# Patient Record
Sex: FEMALE
[De-identification: names, ages, dates, MRNs, and addresses within clinical notes are randomized per-mention and may not be internally consistent; named-entity substitution may affect disease eponyms.]

---

## 2019-05-17 ENCOUNTER — HOSPITAL ENCOUNTER (OUTPATIENT)
Dept: HOSPITAL 31 - C.MAMMO | Age: 59
End: 2019-05-17
Payer: COMMERCIAL

## 2019-05-17 DIAGNOSIS — Z12.31: Primary | ICD-10-CM

## 2024-06-02 ENCOUNTER — HOSPITAL ENCOUNTER (INPATIENT)
Facility: HOSPITAL | Age: 64
Discharge: NURSING, REHAB OR LONG-TERM CARE | End: 2024-06-02
Attending: STUDENT IN AN ORGANIZED HEALTH CARE EDUCATION/TRAINING PROGRAM | Admitting: EMERGENCY MEDICINE
Payer: MEDICARE

## 2024-06-02 ENCOUNTER — APPOINTMENT (OUTPATIENT)
Dept: CARDIOLOGY | Facility: HOSPITAL | Age: 64
End: 2024-06-02

## 2024-06-02 DIAGNOSIS — G62.9 NEUROPATHY: ICD-10-CM

## 2024-06-02 DIAGNOSIS — I10 HYPERTENSION, UNSPECIFIED TYPE: ICD-10-CM

## 2024-06-02 DIAGNOSIS — Z13.9 ENCOUNTER FOR MEDICAL SCREENING EXAMINATION: ICD-10-CM

## 2024-06-02 DIAGNOSIS — L85.3 DRY SKIN: ICD-10-CM

## 2024-06-02 DIAGNOSIS — R73.9 HYPERGLYCEMIA: ICD-10-CM

## 2024-06-02 DIAGNOSIS — K59.00 CONSTIPATION, UNSPECIFIED CONSTIPATION TYPE: ICD-10-CM

## 2024-06-02 DIAGNOSIS — N17.0 ACUTE RENAL FAILURE WITH ACUTE TUBULAR NECROSIS SUPERIMPOSED ON STAGE 3B CHRONIC KIDNEY DISEASE (MULTI): ICD-10-CM

## 2024-06-02 DIAGNOSIS — R06.00 DYSPNEA, UNSPECIFIED: ICD-10-CM

## 2024-06-02 DIAGNOSIS — R53.1 WEAKNESS: ICD-10-CM

## 2024-06-02 DIAGNOSIS — J96.02 ACUTE RESPIRATORY FAILURE WITH HYPOXIA AND HYPERCAPNIA (MULTI): ICD-10-CM

## 2024-06-02 DIAGNOSIS — H40.9 GLAUCOMA, UNSPECIFIED GLAUCOMA TYPE, UNSPECIFIED LATERALITY: ICD-10-CM

## 2024-06-02 DIAGNOSIS — Z78.9 UNABLE TO CARE FOR SELF: ICD-10-CM

## 2024-06-02 DIAGNOSIS — Z79.4 TYPE 2 DIABETES MELLITUS WITH HYPERGLYCEMIA, WITH LONG-TERM CURRENT USE OF INSULIN (MULTI): ICD-10-CM

## 2024-06-02 DIAGNOSIS — E11.65 TYPE 2 DIABETES MELLITUS WITH HYPERGLYCEMIA, WITH LONG-TERM CURRENT USE OF INSULIN (MULTI): ICD-10-CM

## 2024-06-02 DIAGNOSIS — N19 RENAL FAILURE, UNSPECIFIED CHRONICITY: ICD-10-CM

## 2024-06-02 DIAGNOSIS — N18.32 ACUTE RENAL FAILURE WITH ACUTE TUBULAR NECROSIS SUPERIMPOSED ON STAGE 3B CHRONIC KIDNEY DISEASE (MULTI): ICD-10-CM

## 2024-06-02 DIAGNOSIS — B37.9 CANDIDIASIS: ICD-10-CM

## 2024-06-02 DIAGNOSIS — K76.0 NAFL (NONALCOHOLIC FATTY LIVER): Primary | ICD-10-CM

## 2024-06-02 DIAGNOSIS — J96.01 ACUTE RESPIRATORY FAILURE WITH HYPOXIA AND HYPERCAPNIA (MULTI): ICD-10-CM

## 2024-06-02 DIAGNOSIS — I73.9 PERIPHERAL VASCULAR DISEASE (CMS-HCC): ICD-10-CM

## 2024-06-02 LAB
ALBUMIN SERPL-MCNC: 3.1 G/DL (ref 3.5–5)
ALP BLD-CCNC: 186 U/L (ref 35–125)
ALT SERPL-CCNC: 13 U/L (ref 5–40)
ANION GAP SERPL CALC-SCNC: 10 MMOL/L
AST SERPL-CCNC: 20 U/L (ref 5–40)
BASOPHILS # BLD AUTO: 0.03 X10*3/UL (ref 0–0.1)
BASOPHILS NFR BLD AUTO: 0.5 %
BILIRUB SERPL-MCNC: 0.4 MG/DL (ref 0.1–1.2)
BUN SERPL-MCNC: 46 MG/DL (ref 8–25)
CALCIUM SERPL-MCNC: 8.3 MG/DL (ref 8.5–10.4)
CHLORIDE SERPL-SCNC: 99 MMOL/L (ref 97–107)
CO2 SERPL-SCNC: 26 MMOL/L (ref 24–31)
CREAT SERPL-MCNC: 2.1 MG/DL (ref 0.4–1.6)
EGFRCR SERPLBLD CKD-EPI 2021: 26 ML/MIN/1.73M*2
EOSINOPHIL # BLD AUTO: 0.12 X10*3/UL (ref 0–0.7)
EOSINOPHIL NFR BLD AUTO: 1.8 %
ERYTHROCYTE [DISTWIDTH] IN BLOOD BY AUTOMATED COUNT: 20.9 % (ref 11.5–14.5)
GLUCOSE SERPL-MCNC: 453 MG/DL (ref 65–99)
HCT VFR BLD AUTO: 25.8 % (ref 36–46)
HGB BLD-MCNC: 7.8 G/DL (ref 12–16)
IMM GRANULOCYTES # BLD AUTO: 0.05 X10*3/UL (ref 0–0.7)
IMM GRANULOCYTES NFR BLD AUTO: 0.8 % (ref 0–0.9)
LYMPHOCYTES # BLD AUTO: 0.86 X10*3/UL (ref 1.2–4.8)
LYMPHOCYTES NFR BLD AUTO: 12.9 %
MCH RBC QN AUTO: 27.1 PG (ref 26–34)
MCHC RBC AUTO-ENTMCNC: 30.2 G/DL (ref 32–36)
MCV RBC AUTO: 90 FL (ref 80–100)
MONOCYTES # BLD AUTO: 0.65 X10*3/UL (ref 0.1–1)
MONOCYTES NFR BLD AUTO: 9.8 %
NEUTROPHILS # BLD AUTO: 4.94 X10*3/UL (ref 1.2–7.7)
NEUTROPHILS NFR BLD AUTO: 74.2 %
NRBC BLD-RTO: 0 /100 WBCS (ref 0–0)
PLATELET # BLD AUTO: 182 X10*3/UL (ref 150–450)
POLYCHROMASIA BLD QL SMEAR: NORMAL
POTASSIUM SERPL-SCNC: 4.8 MMOL/L (ref 3.4–5.1)
PROT SERPL-MCNC: 7 G/DL (ref 5.9–7.9)
RBC # BLD AUTO: 2.88 X10*6/UL (ref 4–5.2)
RBC MORPH BLD: NORMAL
SODIUM SERPL-SCNC: 135 MMOL/L (ref 133–145)
WBC # BLD AUTO: 6.7 X10*3/UL (ref 4.4–11.3)

## 2024-06-02 PROCEDURE — 83036 HEMOGLOBIN GLYCOSYLATED A1C: CPT | Performed by: EMERGENCY MEDICINE

## 2024-06-02 PROCEDURE — 99285 EMERGENCY DEPT VISIT HI MDM: CPT

## 2024-06-02 PROCEDURE — 93010 ELECTROCARDIOGRAM REPORT: CPT | Performed by: INTERNAL MEDICINE

## 2024-06-02 PROCEDURE — 80053 COMPREHEN METABOLIC PANEL: CPT | Performed by: STUDENT IN AN ORGANIZED HEALTH CARE EDUCATION/TRAINING PROGRAM

## 2024-06-02 PROCEDURE — 84443 ASSAY THYROID STIM HORMONE: CPT | Performed by: EMERGENCY MEDICINE

## 2024-06-02 PROCEDURE — 2500000002 HC RX 250 W HCPCS SELF ADMINISTERED DRUGS (ALT 637 FOR MEDICARE OP, ALT 636 FOR OP/ED): Performed by: STUDENT IN AN ORGANIZED HEALTH CARE EDUCATION/TRAINING PROGRAM

## 2024-06-02 PROCEDURE — 85025 COMPLETE CBC W/AUTO DIFF WBC: CPT | Performed by: STUDENT IN AN ORGANIZED HEALTH CARE EDUCATION/TRAINING PROGRAM

## 2024-06-02 PROCEDURE — 36415 COLL VENOUS BLD VENIPUNCTURE: CPT | Performed by: STUDENT IN AN ORGANIZED HEALTH CARE EDUCATION/TRAINING PROGRAM

## 2024-06-02 PROCEDURE — 86140 C-REACTIVE PROTEIN: CPT | Performed by: EMERGENCY MEDICINE

## 2024-06-02 PROCEDURE — 93005 ELECTROCARDIOGRAM TRACING: CPT

## 2024-06-02 RX ORDER — METOPROLOL SUCCINATE 50 MG/1
50 TABLET, EXTENDED RELEASE ORAL DAILY
Status: ON HOLD | COMMUNITY

## 2024-06-02 RX ORDER — LOSARTAN POTASSIUM 50 MG/1
50 TABLET ORAL DAILY
Status: ON HOLD | COMMUNITY

## 2024-06-02 RX ORDER — HYDROCORTISONE 25 MG/G
1 CREAM TOPICAL DAILY
Status: ON HOLD | COMMUNITY

## 2024-06-02 RX ORDER — ASCORBIC ACID 500 MG
500 TABLET ORAL DAILY
Status: ON HOLD | COMMUNITY

## 2024-06-02 RX ORDER — ROSUVASTATIN CALCIUM 5 MG/1
5 TABLET, COATED ORAL DAILY
Status: ON HOLD | COMMUNITY

## 2024-06-02 RX ORDER — INSULIN LISPRO 100 [IU]/ML
INJECTION, SOLUTION INTRAVENOUS; SUBCUTANEOUS
Status: ON HOLD | COMMUNITY

## 2024-06-02 RX ORDER — NITROFURANTOIN 25; 75 MG/1; MG/1
100 CAPSULE ORAL 2 TIMES DAILY
Status: ON HOLD | COMMUNITY
End: 2024-06-04

## 2024-06-02 RX ORDER — FERROUS SULFATE 325(65) MG
325 TABLET ORAL
Status: ON HOLD | COMMUNITY

## 2024-06-02 RX ORDER — INSULIN LISPRO 100 [IU]/ML
10 INJECTION, SOLUTION INTRAVENOUS; SUBCUTANEOUS ONCE
Status: COMPLETED | OUTPATIENT
Start: 2024-06-02 | End: 2024-06-02

## 2024-06-02 RX ORDER — AMLODIPINE BESYLATE 10 MG/1
10 TABLET ORAL DAILY
Status: ON HOLD | COMMUNITY

## 2024-06-02 RX ORDER — FUROSEMIDE 40 MG/1
40 TABLET ORAL DAILY
Status: ON HOLD | COMMUNITY

## 2024-06-02 RX ORDER — GABAPENTIN 300 MG/1
300 CAPSULE ORAL 3 TIMES DAILY
Status: ON HOLD | COMMUNITY

## 2024-06-02 RX ORDER — PANTOPRAZOLE SODIUM 40 MG/1
40 TABLET, DELAYED RELEASE ORAL
Status: ON HOLD | COMMUNITY

## 2024-06-02 RX ORDER — LEVOTHYROXINE SODIUM 75 UG/1
75 TABLET ORAL DAILY
Status: ON HOLD | COMMUNITY

## 2024-06-02 RX ORDER — INSULIN LISPRO 100 [IU]/ML
5 INJECTION, SOLUTION INTRAVENOUS; SUBCUTANEOUS
Status: ON HOLD | COMMUNITY

## 2024-06-02 RX ADMIN — INSULIN LISPRO 10 UNITS: 100 INJECTION, SOLUTION INTRAVENOUS; SUBCUTANEOUS at 23:56

## 2024-06-02 ASSESSMENT — PAIN - FUNCTIONAL ASSESSMENT: PAIN_FUNCTIONAL_ASSESSMENT: 0-10

## 2024-06-02 ASSESSMENT — PAIN SCALES - GENERAL: PAINLEVEL_OUTOF10: 0 - NO PAIN

## 2024-06-02 ASSESSMENT — COLUMBIA-SUICIDE SEVERITY RATING SCALE - C-SSRS
2. HAVE YOU ACTUALLY HAD ANY THOUGHTS OF KILLING YOURSELF?: NO
6. HAVE YOU EVER DONE ANYTHING, STARTED TO DO ANYTHING, OR PREPARED TO DO ANYTHING TO END YOUR LIFE?: NO
1. IN THE PAST MONTH, HAVE YOU WISHED YOU WERE DEAD OR WISHED YOU COULD GO TO SLEEP AND NOT WAKE UP?: NO

## 2024-06-03 ENCOUNTER — APPOINTMENT (OUTPATIENT)
Dept: CARDIOLOGY | Facility: HOSPITAL | Age: 64
End: 2024-06-03
Payer: MEDICARE

## 2024-06-03 ENCOUNTER — APPOINTMENT (OUTPATIENT)
Dept: RADIOLOGY | Facility: HOSPITAL | Age: 64
End: 2024-06-03
Payer: MEDICARE

## 2024-06-03 PROBLEM — I73.9 PERIPHERAL VASCULAR DISEASE (CMS-HCC): Status: ACTIVE | Noted: 2024-06-03

## 2024-06-03 PROBLEM — E11.9 TYPE 2 DIABETES MELLITUS (MULTI): Status: ACTIVE | Noted: 2024-06-03

## 2024-06-03 PROBLEM — Z78.9 UNABLE TO CARE FOR SELF: Status: ACTIVE | Noted: 2024-06-03

## 2024-06-03 PROBLEM — I10 HYPERTENSION: Status: ACTIVE | Noted: 2024-06-03

## 2024-06-03 PROBLEM — F69 BEHAVIOR PROBLEM, ADULT: Status: ACTIVE | Noted: 2024-06-03

## 2024-06-03 PROBLEM — L89.153 PRESSURE INJURY OF SACRAL REGION, STAGE 3 (MULTI): Status: ACTIVE | Noted: 2024-06-03

## 2024-06-03 PROBLEM — R53.1 WEAKNESS: Status: ACTIVE | Noted: 2024-06-03

## 2024-06-03 PROBLEM — N19 RENAL FAILURE: Status: ACTIVE | Noted: 2024-06-03

## 2024-06-03 PROBLEM — E66.01 MORBID OBESITY (MULTI): Status: ACTIVE | Noted: 2024-06-03

## 2024-06-03 LAB
ALBUMIN SERPL-MCNC: 3 G/DL (ref 3.5–5)
AMPHETAMINES UR QL SCN>1000 NG/ML: NEGATIVE
ANION GAP SERPL CALC-SCNC: 10 MMOL/L
APPARATUS: ABNORMAL
APPEARANCE UR: ABNORMAL
ARTERIAL PATENCY WRIST A: POSITIVE
BACTERIA #/AREA URNS AUTO: ABNORMAL /HPF
BARBITURATES UR QL SCN>300 NG/ML: NEGATIVE
BASE EXCESS BLDA CALC-SCNC: 0.8 MMOL/L (ref -2–3)
BENZODIAZ UR QL SCN>300 NG/ML: NEGATIVE
BILIRUB UR STRIP.AUTO-MCNC: NEGATIVE MG/DL
BODY TEMPERATURE: 37 DEGREES CELSIUS
BUN SERPL-MCNC: 42 MG/DL (ref 8–25)
BZE UR QL SCN>300 NG/ML: NEGATIVE
CALCIUM SERPL-MCNC: 8.1 MG/DL (ref 8.5–10.4)
CANNABINOIDS UR QL SCN>50 NG/ML: NEGATIVE
CHLORIDE SERPL-SCNC: 101 MMOL/L (ref 97–107)
CK SERPL-CCNC: 72 U/L (ref 24–195)
CO2 SERPL-SCNC: 26 MMOL/L (ref 24–31)
COLOR UR: YELLOW
CREAT SERPL-MCNC: 2 MG/DL (ref 0.4–1.6)
CRP SERPL-MCNC: 3.6 MG/DL (ref 0–2)
EGFRCR SERPLBLD CKD-EPI 2021: 27 ML/MIN/1.73M*2
EST. AVERAGE GLUCOSE BLD GHB EST-MCNC: 169 MG/DL
FENTANYL+NORFENTANYL UR QL SCN: NEGATIVE
FERRITIN SERPL-MCNC: 330 NG/ML (ref 13–150)
GLUCOSE BLD MANUAL STRIP-MCNC: 247 MG/DL (ref 74–99)
GLUCOSE BLD MANUAL STRIP-MCNC: 265 MG/DL (ref 74–99)
GLUCOSE BLD MANUAL STRIP-MCNC: 269 MG/DL (ref 74–99)
GLUCOSE BLD MANUAL STRIP-MCNC: 423 MG/DL (ref 74–99)
GLUCOSE BLD MANUAL STRIP-MCNC: 441 MG/DL (ref 74–99)
GLUCOSE SERPL-MCNC: 379 MG/DL (ref 65–99)
GLUCOSE UR STRIP.AUTO-MCNC: ABNORMAL MG/DL
HBA1C MFR BLD: 7.5 %
HCO3 BLDA-SCNC: 28.5 MMOL/L (ref 22–26)
INHALED O2 CONCENTRATION: 28 %
IRON SATN MFR SERPL: 21 % (ref 12–50)
IRON SERPL-MCNC: 40 UG/DL (ref 30–160)
KETONES UR STRIP.AUTO-MCNC: NEGATIVE MG/DL
LEUKOCYTE ESTERASE UR QL STRIP.AUTO: ABNORMAL
METHADONE UR QL SCN>300 NG/ML: NEGATIVE
NITRITE UR QL STRIP.AUTO: NEGATIVE
OPIATES UR QL SCN>300 NG/ML: NEGATIVE
OXYCODONE UR QL: NEGATIVE
OXYHGB MFR BLDA: ABNORMAL %
PCO2 BLDA: 58 MM HG (ref 38–42)
PCP UR QL SCN>25 NG/ML: NEGATIVE
PH BLDA: 7.3 PH (ref 7.38–7.42)
PH UR STRIP.AUTO: 8 [PH]
PHOSPHATE SERPL-MCNC: 4.3 MG/DL (ref 2.5–4.5)
PO2 BLDA: 103 MM HG (ref 85–95)
POTASSIUM SERPL-SCNC: 4.6 MMOL/L (ref 3.4–5.1)
PROT UR STRIP.AUTO-MCNC: ABNORMAL MG/DL
RBC # UR STRIP.AUTO: NEGATIVE /UL
RBC #/AREA URNS AUTO: ABNORMAL /HPF
SAO2 % BLDA: ABNORMAL %
SODIUM SERPL-SCNC: 137 MMOL/L (ref 133–145)
SP GR UR STRIP.AUTO: 1.02
SPECIMEN DRAWN FROM PATIENT: ABNORMAL
T3FREE SERPL-MCNC: 2.2 PG/ML (ref 2.3–4.2)
TIBC SERPL-MCNC: 193 UG/DL (ref 228–428)
TRI-PHOS CRY #/AREA UR COMP ASSIST: ABNORMAL /HPF
TSH SERPL DL<=0.05 MIU/L-ACNC: 7.13 MIU/L (ref 0.27–4.2)
UIBC SERPL-MCNC: 153 UG/DL (ref 110–370)
UROBILINOGEN UR STRIP.AUTO-MCNC: NORMAL MG/DL
WBC #/AREA URNS AUTO: >50 /HPF
WBC CLUMPS #/AREA URNS AUTO: ABNORMAL /HPF

## 2024-06-03 PROCEDURE — 2500000002 HC RX 250 W HCPCS SELF ADMINISTERED DRUGS (ALT 637 FOR MEDICARE OP, ALT 636 FOR OP/ED): Performed by: EMERGENCY MEDICINE

## 2024-06-03 PROCEDURE — 97535 SELF CARE MNGMENT TRAINING: CPT | Mod: GO

## 2024-06-03 PROCEDURE — 94760 N-INVAS EAR/PLS OXIMETRY 1: CPT

## 2024-06-03 PROCEDURE — 83540 ASSAY OF IRON: CPT | Performed by: INTERNAL MEDICINE

## 2024-06-03 PROCEDURE — 99222 1ST HOSP IP/OBS MODERATE 55: CPT

## 2024-06-03 PROCEDURE — 82805 BLOOD GASES W/O2 SATURATION: CPT | Performed by: EMERGENCY MEDICINE

## 2024-06-03 PROCEDURE — 36600 WITHDRAWAL OF ARTERIAL BLOOD: CPT

## 2024-06-03 PROCEDURE — 97530 THERAPEUTIC ACTIVITIES: CPT | Mod: GP

## 2024-06-03 PROCEDURE — 71046 X-RAY EXAM CHEST 2 VIEWS: CPT | Performed by: RADIOLOGY

## 2024-06-03 PROCEDURE — 82728 ASSAY OF FERRITIN: CPT | Performed by: INTERNAL MEDICINE

## 2024-06-03 PROCEDURE — 2500000002 HC RX 250 W HCPCS SELF ADMINISTERED DRUGS (ALT 637 FOR MEDICARE OP, ALT 636 FOR OP/ED): Performed by: NURSE PRACTITIONER

## 2024-06-03 PROCEDURE — 93005 ELECTROCARDIOGRAM TRACING: CPT

## 2024-06-03 PROCEDURE — 9420000001 HC RT PATIENT EDUCATION 5 MIN

## 2024-06-03 PROCEDURE — 1100000001 HC PRIVATE ROOM DAILY

## 2024-06-03 PROCEDURE — 84100 ASSAY OF PHOSPHORUS: CPT | Performed by: INTERNAL MEDICINE

## 2024-06-03 PROCEDURE — 82550 ASSAY OF CK (CPK): CPT | Performed by: INTERNAL MEDICINE

## 2024-06-03 PROCEDURE — 83550 IRON BINDING TEST: CPT | Performed by: INTERNAL MEDICINE

## 2024-06-03 PROCEDURE — 80307 DRUG TEST PRSMV CHEM ANLYZR: CPT | Performed by: EMERGENCY MEDICINE

## 2024-06-03 PROCEDURE — 2500000005 HC RX 250 GENERAL PHARMACY W/O HCPCS: Performed by: EMERGENCY MEDICINE

## 2024-06-03 PROCEDURE — 84481 FREE ASSAY (FT-3): CPT | Mod: TRILAB,WESLAB | Performed by: NURSE PRACTITIONER

## 2024-06-03 PROCEDURE — 36415 COLL VENOUS BLD VENIPUNCTURE: CPT | Performed by: INTERNAL MEDICINE

## 2024-06-03 PROCEDURE — 82947 ASSAY GLUCOSE BLOOD QUANT: CPT

## 2024-06-03 PROCEDURE — 97166 OT EVAL MOD COMPLEX 45 MIN: CPT | Mod: GO

## 2024-06-03 PROCEDURE — 97163 PT EVAL HIGH COMPLEX 45 MIN: CPT | Mod: GP

## 2024-06-03 PROCEDURE — 76770 US EXAM ABDO BACK WALL COMP: CPT

## 2024-06-03 PROCEDURE — 2500000001 HC RX 250 WO HCPCS SELF ADMINISTERED DRUGS (ALT 637 FOR MEDICARE OP): Performed by: EMERGENCY MEDICINE

## 2024-06-03 PROCEDURE — 2500000004 HC RX 250 GENERAL PHARMACY W/ HCPCS (ALT 636 FOR OP/ED): Performed by: INTERNAL MEDICINE

## 2024-06-03 PROCEDURE — 80069 RENAL FUNCTION PANEL: CPT | Performed by: INTERNAL MEDICINE

## 2024-06-03 PROCEDURE — 97530 THERAPEUTIC ACTIVITIES: CPT | Mod: GO

## 2024-06-03 PROCEDURE — 36415 COLL VENOUS BLD VENIPUNCTURE: CPT | Performed by: NURSE PRACTITIONER

## 2024-06-03 PROCEDURE — 71046 X-RAY EXAM CHEST 2 VIEWS: CPT

## 2024-06-03 PROCEDURE — 81001 URINALYSIS AUTO W/SCOPE: CPT | Performed by: EMERGENCY MEDICINE

## 2024-06-03 PROCEDURE — 2500000004 HC RX 250 GENERAL PHARMACY W/ HCPCS (ALT 636 FOR OP/ED): Performed by: EMERGENCY MEDICINE

## 2024-06-03 PROCEDURE — 94640 AIRWAY INHALATION TREATMENT: CPT

## 2024-06-03 PROCEDURE — 82810 BLOOD GASES O2 SAT ONLY: CPT | Performed by: EMERGENCY MEDICINE

## 2024-06-03 PROCEDURE — 76770 US EXAM ABDO BACK WALL COMP: CPT | Performed by: RADIOLOGY

## 2024-06-03 RX ORDER — ENOXAPARIN SODIUM 100 MG/ML
40 INJECTION SUBCUTANEOUS EVERY 12 HOURS SCHEDULED
Status: DISCONTINUED | OUTPATIENT
Start: 2024-06-03 | End: 2024-06-03

## 2024-06-03 RX ORDER — GABAPENTIN 300 MG/1
300 CAPSULE ORAL 3 TIMES DAILY
Status: DISCONTINUED | OUTPATIENT
Start: 2024-06-03 | End: 2024-06-06

## 2024-06-03 RX ORDER — IPRATROPIUM BROMIDE AND ALBUTEROL SULFATE 2.5; .5 MG/3ML; MG/3ML
3 SOLUTION RESPIRATORY (INHALATION)
Status: DISCONTINUED | OUTPATIENT
Start: 2024-06-03 | End: 2024-06-03

## 2024-06-03 RX ORDER — LEVOTHYROXINE SODIUM 75 UG/1
75 TABLET ORAL DAILY
Status: DISPENSED | OUTPATIENT
Start: 2024-06-03

## 2024-06-03 RX ORDER — INSULIN LISPRO 100 [IU]/ML
0-15 INJECTION, SOLUTION INTRAVENOUS; SUBCUTANEOUS 4 TIMES DAILY PRN
Status: DISCONTINUED | OUTPATIENT
Start: 2024-06-03 | End: 2024-06-03

## 2024-06-03 RX ORDER — ACETAMINOPHEN 325 MG/1
650 TABLET ORAL EVERY 4 HOURS PRN
Status: DISPENSED | OUTPATIENT
Start: 2024-06-03

## 2024-06-03 RX ORDER — INSULIN LISPRO 100 [IU]/ML
0-15 INJECTION, SOLUTION INTRAVENOUS; SUBCUTANEOUS 3 TIMES DAILY
Status: DISCONTINUED | OUTPATIENT
Start: 2024-06-03 | End: 2024-06-03

## 2024-06-03 RX ORDER — IPRATROPIUM BROMIDE AND ALBUTEROL SULFATE 2.5; .5 MG/3ML; MG/3ML
3 SOLUTION RESPIRATORY (INHALATION) EVERY 2 HOUR PRN
Status: DISPENSED | OUTPATIENT
Start: 2024-06-03

## 2024-06-03 RX ORDER — NYSTATIN 100000 [USP'U]/G
1 POWDER TOPICAL EVERY 8 HOURS
Status: DISPENSED | OUTPATIENT
Start: 2024-06-03

## 2024-06-03 RX ORDER — FERROUS SULFATE 325(65) MG
1 TABLET ORAL
Status: DISPENSED | OUTPATIENT
Start: 2024-06-03

## 2024-06-03 RX ORDER — TOLNAFTATE 1 G/100G
POWDER TOPICAL EVERY 8 HOURS
Status: DISCONTINUED | OUTPATIENT
Start: 2024-06-03 | End: 2024-06-03

## 2024-06-03 RX ORDER — AMLODIPINE BESYLATE 10 MG/1
10 TABLET ORAL DAILY
Status: DISCONTINUED | OUTPATIENT
Start: 2024-06-03 | End: 2024-06-06

## 2024-06-03 RX ORDER — MULTIVIT-MIN/IRON FUM/FOLIC AC 7.5 MG-4
1 TABLET ORAL DAILY
Status: DISPENSED | OUTPATIENT
Start: 2024-06-03

## 2024-06-03 RX ORDER — INSULIN LISPRO 100 [IU]/ML
0-15 INJECTION, SOLUTION INTRAVENOUS; SUBCUTANEOUS
Status: DISCONTINUED | OUTPATIENT
Start: 2024-06-04 | End: 2024-06-07

## 2024-06-03 RX ORDER — IPRATROPIUM BROMIDE AND ALBUTEROL SULFATE 2.5; .5 MG/3ML; MG/3ML
3 SOLUTION RESPIRATORY (INHALATION) EVERY 6 HOURS PRN
Status: DISCONTINUED | OUTPATIENT
Start: 2024-06-03 | End: 2024-06-03

## 2024-06-03 RX ORDER — INSULIN LISPRO 100 [IU]/ML
20 INJECTION, SOLUTION INTRAVENOUS; SUBCUTANEOUS ONCE
Status: COMPLETED | OUTPATIENT
Start: 2024-06-03 | End: 2024-06-03

## 2024-06-03 RX ORDER — ONDANSETRON HYDROCHLORIDE 2 MG/ML
4 INJECTION, SOLUTION INTRAVENOUS EVERY 8 HOURS PRN
Status: DISCONTINUED | OUTPATIENT
Start: 2024-06-03 | End: 2024-06-04

## 2024-06-03 RX ORDER — ACETAMINOPHEN 650 MG/1
650 SUPPOSITORY RECTAL EVERY 4 HOURS PRN
Status: DISCONTINUED | OUTPATIENT
Start: 2024-06-03 | End: 2024-06-04

## 2024-06-03 RX ORDER — ROSUVASTATIN CALCIUM 10 MG/1
5 TABLET, COATED ORAL DAILY
Status: DISPENSED | OUTPATIENT
Start: 2024-06-03

## 2024-06-03 RX ORDER — DEXTROSE 50 % IN WATER (D50W) INTRAVENOUS SYRINGE
12.5
Status: DISPENSED | OUTPATIENT
Start: 2024-06-03

## 2024-06-03 RX ORDER — PANTOPRAZOLE SODIUM 40 MG/1
40 TABLET, DELAYED RELEASE ORAL
Status: DISPENSED | OUTPATIENT
Start: 2024-06-03

## 2024-06-03 RX ORDER — ENOXAPARIN SODIUM 100 MG/ML
40 INJECTION SUBCUTANEOUS DAILY
Status: DISCONTINUED | OUTPATIENT
Start: 2024-06-04 | End: 2024-06-13

## 2024-06-03 RX ORDER — ONDANSETRON 4 MG/1
4 TABLET, ORALLY DISINTEGRATING ORAL EVERY 8 HOURS PRN
Status: ACTIVE | OUTPATIENT
Start: 2024-06-03

## 2024-06-03 RX ORDER — AMMONIUM LACTATE 12 G/100G
LOTION TOPICAL
Status: DISPENSED | OUTPATIENT
Start: 2024-06-03

## 2024-06-03 RX ORDER — METOPROLOL SUCCINATE 50 MG/1
50 TABLET, EXTENDED RELEASE ORAL DAILY
Status: DISCONTINUED | OUTPATIENT
Start: 2024-06-03 | End: 2024-06-13

## 2024-06-03 RX ORDER — LOSARTAN POTASSIUM 50 MG/1
50 TABLET ORAL DAILY
Status: DISCONTINUED | OUTPATIENT
Start: 2024-06-03 | End: 2024-06-13

## 2024-06-03 RX ORDER — INSULIN GLARGINE 100 [IU]/ML
10 INJECTION, SOLUTION SUBCUTANEOUS EVERY 24 HOURS
Status: DISCONTINUED | OUTPATIENT
Start: 2024-06-03 | End: 2024-06-05

## 2024-06-03 RX ORDER — KETOTIFEN FUMARATE 0.35 MG/ML
1 SOLUTION/ DROPS OPHTHALMIC 2 TIMES DAILY
Status: DISPENSED | OUTPATIENT
Start: 2024-06-03

## 2024-06-03 RX ORDER — ACETAMINOPHEN 160 MG/5ML
650 SOLUTION ORAL EVERY 4 HOURS PRN
Status: DISCONTINUED | OUTPATIENT
Start: 2024-06-03 | End: 2024-06-04

## 2024-06-03 RX ORDER — IPRATROPIUM BROMIDE AND ALBUTEROL SULFATE 2.5; .5 MG/3ML; MG/3ML
3 SOLUTION RESPIRATORY (INHALATION)
Status: DISPENSED | OUTPATIENT
Start: 2024-06-03

## 2024-06-03 RX ORDER — FUROSEMIDE 10 MG/ML
40 INJECTION INTRAMUSCULAR; INTRAVENOUS ONCE
Status: COMPLETED | OUTPATIENT
Start: 2024-06-03 | End: 2024-06-03

## 2024-06-03 RX ADMIN — METOPROLOL SUCCINATE 50 MG: 50 TABLET, EXTENDED RELEASE ORAL at 08:39

## 2024-06-03 RX ADMIN — Medication 2 L/MIN: at 15:00

## 2024-06-03 RX ADMIN — INSULIN LISPRO 9 UNITS: 100 INJECTION, SOLUTION INTRAVENOUS; SUBCUTANEOUS at 17:15

## 2024-06-03 RX ADMIN — IPRATROPIUM BROMIDE AND ALBUTEROL SULFATE 3 ML: .5; 3 SOLUTION RESPIRATORY (INHALATION) at 01:43

## 2024-06-03 RX ADMIN — NYSTATIN 1 APPLICATION: 100000 POWDER TOPICAL at 04:10

## 2024-06-03 RX ADMIN — PANTOPRAZOLE SODIUM 40 MG: 40 TABLET, DELAYED RELEASE ORAL at 06:00

## 2024-06-03 RX ADMIN — GABAPENTIN 300 MG: 300 CAPSULE ORAL at 08:39

## 2024-06-03 RX ADMIN — GABAPENTIN 300 MG: 300 CAPSULE ORAL at 21:51

## 2024-06-03 RX ADMIN — IPRATROPIUM BROMIDE AND ALBUTEROL SULFATE 3 ML: .5; 3 SOLUTION RESPIRATORY (INHALATION) at 12:49

## 2024-06-03 RX ADMIN — INSULIN LISPRO 20 UNITS: 100 INJECTION, SOLUTION INTRAVENOUS; SUBCUTANEOUS at 08:41

## 2024-06-03 RX ADMIN — ROSUVASTATIN CALCIUM 5 MG: 10 TABLET, COATED ORAL at 08:40

## 2024-06-03 RX ADMIN — NYSTATIN 1 APPLICATION: 100000 POWDER TOPICAL at 13:02

## 2024-06-03 RX ADMIN — AMLODIPINE BESYLATE 10 MG: 10 TABLET ORAL at 08:38

## 2024-06-03 RX ADMIN — KETOTIFEN FUMARATE 1 DROP: 0.25 SOLUTION/ DROPS OPHTHALMIC at 21:52

## 2024-06-03 RX ADMIN — IPRATROPIUM BROMIDE AND ALBUTEROL SULFATE 3 ML: .5; 3 SOLUTION RESPIRATORY (INHALATION) at 07:47

## 2024-06-03 RX ADMIN — Medication 3 L/MIN: at 08:00

## 2024-06-03 RX ADMIN — NYSTATIN 1 APPLICATION: 100000 POWDER TOPICAL at 22:01

## 2024-06-03 RX ADMIN — INSULIN HUMAN 8 UNITS: 100 INJECTION, SOLUTION PARENTERAL at 01:43

## 2024-06-03 RX ADMIN — KETOTIFEN FUMARATE 1 DROP: 0.25 SOLUTION/ DROPS OPHTHALMIC at 08:39

## 2024-06-03 RX ADMIN — INSULIN LISPRO 9 UNITS: 100 INJECTION, SOLUTION INTRAVENOUS; SUBCUTANEOUS at 12:55

## 2024-06-03 RX ADMIN — FUROSEMIDE 40 MG: 10 INJECTION, SOLUTION INTRAMUSCULAR; INTRAVENOUS at 13:02

## 2024-06-03 RX ADMIN — INSULIN GLARGINE 10 UNITS: 100 INJECTION, SOLUTION SUBCUTANEOUS at 17:01

## 2024-06-03 RX ADMIN — LEVOTHYROXINE SODIUM 75 MCG: 0.07 TABLET ORAL at 05:57

## 2024-06-03 RX ADMIN — FERROUS SULFATE TAB 325 MG (65 MG ELEMENTAL FE) 1 TABLET: 325 (65 FE) TAB at 08:39

## 2024-06-03 RX ADMIN — GABAPENTIN 300 MG: 300 CAPSULE ORAL at 16:34

## 2024-06-03 RX ADMIN — LOSARTAN POTASSIUM 50 MG: 50 TABLET, FILM COATED ORAL at 08:39

## 2024-06-03 RX ADMIN — ENOXAPARIN SODIUM 40 MG: 40 INJECTION SUBCUTANEOUS at 05:57

## 2024-06-03 RX ADMIN — Medication 1 TABLET: at 08:40

## 2024-06-03 RX ADMIN — IPRATROPIUM BROMIDE AND ALBUTEROL SULFATE 3 ML: .5; 3 SOLUTION RESPIRATORY (INHALATION) at 19:19

## 2024-06-03 SDOH — SOCIAL STABILITY: SOCIAL INSECURITY: WITHIN THE LAST YEAR, HAVE YOU BEEN HUMILIATED OR EMOTIONALLY ABUSED IN OTHER WAYS BY YOUR PARTNER OR EX-PARTNER?: NO

## 2024-06-03 SDOH — SOCIAL STABILITY: SOCIAL NETWORK: HOW OFTEN DO YOU ATTENT MEETINGS OF THE CLUB OR ORGANIZATION YOU BELONG TO?: PATIENT DECLINED

## 2024-06-03 SDOH — SOCIAL STABILITY: SOCIAL INSECURITY: ARE YOU OR HAVE YOU BEEN THREATENED OR ABUSED PHYSICALLY, EMOTIONALLY, OR SEXUALLY BY ANYONE?: NO

## 2024-06-03 SDOH — SOCIAL STABILITY: SOCIAL INSECURITY: HAVE YOU HAD ANY THOUGHTS OF HARMING ANYONE ELSE?: NO

## 2024-06-03 SDOH — SOCIAL STABILITY: SOCIAL INSECURITY: WERE YOU ABLE TO COMPLETE ALL THE BEHAVIORAL HEALTH SCREENINGS?: YES

## 2024-06-03 SDOH — ECONOMIC STABILITY: FOOD INSECURITY: WITHIN THE PAST 12 MONTHS, THE FOOD YOU BOUGHT JUST DIDN'T LAST AND YOU DIDN'T HAVE MONEY TO GET MORE.: NEVER TRUE

## 2024-06-03 SDOH — HEALTH STABILITY: MENTAL HEALTH
STRESS IS WHEN SOMEONE FEELS TENSE, NERVOUS, ANXIOUS, OR CAN'T SLEEP AT NIGHT BECAUSE THEIR MIND IS TROUBLED. HOW STRESSED ARE YOU?: ONLY A LITTLE

## 2024-06-03 SDOH — ECONOMIC STABILITY: FOOD INSECURITY: WITHIN THE PAST 12 MONTHS, YOU WORRIED THAT YOUR FOOD WOULD RUN OUT BEFORE YOU GOT MONEY TO BUY MORE.: NEVER TRUE

## 2024-06-03 SDOH — SOCIAL STABILITY: SOCIAL INSECURITY: WITHIN THE LAST YEAR, HAVE YOU BEEN AFRAID OF YOUR PARTNER OR EX-PARTNER?: NO

## 2024-06-03 SDOH — ECONOMIC STABILITY: HOUSING INSECURITY
IN THE LAST 12 MONTHS, WAS THERE A TIME WHEN YOU DID NOT HAVE A STEADY PLACE TO SLEEP OR SLEPT IN A SHELTER (INCLUDING NOW)?: NO

## 2024-06-03 SDOH — HEALTH STABILITY: MENTAL HEALTH
HOW OFTEN DO YOU NEED TO HAVE SOMEONE HELP YOU WHEN YOU READ INSTRUCTIONS, PAMPHLETS, OR OTHER WRITTEN MATERIAL FROM YOUR DOCTOR OR PHARMACY?: NEVER

## 2024-06-03 SDOH — HEALTH STABILITY: PHYSICAL HEALTH: ON AVERAGE, HOW MANY DAYS PER WEEK DO YOU ENGAGE IN MODERATE TO STRENUOUS EXERCISE (LIKE A BRISK WALK)?: 0 DAYS

## 2024-06-03 SDOH — SOCIAL STABILITY: SOCIAL INSECURITY
WITHIN THE LAST YEAR, HAVE YOU BEEN KICKED, HIT, SLAPPED, OR OTHERWISE PHYSICALLY HURT BY YOUR PARTNER OR EX-PARTNER?: NO

## 2024-06-03 SDOH — SOCIAL STABILITY: SOCIAL NETWORK: ARE YOU MARRIED, WIDOWED, DIVORCED, SEPARATED, NEVER MARRIED, OR LIVING WITH A PARTNER?: PATIENT DECLINED

## 2024-06-03 SDOH — SOCIAL STABILITY: SOCIAL INSECURITY: HAS ANYONE EVER THREATENED TO HURT YOUR FAMILY OR YOUR PETS?: NO

## 2024-06-03 SDOH — ECONOMIC STABILITY: INCOME INSECURITY: IN THE LAST 12 MONTHS, WAS THERE A TIME WHEN YOU WERE NOT ABLE TO PAY THE MORTGAGE OR RENT ON TIME?: NO

## 2024-06-03 SDOH — SOCIAL STABILITY: SOCIAL INSECURITY: ABUSE: ADULT

## 2024-06-03 SDOH — ECONOMIC STABILITY: TRANSPORTATION INSECURITY
IN THE PAST 12 MONTHS, HAS THE LACK OF TRANSPORTATION KEPT YOU FROM MEDICAL APPOINTMENTS OR FROM GETTING MEDICATIONS?: NO

## 2024-06-03 SDOH — SOCIAL STABILITY: SOCIAL INSECURITY: HAVE YOU HAD THOUGHTS OF HARMING ANYONE ELSE?: NO

## 2024-06-03 SDOH — SOCIAL STABILITY: SOCIAL INSECURITY
WITHIN THE LAST YEAR, HAVE TO BEEN RAPED OR FORCED TO HAVE ANY KIND OF SEXUAL ACTIVITY BY YOUR PARTNER OR EX-PARTNER?: NO

## 2024-06-03 SDOH — ECONOMIC STABILITY: HOUSING INSECURITY: IN THE LAST 12 MONTHS, HOW MANY PLACES HAVE YOU LIVED?: 1

## 2024-06-03 SDOH — SOCIAL STABILITY: SOCIAL INSECURITY: DO YOU FEEL ANYONE HAS EXPLOITED OR TAKEN ADVANTAGE OF YOU FINANCIALLY OR OF YOUR PERSONAL PROPERTY?: NO

## 2024-06-03 SDOH — ECONOMIC STABILITY: INCOME INSECURITY: HOW HARD IS IT FOR YOU TO PAY FOR THE VERY BASICS LIKE FOOD, HOUSING, MEDICAL CARE, AND HEATING?: NOT HARD AT ALL

## 2024-06-03 SDOH — HEALTH STABILITY: PHYSICAL HEALTH: ON AVERAGE, HOW MANY MINUTES DO YOU ENGAGE IN EXERCISE AT THIS LEVEL?: 0 MIN

## 2024-06-03 SDOH — HEALTH STABILITY: MENTAL HEALTH
HOW OFTEN DO YOU NEED TO HAVE SOMEONE HELP YOU WHEN YOU READ INSTRUCTIONS, PAMPHLETS, OR OTHER WRITTEN MATERIAL FROM YOUR DOCTOR OR PHARMACY?: RARELY

## 2024-06-03 SDOH — ECONOMIC STABILITY: INCOME INSECURITY: IN THE PAST 12 MONTHS, HAS THE ELECTRIC, GAS, OIL, OR WATER COMPANY THREATENED TO SHUT OFF SERVICE IN YOUR HOME?: NO

## 2024-06-03 SDOH — ECONOMIC STABILITY: FOOD INSECURITY: WITHIN THE PAST 12 MONTHS, THE FOOD YOU BOUGHT JUST DIDN'T LAST AND YOU DIDN'T HAVE MONEY TO GET MORE.: PATIENT DECLINED

## 2024-06-03 SDOH — SOCIAL STABILITY: SOCIAL NETWORK: HOW OFTEN DO YOU GET TOGETHER WITH FRIENDS OR RELATIVES?: PATIENT DECLINED

## 2024-06-03 SDOH — ECONOMIC STABILITY: TRANSPORTATION INSECURITY
IN THE PAST 12 MONTHS, HAS LACK OF TRANSPORTATION KEPT YOU FROM MEETINGS, WORK, OR FROM GETTING THINGS NEEDED FOR DAILY LIVING?: NO

## 2024-06-03 SDOH — SOCIAL STABILITY: SOCIAL NETWORK: IN A TYPICAL WEEK, HOW MANY TIMES DO YOU TALK ON THE PHONE WITH FAMILY, FRIENDS, OR NEIGHBORS?: TWICE A WEEK

## 2024-06-03 SDOH — SOCIAL STABILITY: SOCIAL INSECURITY: DOES ANYONE TRY TO KEEP YOU FROM HAVING/CONTACTING OTHER FRIENDS OR DOING THINGS OUTSIDE YOUR HOME?: NO

## 2024-06-03 SDOH — ECONOMIC STABILITY: INCOME INSECURITY
IN THE PAST 12 MONTHS, HAS THE ELECTRIC, GAS, OIL, OR WATER COMPANY THREATENED TO SHUT OFF SERVICE IN YOUR HOME?: PATIENT DECLINED

## 2024-06-03 SDOH — SOCIAL STABILITY: SOCIAL INSECURITY: DO YOU FEEL UNSAFE GOING BACK TO THE PLACE WHERE YOU ARE LIVING?: NO

## 2024-06-03 SDOH — SOCIAL STABILITY: SOCIAL INSECURITY: ARE THERE ANY APPARENT SIGNS OF INJURIES/BEHAVIORS THAT COULD BE RELATED TO ABUSE/NEGLECT?: NO

## 2024-06-03 SDOH — SOCIAL STABILITY: SOCIAL NETWORK
DO YOU BELONG TO ANY CLUBS OR ORGANIZATIONS SUCH AS CHURCH GROUPS UNIONS, FRATERNAL OR ATHLETIC GROUPS, OR SCHOOL GROUPS?: PATIENT DECLINED

## 2024-06-03 ASSESSMENT — ACTIVITIES OF DAILY LIVING (ADL)
JUDGMENT_ADEQUATE_SAFELY_COMPLETE_DAILY_ACTIVITIES: YES
BATHING_LEVEL_OF_ASSISTANCE: MAXIMUM ASSISTANCE
BATHING_ASSISTANCE: MODERATE
BATHING_WHERE_ASSESSED: OTHER (COMMENT)
PATIENT'S MEMORY ADEQUATE TO SAFELY COMPLETE DAILY ACTIVITIES?: YES
LACK_OF_TRANSPORTATION: PATIENT DECLINED
ADL_ASSISTANCE: INDEPENDENT
TOILETING: NEEDS ASSISTANCE
BATHING: NEEDS ASSISTANCE
ADEQUATE_TO_COMPLETE_ADL: YES
WALKS IN HOME: NEEDS ASSISTANCE
HEARING - LEFT EAR: FUNCTIONAL
LACK_OF_TRANSPORTATION: NO
HOME_MANAGEMENT_TIME_ENTRY: 15
DRESSING YOURSELF: NEEDS ASSISTANCE
HEARING - RIGHT EAR: FUNCTIONAL
FEEDING YOURSELF: NEEDS ASSISTANCE
GROOMING: NEEDS ASSISTANCE
ASSISTIVE_DEVICE: EYEGLASSES;WHEELCHAIR;WALKER

## 2024-06-03 ASSESSMENT — COGNITIVE AND FUNCTIONAL STATUS - GENERAL
DAILY ACTIVITIY SCORE: 13
DRESSING REGULAR LOWER BODY CLOTHING: TOTAL
HELP NEEDED FOR BATHING: A LOT
DAILY ACTIVITIY SCORE: 13
CLIMB 3 TO 5 STEPS WITH RAILING: TOTAL
STANDING UP FROM CHAIR USING ARMS: TOTAL
MOVING TO AND FROM BED TO CHAIR: TOTAL
TURNING FROM BACK TO SIDE WHILE IN FLAT BAD: TOTAL
MOVING TO AND FROM BED TO CHAIR: TOTAL
DRESSING REGULAR UPPER BODY CLOTHING: A LITTLE
DRESSING REGULAR UPPER BODY CLOTHING: A LITTLE
MOBILITY SCORE: 8
PERSONAL GROOMING: A LITTLE
EATING MEALS: A LITTLE
CLIMB 3 TO 5 STEPS WITH RAILING: TOTAL
EATING MEALS: A LITTLE
MOBILITY SCORE: 7
WALKING IN HOSPITAL ROOM: TOTAL
TURNING FROM BACK TO SIDE WHILE IN FLAT BAD: A LOT
PATIENT BASELINE BEDBOUND: UNABLE TO ASSESS AT THIS TIME
MOVING FROM LYING ON BACK TO SITTING ON SIDE OF FLAT BED WITH BEDRAILS: A LOT
DRESSING REGULAR LOWER BODY CLOTHING: TOTAL
TOILETING: A LOT
WALKING IN HOSPITAL ROOM: TOTAL
MOVING FROM LYING ON BACK TO SITTING ON SIDE OF FLAT BED WITH BEDRAILS: A LOT
HELP NEEDED FOR BATHING: TOTAL
TOILETING: TOTAL
PERSONAL GROOMING: A LITTLE
STANDING UP FROM CHAIR USING ARMS: TOTAL

## 2024-06-03 ASSESSMENT — ENCOUNTER SYMPTOMS
COLOR CHANGE: 0
CONFUSION: 0
DYSURIA: 0
POLYDIPSIA: 0
HALLUCINATIONS: 0
DIZZINESS: 0
SHORTNESS OF BREATH: 0
HEADACHES: 0
DIARRHEA: 1
MYALGIAS: 0
CHILLS: 0
SINUS PAIN: 0
POLYPHAGIA: 0
VOMITING: 0
HEMATURIA: 0
COUGH: 0
CHEST TIGHTNESS: 0
WEAKNESS: 1
ARTHRALGIAS: 0
NAUSEA: 0
FEVER: 0

## 2024-06-03 ASSESSMENT — PAIN - FUNCTIONAL ASSESSMENT: PAIN_FUNCTIONAL_ASSESSMENT: 0-10

## 2024-06-03 ASSESSMENT — LIFESTYLE VARIABLES
HOW OFTEN DO YOU HAVE 6 OR MORE DRINKS ON ONE OCCASION: NEVER
SKIP TO QUESTIONS 9-10: 1
SUBSTANCE_ABUSE_PAST_12_MONTHS: NO
HOW MANY STANDARD DRINKS CONTAINING ALCOHOL DO YOU HAVE ON A TYPICAL DAY: PATIENT DOES NOT DRINK
AUDIT-C TOTAL SCORE: 0
AUDIT-C TOTAL SCORE: 0
HOW OFTEN DO YOU HAVE A DRINK CONTAINING ALCOHOL: NEVER
PRESCIPTION_ABUSE_PAST_12_MONTHS: NO

## 2024-06-03 ASSESSMENT — PATIENT HEALTH QUESTIONNAIRE - PHQ9
1. LITTLE INTEREST OR PLEASURE IN DOING THINGS: SEVERAL DAYS
2. FEELING DOWN, DEPRESSED OR HOPELESS: SEVERAL DAYS
SUM OF ALL RESPONSES TO PHQ9 QUESTIONS 1 & 2: 2

## 2024-06-03 ASSESSMENT — PAIN SCALES - GENERAL
PAINLEVEL_OUTOF10: 0 - NO PAIN
PAINLEVEL_OUTOF10: 0 - NO PAIN

## 2024-06-03 NOTE — CONSULTS
Wound Care Consult     Visit Date: 6/3/2024      Patient Name: Brittanie Smart         MRN: [id number]           YOB: 1960    Consulted for wound care. A 63 y.o. female patient admitted for   Weakness [R53.1]  Hyperglycemia [R73.9]  Encounter for medical screening examination [Z13.9]  Unable to care for self [Z78.9]   Past Medical History:   Diagnosis Date    Anemia     Behavior problem, adult     Decubitus ulcer     Depression     Diabetes mellitus (Multi)     GERD (gastroesophageal reflux disease)     Hemorrhoids     Hyperlipidemia     Hypertension     Hypothyroid     Kidney disease     Morbid obesity (Multi)     Peripheral vascular disease (CMS-HCC)       History reviewed. No pertinent surgical history.   Scheduled medications  amLODIPine, 10 mg, oral, Daily  [START ON 6/4/2024] enoxaparin, 40 mg, subcutaneous, Daily  ferrous sulfate (325 mg ferrous sulfate), 1 tablet, oral, Daily with breakfast  gabapentin, 300 mg, oral, TID  ipratropium-albuteroL, 3 mL, nebulization, TID  ketotifen, 1 drop, Both Eyes, BID  levothyroxine, 75 mcg, oral, Daily  [Held by provider] losartan, 50 mg, oral, Daily  metoprolol succinate XL, 50 mg, oral, Daily  multivitamin with minerals, 1 tablet, oral, Daily  nystatin, 1 Application, Topical, q8h  oxygen, , inhalation, q8h  pantoprazole, 40 mg, oral, Daily before breakfast  rosuvastatin, 5 mg, oral, Daily      Continuous medications     PRN medications  PRN medications: acetaminophen **OR** acetaminophen **OR** acetaminophen, dextrose, glucagon, insulin lispro, ondansetron ODT **OR** ondansetron     No Known Allergies     No results found for the last 90 days.         Pertinent Labs:   Albumin   Date Value Ref Range Status   06/02/2024 3.1 (L) 3.5 - 5.0 g/dL Final       Wound Assessment:  Wound 06/03/24 Pressure Injury Sacrum (Active)   Wound Image   06/03/24 1000   Site Assessment White;Red 06/03/24 1000   Mery-Wound Assessment Blanchable erythema 06/03/24 1000   Pressure  Injury Stage 3 06/03/24 1000   Wound Length (cm) 1 cm 06/03/24 1000   Wound Width (cm) 0.5 cm 06/03/24 1000   Wound Surface Area (cm^2) 0.5 cm^2 06/03/24 1000   Wound Depth (cm) 0.3 cm 06/03/24 1000   Wound Volume (cm^3) 0.15 cm^3 06/03/24 1000   Margins Well-defined edges;Epibole (Rolled edges) 06/03/24 1000   Drainage Description Serosanguineous 06/03/24 1000   Drainage Amount Small 06/03/24 1000   Dressing Foam 06/03/24 0304   Dressing Changed New 06/03/24 0304   Dressing Status Clean;Dry 06/03/24 1000       Wound 06/03/24 Diabetic Ulcer Ankle Dorsal foot;Left (Active)   Site Assessment Alston 06/03/24 1000   Mery-Wound Assessment Blanchable erythema 06/03/24 1000   Wound Length (cm) 0.3 cm 06/03/24 1000   Wound Width (cm) 0.3 cm 06/03/24 1000   Wound Surface Area (cm^2) 0.09 cm^2 06/03/24 1000   Wound Depth (cm) 0.1 cm 06/03/24 1000   Wound Volume (cm^3) 0.009 cm^3 06/03/24 1000   Margins Well-defined edges 06/03/24 1000   Drainage Description None 06/03/24 1000   Dressing Foam 06/03/24 1000   Dressing Changed New 06/03/24 1000   Dressing Status Clean;Dry 06/03/24 0304       Wound 06/03/24 Traumatic Dorsal foot;Left (Active)   Wound Image   06/03/24 1000   Site Assessment Alston 06/03/24 1000   Mery-Wound Assessment Alston 06/03/24 1000   Wound Length (cm) 0.5 cm 06/03/24 1000   Wound Width (cm) 0.5 cm 06/03/24 1000   Wound Surface Area (cm^2) 0.25 cm^2 06/03/24 1000   Drainage Description None 06/03/24 1000   Dressing Changed Changed 06/03/24 1000       Wound 06/03/24 Other (comment) Knee Left;Anterior (Active)   Wound Image   06/03/24 1000   Site Assessment Brown;Eschar;Dry 06/03/24 1000   Drainage Amount None 06/03/24 0304   Dressing Open to air 06/03/24 1000       Wound 06/03/24 Diabetic Ulcer Ankle Right;Anterior;Lateral (Active)   Wound Image   06/03/24 1051   Site Assessment Brown;Eschar;Dry 06/03/24 1051   Wound Length (cm) 0.8 cm 06/03/24 1051   Wound Width (cm) 0.8 cm 06/03/24 1051   Wound Surface Area  (cm^2) 0.64 cm^2 06/03/24 1051   Wound Depth (cm) 0.3 cm 06/03/24 1051   Wound Volume (cm^3) 0.192 cm^3 06/03/24 1051   Dressing Carbon-impregnated;Gauze;Foam 06/03/24 1051   Dressing Changed New 06/03/24 1051   Dressing Status Removed 06/03/24 1051       Wound 06/03/24 Moisture Associated Skin Damage Breast Right;Lower (Active)   Wound Image   06/03/24 1054       Wound 06/03/24 Pressure Injury Heel Right (Active)   Site Assessment Red 06/03/24 1056   Pressure Injury Stage 1 06/03/24 1056   Wound Length (cm) 1.2 cm 06/03/24 1056   Wound Width (cm) 1 cm 06/03/24 1056   Wound Surface Area (cm^2) 1.2 cm^2 06/03/24 1056   State of Healing Closed wound edges 06/03/24 1056       Reason for Consult: Nursing consulted for wound evaluation and recommendations       Wound History: Patient was discharged from skilled nursing facility in [place], transported to [place] to live independently and ended up in the hospital. Bilateral ankle and sacrum wounds are present on admission.     Wound Team Assessment: Elliott is sitting in bedside chair, agreeable to exam, able to give history, no family present and nursing helped with 2 assist to stand using walker for sacral exam.     Upper sacrum has stage 3 pressure injury (staged by provider on admission), measures 1x0.5x0.3 cm, white/red base, linear nature to wound, no drainage noted on dressing and no odor noted. Patient unable to stand for long enough for treatment. Nursing has washed wound and applied medihoney and will recommend continuing this daily. Patient remained seated for rest of exam. Bilateral calves whashed with soap/water, rinsed, patted dry, dried with towel, loose skin was removed from right foot with drying.     Right lateral malleolus of ankle has diabetic wound, measure 0.8x0.8x0.2 cm, base is has darkened brown skin, medihoney in use by nursing, no drainage noted, no odor. Left lateral malleolus has diabetic wound measuring 0.3x0.3x0.1 cm with pink base, no  drainage and no odor. Bilateral ankles have 2x2 and medihoney placed and recommend daily dressing changes   Right heel has Stage 1 Pressure Injury that is present on admission, measures 1.2x1cm, red non blancheable area under brown skin that lifted and was removed during drying of foot. Left heel is not red and has dry flaking skin. Heel borders placed bilaterally that cover ankle 2x2 with medihoney.     Left Hallux and 2nd toe have superficial traumatic abrasions, patient states she was crawling and injured them on the carpet. Left toes dressed with medihoney and gauze covered by mepilex for protection. Patient is morbidly obese. Patient lifted breasts under right breast is yeast infection with linear (see photo) wound, recommend cleaning with soap/water, rinse, pat dry and apply nystatin as order for inner fold. Recommend using Interdry fabric for outer areas where skin is on skin under breasts and under pannus. Continue to follow provider orders with nystatin use.     Recommend daily dressing changes with medihoney as stated above.     Patient is on a [employer] bed frame with [employer] Mattress, due to patient obesity, internal bariatric bed ordered via maintenance, no internal bed available, requested order for rental Compella Bariatric bed, Lissette Camarena RN updated, continue pressure injury prevention interventions and nursing to continue to follow provider orders. Re consult wound RN PRN.    Soo NAVARRON RN [facility] OMS  6/3/2024  10:59 AM

## 2024-06-03 NOTE — PROGRESS NOTES
Occupational Therapy                 Therapy Communication Note    Patient Name: Brittanie Smart  MRN: [id number]  Today's Date: 6/3/2024     Discipline: Occupational Therapy    Missed Visit Reason: Missed Visit Reason: Other (Comment) (Attempted to see patient for OT, patient leaving for X-ray.)    Missed Time: Attempt    Comment:

## 2024-06-03 NOTE — PROGRESS NOTES
Occupational Therapy    Evaluation/Treatment    Patient Name: Brittanie Smart  MRN: [id number]  : 1960  Today's Date: 24  Time Calculation  Start Time: 48  Stop Time: 935  Time Calculation (min): 47 min       Assessment:  OT Assessment: 63 year old patient was in [place] in a skilled nursing and rehab facility. Pt insurance stopped covering the facility and patients family picked her up and brought her to [place] as patient unable to care for herself. Pt with BLE swelling and weakness , Chest XR 6/3  Bilateral pleural effusions on lateral projection and elevated right  hemidiaphragm.Patient is on 3 ltiers of O2 this date, is functioning below her baseline as was living independently 6 weeks ago . Patient limited with decreased strength, decreased balance, decreased endurance, decreased safety, is a 2 person assist for transfers this date, will benefit from continued OT intervention and moderate intensity rehab is recommended.  Prognosis: Good  Barriers to Discharge: Decreased caregiver support  Evaluation/Treatment Tolerance: Patient limited by fatigue  Medical Staff Made Aware: Yes  End of Session Communication: Bedside nurse  End of Session Patient Position: Up in chair, Alarm on  OT Assessment Results: Decreased ADL status, Decreased upper extremity strength, Decreased safe judgment during ADL, Decreased cognition, Decreased endurance, Decreased sensation, Decreased fine motor control, Decreased functional mobility, Decreased IADLs  Prognosis: Good  Barriers to Discharge: Decreased caregiver support  Evaluation/Treatment Tolerance: Patient limited by fatigue  Medical Staff Made Aware: Yes  Strengths: Premorbid level of function, Capable of completing ADLs semi/independent, Ability to acquire knowledge, Access to adaptive/assistive products, Attitude of self, Support of Caregivers, Other (Comment) (Patient reports living [facility] 6 weeks ago in [place].)  Barriers to Participation: Comorbidities, Coping  skills, Insurance, Other (Comment) (Patient just came in from [place] as her insurance ran out, currently unable to be independent as she needs 2 person assist for transfers, is morbidly obese, has had recent multiple falls.)  Plan:  Treatment Interventions: ADL retraining, Functional transfer training, UE strengthening/ROM, Endurance training, Cognitive reorientation, Patient/family training, Equipment evaluation/education, Fine motor coordination activities, Compensatory technique education  OT Frequency: 3 times per week  OT Discharge Recommendations: Moderate intensity level of continued care  Equipment Recommended upon Discharge: Wheeled walker  OT Recommended Transfer Status: Assist of 2  OT - OK to Discharge: Yes  Treatment Interventions: ADL retraining, Functional transfer training, UE strengthening/ROM, Endurance training, Cognitive reorientation, Patient/family training, Equipment evaluation/education, Fine motor coordination activities, Compensatory technique education    Subjective   Current Problem:  1. Unable to care for self        2. Encounter for medical screening examination        3. Hyperglycemia        4. Weakness          General:   OT Received On: 06/03/24  General  Reason for Referral: Decline in ADL function, unable to care for herself.  Referred By: Dr Turner  Past Medical History Relevant to Rehab: Behavior problem, adult      Decubitus ulcer     Depression     GERD (gastroesophageal reflux disease)     Hemorrhoids     Hyperlipidemia     Hypertension     Hypothyroid     Morbid obesity (Multi)     Peripheral vascular disease (CMS-HCC)  Missed Visit: Yes  Missed Visit Reason: Other (Comment) (Attempted to see patient for OT, patient leaving for X-ray.)  Family/Caregiver Present: No  Co-Treatment: PT  Co-Treatment Reason: Partial session with PT to optimize safe handling of the patient as patient is a 2 person assist for transfers.  Prior to Session Communication: Bedside nurse  Patient  Position Received: Bed, 3 rail up, Alarm on  Preferred Learning Style: verbal  General Comment: Cleared to see patient for OT, patient agreeable to therapy. Patient met in the room in the bed, on 2 liters of O2 this date.  Precautions:  Hearing/Visual Limitations: WfL hearing, glasses for reading.  Medical Precautions: Fall precautions, Oxygen therapy device and L/min (3 liters of O2)  Precautions Comment: Skin, Multiple Area BLE of Skin Breakdown.  Skin Protectors dressings both feet/heels.  Vital Signs:     Pain:  Pain Assessment  Pain Assessment: 0-10  Pain Score: 0 - No pain    Objective   Cognition:  Overall Cognitive Status: Within Functional Limits  Problem Solving: Exceptions to WFL  Safety/Judgement: Exceptions to WFL  Complex Functional Tasks: Moderate  Insight: Moderate  Impulsive: Mildly    Home Living:  Type of Home: Apartment  Lives With: Alone  Home Adaptive Equipment: Walker rolling or standard, Cane, Wheelchair-manual, Other (Comment) (Home O2)  Home Layout: One level  Bathroom Shower/Tub: Tub/shower unit  Bathroom Toilet: Standard  Bathroom Equipment: Grab bars in shower  Home Living Comments: Patient reports living in apartment in [place] 6 weeks ago, was at the SNF in [place] till this admission when family brought her to [place] as patient unable to take care of herself.  Prior Function:  Level of [place]: Independent with ADLs and functional transfers, Independent with homemaking with ambulation (6 weeks ago)  Receives Help From: Neighbor, Family  ADL Assistance: Independent  Homemaking Assistance: Independent  Ambulatory Assistance: Independent (cane/walker)  Vocational: Retired  Hand Dominance: Right  Prior Function Comments: Patient reports independent with ADL /transfers 6 weeks ago, minimal driving due to lack opf parking, used 2 liters of O2 continuous, grocery woyld be delivered, per patient she managed her own medication and finances. Per patient 3 falls prior to her  hospitalization.  IADL History:  Homemaking Responsibilities: Yes  Meal Prep Responsibility: Primary  Laundry Responsibility: Primary  Cleaning Responsibility: Primary  Bill Paying/Finance Responsibility: Primary  Shopping Responsibility: Primary  Current License: Yes  Mode of Transportation: Car, Family  ADL:  Eating Assistance: Independent  Eating Deficit: Setup  Grooming Assistance: Independent  Grooming Deficit: Setup  Bathing Assistance: Moderate  Bathing Deficit: Left lower leg including foot, Right lower leg including foot, Perineal area  UE Dressing Assistance: Minimal  UE Dressing Deficit:  (farshad/doff gown)  LE Dressing Assistance: Total  LE Dressing Deficit: Don/doff R sock, Don/doff L sock  Toileting Assistance with Device: Total  Toileting Deficit:  (after BM)  Functional Assistance: Other (Comment)  Functional Deficit:  (Assist x2 with walker)    Activity Tolerance:  Endurance: Decreased tolerance for upright activites  Activity Tolerance Comments: Patient on 2 liters of O2, reports decreased standing tolerance due to back pain.  Functional Standing Tolerance:     Bed Mobility/Transfers: Bed Mobility  Bed Mobility: Yes (Patient needed maximal assist for trunk, legs with the HOB raised and increased time to get to the EOB this date, no dizziness reported this date.)    Transfers  Transfer: Yes    Functional Mobility:  Functional Mobility  Functional Mobility Performed: Yes (Patient was able to walk with the walker with moderate assist x2 bed-chair and moderate assist x2 chair-BSC and back to the chair this date with moderate V/C this date.)  Sitting Balance:  Static Sitting Balance  Static Sitting-Level of Assistance: Close supervision  Static Sitting-Comment/Number of Minutes: 5-6min, G balance  Dynamic Sitting Balance  Dynamic Sitting-Comments: 6-7min, Close Supervision, F+/G  Standing Balance:  Static Standing Balance  Static Standing-Comment/Number of Minutes: Fair balance, 2-3min  Dynamic Standing  Balance  Dynamic Standing-Comments: Fair, 2-3min with the walker    Vision:Vision - Basic Assessment  Current Vision: Wears glasses only for reading  Sensation:  Sensation Comment: Patient reports tingling/numbness in Both hands/feet from neuropathy.  Strength:  Strength Comments: Overall 3+/5    Coordination:  Movements are Fluid and Coordinated: No  Upper Body Coordination: Patient limited with tremors in BUE/hands .   Hand Function:  Hand Function  Gross Grasp: Functional  Coordination: Impaired    Outcome Measures: [facility] Daily Activity  Putting on and taking off regular lower body clothing: Total  Bathing (including washing, rinsing, drying): A lot  Putting on and taking off regular upper body clothing: A little  Toileting, which includes using toilet, bedpan or urinal: Total  Taking care of personal grooming such as brushing teeth: A little  Eating Meals: A little  Daily Activity - Total Score: 13        Education Documentation  ADL Training, taught by Josie Brown, OT at 6/3/2024 10:13 AM.  Learner: Patient  Readiness: Acceptance  Method: Explanation  Response: Verbalizes Understanding, Demonstrated Understanding, Needs Reinforcement  Comment: Patient was instructed in safe functional transfers/mobility with the walker.    Education Comments  No comments found.        OP EDUCATION:       Goals:  Encounter Problems       Encounter Problems (Active)       OT Goals       Patient will be able to complete UB/LB dressing, bathing, grooming with modified independence using good safety and AE as needed. (Progressing)       Start:  06/03/24    Expected End:  06/17/24            Patient will be able to complete toileting tasks with Close Supervision using good safety and with G balance.  (Progressing)       Start:  06/03/24    Expected End:  06/17/24            Patient will be able to complete functional transfers/mobility with the walker with Close Supervision with G balance using good safety.  (Progressing)        Start:  06/03/24    Expected End:  06/17/24            Patient will be able to tolerate 7-8min of functional standing with G balance in prep for ADL/transfers. (Progressing)       Start:  06/03/24    Expected End:  06/17/24

## 2024-06-03 NOTE — ED NOTES
"Patient refuses to get chest xray at this time. She states \"Im trying to sleep and I will do it in the morning,\" Patient educated but chooses to refuse to have xray done.      Mami Moscoso RN  06/03/24 0147    "

## 2024-06-03 NOTE — CARE PLAN
The patient's goals for the shift include      The clinical goals for the shift include monitor for sob    Over the shift, the patient did not make progress toward the following goals. Barriers to progression include . Recommendations to address these barriers include   Problem: Respiratory  Goal: No signs of respiratory distress (eg. Use of accessory muscles. Peds grunting)  Outcome: Progressing  Goal: Wean oxygen to maintain O2 saturation per order/standard this shift  Outcome: Progressing   .

## 2024-06-03 NOTE — CARE PLAN
Problem: PT Problem  Goal: BED MOBILITY Patient will transfer supine to sit and sit to supine with minimal assist to facilitate mobility.   Outcome: Progressing  Goal: TRANSFER Patient will transfer sit to stand and stand to sit with  minimal 1-2 with RW  assist to facilitate mobility.   Outcome: Progressing  Goal: AMBULATION Patient will amb 25 feet with rolling walker device including two turns on even surface with minimal assist to facilitate safe mobility.   Outcome: Progressing  Goal: STRENGTH Patient will increase BLE strength to 4/5 to improve functional mobility.     Outcome: Progressing  Goal: ACTIVITY TOLERANCE Pt will tolerate standing activities including reaching for items, standing 3-4 minutes to improve overall activity tolerance.   Outcome: Progressing

## 2024-06-03 NOTE — PROGRESS NOTES
06/03/24 1212   Discharge Planning   Living Arrangements Alone   Support Systems Family members   Assistance Needed Patient was at a rehab, but insurance ended, and was discharged. Patient currently requires assistance for ADLs and is dependent for IADLs; however, prior to SNF admission was living independently in her own apartment.   Type of Residence Private residence   Number of Stairs to Enter Residence 0   Number of Stairs Within Residence 0   Do you have animals or pets at home? No   Who is requesting discharge planning? Patient   Home or Post Acute Services Post acute facilities (Rehab/SNF/etc)   Type of Post Acute Facility Services Skilled nursing   Patient expects to be discharged to: SNF   Does the patient need discharge transport arranged? Yes   RoundTrip coordination needed? Yes   Has discharge transport been arranged? No   Financial Resource Strain   How hard is it for you to pay for the very basics like food, housing, medical care, and heating? Not hard   Housing Stability   In the last 12 months, was there a time when you were not able to pay the mortgage or rent on time? N   In the last 12 months, how many places have you lived? 1   In the last 12 months, was there a time when you did not have a steady place to sleep or slept in a shelter (including now)? N   Transportation Needs   In the past 12 months, has lack of transportation kept you from medical appointments or from getting medications? no   In the past 12 months, has lack of transportation kept you from meetings, work, or from getting things needed for daily living? No   Patient Choice   Provider Choice list and CMS website (https://medicare.gov/care-compare#search) for post-acute Quality and Resource Measure Data were provided and reviewed with: Patient   Patient / Family choosing to utilize agency / facility established prior to hospitalization No     Patient was living in an apartment in [place], [place] for the past 34 years. She reports her  sister has been encouraging her to move to [place] for sometime. Patient agreed following SNF stay and sister made arrangements for an apartment in [place] for which patient has paid rent and security deposit. Patient unable to care for herself following SNF stay and agreeable to return to SNF in the [place] area; list provided and referrals made to SNFs in immediate proximity. Patient states she has a 4:00 PM phone meeting regarding her insurance; reports she had insurance, but does not currently; however, system states she is active with Medicare. Will have facilities check into the same.     2:39 PM [facility], [facility], [facility], [facility], and [facility] declined the patient due to insurance.     [facility], [facility], [facility] and [facility] are reviewing.     [facility] and [facility] are clinically able to accept pending insurance determinations. Patient has a meeting at 4:00 PM to obtain Medicare for [place] and to advise.     4:05 PM [facility] is willing to accept the patient and confirmed insurance of [employer]. They are out of network, but her policy does have [place] benefits. Advised their liaison they could come discuss the same with the patient. Patient is scheduled for a 4:00 PM meeting this date to change insurance, but insurance changes are not typically effective until the earliest of the 1st day of the following month. Will follow up with patient for additional information.
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
## 2024-06-03 NOTE — CARE PLAN
Problem: Psychosocial Needs  Goal: Collaborate with me, my family, and caregiver to identify my specific goals  Recent Flowsheet Documentation  Taken 6/3/2024 0302 by Kya Chinchilla RN  Cultural Requests During Hospitalization: none  Spiritual Requests During Hospitalization: none     Problem: Skin  Goal: Prevent/manage excess moisture  Flowsheets (Taken 6/3/2024 0638)  Prevent/manage excess moisture:   Cleanse incontinence/protect with barrier cream   Follow provider orders for dressing changes   Monitor for/manage infection if present   Moisturize dry skin   The patient's goals for the shift include      The clinical goals for the shift include monitor for sob

## 2024-06-03 NOTE — PROGRESS NOTES
Physical Therapy    Physical Therapy Evaluation    Patient Name: Brittanie Smart  MRN: [id number]  Today's Date: 6/3/2024   Time Calculation  Start Time: 0847  Stop Time: 0915  Time Calculation (min): 28 min    Assessment/Plan   PT Assessment  PT Assessment Results: Decreased strength, Decreased range of motion, Decreased endurance, Impaired balance, Decreased mobility, Decreased coordination, Decreased safety awareness, Impaired sensation, Obesity, Decreased skin integrity, Pain  IP OR SWING BED PT PLAN  Inpatient or Swing Bed: Inpatient  PT Plan  Treatment/Interventions: Bed mobility, Transfer training, Gait training, Range of motion, Strengthening, Endurance training, Therapeutic exercise  PT Plan: Skilled PT  PT Frequency: 4 times per week  PT Discharge Recommendations: Moderate intensity level of continued care  PT Recommended Transfer Status: Assist x2  PT - OK to Discharge: Yes      Subjective   General Visit Information:  General  Reason for Referral: Decline of all Mobility Skills  Referred By: Dr Turner  Past Medical History Relevant to Rehab: Behavior problem, adult      Decubitus ulcer     Depression     GERD (gastroesophageal reflux disease)     Hemorrhoids     Hyperlipidemia     Hypertension     Hypothyroid     Morbid obesity (Multi)     Peripheral vascular disease (CMS-HCC)  General Comment: Pt in bed AxOx3 Cooperative, Agreeable  Home Living:  Home Living  Type of Home: Apartment (per pt as of 1 month ago living alone)  Lives With: Alone  Home Adaptive Equipment: Walker rolling or standard, Cane, Wheelchair-manual  Home Layout: One level  Home Access: Level entry  Home Living Comments: Pt was most recently at SNF for Rehab.  Has not been living Alone for last month.  Sister lives in [facility] and brought pt here from [place].  Plan is to live alone in [facility].  Prior Level of Function:  Prior Function Per Pt/Caregiver Report  Level of [place]: Independent with ADLs and functional transfers (Was struggling but  completed light meal prep, had groceries delivered.  Wasc Driving)  Precautions:  Precautions  Precautions Comment: Skin, Multiple Area BLE of Skin Breakdown.  Skin Protectors dressings both feet/heels.     Cognition:  Cognition  Overall Cognitive Status: Within Functional Limits  Attention: Within Functional Limits  Processing Speed: Within funtional limits    General Assessments:  General Observation  General Observation: Pt up in bedside chair, all needs in reach   Postural Control  Postural Control: Impaired  Head Control: Forward Head posture  Trunk Control: Flexed at hips  Posture Comment: Pt reports difficulty standing upright for soemtime    Static Standing Balance  Static Standing-Balance Support: Bilateral upper extremity supported (with RW min assist x2)  Static Standing-Level of Assistance: Minimum assistance, Moderate assistance (Min/Mod assist x2)  Static Standing-Comment/Number of Minutes: Min/mod assisonce standing  Dynamic Standing Balance  Dynamic Standing-Balance Support: Bilateral upper extremity supported  Dynamic Standing-Balance: Turning  Dynamic Standing-Comments: Min/mod assist x2 with RW very effortful  Functional Assessments:  Bed Mobility 1  Bed Mobility 1: Supine to sitting  Level of Assistance 1: Moderate verbal cues, Moderate assistance, +2  Bed Mobility Comments 1: effortful trunk assist up    Transfer 1  Transfer From 1: Bed to  Transfer to 1: Stand  Technique 1: Sit to stand, Stand to sit  Transfer Device 1: Walker  Transfer Level of Assistance 1: Moderate assistance, +2  Trials/Comments 1: Transfers with hands on RW , bed height elevated.  Once standing slow to fully stand upright.  Transfers 2  Transfer From 2: Chair with arms to  Transfer to 2: Stand  Technique 2: Sit to stand, Stand to sit  Transfer Level of Assistance 2: Moderate assistance, +2  Trials/Comments 2: STS vmod assist x2 (chair pad placed with 3rd to position in chair while brieflt standing)    Ambulation/Gait  Training  Ambulation/Gait Training Performed: Yes  Ambulation/Gait Training 1  Surface 1: Level tile  Device 1: Rolling walker  Assistance 1: Moderate tactile cues, Moderate assistance (mod assist x2)  Quality of Gait 1: Forward flexed posture, Shuffling gait (Slow effortful)  Comments/Distance (ft) 1: Ambulating 6-7 steps RW min/mod assist x2  Extremity/Trunk Assessments:  Strength RLE  R Hip Flexion: 2+/5  R Hip Extension: 2+/5  R Hip ABduction: 2+/5  R Hip ADduction: 2+/5  R Knee Flexion: 3-/5  R Knee Extension: 2+/5  R Ankle Dorsiflexion: 2+/5  R Ankle Plantar Flexion: 2+/5  Strength LLE  L Hip Flexion: 2+/5  L Hip Extension: 2+/5  L Hip ABduction: 2+/5  L Hip ADduction: 2+/5  L Knee Flexion: 2+/5  L Knee Extension: 2+/5  L Ankle Dorsiflexion: 2+/5  L Ankle Plantar Flexion: 2+/5  Outcome Measures:  [facility] Basic Mobility  Turning from your back to your side while in a flat bed without using bedrails: A lot  Moving from lying on your back to sitting on the side of a flat bed without using bedrails: Total  Moving to and from bed to chair (including a wheelchair): Total  Standing up from a chair using your arms (e.g. wheelchair or bedside chair): Total  To walk in hospital room: Total  Climbing 3-5 steps with railing: Total  Basic Mobility - Total Score: 7    Problem: PT Problem  Goal: BED MOBILITY Patient will transfer supine to sit and sit to supine with minimal assist to facilitate mobility.   Outcome: Progressing  Goal: TRANSFER Patient will transfer sit to stand and stand to sit with  minimal 1-2 with RW  assist to facilitate mobility.   Outcome: Progressing  Goal: AMBULATION Patient will amb 25 feet with rolling walker device including two turns on even surface with minimal assist to facilitate safe mobility.   Outcome: Progressing  Goal: STRENGTH Patient will increase BLE strength to 4/5 to improve functional mobility.     Outcome: Progressing  Goal: ACTIVITY TOLERANCE Pt will tolerate standing activities  including reaching for items, standing 3-4 minutes to improve overall activity tolerance.   Outcome: Progressing

## 2024-06-03 NOTE — ED NOTES
Release of medical information form filled out and signed by patient. Form faxed to facility in [place].      Soo Martinez RN  06/03/24 015

## 2024-06-03 NOTE — ED NOTES
Respiratory therapy called at this time to obtain blood gas.      Soo Martinez RN  06/03/24 0157

## 2024-06-03 NOTE — NURSING NOTE
Pt lying in bed eyes closed nodded yes when asked if nshe would do her CXR today oxygen on per order .

## 2024-06-03 NOTE — CONSULTS
"Nutrition Assessement Note Pt recently moved to [place], states she has had many different kinds of supplements in ECF and acute care, feels some have caused severe diarrhea. Discussed Davi, agreeable to trying in applesauce    Nutrition Assessment    Reason for Assessment: Admission nursing screening    Reason for Hospital Admission:  Brittanie Smart is a 63 y.o. female who is admitted for unable to care for self    Past Medical History:   Diagnosis Date    Anemia     Behavior problem, adult     Decubitus ulcer     Depression     Diabetes mellitus (Multi)     GERD (gastroesophageal reflux disease)     Hemorrhoids     Hyperlipidemia     Hypertension     Hypothyroid     Kidney disease     Morbid obesity (Multi)     Peripheral vascular disease (CMS-HCC)       History reviewed. No pertinent surgical history.    Nutrition History:     Energy Intake: Good > 75 %  Food Allergies/Intolerances:  None  GI Symptoms: None  Oral Problems: None  Anthropometrics:  Ht: 172.7 cm (5' 7.99\"), Wt: 126 kg (277 lb 3.2 oz), BMI: 42.16  IBW/kg (Dietitian Calculated): 64 kg  Percent of IBW: 197 %  Adjusted Body Weight (kg): 80 kg    Weight Change:  Daily Weight  06/03/24 : 126 kg (277 lb 3.2 oz)              Nutrition Focused Physical Exam Findings:   Subcutaneous Fat Loss  Orbital Fat Pads: Well nourished (slightly bulging fat pads)  Buccal Fat Pads: Well nourished (full, rounded cheeks)  Triceps: Well nourished (ample fat tissue)    Muscle Wasting  Temporalis: Well nourished (well-defined muscle)  Pectoralis (Clavicular Region): Well nourished (clavicle not visible)  Deltoid/Trapezius: Well nourished (rounded appearance at arm, shoulder, neck)    Edema  Edema: none    Physical Findings (Nutrition Deficiency/Toxicity)  Skin: Positive    Nutrition Significant Labs:  Lab Results   Component Value Date    WBC 6.7 06/02/2024    HGB 7.8 (L) 06/02/2024    HCT 25.8 (L) 06/02/2024     06/02/2024    ALT 13 06/02/2024    AST 20 06/02/2024     " 06/03/2024    K 4.6 06/03/2024     06/03/2024    CREATININE 2.00 (H) 06/03/2024    BUN 42 (H) 06/03/2024    CO2 26 06/03/2024    TSH 7.13 (H) 06/02/2024    HGBA1C 7.5 (H) 06/02/2024     Nutrition Specific Medications:  amLODIPine, 10 mg, oral, Daily  [START ON 6/4/2024] enoxaparin, 40 mg, subcutaneous, Daily  ferrous sulfate (325 mg ferrous sulfate), 1 tablet, oral, Daily with breakfast  gabapentin, 300 mg, oral, TID  ipratropium-albuteroL, 3 mL, nebulization, TID  ketotifen, 1 drop, Both Eyes, BID  levothyroxine, 75 mcg, oral, Daily  [Held by provider] losartan, 50 mg, oral, Daily  metoprolol succinate XL, 50 mg, oral, Daily  multivitamin with minerals, 1 tablet, oral, Daily  nystatin, 1 Application, Topical, q8h  oxygen, , inhalation, q8h  pantoprazole, 40 mg, oral, Daily before breakfast  rosuvastatin, 5 mg, oral, Daily         Dietary Orders (From admission, onward)       Start     Ordered    06/03/24 0250  Adult diet Carb Controlled, Cardiac; 90 gram carb/meal, 60 gram Carb evening snack; 70 gm fat; 2 - 3 grams Sodium  Diet effective now        Question Answer Comment   Diet type Carb Controlled    Diet type Cardiac    Carb diet selection: 90 gram carb/meal, 60 gram Carb evening snack    Fat restriction: 70 gm fat    Sodium restriction: 2 - 3 grams Sodium        06/03/24 0249                     Estimated Needs:   Estimated Energy Needs  Total Energy Estimated Needs (kCal):  (7612-0849)  Total Estimated Energy Need per Day (kCal/kg):  (25-30)  Method for Estimating Needs: adj wt    Estimated Protein Needs  Total Protein Estimated Needs (g):  ()  Total Protein Estimated Needs (g/kg):  (1.25-1.5)  Method for Estimating Needs: adj wt    Estimated Fluid Needs  Total Fluid Estimated Needs (mL):  (2337-5565)  Total Fluid Estimated Needs (mL/kg): 1 mL/kg  Method for Estimating Needs: adj wt      Nutrition Diagnosis   Nutrition Diagnosis:  Malnutrition Diagnosis  Patient has Malnutrition Diagnosis:  No    Nutrition Diagnosis  Patient has Nutrition Diagnosis: Yes  Diagnosis Status (1): New  Nutrition Diagnosis 1: Increased nutrient needs  Related to (1): increased demand for nutrients  As Evidenced by (1): pressure injury     Nutrition Interventions/Recommendations   Nutrition Interventions and Recommendations:    Nutrition Prescription:  Individualized Nutrition Prescription Provided for : 0714-4155 calories,  gm protein,    Nutrition Interventions:   Food and/or Nutrient Delivery Interventions  Interventions: Medical food supplement  Medical Food Supplement: Commercial beverage  Goal: unflavored Davi in applesauce    Education Documentation  No documentation found.       Nutrition Monitoring and Evaluation   Monitoring/Evaluation:   Food/Nutrient Related History Monitoring  Monitoring and Evaluation Plan: Energy intake  Energy Intake: Estimated energy intake  Criteria: Pt will consume >/75% estimated energy needs                 Time Spent/Follow-up:   Follow Up  Time Spent (min): 35 minutes  Last Date of Nutrition Visit: 06/03/24  Nutrition Follow-Up Needed?: 3-5 days  Follow up Comment: 6/7/24

## 2024-06-03 NOTE — PROGRESS NOTES
Brittanie Smart is a 63 y.o. female on day 0 of admission presenting with Unable to care for self.      Subjective   Patient resting in bed comfortably. She is tired and expressed frustration about her insurance. I told her that the right people will look into the insurance and we will come up with a plan for post discharge..        Objective     Last Recorded Vitals  /87 (BP Location: Left arm, Patient Position: Lying)   Pulse 77   Temp 36.7 °C (98.1 °F) (Oral)   Resp 20   Wt 126 kg (277 lb 3.2 oz)   SpO2 97%   Intake/Output last 3 Shifts:    Intake/Output Summary (Last 24 hours) at 6/3/2024 1337  Last data filed at 6/3/2024 0900  Gross per 24 hour   Intake 120 ml   Output --   Net 120 ml       Admission Weight  Weight: 119 kg (263 lb 4.8 oz) (06/02/24 2217)    Daily Weight  06/03/24 : 126 kg (277 lb 3.2 oz)    Image Results      Physical Exam  Constitutional:       Appearance: Normal appearance.   Cardiovascular:      Rate and Rhythm: Normal rate and regular rhythm.      Pulses: Normal pulses.      Heart sounds: Normal heart sounds.   Pulmonary:      Effort: Pulmonary effort is normal.      Breath sounds: Normal breath sounds.   Abdominal:      General: Bowel sounds are normal.      Palpations: Abdomen is soft.   Musculoskeletal:         General: Normal range of motion.   Skin:     General: Skin is warm and dry.   Neurological:      Mental Status: She is alert and oriented to person, place, and time.         Relevant Results             Results for orders placed or performed during the hospital encounter of 06/02/24 (from the past 24 hour(s))   CBC and Auto Differential   Result Value Ref Range    WBC 6.7 4.4 - 11.3 x10*3/uL    nRBC 0.0 0.0 - 0.0 /100 WBCs    RBC 2.88 (L) 4.00 - 5.20 x10*6/uL    Hemoglobin 7.8 (L) 12.0 - 16.0 g/dL    Hematocrit 25.8 (L) 36.0 - 46.0 %    MCV 90 80 - 100 fL    MCH 27.1 26.0 - 34.0 pg    MCHC 30.2 (L) 32.0 - 36.0 g/dL    RDW 20.9 (H) 11.5 - 14.5 %    Platelets 182 150 - 450  x10*3/uL    Neutrophils % 74.2 40.0 - 80.0 %    Immature Granulocytes %, Automated 0.8 0.0 - 0.9 %    Lymphocytes % 12.9 13.0 - 44.0 %    Monocytes % 9.8 2.0 - 10.0 %    Eosinophils % 1.8 0.0 - 6.0 %    Basophils % 0.5 0.0 - 2.0 %    Neutrophils Absolute 4.94 1.20 - 7.70 x10*3/uL    Immature Granulocytes Absolute, Automated 0.05 0.00 - 0.70 x10*3/uL    Lymphocytes Absolute 0.86 (L) 1.20 - 4.80 x10*3/uL    Monocytes Absolute 0.65 0.10 - 1.00 x10*3/uL    Eosinophils Absolute 0.12 0.00 - 0.70 x10*3/uL    Basophils Absolute 0.03 0.00 - 0.10 x10*3/uL   Comprehensive Metabolic Panel   Result Value Ref Range    Glucose 453 (H) 65 - 99 mg/dL    Sodium 135 133 - 145 mmol/L    Potassium 4.8 3.4 - 5.1 mmol/L    Chloride 99 97 - 107 mmol/L    Bicarbonate 26 24 - 31 mmol/L    Urea Nitrogen 46 (H) 8 - 25 mg/dL    Creatinine 2.10 (H) 0.40 - 1.60 mg/dL    eGFR 26 (L) >60 mL/min/1.73m*2    Calcium 8.3 (L) 8.5 - 10.4 mg/dL    Albumin 3.1 (L) 3.5 - 5.0 g/dL    Alkaline Phosphatase 186 (H) 35 - 125 U/L    Total Protein 7.0 5.9 - 7.9 g/dL    AST 20 5 - 40 U/L    Bilirubin, Total 0.4 0.1 - 1.2 mg/dL    ALT 13 5 - 40 U/L    Anion Gap 10 <=19 mmol/L   Morphology   Result Value Ref Range    RBC Morphology See Below     Polychromasia Mild    Hemoglobin A1C   Result Value Ref Range    Hemoglobin A1C 7.5 (H) See below %    Estimated Average Glucose 169 Not Established mg/dL   TSH   Result Value Ref Range    Thyroid Stimulating Hormone 7.13 (H) 0.27 - 4.20 mIU/L   C-reactive protein   Result Value Ref Range    C-Reactive Protein 3.60 (H) 0.00 - 2.00 mg/dL   ECG 12 lead   Result Value Ref Range    Ventricular Rate 74 BPM    Atrial Rate 74 BPM    CO Interval 156 ms    QRS Duration 96 ms    QT Interval 430 ms    QTC Calculation(Bazett) 477 ms    P Axis 42 degrees    R Axis -22 degrees    T Axis 9 degrees    QRS Count 12 beats    Q Onset 213 ms    P Onset 135 ms    P Offset 182 ms    T Offset 428 ms    QTC Fredericia 461 ms   POCT GLUCOSE   Result  Value Ref Range    POCT Glucose 441 (H) 74 - 99 mg/dL   Urinalysis with Reflex Microscopic   Result Value Ref Range    Color, Urine Yellow Light-Yellow, Yellow, Dark-Yellow    Appearance, Urine Turbid (N) Clear    Specific Gravity, Urine 1.016 1.005 - 1.035    pH, Urine 8.0 5.0, 5.5, 6.0, 6.5, 7.0, 7.5, 8.0    Protein, Urine 200 (2+) (A) NEGATIVE, 10 (TRACE), 20 (TRACE) mg/dL    Glucose, Urine 300 (3+) (A) Normal mg/dL    Blood, Urine NEGATIVE NEGATIVE    Ketones, Urine NEGATIVE NEGATIVE mg/dL    Bilirubin, Urine NEGATIVE NEGATIVE    Urobilinogen, Urine Normal Normal mg/dL    Nitrite, Urine NEGATIVE NEGATIVE    Leukocyte Esterase, Urine 500 [place]/µL (A) NEGATIVE   Drug Screen, Urine   Result Value Ref Range    Amphetamine Screen, Urine Negative      Barbiturate Screen, Urine Negative      Benzodiazepines Screen, Urine Negative      Cannabinoid Screen, Urine Negative      Cocaine Metabolite Screen, Urine Negative      Fentanyl Screen, Urine Negative       Methadone Screen, Urine Negative      Opiate Screen, Urine Negative      Oxycodone Screen, Urine Negative      PCP Screen, Urine Negative     Microscopic Only, Urine   Result Value Ref Range    WBC, Urine >50 (A) 1-5, NONE /HPF    WBC Clumps, Urine OCCASIONAL Reference range not established. /HPF    RBC, Urine 11-20 (A) NONE, 1-2, 3-5 /HPF    Bacteria, Urine 3+ (A) NONE SEEN /HPF    Triple Phosphate Crystals, Urine 1+ NONE, 1+ /HPF   Blood Gas Arterial   Result Value Ref Range    POCT pH, Arterial 7.30 (L) 7.38 - 7.42 pH    POCT pCO2, Arterial 58 (H) 38 - 42 mm Hg    POCT pO2, Arterial 103 (H) 85 - 95 mm Hg    POCT SO2, Arterial      POCT Oxy Hemoglobin, Arterial      POCT Base Excess, Arterial 0.8 -2.0 - 3.0 mmol/L    POCT HCO3 Calculated, Arterial 28.5 (H) 22.0 - 26.0 mmol/L    Patient Temperature 37.0 degrees Celsius    FiO2 28 %    Apparatus CANNULA     Site of Arterial Puncture Radial Right     Tyrone's Test Positive    Creatine Kinase   Result Value Ref Range     Creatine Kinase 72 24 - 195 U/L   POCT GLUCOSE   Result Value Ref Range    POCT Glucose 423 (H) 74 - 99 mg/dL   Renal Function Panel   Result Value Ref Range    Glucose 379 (H) 65 - 99 mg/dL    Sodium 137 133 - 145 mmol/L    Potassium 4.6 3.4 - 5.1 mmol/L    Chloride 101 97 - 107 mmol/L    Bicarbonate 26 24 - 31 mmol/L    Urea Nitrogen 42 (H) 8 - 25 mg/dL    Creatinine 2.00 (H) 0.40 - 1.60 mg/dL    eGFR 27 (L) >60 mL/min/1.73m*2    Calcium 8.1 (L) 8.5 - 10.4 mg/dL    Phosphorus 4.3 2.5 - 4.5 mg/dL    Albumin 3.0 (L) 3.5 - 5.0 g/dL    Anion Gap 10 <=19 mmol/L   POCT GLUCOSE   Result Value Ref Range    POCT Glucose 269 (H) 74 - 99 mg/dL       Assessment/Plan                  Principal Problem:    Unable to care for self  Active Problems:    Type 2 diabetes mellitus (Multi)    Morbid obesity (Multi)    Peripheral vascular disease (CMS-[facility])    Hypertension    Pressure injury of sacral region, stage 3 (Multi)    Behavior problem, adult    Renal failure      Unable to care for self   Consult  for assistance.   No indication for APS at this time; defer to . Patient is angry at her sister and brother in law because she was brought to the hospital and not her new apartment.     Type 2 diabetes mellitus  markedly uncontrolled blood sugar on arrival, Hbg A1c 7.5  Will add Lantus at 10 units  Hypoglycemic protocol  AC/HS blood sugars    Morbid obesity   limited ability to care for herself  Safety precautions   PT/OT to evaluate and treat    Peripheral vascular disease   unsure what previous diagnostics have been done[place]    Hypertension   continue medications    Chronic Kidney Disease   - unsure chronicity  Patient has been on hemodialysis temporarily.    Pressure injury of sacral region, stage 3 and both ankles   consult wound care.  Continue MediHoney    Behavior problem, adult   Unsure of previous diagnosis  Consult Behavior Health    Hypothyroid   check TSH  and continue her medications    Anemia   on iron supplementation    Plan of Care  Patient will likely need SNF at discharge. Social Service will need to verify insurance benefits.   Plan of care discussed with patient and collaborating physician, Dr. Tobin.               ACE [facility]-CNP

## 2024-06-03 NOTE — ED PROVIDER NOTES
HPI   Chief Complaint   Patient presents with    Leg Swelling    Weakness, Gen     Pt was in [place] in a skilled nursing and rehab facility. Pt insurance stopped covering the facility and pt family picked her up and brought her to [place]. Pt has leg swelling and weakness in her legs.        Patient was in [place] in a skilled nursing/rehab facility.  She had recently been diagnosed with a kidney infection and her legs have been weak.  She states that she also had a recent hip injection.  She states that she ran out of time at her rehab facility and so was discharged.  She had plan to go to an apartment in [place].  Her family came and got her from [place] but on arrival to [place], rather than taking her to her apartment, family found that patient was unable to walk on her own and patient agrees that she has been unable to get around on her own without significant help at her facility.  She states that she has not been able to get to the bathroom on her own without help.  Family initially tried to make patient get out of the car in the emergency department but she did not want to.  Patient ultimately agreed to get out of the car and is now in the emergency department.                          No data recorded                   Patient History   History reviewed. No pertinent past medical history.  History reviewed. No pertinent surgical history.  No family history on file.  Social History     Tobacco Use    Smoking status: Unknown    Smokeless tobacco: Not on file   Substance Use Topics    Alcohol use: Not on file    Drug use: Not on file       Physical Exam   ED Triage Vitals [06/02/24 2130]   Temperature Heart Rate Respirations BP   36.8 °C (98.2 °F) 78 18 130/72      Pulse Ox Temp Source Heart Rate Source Patient Position   96 % Temporal Monitor Sitting      BP Location FiO2 (%)     Right arm --       Physical Exam  HENT:      Head: Normocephalic.   Eyes:      General: No scleral  icterus.  Cardiovascular:      Rate and Rhythm: Normal rate.   Pulmonary:      Effort: Pulmonary effort is normal.   Musculoskeletal:      Comments: Bilateral pretibial edema of lower extremities.   Neurological:      Mental Status: She is alert.      Comments: Poor strength globally, patient barely able to transfer from wheelchair to bed.  Patient needs help to lift legs into bed.  Patient is awake and alert, answers questions appropriately.         ED Course & MDM   ED Course as of 06/03/24 0157   Sun Jun 02, 2024   7993 EKG interpretation: Normal sinus rhythm, rate 74.  Normal axis.  Borderline LVH with possible repolarization abnormality. [ML]      ED Course User Index  [ML] Demetrio Peguero MD         Diagnoses as of 06/03/24 0157   Encounter for medical screening examination   Hyperglycemia   Weakness   Unable to care for self       Medical Decision Making  Patient presents with ongoing inability to complete her activities of daily living or walk without assistance.  I was able to witness her transfer from wheelchair to bed and she was barely able to accomplish this.  Her legs had to be physically lifted into bed as she was unable to do it herself.  Patient agrees that she is not able to walk to the bathroom on her own and has not been able to do so since she has been in her rehab facility in [place].  Laboratory studies noted to have hyperglycemia and patient's home insulin was ordered.  Patient accepted to hospitalist service for care coordinator intervention and further disposition.  Parts of this chart were completed with dictation software, please excuse any errors in transcription.        Procedure  Procedures     Demetrio Peguero MD  06/03/24 0015       Demetrio Peguero MD  06/03/24 0157

## 2024-06-03 NOTE — CONSULTS
Consults    Reason For Consult  Elevated creatinine    History Of Present Illness  Brittanie Smart is a 63 y.o. female with a past medical history of diabetes mellitus who presented to the hospital from [place] with inability to care for herself, generalized weakness, nonambulatory.  Patient reports she has been having diarrhea otherwise denies any other symptoms.  She was found to have a creatinine of 2.1.  It is unclear at this time what her baseline creatinine is.  She does report that 2 years ago she was told she had kidney failure and she was temporarily on dialysis that she had dialysis 2 times and no longer on dialysis.  She reports that 7 weeks ago she got sick and had weakness, poor p.o. intake, falls, told she had a kidney infection, was in the hospital for 3.5 weeks and then was discharged to rehab.  She denies NSAID use.  We are consulted for management of elevated creatinine.     Past Medical History  She has a past medical history of Anemia, Behavior problem, adult, Decubitus ulcer, Depression, Diabetes mellitus (Multi), GERD (gastroesophageal reflux disease), Hemorrhoids, Hyperlipidemia, Hypertension, Hypothyroid, Kidney disease, Morbid obesity (Multi), and Peripheral vascular disease (CMS-HCC).    Surgical History  She has no past surgical history on file.     Social History  She reports that she has never smoked. She has never used smokeless tobacco. She reports that she does not currently use alcohol. She reports that she does not use drugs.    Family History  Family History   Problem Relation Name Age of Onset    COPD Mother      Heart disease Father          Allergies  Patient has no known allergies.    Review of Systems   Constitutional:  Negative for chills and fever.   HENT:  Negative for congestion and sinus pain.    Respiratory:  Negative for cough, chest tightness and shortness of breath.    Cardiovascular:  Positive for leg swelling. Negative for chest pain.   Gastrointestinal:  Positive  "for diarrhea. Negative for nausea and vomiting.   Endocrine: Negative for polydipsia, polyphagia and polyuria.   Genitourinary:  Negative for dysuria and hematuria.   Musculoskeletal:  Negative for arthralgias and myalgias.   Skin:  Negative for color change and rash.   Neurological:  Positive for weakness. Negative for dizziness, syncope and headaches.   Psychiatric/Behavioral:  Negative for confusion and hallucinations.           Physical Exam  Constitutional:       General: She is awake. She is not in acute distress.     Appearance: She is not toxic-appearing.   HENT:      Head: Normocephalic and atraumatic.      Mouth/Throat:      Mouth: Mucous membranes are moist.   Eyes:      General: No scleral icterus.     Comments: Conjunctiva clear   Neck:      Vascular: No JVD.   Cardiovascular:      Rate and Rhythm: Regular rhythm.      Heart sounds:      No friction rub.   Pulmonary:      Effort: Pulmonary effort is normal.      Comments: Some diminished breath sounds in the lung bases bilaterally  Abdominal:      General: Bowel sounds are normal.      Palpations: Abdomen is soft.      Tenderness: There is no guarding or rebound.   Musculoskeletal:      Cervical back: Neck supple.      Comments: 1-2+ edema   Skin:     General: Skin is warm.      Coloration: Skin is not jaundiced.   Neurological:      Mental Status: She is alert.      Comments: Cooperative with exam   Psychiatric:         Attention and Perception: Attention normal.      Comments: Slightly depressed mood            Last Recorded Vitals  Blood pressure 140/87, pulse 77, temperature 36.7 °C (98.1 °F), temperature source Oral, resp. rate 20, height 1.727 m (5' 7.99\"), weight 126 kg (277 lb 3.2 oz), SpO2 98%.    Relevant Results  Results for orders placed or performed during the hospital encounter of 06/02/24 (from the past 24 hour(s))   CBC and Auto Differential   Result Value Ref Range    WBC 6.7 4.4 - 11.3 x10*3/uL    nRBC 0.0 0.0 - 0.0 /100 WBCs    RBC " 2.88 (L) 4.00 - 5.20 x10*6/uL    Hemoglobin 7.8 (L) 12.0 - 16.0 g/dL    Hematocrit 25.8 (L) 36.0 - 46.0 %    MCV 90 80 - 100 fL    MCH 27.1 26.0 - 34.0 pg    MCHC 30.2 (L) 32.0 - 36.0 g/dL    RDW 20.9 (H) 11.5 - 14.5 %    Platelets 182 150 - 450 x10*3/uL    Neutrophils % 74.2 40.0 - 80.0 %    Immature Granulocytes %, Automated 0.8 0.0 - 0.9 %    Lymphocytes % 12.9 13.0 - 44.0 %    Monocytes % 9.8 2.0 - 10.0 %    Eosinophils % 1.8 0.0 - 6.0 %    Basophils % 0.5 0.0 - 2.0 %    Neutrophils Absolute 4.94 1.20 - 7.70 x10*3/uL    Immature Granulocytes Absolute, Automated 0.05 0.00 - 0.70 x10*3/uL    Lymphocytes Absolute 0.86 (L) 1.20 - 4.80 x10*3/uL    Monocytes Absolute 0.65 0.10 - 1.00 x10*3/uL    Eosinophils Absolute 0.12 0.00 - 0.70 x10*3/uL    Basophils Absolute 0.03 0.00 - 0.10 x10*3/uL   Comprehensive Metabolic Panel   Result Value Ref Range    Glucose 453 (H) 65 - 99 mg/dL    Sodium 135 133 - 145 mmol/L    Potassium 4.8 3.4 - 5.1 mmol/L    Chloride 99 97 - 107 mmol/L    Bicarbonate 26 24 - 31 mmol/L    Urea Nitrogen 46 (H) 8 - 25 mg/dL    Creatinine 2.10 (H) 0.40 - 1.60 mg/dL    eGFR 26 (L) >60 mL/min/1.73m*2    Calcium 8.3 (L) 8.5 - 10.4 mg/dL    Albumin 3.1 (L) 3.5 - 5.0 g/dL    Alkaline Phosphatase 186 (H) 35 - 125 U/L    Total Protein 7.0 5.9 - 7.9 g/dL    AST 20 5 - 40 U/L    Bilirubin, Total 0.4 0.1 - 1.2 mg/dL    ALT 13 5 - 40 U/L    Anion Gap 10 <=19 mmol/L   Morphology   Result Value Ref Range    RBC Morphology See Below     Polychromasia Mild    Hemoglobin A1C   Result Value Ref Range    Hemoglobin A1C 7.5 (H) See below %    Estimated Average Glucose 169 Not Established mg/dL   TSH   Result Value Ref Range    Thyroid Stimulating Hormone 7.13 (H) 0.27 - 4.20 mIU/L   C-reactive protein   Result Value Ref Range    C-Reactive Protein 3.60 (H) 0.00 - 2.00 mg/dL   ECG 12 lead   Result Value Ref Range    Ventricular Rate 74 BPM    Atrial Rate 74 BPM    NV Interval 156 ms    QRS Duration 96 ms    QT Interval 430  ms    QTC Calculation(Bazett) 477 ms    P Axis 42 degrees    R Axis -22 degrees    T Axis 9 degrees    QRS Count 12 beats    Q Onset 213 ms    P Onset 135 ms    P Offset 182 ms    T Offset 428 ms    QTC Fredericia 461 ms   POCT GLUCOSE   Result Value Ref Range    POCT Glucose 441 (H) 74 - 99 mg/dL   Urinalysis with Reflex Microscopic   Result Value Ref Range    Color, Urine Yellow Light-Yellow, Yellow, Dark-Yellow    Appearance, Urine Turbid (N) Clear    Specific Gravity, Urine 1.016 1.005 - 1.035    pH, Urine 8.0 5.0, 5.5, 6.0, 6.5, 7.0, 7.5, 8.0    Protein, Urine 200 (2+) (A) NEGATIVE, 10 (TRACE), 20 (TRACE) mg/dL    Glucose, Urine 300 (3+) (A) Normal mg/dL    Blood, Urine NEGATIVE NEGATIVE    Ketones, Urine NEGATIVE NEGATIVE mg/dL    Bilirubin, Urine NEGATIVE NEGATIVE    Urobilinogen, Urine Normal Normal mg/dL    Nitrite, Urine NEGATIVE NEGATIVE    Leukocyte Esterase, Urine 500 [place]/µL (A) NEGATIVE   Drug Screen, Urine   Result Value Ref Range    Amphetamine Screen, Urine Negative      Barbiturate Screen, Urine Negative      Benzodiazepines Screen, Urine Negative      Cannabinoid Screen, Urine Negative      Cocaine Metabolite Screen, Urine Negative      Fentanyl Screen, Urine Negative       Methadone Screen, Urine Negative      Opiate Screen, Urine Negative      Oxycodone Screen, Urine Negative      PCP Screen, Urine Negative     Microscopic Only, Urine   Result Value Ref Range    WBC, Urine >50 (A) 1-5, NONE /HPF    WBC Clumps, Urine OCCASIONAL Reference range not established. /HPF    RBC, Urine 11-20 (A) NONE, 1-2, 3-5 /HPF    Bacteria, Urine 3+ (A) NONE SEEN /HPF    Triple Phosphate Crystals, Urine 1+ NONE, 1+ /HPF   Blood Gas Arterial   Result Value Ref Range    POCT pH, Arterial 7.30 (L) 7.38 - 7.42 pH    POCT pCO2, Arterial 58 (H) 38 - 42 mm Hg    POCT pO2, Arterial 103 (H) 85 - 95 mm Hg    POCT SO2, Arterial      POCT Oxy Hemoglobin, Arterial      POCT Base Excess, Arterial 0.8 -2.0 - 3.0 mmol/L    POCT  HCO3 Calculated, Arterial 28.5 (H) 22.0 - 26.0 mmol/L    Patient Temperature 37.0 degrees Celsius    FiO2 28 %    Apparatus CANNULA     Site of Arterial Puncture Radial Right     Tyrone's Test Positive    POCT GLUCOSE   Result Value Ref Range    POCT Glucose 423 (H) 74 - 99 mg/dL   Renal Function Panel   Result Value Ref Range    Glucose 379 (H) 65 - 99 mg/dL    Sodium 137 133 - 145 mmol/L    Potassium 4.6 3.4 - 5.1 mmol/L    Chloride 101 97 - 107 mmol/L    Bicarbonate 26 24 - 31 mmol/L    Urea Nitrogen 42 (H) 8 - 25 mg/dL    Creatinine 2.00 (H) 0.40 - 1.60 mg/dL    eGFR 27 (L) >60 mL/min/1.73m*2    Calcium 8.1 (L) 8.5 - 10.4 mg/dL    Phosphorus 4.3 2.5 - 4.5 mg/dL    Albumin 3.0 (L) 3.5 - 5.0 g/dL    Anion Gap 10 <=19 mmol/L   POCT GLUCOSE   Result Value Ref Range    POCT Glucose 269 (H) 74 - 99 mg/dL          Assessment/Plan   Elevated creatinine I believe she likely has chronic kidney disease secondary to diabetic nephropathy and that she is close to her baseline however I have no records at this time to confirm this  Diabetes mellitus type 2  Hypertension  Anemia  Possible UTI  Generalized weakness    Plan: Discussed case with the primary team who will request records from [place].  Will check a kidney ultrasound and postvoid bladder scan.  Monitor culture results.  Hold losartan for now.  Check a CK level.  Give 40 mg of IV Lasix x 1 for volume overload.  Recommend Pulmonology consultation and will likely need an echocardiogram.  Dose medications for GFR.  Check iron studies.  Thank you for your consultation.    Siddhartha Tovar MD

## 2024-06-04 ENCOUNTER — APPOINTMENT (OUTPATIENT)
Dept: RADIOLOGY | Facility: HOSPITAL | Age: 64
End: 2024-06-04
Payer: MEDICARE

## 2024-06-04 ENCOUNTER — APPOINTMENT (OUTPATIENT)
Dept: CARDIOLOGY | Facility: HOSPITAL | Age: 64
End: 2024-06-04

## 2024-06-04 LAB
ALBUMIN SERPL-MCNC: 2.8 G/DL (ref 3.5–5)
ALP BLD-CCNC: 161 U/L (ref 35–125)
ALT SERPL-CCNC: 10 U/L (ref 5–40)
ANION GAP SERPL CALC-SCNC: 9 MMOL/L
AORTIC VALVE PEAK VELOCITY: 1.37 M/S
AST SERPL-CCNC: 16 U/L (ref 5–40)
ATRIAL RATE: 74 BPM
AV PEAK GRADIENT: 7.5 MMHG
AVA (PEAK VEL): 1.19 CM2
BILIRUB SERPL-MCNC: 0.4 MG/DL (ref 0.1–1.2)
BUN SERPL-MCNC: 52 MG/DL (ref 8–25)
CALCIUM SERPL-MCNC: 8.2 MG/DL (ref 8.5–10.4)
CHLORIDE SERPL-SCNC: 101 MMOL/L (ref 97–107)
CO2 SERPL-SCNC: 27 MMOL/L (ref 24–31)
CREAT SERPL-MCNC: 2.3 MG/DL (ref 0.4–1.6)
EGFRCR SERPLBLD CKD-EPI 2021: 23 ML/MIN/1.73M*2
EJECTION FRACTION APICAL 4 CHAMBER: 64.1
ERYTHROCYTE [DISTWIDTH] IN BLOOD BY AUTOMATED COUNT: 20.8 % (ref 11.5–14.5)
GLUCOSE BLD MANUAL STRIP-MCNC: 258 MG/DL (ref 74–99)
GLUCOSE BLD MANUAL STRIP-MCNC: 272 MG/DL (ref 74–99)
GLUCOSE BLD MANUAL STRIP-MCNC: 272 MG/DL (ref 74–99)
GLUCOSE BLD MANUAL STRIP-MCNC: 279 MG/DL (ref 74–99)
GLUCOSE SERPL-MCNC: 265 MG/DL (ref 65–99)
HCT VFR BLD AUTO: 24.7 % (ref 36–46)
HGB BLD-MCNC: 7.4 G/DL (ref 12–16)
LEFT VENTRICLE INTERNAL DIMENSION DIASTOLE: 4.76 CM (ref 3.5–6)
LEFT VENTRICULAR OUTFLOW TRACT DIAMETER: 1.7 CM
LV EJECTION FRACTION BIPLANE: 60 %
MCH RBC QN AUTO: 26.4 PG (ref 26–34)
MCHC RBC AUTO-ENTMCNC: 30 G/DL (ref 32–36)
MCV RBC AUTO: 88 FL (ref 80–100)
MITRAL VALVE E/A RATIO: 1.11
MITRAL VALVE E/E' RATIO: 16.29
NRBC BLD-RTO: 0 /100 WBCS (ref 0–0)
P AXIS: 42 DEGREES
P OFFSET: 182 MS
P ONSET: 135 MS
PLATELET # BLD AUTO: 169 X10*3/UL (ref 150–450)
POTASSIUM SERPL-SCNC: 4.9 MMOL/L (ref 3.4–5.1)
PR INTERVAL: 156 MS
PROT SERPL-MCNC: 6.5 G/DL (ref 5.9–7.9)
Q ONSET: 213 MS
QRS COUNT: 12 BEATS
QRS DURATION: 96 MS
QT INTERVAL: 430 MS
QTC CALCULATION(BAZETT): 477 MS
QTC FREDERICIA: 461 MS
R AXIS: -22 DEGREES
RBC # BLD AUTO: 2.8 X10*6/UL (ref 4–5.2)
RIGHT VENTRICLE FREE WALL PEAK S': 7.94 CM/S
RIGHT VENTRICLE PEAK SYSTOLIC PRESSURE: 79.6 MMHG
SODIUM SERPL-SCNC: 137 MMOL/L (ref 133–145)
T AXIS: 9 DEGREES
T OFFSET: 428 MS
TRICUSPID ANNULAR PLANE SYSTOLIC EXCURSION: 1.1 CM
VENTRICULAR RATE: 74 BPM
WBC # BLD AUTO: 7.4 X10*3/UL (ref 4.4–11.3)

## 2024-06-04 PROCEDURE — 36415 COLL VENOUS BLD VENIPUNCTURE: CPT | Performed by: EMERGENCY MEDICINE

## 2024-06-04 PROCEDURE — 2500000002 HC RX 250 W HCPCS SELF ADMINISTERED DRUGS (ALT 637 FOR MEDICARE OP, ALT 636 FOR OP/ED): Performed by: NURSE PRACTITIONER

## 2024-06-04 PROCEDURE — 2500000004 HC RX 250 GENERAL PHARMACY W/ HCPCS (ALT 636 FOR OP/ED): Performed by: INTERNAL MEDICINE

## 2024-06-04 PROCEDURE — 2500000004 HC RX 250 GENERAL PHARMACY W/ HCPCS (ALT 636 FOR OP/ED): Performed by: NURSE PRACTITIONER

## 2024-06-04 PROCEDURE — 74150 CT ABDOMEN W/O CONTRAST: CPT

## 2024-06-04 PROCEDURE — 2500000001 HC RX 250 WO HCPCS SELF ADMINISTERED DRUGS (ALT 637 FOR MEDICARE OP): Performed by: INTERNAL MEDICINE

## 2024-06-04 PROCEDURE — 2500000005 HC RX 250 GENERAL PHARMACY W/O HCPCS: Performed by: EMERGENCY MEDICINE

## 2024-06-04 PROCEDURE — 94640 AIRWAY INHALATION TREATMENT: CPT

## 2024-06-04 PROCEDURE — 9420000001 HC RT PATIENT EDUCATION 5 MIN

## 2024-06-04 PROCEDURE — 93306 TTE W/DOPPLER COMPLETE: CPT | Performed by: INTERNAL MEDICINE

## 2024-06-04 PROCEDURE — 93306 TTE W/DOPPLER COMPLETE: CPT

## 2024-06-04 PROCEDURE — 74150 CT ABDOMEN W/O CONTRAST: CPT | Performed by: RADIOLOGY

## 2024-06-04 PROCEDURE — 2500000001 HC RX 250 WO HCPCS SELF ADMINISTERED DRUGS (ALT 637 FOR MEDICARE OP): Performed by: EMERGENCY MEDICINE

## 2024-06-04 PROCEDURE — 82947 ASSAY GLUCOSE BLOOD QUANT: CPT

## 2024-06-04 PROCEDURE — 94760 N-INVAS EAR/PLS OXIMETRY 1: CPT

## 2024-06-04 PROCEDURE — 2500000002 HC RX 250 W HCPCS SELF ADMINISTERED DRUGS (ALT 637 FOR MEDICARE OP, ALT 636 FOR OP/ED): Performed by: EMERGENCY MEDICINE

## 2024-06-04 PROCEDURE — 80053 COMPREHEN METABOLIC PANEL: CPT | Performed by: EMERGENCY MEDICINE

## 2024-06-04 PROCEDURE — 85027 COMPLETE CBC AUTOMATED: CPT | Performed by: EMERGENCY MEDICINE

## 2024-06-04 PROCEDURE — 1100000001 HC PRIVATE ROOM DAILY

## 2024-06-04 RX ADMIN — ACETAMINOPHEN 650 MG: 325 TABLET ORAL at 16:07

## 2024-06-04 RX ADMIN — GABAPENTIN 300 MG: 300 CAPSULE ORAL at 14:16

## 2024-06-04 RX ADMIN — INSULIN LISPRO 9 UNITS: 100 INJECTION, SOLUTION INTRAVENOUS; SUBCUTANEOUS at 18:27

## 2024-06-04 RX ADMIN — Medication 1 TABLET: at 09:34

## 2024-06-04 RX ADMIN — INSULIN LISPRO 9 UNITS: 100 INJECTION, SOLUTION INTRAVENOUS; SUBCUTANEOUS at 09:32

## 2024-06-04 RX ADMIN — IRON SUCROSE 200 MG: 20 INJECTION, SOLUTION INTRAVENOUS at 12:20

## 2024-06-04 RX ADMIN — NYSTATIN 1 APPLICATION: 100000 POWDER TOPICAL at 14:16

## 2024-06-04 RX ADMIN — PERFLUTREN 2 ML OF DILUTION: 6.52 INJECTION, SUSPENSION INTRAVENOUS at 09:23

## 2024-06-04 RX ADMIN — GABAPENTIN 300 MG: 300 CAPSULE ORAL at 20:31

## 2024-06-04 RX ADMIN — KETOTIFEN FUMARATE 1 DROP: 0.25 SOLUTION/ DROPS OPHTHALMIC at 21:00

## 2024-06-04 RX ADMIN — FERROUS SULFATE TAB 325 MG (65 MG ELEMENTAL FE) 1 TABLET: 325 (65 FE) TAB at 09:31

## 2024-06-04 RX ADMIN — Medication 2 L/MIN: at 15:00

## 2024-06-04 RX ADMIN — ENOXAPARIN SODIUM 40 MG: 40 INJECTION SUBCUTANEOUS at 09:36

## 2024-06-04 RX ADMIN — INSULIN LISPRO 9 UNITS: 100 INJECTION, SOLUTION INTRAVENOUS; SUBCUTANEOUS at 12:16

## 2024-06-04 RX ADMIN — Medication 2 L/MIN: at 07:00

## 2024-06-04 RX ADMIN — NYSTATIN 1 APPLICATION: 100000 POWDER TOPICAL at 06:30

## 2024-06-04 RX ADMIN — METOPROLOL SUCCINATE 50 MG: 50 TABLET, EXTENDED RELEASE ORAL at 09:34

## 2024-06-04 RX ADMIN — ROSUVASTATIN CALCIUM 5 MG: 10 TABLET, COATED ORAL at 09:34

## 2024-06-04 RX ADMIN — KETOTIFEN FUMARATE 1 DROP: 0.25 SOLUTION/ DROPS OPHTHALMIC at 09:51

## 2024-06-04 RX ADMIN — LEVOTHYROXINE SODIUM 75 MCG: 0.07 TABLET ORAL at 06:30

## 2024-06-04 RX ADMIN — IPRATROPIUM BROMIDE AND ALBUTEROL SULFATE 3 ML: .5; 3 SOLUTION RESPIRATORY (INHALATION) at 19:25

## 2024-06-04 RX ADMIN — PANTOPRAZOLE SODIUM 40 MG: 40 TABLET, DELAYED RELEASE ORAL at 06:30

## 2024-06-04 RX ADMIN — INSULIN GLARGINE 10 UNITS: 100 INJECTION, SOLUTION SUBCUTANEOUS at 21:00

## 2024-06-04 RX ADMIN — AMLODIPINE BESYLATE 10 MG: 10 TABLET ORAL at 09:31

## 2024-06-04 RX ADMIN — FUROSEMIDE 60 MG: 40 TABLET ORAL at 18:32

## 2024-06-04 RX ADMIN — GABAPENTIN 300 MG: 300 CAPSULE ORAL at 09:37

## 2024-06-04 RX ADMIN — IPRATROPIUM BROMIDE AND ALBUTEROL SULFATE 3 ML: .5; 3 SOLUTION RESPIRATORY (INHALATION) at 12:49

## 2024-06-04 ASSESSMENT — COGNITIVE AND FUNCTIONAL STATUS - GENERAL
PERSONAL GROOMING: A LITTLE
EATING MEALS: A LITTLE
WALKING IN HOSPITAL ROOM: TOTAL
CLIMB 3 TO 5 STEPS WITH RAILING: TOTAL
DAILY ACTIVITIY SCORE: 13
MOVING TO AND FROM BED TO CHAIR: TOTAL
TURNING FROM BACK TO SIDE WHILE IN FLAT BAD: TOTAL
TOILETING: TOTAL
STANDING UP FROM CHAIR USING ARMS: TOTAL
HELP NEEDED FOR BATHING: A LOT
PERSONAL GROOMING: A LITTLE
DRESSING REGULAR UPPER BODY CLOTHING: A LITTLE
DRESSING REGULAR UPPER BODY CLOTHING: A LOT
STANDING UP FROM CHAIR USING ARMS: TOTAL
CLIMB 3 TO 5 STEPS WITH RAILING: TOTAL
MOBILITY SCORE: 7
MOVING FROM LYING ON BACK TO SITTING ON SIDE OF FLAT BED WITH BEDRAILS: A LOT
HELP NEEDED FOR BATHING: A LOT
DAILY ACTIVITIY SCORE: 13
DAILY ACTIVITIY SCORE: 12
CLIMB 3 TO 5 STEPS WITH RAILING: TOTAL
EATING MEALS: A LITTLE
STANDING UP FROM CHAIR USING ARMS: TOTAL
MOVING FROM LYING ON BACK TO SITTING ON SIDE OF FLAT BED WITH BEDRAILS: A LOT
WALKING IN HOSPITAL ROOM: TOTAL
MOVING TO AND FROM BED TO CHAIR: TOTAL
MOBILITY SCORE: 7
EATING MEALS: A LITTLE
DRESSING REGULAR LOWER BODY CLOTHING: TOTAL
HELP NEEDED FOR BATHING: A LOT
MOBILITY SCORE: 7
DRESSING REGULAR LOWER BODY CLOTHING: TOTAL
DRESSING REGULAR LOWER BODY CLOTHING: TOTAL
MOVING TO AND FROM BED TO CHAIR: TOTAL
DRESSING REGULAR UPPER BODY CLOTHING: A LITTLE
TURNING FROM BACK TO SIDE WHILE IN FLAT BAD: TOTAL
WALKING IN HOSPITAL ROOM: TOTAL
PERSONAL GROOMING: A LITTLE
TOILETING: TOTAL
TURNING FROM BACK TO SIDE WHILE IN FLAT BAD: TOTAL
MOVING FROM LYING ON BACK TO SITTING ON SIDE OF FLAT BED WITH BEDRAILS: A LOT
TOILETING: TOTAL

## 2024-06-04 ASSESSMENT — PAIN SCALES - GENERAL
PAINLEVEL_OUTOF10: 0 - NO PAIN
PAINLEVEL_OUTOF10: 3

## 2024-06-04 ASSESSMENT — PAIN DESCRIPTION - LOCATION: LOCATION: BACK

## 2024-06-04 NOTE — PROGRESS NOTES
Brittanie Smart is a 63 y.o. female on day 1 of admission presenting with Unable to care for self.      Subjective   Patient resting in bed, endorses weakness and fatigue. She denies any pain or sob.        Objective     Last Recorded Vitals  /53 (BP Location: Right arm, Patient Position: Lying)   Pulse 77   Temp 36.9 °C (98.4 °F) (Oral)   Resp 16   Wt 126 kg (277 lb 3.2 oz)   SpO2 90%   Intake/Output last 3 Shifts:    Intake/Output Summary (Last 24 hours) at 6/4/2024 0948  Last data filed at 6/4/2024 0300  Gross per 24 hour   Intake 360 ml   Output 1000 ml   Net -640 ml       Admission Weight  Weight: 119 kg (263 lb 4.8 oz) (06/02/24 2217)    Daily Weight  06/03/24 : 126 kg (277 lb 3.2 oz)    Image Results  US renal complete  Narrative: Interpreted By:  Otilio Hernandez,   STUDY:  US RENAL COMPLETE; 6/3/2024 7:24 pm      INDICATION:  Signs/Symptoms:DELISA.      COMPARISON:  None      ACCESSION NUMBER(S):  [id number]      ORDERING CLINICIAN:  DEBORAH KIM      TECHNIQUE:  Grayscale and color Doppler imaging of the kidneys      FINDINGS:  COMMENTS: Technologist note indicates that the study was quite  limited due to the patient's body habitus. Exam was performed  portably as well.      The right kidney measures 10.7 cm x 3.9 cm x 4.7 cm .  The left kidney measures 11.8 cm x 5.4 cm x 5.4 cm. THERE IS A  HETEROGENEOUS COMPLEX MASS LATERALLY MID TO LOWER POLE LEFT KIDNEY  MEASURING 4.3 X 3.9 X 4.6 CM.          No renal stones are identified.  The renal cortical thickness and  echogenicity is normal.  No hydronephrosis is identified.      Urinary bladder is nonspecific in appearance with no stones or masses  identified.      Impression: COMPLEX APPEARING MASS LEFT KIDNEY MEASURING UP TO 4.6 CM IN  DIAMETER, WITH A DIFFERENTIAL TO INCLUDE MALIGNANCY. CT UROGRAM WITH  AND WITHOUT CONTRAST IS SUGGESTED FOR MORE DEFINITIVE ASSESSMENT.      MACRO:  Otilio Hernandez discussed the significance and urgency of this  critical  finding EPIC secure chat with  [facility] medicine team on 6/4/2024 at  8:22 am.  (**-RCF-**) Findings:  See findings.      Signed by: Otilio Mary 6/4/2024 8:23 AM  Dictation workstation:   [id number]      Physical Exam  Constitutional:       Appearance: She is obese. She is ill-appearing.   Cardiovascular:      Rate and Rhythm: Normal rate and regular rhythm.      Pulses: Normal pulses.      Heart sounds: Normal heart sounds.   Pulmonary:      Effort: Pulmonary effort is normal.      Breath sounds: Normal breath sounds.   Abdominal:      General: Bowel sounds are normal.      Palpations: Abdomen is soft.   Musculoskeletal:         General: Normal range of motion.   Skin:     General: Skin is warm and dry.      Comments: Ulcer to medial left ankle and great left toe  Stage 2 pressure ulcer to coccyx, borders are healthy and pink, no drainage.    Neurological:      Mental Status: She is alert and oriented to person, place, and time.         Relevant Results             Results for orders placed or performed during the hospital encounter of 06/02/24 (from the past 24 hour(s))   Renal Function Panel   Result Value Ref Range    Glucose 379 (H) 65 - 99 mg/dL    Sodium 137 133 - 145 mmol/L    Potassium 4.6 3.4 - 5.1 mmol/L    Chloride 101 97 - 107 mmol/L    Bicarbonate 26 24 - 31 mmol/L    Urea Nitrogen 42 (H) 8 - 25 mg/dL    Creatinine 2.00 (H) 0.40 - 1.60 mg/dL    eGFR 27 (L) >60 mL/min/1.73m*2    Calcium 8.1 (L) 8.5 - 10.4 mg/dL    Phosphorus 4.3 2.5 - 4.5 mg/dL    Albumin 3.0 (L) 3.5 - 5.0 g/dL    Anion Gap 10 <=19 mmol/L   Ferritin   Result Value Ref Range    Ferritin 330 (H) 13 - 150 ng/mL   Iron and TIBC   Result Value Ref Range    Iron 40 30 - 160 ug/dL    UIBC 153 110 - 370 ug/dL    TIBC 193 (L) 228 - 428 ug/dL    % Saturation 21 12 - 50 %   POCT GLUCOSE   Result Value Ref Range    POCT Glucose 269 (H) 74 - 99 mg/dL   POCT GLUCOSE   Result Value Ref Range    POCT Glucose 265 (H) 74 - 99 mg/dL   POCT  GLUCOSE   Result Value Ref Range    POCT Glucose 247 (H) 74 - 99 mg/dL   CBC   Result Value Ref Range    WBC 7.4 4.4 - 11.3 x10*3/uL    nRBC 0.0 0.0 - 0.0 /100 WBCs    RBC 2.80 (L) 4.00 - 5.20 x10*6/uL    Hemoglobin 7.4 (L) 12.0 - 16.0 g/dL    Hematocrit 24.7 (L) 36.0 - 46.0 %    MCV 88 80 - 100 fL    MCH 26.4 26.0 - 34.0 pg    MCHC 30.0 (L) 32.0 - 36.0 g/dL    RDW 20.8 (H) 11.5 - 14.5 %    Platelets 169 150 - 450 x10*3/uL   Comprehensive metabolic panel   Result Value Ref Range    Glucose 265 (H) 65 - 99 mg/dL    Sodium 137 133 - 145 mmol/L    Potassium 4.9 3.4 - 5.1 mmol/L    Chloride 101 97 - 107 mmol/L    Bicarbonate 27 24 - 31 mmol/L    Urea Nitrogen 52 (H) 8 - 25 mg/dL    Creatinine 2.30 (H) 0.40 - 1.60 mg/dL    eGFR 23 (L) >60 mL/min/1.73m*2    Calcium 8.2 (L) 8.5 - 10.4 mg/dL    Albumin 2.8 (L) 3.5 - 5.0 g/dL    Alkaline Phosphatase 161 (H) 35 - 125 U/L    Total Protein 6.5 5.9 - 7.9 g/dL    AST 16 5 - 40 U/L    Bilirubin, Total 0.4 0.1 - 1.2 mg/dL    ALT 10 5 - 40 U/L    Anion Gap 9 <=19 mmol/L   POCT GLUCOSE   Result Value Ref Range    POCT Glucose 258 (H) 74 - 99 mg/dL   Transthoracic Echo (TTE) Complete   Result Value Ref Range    BSA 2.46 m2       Assessment/Plan                  Principal Problem:    Unable to care for self  Active Problems:    Type 2 diabetes mellitus (Multi)    Morbid obesity (Multi)    Peripheral vascular disease (CMS-HCC)    Hypertension    Pressure injury of sacral region, stage 3 (Multi)    Behavior problem, adult    Renal failure        Unable to care for self   Consult  for assistance.   No indication for APS at this time; defer to . Patient is angry at her sister and brother in law because she was brought to the hospital and not her new apartment.   Insurance will likely cover discharge to SNF, arrangements are being made       Type 2 diabetes mellitus  markedly uncontrolled blood sugar on arrival, Hbg A1c 7.5  Will add Lantus at 10  units  Hypoglycemic protocol  AC/HS blood sugars     Morbid obesity   limited ability to care for herself  Safety precautions   PT/OT to evaluate and treat     Peripheral vascular disease   unsure what previous diagnostics have been done[place]     Hypertension   continue medications     Chronic Kidney Disease   - unsure chronicity  Patient has been on hemodialysis temporarily.  US shows a possible mass/malignancy on left kidney, MRI ordered.  -patient states she did have a biopsy in [place] and was found to have an infection in her kidney and had 6 weeks of antibiotic, MRI will still be done and record retrieval is in process       Pressure injury of sacral region, stage 3 and both ankles   wound care gave recommendations  Continue [facility]     Behavior problem, adult   Unsure of previous diagnosis patient denies any  Consult to Behavior Health was done and found no needs at present     Hypothyroid   check TSH and continue her medications, TSH elevated and free t 4 only slightly decreased will continue will same dose and recommend recheck in 6 weeks.     Anemia   on iron supplementation     Plan of Care  Patient will likely need SNF at discharge. Social Service will need to verify insurance benefits.   Plan of care discussed with patient and collaborating physician, Dr. Tobin.               Kaylan Bird, APRN-CNP

## 2024-06-04 NOTE — CONSULTS
Inpatient consult to Urology  Consult performed by: Mirna Baron MD  Consult ordered by: Siddhartha Tovar MD  Reason for consult: renal lashay  Assessment/Recommendations: Pt refusing MRI at this facility, explained the concerns re the mass, but is adamant, will have her pursue as outpatient, but concerned re her compliance      Renal mass    Pt ruibn traveled from rehab in [place], brought to ER by family due to ambulation issues, She is upset being here, but ultimately many   medical issues being pursued, one of which is cki, had renal us which revealed a moderate sized renal mass.  She did not recall having the ultrasound done and denies any discomfort    Active Ambulatory Problems     Diagnosis Date Noted    No Active Ambulatory Problems     Resolved Ambulatory Problems     Diagnosis Date Noted    No Resolved Ambulatory Problems     Past Medical History:   Diagnosis Date    Anemia     Behavior problem, adult     Decubitus ulcer     Depression     Diabetes mellitus (Multi)     GERD (gastroesophageal reflux disease)     Hemorrhoids     Hyperlipidemia     Hypertension     Hypothyroid     Kidney disease     Morbid obesity (Multi)     Peripheral vascular disease (CMS-HCC)         History reviewed. No pertinent surgical history.     No current facility-administered medications on file prior to encounter.     Current Outpatient Medications on File Prior to Encounter   Medication Sig Dispense Refill    amLODIPine (Norvasc) 10 mg tablet Take 1 tablet (10 mg) by mouth once daily.      ascorbic acid (Vitamin C) 500 mg tablet Take 1 tablet (500 mg) by mouth once daily.      B complex-vitamin C-folic acid (Nephro-Chavez Rx) 1- mg-mg-mcg tablet Take 1 tablet by mouth once daily with breakfast.      ferrous sulfate, 325 mg ferrous sulfate, tablet Take 1 tablet by mouth once daily with breakfast.      furosemide (Lasix) 40 mg tablet Take 1 tablet (40 mg) by mouth once daily.      gabapentin (Neurontin) 300 mg capsule Take 1  capsule (300 mg) by mouth 3 times a day.      hydrocortisone (Anusol-HC) 2.5 % rectal cream Insert 1 Application into the rectum once daily.      insulin lispro (HumaLOG) 100 unit/mL injection Inject under the skin 3 times daily (morning, midday, late afternoon). Take as directed per insulin instructions.      insulin lispro (HumaLOG) 100 unit/mL injection Inject 0.05 mL (5 Units) under the skin 3 times daily (morning, midday, late afternoon). Take as directed per insulin instructions.      levothyroxine (Synthroid, Levoxyl) 75 mcg tablet Take 1 tablet (75 mcg) by mouth early in the morning.. Take on an empty stomach at the same time each day, either 30 to 60 minutes prior to breakfast      losartan (Cozaar) 50 mg tablet Take 1 tablet (50 mg) by mouth once daily.      metoprolol succinate XL (Toprol-XL) 50 mg 24 hr tablet Take 1 tablet (50 mg) by mouth once daily. Do not crush or chew.      multivitamin with minerals iron-free (Centrum Silver) Take 1 tablet by mouth once daily.      nitrofurantoin, macrocrystal-monohydrate, (Macrobid) 100 mg capsule Take 1 capsule (100 mg) by mouth 2 times a day.      pantoprazole (ProtoNix) 40 mg EC tablet Take 1 tablet (40 mg) by mouth once daily in the morning. Take before meals. Do not crush, chew, or split.      rosuvastatin (Crestor) 5 mg tablet Take 1 tablet (5 mg) by mouth once daily.          No Known Allergies     Results for orders placed or performed during the hospital encounter of 06/02/24 (from the past 96 hour(s))   CBC and Auto Differential   Result Value Ref Range    WBC 6.7 4.4 - 11.3 x10*3/uL    nRBC 0.0 0.0 - 0.0 /100 WBCs    RBC 2.88 (L) 4.00 - 5.20 x10*6/uL    Hemoglobin 7.8 (L) 12.0 - 16.0 g/dL    Hematocrit 25.8 (L) 36.0 - 46.0 %    MCV 90 80 - 100 fL    MCH 27.1 26.0 - 34.0 pg    MCHC 30.2 (L) 32.0 - 36.0 g/dL    RDW 20.9 (H) 11.5 - 14.5 %    Platelets 182 150 - 450 x10*3/uL    Neutrophils % 74.2 40.0 - 80.0 %    Immature Granulocytes %, Automated 0.8 0.0 -  0.9 %    Lymphocytes % 12.9 13.0 - 44.0 %    Monocytes % 9.8 2.0 - 10.0 %    Eosinophils % 1.8 0.0 - 6.0 %    Basophils % 0.5 0.0 - 2.0 %    Neutrophils Absolute 4.94 1.20 - 7.70 x10*3/uL    Immature Granulocytes Absolute, Automated 0.05 0.00 - 0.70 x10*3/uL    Lymphocytes Absolute 0.86 (L) 1.20 - 4.80 x10*3/uL    Monocytes Absolute 0.65 0.10 - 1.00 x10*3/uL    Eosinophils Absolute 0.12 0.00 - 0.70 x10*3/uL    Basophils Absolute 0.03 0.00 - 0.10 x10*3/uL   Comprehensive Metabolic Panel   Result Value Ref Range    Glucose 453 (H) 65 - 99 mg/dL    Sodium 135 133 - 145 mmol/L    Potassium 4.8 3.4 - 5.1 mmol/L    Chloride 99 97 - 107 mmol/L    Bicarbonate 26 24 - 31 mmol/L    Urea Nitrogen 46 (H) 8 - 25 mg/dL    Creatinine 2.10 (H) 0.40 - 1.60 mg/dL    eGFR 26 (L) >60 mL/min/1.73m*2    Calcium 8.3 (L) 8.5 - 10.4 mg/dL    Albumin 3.1 (L) 3.5 - 5.0 g/dL    Alkaline Phosphatase 186 (H) 35 - 125 U/L    Total Protein 7.0 5.9 - 7.9 g/dL    AST 20 5 - 40 U/L    Bilirubin, Total 0.4 0.1 - 1.2 mg/dL    ALT 13 5 - 40 U/L    Anion Gap 10 <=19 mmol/L   Morphology   Result Value Ref Range    RBC Morphology See Below     Polychromasia Mild    Hemoglobin A1C   Result Value Ref Range    Hemoglobin A1C 7.5 (H) See below %    Estimated Average Glucose 169 Not Established mg/dL   TSH   Result Value Ref Range    Thyroid Stimulating Hormone 7.13 (H) 0.27 - 4.20 mIU/L   C-reactive protein   Result Value Ref Range    C-Reactive Protein 3.60 (H) 0.00 - 2.00 mg/dL   ECG 12 lead   Result Value Ref Range    Ventricular Rate 74 BPM    Atrial Rate 74 BPM    MS Interval 156 ms    QRS Duration 96 ms    QT Interval 430 ms    QTC Calculation(Bazett) 477 ms    P Axis 42 degrees    R Axis -22 degrees    T Axis 9 degrees    QRS Count 12 beats    Q Onset 213 ms    P Onset 135 ms    P Offset 182 ms    T Offset 428 ms    QTC Fredericia 461 ms   POCT GLUCOSE   Result Value Ref Range    POCT Glucose 441 (H) 74 - 99 mg/dL   Urinalysis with Reflex Microscopic    Result Value Ref Range    Color, Urine Yellow Light-Yellow, Yellow, Dark-Yellow    Appearance, Urine Turbid (N) Clear    Specific Gravity, Urine 1.016 1.005 - 1.035    pH, Urine 8.0 5.0, 5.5, 6.0, 6.5, 7.0, 7.5, 8.0    Protein, Urine 200 (2+) (A) NEGATIVE, 10 (TRACE), 20 (TRACE) mg/dL    Glucose, Urine 300 (3+) (A) Normal mg/dL    Blood, Urine NEGATIVE NEGATIVE    Ketones, Urine NEGATIVE NEGATIVE mg/dL    Bilirubin, Urine NEGATIVE NEGATIVE    Urobilinogen, Urine Normal Normal mg/dL    Nitrite, Urine NEGATIVE NEGATIVE    Leukocyte Esterase, Urine 500 Clemente/µL (A) NEGATIVE   Drug Screen, Urine   Result Value Ref Range    Amphetamine Screen, Urine Negative      Barbiturate Screen, Urine Negative      Benzodiazepines Screen, Urine Negative      Cannabinoid Screen, Urine Negative      Cocaine Metabolite Screen, Urine Negative      Fentanyl Screen, Urine Negative       Methadone Screen, Urine Negative      Opiate Screen, Urine Negative      Oxycodone Screen, Urine Negative      PCP Screen, Urine Negative     Microscopic Only, Urine   Result Value Ref Range    WBC, Urine >50 (A) 1-5, NONE /HPF    WBC Clumps, Urine OCCASIONAL Reference range not established. /HPF    RBC, Urine 11-20 (A) NONE, 1-2, 3-5 /HPF    Bacteria, Urine 3+ (A) NONE SEEN /HPF    Triple Phosphate Crystals, Urine 1+ NONE, 1+ /HPF   Blood Gas Arterial   Result Value Ref Range    POCT pH, Arterial 7.30 (L) 7.38 - 7.42 pH    POCT pCO2, Arterial 58 (H) 38 - 42 mm Hg    POCT pO2, Arterial 103 (H) 85 - 95 mm Hg    POCT SO2, Arterial      POCT Oxy Hemoglobin, Arterial      POCT Base Excess, Arterial 0.8 -2.0 - 3.0 mmol/L    POCT HCO3 Calculated, Arterial 28.5 (H) 22.0 - 26.0 mmol/L    Patient Temperature 37.0 degrees Celsius    FiO2 28 %    Apparatus CANNULA     Site of Arterial Puncture Radial Right     Tyrone's Test Positive    T3, free   Result Value Ref Range    Triiodothyronine, Free 2.2 (L) 2.3 - 4.2 pg/mL   Creatine Kinase   Result Value Ref Range     Creatine Kinase 72 24 - 195 U/L   POCT GLUCOSE   Result Value Ref Range    POCT Glucose 423 (H) 74 - 99 mg/dL   Renal Function Panel   Result Value Ref Range    Glucose 379 (H) 65 - 99 mg/dL    Sodium 137 133 - 145 mmol/L    Potassium 4.6 3.4 - 5.1 mmol/L    Chloride 101 97 - 107 mmol/L    Bicarbonate 26 24 - 31 mmol/L    Urea Nitrogen 42 (H) 8 - 25 mg/dL    Creatinine 2.00 (H) 0.40 - 1.60 mg/dL    eGFR 27 (L) >60 mL/min/1.73m*2    Calcium 8.1 (L) 8.5 - 10.4 mg/dL    Phosphorus 4.3 2.5 - 4.5 mg/dL    Albumin 3.0 (L) 3.5 - 5.0 g/dL    Anion Gap 10 <=19 mmol/L   Ferritin   Result Value Ref Range    Ferritin 330 (H) 13 - 150 ng/mL   Iron and TIBC   Result Value Ref Range    Iron 40 30 - 160 ug/dL    UIBC 153 110 - 370 ug/dL    TIBC 193 (L) 228 - 428 ug/dL    % Saturation 21 12 - 50 %   POCT GLUCOSE   Result Value Ref Range    POCT Glucose 269 (H) 74 - 99 mg/dL   POCT GLUCOSE   Result Value Ref Range    POCT Glucose 265 (H) 74 - 99 mg/dL   POCT GLUCOSE   Result Value Ref Range    POCT Glucose 247 (H) 74 - 99 mg/dL   CBC   Result Value Ref Range    WBC 7.4 4.4 - 11.3 x10*3/uL    nRBC 0.0 0.0 - 0.0 /100 WBCs    RBC 2.80 (L) 4.00 - 5.20 x10*6/uL    Hemoglobin 7.4 (L) 12.0 - 16.0 g/dL    Hematocrit 24.7 (L) 36.0 - 46.0 %    MCV 88 80 - 100 fL    MCH 26.4 26.0 - 34.0 pg    MCHC 30.0 (L) 32.0 - 36.0 g/dL    RDW 20.8 (H) 11.5 - 14.5 %    Platelets 169 150 - 450 x10*3/uL   Comprehensive metabolic panel   Result Value Ref Range    Glucose 265 (H) 65 - 99 mg/dL    Sodium 137 133 - 145 mmol/L    Potassium 4.9 3.4 - 5.1 mmol/L    Chloride 101 97 - 107 mmol/L    Bicarbonate 27 24 - 31 mmol/L    Urea Nitrogen 52 (H) 8 - 25 mg/dL    Creatinine 2.30 (H) 0.40 - 1.60 mg/dL    eGFR 23 (L) >60 mL/min/1.73m*2    Calcium 8.2 (L) 8.5 - 10.4 mg/dL    Albumin 2.8 (L) 3.5 - 5.0 g/dL    Alkaline Phosphatase 161 (H) 35 - 125 U/L    Total Protein 6.5 5.9 - 7.9 g/dL    AST 16 5 - 40 U/L    Bilirubin, Total 0.4 0.1 - 1.2 mg/dL    ALT 10 5 - 40 U/L     Anion Gap 9 <=19 mmol/L   POCT GLUCOSE   Result Value Ref Range    POCT Glucose 258 (H) 74 - 99 mg/dL   Transthoracic Echo (TTE) Complete   Result Value Ref Range    AV pk candace 1.37 m/s    LVOT diam 1.70 cm    MV E/A ratio 1.11     Tricuspid annular plane systolic excursion 1.1 cm    LV Biplane EF 60 %    MV avg E/e' ratio 16.29     RV free wall pk S' 7.94 cm/s    RVSP 79.6 mmHg    LVIDd 4.76 cm    AV pk grad 7.5 mmHg    Aortic Valve Area by Continuity of Peak Velocity 1.19 cm2    LV A4C EF 64.1    POCT GLUCOSE   Result Value Ref Range    POCT Glucose 279 (H) 74 - 99 mg/dL       Transthoracic Echo (TTE) Complete    Result Date: 6/4/2024            [place] [id number]             Phone [phone] TRANSTHORACIC ECHOCARDIOGRAM REPORT  Patient Name:      DESI Arias Physician:    [id number] Duong Worthington DO Study Date:        6/4/2024              Ordering Provider:    [id number] GARCIA MRN/PID:           [id number]              Fellow: Accession#:        [id number]          Nurse: Date of Birth/Age: 1960 / 63 years  Sonographer:          Chantelle Carrasquillo                                                                ACS, LUCÍA PATEL Gender:            F                     Additional Staff: Height:            170.18 cm             Admit Date: Weight:            125.65 kg             Admission Status:     Inpatient -                                                                Routine BSA / BMI:         2.32 m2 / 43.38 kg/m2 Department Location:  [facility] Blood Pressure: 124 /53 mmHg Study Type:    TRANSTHORACIC ECHO (TTE) COMPLETE Diagnosis/ICD: Dyspnea, unspecified-R06.00 Indication:    dyspnea, Htn, Renal failure CPT Codes:     Echo Complete w Full  [id number] Patient History: Pertinent History: Dyspnea, DM2, Renal failure, Htn, PVD. Study Detail: The following Echo studies were performed: 2D, M-Mode, Doppler and               color flow. Technically challenging study due to poor acoustic               windows and body habitus. Definity used as a contrast agent for               endocardial border definition. Total contrast used for this               procedure was 2 mL via IV push.  PHYSICIAN INTERPRETATION: Left Ventricle: Left ventricular systolic function is normal, with an estimated ejection fraction of 60-65%. There are no regional wall motion abnormalities. The left ventricular cavity size is normal. There is mild concentric left ventricular hypertrophy. Spectral Doppler shows an impaired relaxation pattern of left ventricular diastolic filling. Left Atrium: The left atrium is normal in size. Right Ventricle: The right ventricle was not well visualized. Unable to determine right ventricular systolic function. RV not well visualized but suggests RV enlargemnt and RV hypokinesis in some views, with severe pulmonary hypertension. Right Atrium: The right atrium was not well visualized. Aortic Valve: The aortic valve appears abnormal. There is mild to moderate aortic valve cusp calcification. There is evidence of mildly elevated transaortic gradients consistent with sclerosis of the aortic valve. There is no evidence of aortic valve regurgitation. The peak instantaneous gradient of the aortic valve is 7.5 mmHg. Mitral Valve: The mitral valve is abnormal. There is mild mitral valve regurgitation. Tricuspid Valve: The tricuspid valve is structurally normal. There is severe tricuspid regurgitation. The Doppler estimated RVSP is severely elevated at 79.6 mmHg. Pulmonic Valve: The pulmonic valve is not well visualized. The pulmonic valve regurgitation was not well visualized. Pericardium: There is no pericardial effusion noted. Aorta: The aortic root is normal.   CONCLUSIONS:  1. Left ventricular systolic function is normal with a 60-65% estimated ejection fraction.  2. Poorly visualized anatomical structures due to suboptimal image quality.  3. Spectral Doppler shows an impaired relaxation pattern of left ventricular diastolic filling.  4. RV not well visualized but suggests RV enlargemnt and RV hypokinesis in some views, with severe pulmonary hypertension.  5. Severe tricuspid regurgitation.  6. Severely elevated right ventricular systolic pressure.  7. Aortic valve appears abnormal.  8. Aortic valve sclerosis. QUANTITATIVE DATA SUMMARY: 2D MEASUREMENTS:                           Normal Ranges: IVSd:          1.40 cm    (0.6-1.1cm) LVPWd:         1.23 cm    (0.6-1.1cm) LVIDd:         4.76 cm    (3.9-5.9cm) LVIDs:         3.21 cm LV Mass Index: 106.2 g/m2 LV % FS        32.6 % RA VOLUME BY A/L METHOD:                       Normal Ranges: RA Area A4C: 12.7 cm2 AORTA MEASUREMENTS:                    Normal Ranges: Asc Ao, d: 3.10 cm (2.1-3.4cm) LV SYSTOLIC FUNCTION BY 2D PLANIMETRY (MOD):                     Normal Ranges: EF-A4C View: 64.1 % (>=55%) EF-A2C View: 56.5 % EF-Biplane:  60.2 % LV DIASTOLIC FUNCTION:                        Normal Ranges: MV Peak E:    1.14 m/s (0.7-1.2 m/s) MV Peak A:    1.03 m/s (0.42-0.7 m/s) E/A Ratio:    1.11     (1.0-2.2) MV e'         0.07 m/s (>8.0) MV lateral e' 0.09 m/s MV medial e'  0.05 m/s E/e' Ratio:   16.29    (<8.0) MITRAL VALVE:                 Normal Ranges: MV DT: 189 msec (150-240msec) AORTIC VALVE:                         Normal Ranges: AoV Vmax:      1.37 m/s (<=1.7m/s) AoV Peak P.5 mmHg (<20mmHg) LVOT Max Joshua:  0.72 m/s (<=1.1m/s) LVOT VTI:      15.80 cm LVOT Diameter: 1.70 cm  (1.8-2.4cm) AoV Area,Vmax: 1.19 cm2 (2.5-4.5cm2)  RIGHT VENTRICLE: RV Basal 3.89 cm TAPSE:   10.7 mm RV s'    0.08 m/s TRICUSPID VALVE/RVSP:                             Normal Ranges: Peak TR Velocity: 3.86 m/s RV Syst Pressure: 79.6 mmHg (<  30mmHg) IVC Diam:         2.45 cm  [id number] Duong Worthington DO Electronically signed on 6/4/2024 at 11:34:03 AM  ** Final **     US renal complete    Result Date: 6/4/2024  Interpreted By:  Otilio Hernandez, STUDY: US RENAL COMPLETE; 6/3/2024 7:24 pm   INDICATION: Signs/Symptoms:DELISA.   COMPARISON: None   ACCESSION NUMBER(S): [id number]   ORDERING CLINICIAN: DEBORAH KIM   TECHNIQUE: Grayscale and color Doppler imaging of the kidneys   FINDINGS: COMMENTS: Technologist note indicates that the study was quite limited due to the patient's body habitus. Exam was performed portably as well.   The right kidney measures 10.7 cm x 3.9 cm x 4.7 cm . The left kidney measures 11.8 cm x 5.4 cm x 5.4 cm. THERE IS A HETEROGENEOUS COMPLEX MASS LATERALLY MID TO LOWER POLE LEFT KIDNEY MEASURING 4.3 X 3.9 X 4.6 CM.     No renal stones are identified.  The renal cortical thickness and echogenicity is normal.  No hydronephrosis is identified.   Urinary bladder is nonspecific in appearance with no stones or masses identified.       COMPLEX APPEARING MASS LEFT KIDNEY MEASURING UP TO 4.6 CM IN DIAMETER, WITH A DIFFERENTIAL TO INCLUDE MALIGNANCY. CT UROGRAM WITH AND WITHOUT CONTRAST IS SUGGESTED FOR MORE DEFINITIVE ASSESSMENT.   MACRO: Otilio Hernandez discussed the significance and urgency of this critical finding EPIC secure chat with  [facility] medicine team on 6/4/2024 at 8:22 am.  (**-RCF-**) Findings:  See findings.   Signed by: Otilio Hernandez 6/4/2024 8:23 AM Dictation workstation:   [id number]    XR chest 2 views    Result Date: 6/3/2024  Interpreted By:  Gerri Hsu, STUDY: XR CHEST 2 VIEWS 6/3/2024 8:15 am   INDICATION: Signs/Symptoms:COPD with wheezing   COMPARISON: None available.   ACCESSION NUMBER(S): [id number]   ORDERING CLINICIAN: ZAHRA BATISTA   TECHNIQUE: AP and lateral views of cervical spine are performed.   FINDINGS: The cardiac size is indeterminate due to the AP projection.   Right hemidiaphragm is elevated. On lateral  projection, bilateral pleural effusions are observed. No obvious focal infiltrate or airspace consolidation is seen.       Bilateral pleural effusions on lateral projection and elevated right hemidiaphragm.   Signed by: Gerri Hsu 6/3/2024 8:24 AM Dictation workstation:   [id number]    ECG 12 lead    Result Date: 6/3/2024    Poor data quality, interpretation may be adversely affected Normal sinus rhythm Minimal voltage criteria for LVH, may be normal variant Nonspecific T wave abnormality Abnormal ECG No previous ECGs available      PE:  avoidance and eye aversion and overt ignoring the examiner  No sob nor wheezes  Abd benign  No rash nor jaundice    A/P:  long discussion w pt describing concern for renal malignancy, she is refusing MRI here and states will only do in open, she states she would never be at a facility that has only closed mri and not willing to listen to explanations or re her condition.she/her/hers may pursue this as outpatient, as not the reason for her admit, but I doubt her future compliance, will try again tomorrow to talk w pt

## 2024-06-04 NOTE — PROGRESS NOTES
Brittanie Smart is a 63 y.o. female on day 1 of admission presenting with Unable to care for self.      Subjective   Still feeling weak otherwise no complaints. Cr fairly unchanged.        Objective          Vitals 24HR  Heart Rate:  [70-78]   Temp:  [36.6 °C (97.9 °F)-36.9 °C (98.4 °F)]   Resp:  [15-17]   BP: (109-137)/(47-80)   SpO2:  [90 %-97 %]       Intake/Output last 3 Shifts:    Intake/Output Summary (Last 24 hours) at 6/4/2024 1646  Last data filed at 6/4/2024 1028  Gross per 24 hour   Intake 120 ml   Output 601 ml   Net -481 ml       Physical Exam  Constitutional:       General: She is awake. She is not in acute distress.  Cardiovascular:      Rate and Rhythm: Regular rhythm.      Heart sounds:      No friction rub.   Pulmonary:      Effort: Pulmonary effort is normal.      Breath sounds: Normal breath sounds.   Abdominal:      General: Bowel sounds are normal.      Palpations: Abdomen is soft.      Tenderness: There is no guarding or rebound.   Musculoskeletal:      Comments: 1-2+ edema   Neurological:      Mental Status: She is alert.         Relevant Results  Results for orders placed or performed during the hospital encounter of 06/02/24 (from the past 24 hour(s))   POCT GLUCOSE   Result Value Ref Range    POCT Glucose 247 (H) 74 - 99 mg/dL   CBC   Result Value Ref Range    WBC 7.4 4.4 - 11.3 x10*3/uL    nRBC 0.0 0.0 - 0.0 /100 WBCs    RBC 2.80 (L) 4.00 - 5.20 x10*6/uL    Hemoglobin 7.4 (L) 12.0 - 16.0 g/dL    Hematocrit 24.7 (L) 36.0 - 46.0 %    MCV 88 80 - 100 fL    MCH 26.4 26.0 - 34.0 pg    MCHC 30.0 (L) 32.0 - 36.0 g/dL    RDW 20.8 (H) 11.5 - 14.5 %    Platelets 169 150 - 450 x10*3/uL   Comprehensive metabolic panel   Result Value Ref Range    Glucose 265 (H) 65 - 99 mg/dL    Sodium 137 133 - 145 mmol/L    Potassium 4.9 3.4 - 5.1 mmol/L    Chloride 101 97 - 107 mmol/L    Bicarbonate 27 24 - 31 mmol/L    Urea Nitrogen 52 (H) 8 - 25 mg/dL    Creatinine 2.30 (H) 0.40 - 1.60 mg/dL    eGFR 23 (L) >60  mL/min/1.73m*2    Calcium 8.2 (L) 8.5 - 10.4 mg/dL    Albumin 2.8 (L) 3.5 - 5.0 g/dL    Alkaline Phosphatase 161 (H) 35 - 125 U/L    Total Protein 6.5 5.9 - 7.9 g/dL    AST 16 5 - 40 U/L    Bilirubin, Total 0.4 0.1 - 1.2 mg/dL    ALT 10 5 - 40 U/L    Anion Gap 9 <=19 mmol/L   POCT GLUCOSE   Result Value Ref Range    POCT Glucose 258 (H) 74 - 99 mg/dL   Transthoracic Echo (TTE) Complete   Result Value Ref Range    AV pk candace 1.37 m/s    LVOT diam 1.70 cm    MV E/A ratio 1.11     Tricuspid annular plane systolic excursion 1.1 cm    LV Biplane EF 60 %    MV avg E/e' ratio 16.29     RV free wall pk S' 7.94 cm/s    RVSP 79.6 mmHg    LVIDd 4.76 cm    AV pk grad 7.5 mmHg    Aortic Valve Area by Continuity of Peak Velocity 1.19 cm2    LV A4C EF 64.1    POCT GLUCOSE   Result Value Ref Range    POCT Glucose 279 (H) 74 - 99 mg/dL            Assessment/Plan   Elevated creatinine I believe she likely has chronic kidney disease secondary to diabetic nephropathy and that she is close to her baseline however I have no records at this time to confirm this  Diabetes mellitus type 2  Hypertension  Anemia   Possible UTI  Generalized weakness  Renal Mass  Diastolic CHF     Plan: Urology consulted. Monitor culture results. Pending post void bladder scan. Hold losartan for now.  Schedule lasix 60 mg PO daily.  Dose medications for GFR.  Start IV venofer. No indications for dialysis. Still awaiting records from [place].     Siddhartha Tovar MD

## 2024-06-04 NOTE — PROGRESS NOTES
"Physical Therapy                 Therapy Communication Note    Patient Name: Brittanie Smart  MRN: [id number]  Today's Date: 6/4/2024     Discipline: Physical Therapy    Missed Visit Reason:      Missed Time: Attempt    Comment: Pt currently off the floor for CT scan.  Addendum 1557: pt just back to the floor, declined any therapy, \"not today.\"  "

## 2024-06-04 NOTE — CONSULTS
"Inpatient consult to Psychiatry  Consult performed by: RHONDA Marcelo  Consult ordered by: RHONDA Kaiser  Reason for consult: \"anxiety depression, behavior problems, possible competency\"      PSYCHIATRY CONSULT NOTE    Visit type: Face to face evaluation     HISTORY OF PRESENT ILLNESS:     Brittanie Smart is a 63 y.o. female with no formal past psychiatric history (per patient) and past medical history of diabetes, hyperlipidemia, hypertension, morbid obesity who presented to the ED after family brought her in for significant weeakness, inability to carry out ADLs.  Patient was initially non cooperative, irritable that her family brought her to the hospital and left her here.      On chart review pt has recent illness, falls at home and kidney infection.  After her hospitalization she went to SNF for approximately 2.5 weeks until her insurance was denies for further therapy.  Her sister and family assisted patient in getting an apartment in [place], however once they picked her up and brought her back to [place] they realized that she would not be able to care for herself in her apartment.  They brought her to the [facility] ED, where initially the patient refused to get out of the car.  Once in the ED the family left and patient was admitted for further work up and coordination of care.     On interview this afternoon, patient is asleep, wakes with loud verbal stimulation.  She is agreeable to talk with this provider after introductions.  She is originally mildly disoriented, however after a few minutes she is able to fully and appropriately answer questions.  She explains that her family brought her to [place] to live since she needed more support than was available in [place].  Per patient, \"I got here and I didn't even get to see my apartment, they just dumped me in the hospital.\"  She then explains that she's waiting for an important call from her insurance as she is worried about coverage " "for this hospitalization.  Patient is alert and oriented self, place, month, state, year, president.  She is attentive, able to recite the months of the year in reverse with occasional pause.  When asked about psychiatric symptoms she denies stating she has no history of depression or anxiety, is not treated for either and has never been to a psychiatrist.  She denies history of SI, SA, SIB.  She then asks \"why would you even ask me these questions.\"  Explained that we want to ensure we cover all the bases and ensure safety.  Patient verbalizes understanding and continues with interview.  She admits to being irritable with her family for not even having a conversation with her regarding coming to the ED.  She feels that it was very sneaky and done behind her back.  She denies current symptoms of depression, reports feeling mildly anxious due to being in the hospital again and due to her worries regarding insurance coverage.  She does not seem to have significant insight into her weakness and inability to care for herself in her current state.    Discussed with patient's nurse who denies any behavior issues today, reports patient has been calm and cooperative.     Past Psychiatric History  Current/Previous Diagnoses: Denies  Current Psychiatrist/Provider: Denies  Current Therapist: Denies  Other Providers / Agencies: Denies  Outpatient Treatment History: Denies  Past Medication Trials: Denies  Inpatient Hospitalizations: Denies  Suicide Attempts: Denies  Homicide attempts/Violence: Denies  Self Harm/Self Injurious: Denies    Substance Abuse History  Tobacco use history: Denies  Alcohol use history: Denies  Cannabis use history: Denies  Illicit Drug Use History: Denies    Social History  Household: prior to recent illness was living in an apartment in [place]  Legal hx: Denies  History of trauma/abuse: Denies  Weapons at home and access to lethal means: Denies    [place]AR[place] REVIEW  [place] checked: we do not query [place] [place]    Past Medical History  She has a past medical history of Anemia, Behavior problem, adult, Decubitus ulcer, Depression, Diabetes mellitus (Multi), GERD (gastroesophageal reflux disease), Hemorrhoids, Hyperlipidemia, Hypertension, Hypothyroid, Kidney disease, Morbid obesity (Multi), and Peripheral vascular disease (CMS-[facility]).    ALLERGIES  Patient has no known allergies.    Surgical History  She has no past surgical history on file.    FAMILY HISTORY  Family History   Problem Relation Name Age of Onset    COPD Mother      Heart disease Father        Psychiatric History: denies      PSYCHIATRIC REVIEW OF SYSTEMS  Depression: negative  Anxiety: excessive worry that is difficult to control and irritability  Meg: negative  Psychosis: negative  Delirium: negative   Trauma: negative    OBJECTIVE:     VITALS      6/3/2024     7:30 AM 6/3/2024    11:12 AM 6/3/2024     4:14 PM 6/3/2024     7:32 PM 6/3/2024    10:46 PM 6/4/2024     3:59 AM 6/4/2024     7:25 AM   Vitals   Systolic 138 140 132 134 115 122 124   Diastolic 77 87 60 54 47 54 53   Heart Rate 80 77 67 72 74 76 77   Temp 37 °C (98.6 °F) 36.7 °C (98.1 °F) 36.8 °C (98.2 °F) 36.7 °C (98.1 °F) 36.8 °C (98.2 °F) 36.6 °C (97.9 °F) 36.9 °C (98.4 °F)   Resp 18 20 18 17 16 15 16      Body mass index is 42.16 kg/m².  Facility age limit for growth %teressa is 20 years.  Wt Readings from Last 4 Encounters:   06/03/24 126 kg (277 lb 3.2 oz)       Mental Status Exam  General: 64 y/o obese female laying in her assigned hospital bed during interview.  Appearance: Appeared as age stated; appropriately dressed/groomed in hospital attire.  Attitude: Pleasant and cooperative; guarded but warm.  Behavior: Fair EC; overall responding appropriately  Motor Activity: No notable malcolm PMAR  Speech: Clear, with fair phonation, and no lisp nor dysarthria.   Mood: \"worried\"  Affect:  mildly constricted, congruent with mood and topic of conversation  Thought Process: Linear and logical; not " perseverating   Thought Content: Denied SI/HI. Not voicing/endorsing delusions.  Thought Perception: Did not appear to be responding to internal stimuli. Not endorsing AVH  Cognition: Grossly intact; A&O x4/4 to self, place, date, and context.  Insight: Fair to limited, she does not have good insight into her current state of disability  Judgement: Fair    HOME MEDICATIONS  Medication Documentation Review Audit       Reviewed by Kya Chinchilla RN (Registered Nurse) on 06/03/24 at 0254      Medication Order Taking? Sig Documenting Provider Last Dose Status   amLODIPine (Norvasc) 10 mg tablet [id number]  Take 1 tablet (10 mg) by mouth once daily. Historical MD Ab  Active   ascorbic acid (Vitamin C) 500 mg tablet [id number]  Take 1 tablet (500 mg) by mouth once daily. Marlon Berger MD  Active   B complex-vitamin C-folic acid (Nephro-Chavez Rx) 1- mg-mg-mcg tablet [id number]  Take 1 tablet by mouth once daily with breakfast. Marlon Berger MD  Active   ferrous sulfate, 325 mg ferrous sulfate, tablet [id number]  Take 1 tablet by mouth once daily with breakfast. Marlon Provider, MD  Active   furosemide (Lasix) 40 mg tablet [id number]  Take 1 tablet (40 mg) by mouth once daily. Marlon Provider, MD  Active   gabapentin (Neurontin) 300 mg capsule [id number]  Take 1 capsule (300 mg) by mouth 3 times a day. Historical Provider, MD  Active   hydrocortisone (Anusol-HC) 2.5 % rectal cream [id number]  Insert 1 Application into the rectum once daily. Marlon Berger MD  Active   insulin lispro (HumaLOG) 100 unit/mL injection [id number]  Inject under the skin 3 times daily (morning, midday, late afternoon). Take as directed per insulin instructions. Marlon Berger MD  Active   insulin lispro (HumaLOG) 100 unit/mL injection [id number]  Inject 0.05 mL (5 Units) under the skin 3 times daily (morning, midday, late afternoon). Take as directed per insulin instructions. Marlon Berger MD  Active    levothyroxine (Synthroid, Levoxyl) 75 mcg tablet [id number]  Take 1 tablet (75 mcg) by mouth early in the morning.. Take on an empty stomach at the same time each day, either 30 to 60 minutes prior to breakfast Historical Provider, MD  Active   losartan (Cozaar) 50 mg tablet [id number]  Take 1 tablet (50 mg) by mouth once daily. Historical Provider, MD  Active   metoprolol succinate XL (Toprol-XL) 50 mg 24 hr tablet [id number]  Take 1 tablet (50 mg) by mouth once daily. Do not crush or chew. Historical Provider, MD  Active   multivitamin with minerals iron-free (Centrum Silver) [id number]  Take 1 tablet by mouth once daily. Historical Provider, MD  Active   nitrofurantoin, macrocrystal-monohydrate, (Macrobid) 100 mg capsule [id number]  Take 1 capsule (100 mg) by mouth 2 times a day. Historical Provider, MD  Active   pantoprazole (ProtoNix) 40 mg EC tablet [id number]  Take 1 tablet (40 mg) by mouth once daily in the morning. Take before meals. Do not crush, chew, or split. Historical Provider, MD  Active   rosuvastatin (Crestor) 5 mg tablet [id number]  Take 1 tablet (5 mg) by mouth once daily. Historical Provider, MD  Active                     CURRENT MEDICATIONS  Scheduled medications  amLODIPine, 10 mg, oral, Daily  enoxaparin, 40 mg, subcutaneous, Daily  ferrous sulfate (325 mg ferrous sulfate), 1 tablet, oral, Daily with breakfast  gabapentin, 300 mg, oral, TID  insulin glargine, 10 Units, subcutaneous, q24h  insulin lispro, 0-15 Units, subcutaneous, TID after meals  ipratropium-albuteroL, 3 mL, nebulization, TID  ketotifen, 1 drop, Both Eyes, BID  levothyroxine, 75 mcg, oral, Daily  [Held by provider] losartan, 50 mg, oral, Daily  metoprolol succinate XL, 50 mg, oral, Daily  multivitamin with minerals, 1 tablet, oral, Daily  nystatin, 1 Application, Topical, q8h  oxygen, , inhalation, q8h  pantoprazole, 40 mg, oral, Daily before breakfast  rosuvastatin, 5 mg, oral, Daily        Continuous medications       PRN  medications  PRN medications: acetaminophen **OR** acetaminophen **OR** acetaminophen, ammonium lactate, dextrose, glucagon, ipratropium-albuteroL, ondansetron ODT **OR** ondansetron     LABS  Admission on 06/02/2024   Component Date Value Ref Range Status    WBC 06/02/2024 6.7  4.4 - 11.3 x10*3/uL Final    nRBC 06/02/2024 0.0  0.0 - 0.0 /100 WBCs Final    RBC 06/02/2024 2.88 (L)  4.00 - 5.20 x10*6/uL Final    Hemoglobin 06/02/2024 7.8 (L)  12.0 - 16.0 g/dL Final    Hematocrit 06/02/2024 25.8 (L)  36.0 - 46.0 % Final    MCV 06/02/2024 90  80 - 100 fL Final    MCH 06/02/2024 27.1  26.0 - 34.0 pg Final    MCHC 06/02/2024 30.2 (L)  32.0 - 36.0 g/dL Final    RDW 06/02/2024 20.9 (H)  11.5 - 14.5 % Final    Platelets 06/02/2024 182  150 - 450 x10*3/uL Final    Neutrophils % 06/02/2024 74.2  40.0 - 80.0 % Final    Immature Granulocytes %, Automated 06/02/2024 0.8  0.0 - 0.9 % Final    Immature Granulocyte Count (IG) includes promyelocytes, myelocytes and metamyelocytes but does not include bands. Percent differential counts (%) should be interpreted in the context of the absolute cell counts (cells/UL).    Lymphocytes % 06/02/2024 12.9  13.0 - 44.0 % Final    Monocytes % 06/02/2024 9.8  2.0 - 10.0 % Final    Eosinophils % 06/02/2024 1.8  0.0 - 6.0 % Final    Basophils % 06/02/2024 0.5  0.0 - 2.0 % Final    Neutrophils Absolute 06/02/2024 4.94  1.20 - 7.70 x10*3/uL Final    Percent differential counts (%) should be interpreted in the context of the absolute cell counts (cells/uL).    Immature Granulocytes Absolute, Au* 06/02/2024 0.05  0.00 - 0.70 x10*3/uL Final    Lymphocytes Absolute 06/02/2024 0.86 (L)  1.20 - 4.80 x10*3/uL Final    Monocytes Absolute 06/02/2024 0.65  0.10 - 1.00 x10*3/uL Final    Eosinophils Absolute 06/02/2024 0.12  0.00 - 0.70 x10*3/uL Final    Basophils Absolute 06/02/2024 0.03  0.00 - 0.10 x10*3/uL Final    Glucose 06/02/2024 453 (H)  65 - 99 mg/dL Final    Sodium 06/02/2024 135  133 - 145 mmol/L  Final    Potassium 06/02/2024 4.8  3.4 - 5.1 mmol/L Final    Chloride 06/02/2024 99  97 - 107 mmol/L Final    Bicarbonate 06/02/2024 26  24 - 31 mmol/L Final    Urea Nitrogen 06/02/2024 46 (H)  8 - 25 mg/dL Final    Creatinine 06/02/2024 2.10 (H)  0.40 - 1.60 mg/dL Final    eGFR 06/02/2024 26 (L)  >60 mL/min/1.73m*2 Final    Calculations of estimated GFR are performed using the 2021 CKD-EPI Study Refit equation without the race variable for the IDMS-Traceable creatinine methods.  https://jasn.asnjournals.org/content/early/2021/09/22/ASN.1489702796    Calcium 06/02/2024 8.3 (L)  8.5 - 10.4 mg/dL Final    Albumin 06/02/2024 3.1 (L)  3.5 - 5.0 g/dL Final    Alkaline Phosphatase 06/02/2024 186 (H)  35 - 125 U/L Final    Total Protein 06/02/2024 7.0  5.9 - 7.9 g/dL Final    AST 06/02/2024 20  5 - 40 U/L Final    Bilirubin, Total 06/02/2024 0.4  0.1 - 1.2 mg/dL Final    ALT 06/02/2024 13  5 - 40 U/L Final    Anion Gap 06/02/2024 10  <=19 mmol/L Final    Ventricular Rate 06/02/2024 74  BPM Preliminary    Atrial Rate 06/02/2024 74  BPM Preliminary    RI Interval 06/02/2024 156  ms Preliminary    QRS Duration 06/02/2024 96  ms Preliminary    QT Interval 06/02/2024 430  ms Preliminary    QTC Calculation(Bazett) 06/02/2024 477  ms Preliminary    P Axis 06/02/2024 42  degrees Preliminary    R Axis 06/02/2024 -22  degrees Preliminary    T Axis 06/02/2024 9  degrees Preliminary    QRS Count 06/02/2024 12  beats Preliminary    Q Onset 06/02/2024 213  ms Preliminary    P Onset 06/02/2024 135  ms Preliminary    P Offset 06/02/2024 182  ms Preliminary    T Offset 06/02/2024 428  ms Preliminary    QTC Fredericia 06/02/2024 461  ms Preliminary    RBC Morphology 06/02/2024 See Below   Final    Polychromasia 06/02/2024 Mild   Final    POCT pH, Arterial 06/03/2024 7.30 (L)  7.38 - 7.42 pH Final    POCT pCO2, Arterial 06/03/2024 58 (H)  38 - 42 mm Hg Final    POCT pO2, Arterial 06/03/2024 103 (H)  85 - 95 mm Hg Final    POCT SO2, Arterial  06/03/2024    Final    NO RESULT    POCT Oxy Hemoglobin, Arterial 06/03/2024    Final    NO RESULT    POCT Base Excess, Arterial 06/03/2024 0.8  -2.0 - 3.0 mmol/L Final    POCT HCO3 Calculated, Arterial 06/03/2024 28.5 (H)  22.0 - 26.0 mmol/L Final    Patient Temperature 06/03/2024 37.0  degrees Celsius Final    FiO2 06/03/2024 28  % Final    Apparatus 06/03/2024 CANNULA   Final    Site of Arterial Puncture 06/03/2024 Radial Right   Final    Tyrone's Test 06/03/2024 Positive   Final    Color, Urine 06/03/2024 Yellow  Light-Yellow, Yellow, Dark-Yellow Final    Appearance, Urine 06/03/2024 Turbid (N)  Clear Final    Specific Gravity, Urine 06/03/2024 1.016  1.005 - 1.035 Final    pH, Urine 06/03/2024 8.0  5.0, 5.5, 6.0, 6.5, 7.0, 7.5, 8.0 Final    Protein, Urine 06/03/2024 200 (2+) (A)  NEGATIVE, 10 (TRACE), 20 (TRACE) mg/dL Final    Glucose, Urine 06/03/2024 300 (3+) (A)  Normal mg/dL Final    Blood, Urine 06/03/2024 NEGATIVE  NEGATIVE Final    Ketones, Urine 06/03/2024 NEGATIVE  NEGATIVE mg/dL Final    Bilirubin, Urine 06/03/2024 NEGATIVE  NEGATIVE Final    Urobilinogen, Urine 06/03/2024 Normal  Normal mg/dL Final    Nitrite, Urine 06/03/2024 NEGATIVE  NEGATIVE Final    Leukocyte Esterase, Urine 06/03/2024 500 [place]/µL (A)  NEGATIVE Final    Amphetamine Screen, Urine 06/03/2024 Negative    Final    Barbiturate Screen, Urine 06/03/2024 Negative    Final    Benzodiazepines Screen, Urine 06/03/2024 Negative    Final    Cannabinoid Screen, Urine 06/03/2024 Negative    Final    Cocaine Metabolite Screen, Urine 06/03/2024 Negative    Final    Fentanyl Screen, Urine 06/03/2024 Negative     Final    Methadone Screen, Urine 06/03/2024 Negative    Final    Opiate Screen, Urine 06/03/2024 Negative    Final    Oxycodone Screen, Urine 06/03/2024 Negative    Final    PCP Screen, Urine 06/03/2024 Negative    Final    Hemoglobin A1C 06/02/2024 7.5 (H)  See below % Final    Estimated Average Glucose 06/02/2024 169  Not Established  mg/dL Final    Thyroid Stimulating Hormone 06/02/2024 7.13 (H)  0.27 - 4.20 mIU/L Final    C-Reactive Protein 06/02/2024 3.60 (H)  0.00 - 2.00 mg/dL Final    POCT Glucose 06/03/2024 441 (H)  74 - 99 mg/dL Final    WBC, Urine 06/03/2024 >50 (A)  1-5, NONE /HPF Final    WBC Clumps, Urine 06/03/2024 OCCASIONAL  Reference range not established. /HPF Final    RBC, Urine 06/03/2024 11-20 (A)  NONE, 1-2, 3-5 /HPF Final    Bacteria, Urine 06/03/2024 3+ (A)  NONE SEEN /HPF Final    Triple Phosphate Crystals, Urine 06/03/2024 1+  NONE, 1+ /HPF Final    Triiodothyronine, Free 06/03/2024 2.2 (L)  2.3 - 4.2 pg/mL Final    POCT Glucose 06/03/2024 423 (H)  74 - 99 mg/dL Final    Glucose 06/03/2024 379 (H)  65 - 99 mg/dL Final    Sodium 06/03/2024 137  133 - 145 mmol/L Final    Potassium 06/03/2024 4.6  3.4 - 5.1 mmol/L Final    Chloride 06/03/2024 101  97 - 107 mmol/L Final    Bicarbonate 06/03/2024 26  24 - 31 mmol/L Final    Urea Nitrogen 06/03/2024 42 (H)  8 - 25 mg/dL Final    Creatinine 06/03/2024 2.00 (H)  0.40 - 1.60 mg/dL Final    eGFR 06/03/2024 27 (L)  >60 mL/min/1.73m*2 Final    Calculations of estimated GFR are performed using the 2021 CKD-EPI Study Refit equation without the race variable for the IDMS-Traceable creatinine methods.  https://jasn.asnjournals.org/content/early/2021/09/22/ASN.9932572855    Calcium 06/03/2024 8.1 (L)  8.5 - 10.4 mg/dL Final    Phosphorus 06/03/2024 4.3  2.5 - 4.5 mg/dL Final    Albumin 06/03/2024 3.0 (L)  3.5 - 5.0 g/dL Final    Anion Gap 06/03/2024 10  <=19 mmol/L Final    Creatine Kinase 06/03/2024 72  24 - 195 U/L Final    POCT Glucose 06/03/2024 269 (H)  74 - 99 mg/dL Final    Ferritin 06/03/2024 330 (H)  13 - 150 ng/mL Final    Iron 06/03/2024 40  30 - 160 ug/dL Final    UIBC 06/03/2024 153  110 - 370 ug/dL Final    TIBC 06/03/2024 193 (L)  228 - 428 ug/dL Final    % Saturation 06/03/2024 21  12 - 50 % Final    POCT Glucose 06/03/2024 265 (H)  74 - 99 mg/dL Final    WBC 06/04/2024 7.4   4.4 - 11.3 x10*3/uL Final    nRBC 06/04/2024 0.0  0.0 - 0.0 /100 WBCs Final    RBC 06/04/2024 2.80 (L)  4.00 - 5.20 x10*6/uL Final    Hemoglobin 06/04/2024 7.4 (L)  12.0 - 16.0 g/dL Final    Hematocrit 06/04/2024 24.7 (L)  36.0 - 46.0 % Final    MCV 06/04/2024 88  80 - 100 fL Final    MCH 06/04/2024 26.4  26.0 - 34.0 pg Final    MCHC 06/04/2024 30.0 (L)  32.0 - 36.0 g/dL Final    RDW 06/04/2024 20.8 (H)  11.5 - 14.5 % Final    Platelets 06/04/2024 169  150 - 450 x10*3/uL Final    Glucose 06/04/2024 265 (H)  65 - 99 mg/dL Final    Sodium 06/04/2024 137  133 - 145 mmol/L Final    Potassium 06/04/2024 4.9  3.4 - 5.1 mmol/L Final    Chloride 06/04/2024 101  97 - 107 mmol/L Final    Bicarbonate 06/04/2024 27  24 - 31 mmol/L Final    Urea Nitrogen 06/04/2024 52 (H)  8 - 25 mg/dL Final    Creatinine 06/04/2024 2.30 (H)  0.40 - 1.60 mg/dL Final    eGFR 06/04/2024 23 (L)  >60 mL/min/1.73m*2 Final    Calculations of estimated GFR are performed using the 2021 CKD-EPI Study Refit equation without the race variable for the IDMS-Traceable creatinine methods.  https://jasn.asnjournals.org/content/early/2021/09/22/ASN.8174525850    Calcium 06/04/2024 8.2 (L)  8.5 - 10.4 mg/dL Final    Albumin 06/04/2024 2.8 (L)  3.5 - 5.0 g/dL Final    Alkaline Phosphatase 06/04/2024 161 (H)  35 - 125 U/L Final    Total Protein 06/04/2024 6.5  5.9 - 7.9 g/dL Final    AST 06/04/2024 16  5 - 40 U/L Final    Bilirubin, Total 06/04/2024 0.4  0.1 - 1.2 mg/dL Final    ALT 06/04/2024 10  5 - 40 U/L Final    Anion Gap 06/04/2024 9  <=19 mmol/L Final    POCT Glucose 06/03/2024 247 (H)  74 - 99 mg/dL Final    POCT Glucose 06/04/2024 258 (H)  74 - 99 mg/dL Final     IMAGING  US renal complete    Result Date: 6/4/2024  Interpreted By:  Otilio Hernandez, STUDY: US RENAL COMPLETE; 6/3/2024 7:24 pm   INDICATION: Signs/Symptoms:DELISA.   COMPARISON: None   ACCESSION NUMBER(S): [id number]   ORDERING CLINICIAN: DEBORAH KIM   TECHNIQUE: Grayscale and color Doppler  imaging of the kidneys   FINDINGS: COMMENTS: Technologist note indicates that the study was quite limited due to the patient's body habitus. Exam was performed portably as well.   The right kidney measures 10.7 cm x 3.9 cm x 4.7 cm . The left kidney measures 11.8 cm x 5.4 cm x 5.4 cm. THERE IS A HETEROGENEOUS COMPLEX MASS LATERALLY MID TO LOWER POLE LEFT KIDNEY MEASURING 4.3 X 3.9 X 4.6 CM.     No renal stones are identified.  The renal cortical thickness and echogenicity is normal.  No hydronephrosis is identified.   Urinary bladder is nonspecific in appearance with no stones or masses identified.       COMPLEX APPEARING MASS LEFT KIDNEY MEASURING UP TO 4.6 CM IN DIAMETER, WITH A DIFFERENTIAL TO INCLUDE MALIGNANCY. CT UROGRAM WITH AND WITHOUT CONTRAST IS SUGGESTED FOR MORE DEFINITIVE ASSESSMENT.   MACRO: Otilio Hernandez discussed the significance and urgency of this critical finding EPIC secure chat with  [facility] team on 6/4/2024 at 8:22 am.  (**-RCF-**) Findings:  See findings.   Signed by: Otilio Hernandez 6/4/2024 8:23 AM Dictation workstation:   [id number]    XR chest 2 views    Result Date: 6/3/2024  Interpreted By:  Gerri Hsu, STUDY: XR CHEST 2 VIEWS 6/3/2024 8:15 am   INDICATION: Signs/Symptoms:COPD with wheezing   COMPARISON: None available.   ACCESSION NUMBER(S): [id number]   ORDERING CLINICIAN: ZAHRA BATISTA   TECHNIQUE: AP and lateral views of cervical spine are performed.   FINDINGS: The cardiac size is indeterminate due to the AP projection.   Right hemidiaphragm is elevated. On lateral projection, bilateral pleural effusions are observed. No obvious focal infiltrate or airspace consolidation is seen.       Bilateral pleural effusions on lateral projection and elevated right hemidiaphragm.   Signed by: Gerri Hsu 6/3/2024 8:24 AM Dictation workstation:   [id number]    ECG 12 lead    Result Date: 6/3/2024   Poor data quality, interpretation may be adversely affected Normal sinus rhythm  "Minimal voltage criteria for LVH, may be normal variant Nonspecific T wave abnormality Abnormal ECG No previous ECGs available       ASSESSMENT:     PSYCHIATRIC RISK ASSESSMENT  Violence Risk Factors:  stress/destabilizers  Acute Risk of Harm to Others is Considered: Low  Suicide Risk Factors: ; /[place] native and age > 65 years old   Protective Factors: positive family relationships and hopefulness/future-orientation  Acute Risk of Harm to Self is Considered: Low    DIAGNOSIS  Principal Problem:    Unable to care for self  Active Problems:    Type 2 diabetes mellitus (Multi)    Morbid obesity (Multi)    Peripheral vascular disease (CMS-HCC)    Hypertension    Pressure injury of sacral region, stage 3 (Multi)    Behavior problem, adult    Renal failure    IMPRESSION  Brittanie is a 63 year old female who presented to the ED from [facility].  She was initially uncooperative, irritable due to the situation, but has since seemed to be calm and appropriate.      PLAN/ RECOMMENDATIONS:   SAFETY  - Patient does not currently meet criteria for inpatient psychiatric admission.     - To evaluate decision-making capacity, recommend use of the capacity Evaluation Tool. Search “ IP Capacity Evaluation under SmartText\" unless the patient has a legal guardian, in which case all decisions per the legal guardian.    - As with all hospitalized patients, would recommend delirium precautions, as below.  - Would recommend the use of delirium precautions :  -- Minimize use of benzos, opiates, anticholinergics, as these may worsen mental status  -- Would use caution with narcotic pain medications  -- Would still adequately controlling pain, as uncontrolled pain is also a risk factor for delirium  -- Reinforce sleep hygiene; encourage patient to stay awake during the day  -- Keep curtains/blinds open during the day and closed at night.  -- Would recommend reorienting/redirecting patient as much as " possible,   -- Aim for consistent staffing, familiar objects, avoiding bright lights and loud noises, etc.    WORKUP  Ensure euthyroid (level 7.13 6/3/24)  Recommend obtaining Vit D and Vit B12 levels and supplement as appropriate.    MEDICATIONS  Patient denies feeling depressed or significantly anxious outside of worrying about her insurance information and the fact that she is in a new state.        -Thank you for allowing us to participate in the care of this patient.  Psychiatry will sign off.  Please re-consult if psychiatric needs arise.      I personally spent 56 minutes today providing care for this patient, including preparation, face to face time, documentation and other services such as review of medical records, diagnostic result, patient education, counseling, coordination of care as specified in the encounter.

## 2024-06-04 NOTE — PROGRESS NOTES
"Occupational Therapy                 Therapy Communication Note    Patient Name: Brittanie Smart  MRN: [id number]  Today's Date: 6/4/2024     Discipline: Occupational Therapy    Missed Visit Reason: Missed Visit Reason: Patient refused (Pt appears disinterested, unable to encourage pt to participate, \" maybe tomorrow\")    Missed Time: Cancel    Comment:  "

## 2024-06-04 NOTE — PROGRESS NOTES
Music Therapy Note    Brittanie Smart     Therapy Session  Referral Type: New referral this admission  Visit Type: New visit  Session Start Time: 1404  Intervention Delivery: In-person  Conflict of Service: Asleep               Treatment/Interventions            Narrative  Follow-up: MT will follow-up if Pt remains admitted.    Education Documentation  No documentation found.

## 2024-06-04 NOTE — PROGRESS NOTES
Physical Therapy                 Therapy Communication Note    Patient Name: Brittanie Smart  MRN: [id number]  Today's Date: 6/4/2024     Discipline: Physical Therapy    Missed Visit Reason: Missed Visit Reason: Other (Comment) (Pt is very somnolent at this time. Able to open eyes and respond with effort but unable to maintain. Will hold PT at this time. Nurse notified.)    Missed Time: Attempt    Comment:

## 2024-06-04 NOTE — PROGRESS NOTES
06/04/24 0751   Discharge Planning   Patient expects to be discharged to: SNF   Does the patient need discharge transport arranged? Yes   RoundTrip coordination needed? Yes   Has discharge transport been arranged? No     [facility] will follow up with the patient regarding OON benefit/coverage and advise.     Patient's insurance information sent to [place], [facility], and [facility]. [facility] declined due to being out of network with the patient's insurance.     11:14 AM The only facility willing to accept patient and work with her insurance is [facility]. [facility] liaison, will come meet with the patient and answer any questions. [facility] will have pre-cert submitted and will advise.

## 2024-06-04 NOTE — NURSING NOTE
Informed Dr. Briggs; unable to do MRI, cannot tolerate closed MRI, and pt. states she will not fit in machine.

## 2024-06-05 LAB
ALBUMIN SERPL-MCNC: 2.9 G/DL (ref 3.5–5)
ALP BLD-CCNC: 170 U/L (ref 35–125)
ALT SERPL-CCNC: 8 U/L (ref 5–40)
ANION GAP SERPL CALC-SCNC: 9 MMOL/L
AST SERPL-CCNC: 13 U/L (ref 5–40)
BILIRUB SERPL-MCNC: 0.3 MG/DL (ref 0.1–1.2)
BUN SERPL-MCNC: 59 MG/DL (ref 8–25)
CALCIUM SERPL-MCNC: 8.1 MG/DL (ref 8.5–10.4)
CHLORIDE SERPL-SCNC: 99 MMOL/L (ref 97–107)
CO2 SERPL-SCNC: 26 MMOL/L (ref 24–31)
CREAT SERPL-MCNC: 2.4 MG/DL (ref 0.4–1.6)
EGFRCR SERPLBLD CKD-EPI 2021: 22 ML/MIN/1.73M*2
ERYTHROCYTE [DISTWIDTH] IN BLOOD BY AUTOMATED COUNT: 21.3 % (ref 11.5–14.5)
GLUCOSE BLD MANUAL STRIP-MCNC: 318 MG/DL (ref 74–99)
GLUCOSE BLD MANUAL STRIP-MCNC: 333 MG/DL (ref 74–99)
GLUCOSE BLD MANUAL STRIP-MCNC: 344 MG/DL (ref 74–99)
GLUCOSE BLD MANUAL STRIP-MCNC: 355 MG/DL (ref 74–99)
GLUCOSE SERPL-MCNC: 380 MG/DL (ref 65–99)
HCT VFR BLD AUTO: 26.4 % (ref 36–46)
HGB BLD-MCNC: 7.9 G/DL (ref 12–16)
MCH RBC QN AUTO: 27 PG (ref 26–34)
MCHC RBC AUTO-ENTMCNC: 29.9 G/DL (ref 32–36)
MCV RBC AUTO: 90 FL (ref 80–100)
NRBC BLD-RTO: 0.3 /100 WBCS (ref 0–0)
PLATELET # BLD AUTO: 158 X10*3/UL (ref 150–450)
POTASSIUM SERPL-SCNC: 4.8 MMOL/L (ref 3.4–5.1)
PROT SERPL-MCNC: 6.3 G/DL (ref 5.9–7.9)
RBC # BLD AUTO: 2.93 X10*6/UL (ref 4–5.2)
SODIUM SERPL-SCNC: 134 MMOL/L (ref 133–145)
WBC # BLD AUTO: 7.3 X10*3/UL (ref 4.4–11.3)

## 2024-06-05 PROCEDURE — 2500000001 HC RX 250 WO HCPCS SELF ADMINISTERED DRUGS (ALT 637 FOR MEDICARE OP): Performed by: EMERGENCY MEDICINE

## 2024-06-05 PROCEDURE — 82947 ASSAY GLUCOSE BLOOD QUANT: CPT

## 2024-06-05 PROCEDURE — 9420000001 HC RT PATIENT EDUCATION 5 MIN

## 2024-06-05 PROCEDURE — 85027 COMPLETE CBC AUTOMATED: CPT | Performed by: EMERGENCY MEDICINE

## 2024-06-05 PROCEDURE — 97535 SELF CARE MNGMENT TRAINING: CPT | Mod: GO,CO

## 2024-06-05 PROCEDURE — 2500000002 HC RX 250 W HCPCS SELF ADMINISTERED DRUGS (ALT 637 FOR MEDICARE OP, ALT 636 FOR OP/ED): Performed by: EMERGENCY MEDICINE

## 2024-06-05 PROCEDURE — 80053 COMPREHEN METABOLIC PANEL: CPT | Performed by: EMERGENCY MEDICINE

## 2024-06-05 PROCEDURE — 87086 URINE CULTURE/COLONY COUNT: CPT | Mod: TRILAB,WESLAB | Performed by: INTERNAL MEDICINE

## 2024-06-05 PROCEDURE — 94640 AIRWAY INHALATION TREATMENT: CPT

## 2024-06-05 PROCEDURE — 2500000004 HC RX 250 GENERAL PHARMACY W/ HCPCS (ALT 636 FOR OP/ED): Performed by: INTERNAL MEDICINE

## 2024-06-05 PROCEDURE — 2500000005 HC RX 250 GENERAL PHARMACY W/O HCPCS: Performed by: EMERGENCY MEDICINE

## 2024-06-05 PROCEDURE — 2500000001 HC RX 250 WO HCPCS SELF ADMINISTERED DRUGS (ALT 637 FOR MEDICARE OP): Performed by: INTERNAL MEDICINE

## 2024-06-05 PROCEDURE — 1100000001 HC PRIVATE ROOM DAILY

## 2024-06-05 PROCEDURE — 36415 COLL VENOUS BLD VENIPUNCTURE: CPT | Performed by: EMERGENCY MEDICINE

## 2024-06-05 PROCEDURE — 2500000002 HC RX 250 W HCPCS SELF ADMINISTERED DRUGS (ALT 637 FOR MEDICARE OP, ALT 636 FOR OP/ED): Performed by: NURSE PRACTITIONER

## 2024-06-05 PROCEDURE — 97530 THERAPEUTIC ACTIVITIES: CPT | Mod: GP

## 2024-06-05 RX ORDER — INSULIN GLARGINE 100 [IU]/ML
15 INJECTION, SOLUTION SUBCUTANEOUS NIGHTLY
Status: DISCONTINUED | OUTPATIENT
Start: 2024-06-05 | End: 2024-06-06

## 2024-06-05 RX ADMIN — INSULIN GLARGINE 15 UNITS: 100 INJECTION, SOLUTION SUBCUTANEOUS at 21:32

## 2024-06-05 RX ADMIN — KETOTIFEN FUMARATE 1 DROP: 0.25 SOLUTION/ DROPS OPHTHALMIC at 12:03

## 2024-06-05 RX ADMIN — FUROSEMIDE 60 MG: 40 TABLET ORAL at 09:00

## 2024-06-05 RX ADMIN — PANTOPRAZOLE SODIUM 40 MG: 40 TABLET, DELAYED RELEASE ORAL at 05:42

## 2024-06-05 RX ADMIN — GABAPENTIN 300 MG: 300 CAPSULE ORAL at 16:46

## 2024-06-05 RX ADMIN — LEVOTHYROXINE SODIUM 75 MCG: 0.07 TABLET ORAL at 05:42

## 2024-06-05 RX ADMIN — IPRATROPIUM BROMIDE AND ALBUTEROL SULFATE 3 ML: .5; 3 SOLUTION RESPIRATORY (INHALATION) at 12:46

## 2024-06-05 RX ADMIN — GABAPENTIN 300 MG: 300 CAPSULE ORAL at 09:00

## 2024-06-05 RX ADMIN — METOPROLOL SUCCINATE 50 MG: 50 TABLET, EXTENDED RELEASE ORAL at 09:00

## 2024-06-05 RX ADMIN — IRON SUCROSE 200 MG: 20 INJECTION, SOLUTION INTRAVENOUS at 05:42

## 2024-06-05 RX ADMIN — INSULIN LISPRO 12 UNITS: 100 INJECTION, SOLUTION INTRAVENOUS; SUBCUTANEOUS at 13:15

## 2024-06-05 RX ADMIN — NYSTATIN 1 APPLICATION: 100000 POWDER TOPICAL at 13:20

## 2024-06-05 RX ADMIN — IPRATROPIUM BROMIDE AND ALBUTEROL SULFATE 3 ML: .5; 3 SOLUTION RESPIRATORY (INHALATION) at 19:14

## 2024-06-05 RX ADMIN — ROSUVASTATIN CALCIUM 5 MG: 10 TABLET, COATED ORAL at 09:00

## 2024-06-05 RX ADMIN — KETOTIFEN FUMARATE 1 DROP: 0.25 SOLUTION/ DROPS OPHTHALMIC at 21:34

## 2024-06-05 RX ADMIN — FERROUS SULFATE TAB 325 MG (65 MG ELEMENTAL FE) 1 TABLET: 325 (65 FE) TAB at 08:57

## 2024-06-05 RX ADMIN — Medication 2 L/MIN: at 07:00

## 2024-06-05 RX ADMIN — ENOXAPARIN SODIUM 40 MG: 40 INJECTION SUBCUTANEOUS at 08:59

## 2024-06-05 RX ADMIN — NYSTATIN 1 APPLICATION: 100000 POWDER TOPICAL at 21:33

## 2024-06-05 RX ADMIN — Medication 1 TABLET: at 09:00

## 2024-06-05 RX ADMIN — INSULIN LISPRO 12 UNITS: 100 INJECTION, SOLUTION INTRAVENOUS; SUBCUTANEOUS at 17:56

## 2024-06-05 RX ADMIN — AMLODIPINE BESYLATE 10 MG: 10 TABLET ORAL at 08:59

## 2024-06-05 RX ADMIN — GABAPENTIN 300 MG: 300 CAPSULE ORAL at 21:31

## 2024-06-05 RX ADMIN — IPRATROPIUM BROMIDE AND ALBUTEROL SULFATE 3 ML: .5; 3 SOLUTION RESPIRATORY (INHALATION) at 07:02

## 2024-06-05 RX ADMIN — INSULIN LISPRO 15 UNITS: 100 INJECTION, SOLUTION INTRAVENOUS; SUBCUTANEOUS at 08:53

## 2024-06-05 RX ADMIN — Medication 2 L/MIN: at 15:00

## 2024-06-05 ASSESSMENT — COGNITIVE AND FUNCTIONAL STATUS - GENERAL
EATING MEALS: A LITTLE
HELP NEEDED FOR BATHING: A LOT
MOVING TO AND FROM BED TO CHAIR: TOTAL
CLIMB 3 TO 5 STEPS WITH RAILING: TOTAL
TOILETING: TOTAL
DAILY ACTIVITIY SCORE: 12
STANDING UP FROM CHAIR USING ARMS: A LOT
WALKING IN HOSPITAL ROOM: TOTAL
WALKING IN HOSPITAL ROOM: TOTAL
MOBILITY SCORE: 10
CLIMB 3 TO 5 STEPS WITH RAILING: TOTAL
DRESSING REGULAR LOWER BODY CLOTHING: TOTAL
STANDING UP FROM CHAIR USING ARMS: TOTAL
TOILETING: TOTAL
HELP NEEDED FOR BATHING: A LOT
TOILETING: TOTAL
PERSONAL GROOMING: A LITTLE
DAILY ACTIVITIY SCORE: 12
MOVING FROM LYING ON BACK TO SITTING ON SIDE OF FLAT BED WITH BEDRAILS: A LOT
MOVING TO AND FROM BED TO CHAIR: A LOT
DAILY ACTIVITIY SCORE: 12
CLIMB 3 TO 5 STEPS WITH RAILING: TOTAL
EATING MEALS: A LITTLE
DRESSING REGULAR UPPER BODY CLOTHING: A LOT
WALKING IN HOSPITAL ROOM: TOTAL
DRESSING REGULAR UPPER BODY CLOTHING: A LOT
MOBILITY SCORE: 10
HELP NEEDED FOR BATHING: A LOT
TURNING FROM BACK TO SIDE WHILE IN FLAT BAD: A LOT
DRESSING REGULAR LOWER BODY CLOTHING: TOTAL
MOBILITY SCORE: 7
PERSONAL GROOMING: A LITTLE
EATING MEALS: A LITTLE
STANDING UP FROM CHAIR USING ARMS: A LOT
TURNING FROM BACK TO SIDE WHILE IN FLAT BAD: TOTAL
MOVING FROM LYING ON BACK TO SITTING ON SIDE OF FLAT BED WITH BEDRAILS: A LOT
MOVING TO AND FROM BED TO CHAIR: A LOT
DRESSING REGULAR LOWER BODY CLOTHING: TOTAL
MOVING FROM LYING ON BACK TO SITTING ON SIDE OF FLAT BED WITH BEDRAILS: A LOT
TURNING FROM BACK TO SIDE WHILE IN FLAT BAD: A LOT
DRESSING REGULAR UPPER BODY CLOTHING: A LOT
PERSONAL GROOMING: A LITTLE

## 2024-06-05 ASSESSMENT — PAIN SCALES - GENERAL
PAINLEVEL_OUTOF10: 0 - NO PAIN

## 2024-06-05 ASSESSMENT — ACTIVITIES OF DAILY LIVING (ADL): BATHING_LEVEL_OF_ASSISTANCE: MAXIMUM ASSISTANCE

## 2024-06-05 ASSESSMENT — PAIN - FUNCTIONAL ASSESSMENT
PAIN_FUNCTIONAL_ASSESSMENT: 0-10
PAIN_FUNCTIONAL_ASSESSMENT: 0-10

## 2024-06-05 NOTE — PROGRESS NOTES
Physical Therapy    Physical Therapy Treatment    Patient Name: Brittanie Smart  MRN: [id number]  Today's Date: 6/5/2024  Time Calculation  Start Time: 1337  Stop Time: 1348  Time Calculation (min): 11 min    Assessment/Plan   PT Assessment  PT Assessment Results: Decreased strength, Decreased range of motion, Decreased endurance, Impaired balance, Decreased mobility, Decreased coordination, Decreased safety awareness, Impaired sensation, Obesity, Decreased skin integrity, Pain  Rehab Prognosis: Fair  Barriers to Discharge: assist x 2 for mobility. self-limiting at times.  Evaluation/Treatment Tolerance: Patient limited by fatigue  Medical Staff Made Aware: Yes  End of Session Communication: Bedside nurse  End of Session Patient Position: Up in chair, Alarm on    Assessment Comment: Tolerated transfer to chair this date; required encouragement to participate in task. Requires assist x 2 for safe OOB mobility.       PT Plan  Treatment/Interventions: Transfer training, Gait training, Balance training, Strengthening, Endurance training, Therapeutic activity  PT Plan: Skilled PT  PT Frequency: 4 times per week  PT Discharge Recommendations: Moderate intensity level of continued care  PT Recommended Transfer Status: Assist x2, Assistive device  PT - OK to Discharge: Yes      General Visit Information:   PT  Visit  PT Received On: 06/05/24  General  Reason for Referral: impaired mobility  Missed Visit: No  Family/Caregiver Present: No  Co-Treatment: OT  Co-Treatment Reason: safety with OOB mobility  Prior to Session Communication: Bedside nurse  Patient Position Received: Bed, 2 rail up, Alarm off, caregiver present  Preferred Learning Style: verbal, visual  General Comment: Pt sitting on EOB with OT at bedside, upon PT arrival. Reluctantly agreeable to get OOB to chair.    Subjective   Precautions:  Precautions  Medical Precautions: Fall precautions  Precautions Comment: skin breakdown  Vital Signs:       Objective    Pain:  Pain Assessment  Pain Assessment: 0-10  Pain Score: 0 - No pain  Cognition:  Cognition  Overall Cognitive Status: Within Functional Limits    Activity Tolerance:  Activity Tolerance  Endurance: Decreased tolerance for upright activites  Activity Tolerance Comments: fatigues easily  Treatments:  Therapeutic Exercise  Therapeutic Exercise Performed: Yes  Therapeutic Exercise Activity 1: B LAQs sitting on EOB    Therapeutic Activity  Therapeutic Activity Performed: Yes  Therapeutic Activity 1: Pt sat on EOB about 5-7 minutes with supervision. Cueing to promote upright posture.        Transfer 1  Technique 1: Sit to stand  Transfer Device 1: Walker  Transfer Level of Assistance 1: Moderate assistance, +2  Trials/Comments 1: from elevated EOB  Transfers 2  Technique 2: Stand to sit  Transfer Device 2: Walker  Transfer Level of Assistance 2: Moderate assistance, +2  Trials/Comments 2: onto chair with arms  Transfers 3  Transfer From 3: Bed to  Transfer to 3: Chair with arms  Technique 3: Sit to stand, Stand to sit  Transfer Device 3: Walker  Transfer Level of Assistance 3: Moderate assistance, +2  Trials/Comments 3: shuffle-like gait, flexed posture. walker manipulation provided by therapist. about 1 ft to bedside chair. Repeated cueing to promote uprgiht posture.        Outcome Measures:  [facility] Basic Mobility  Turning from your back to your side while in a flat bed without using bedrails: A lot  Moving from lying on your back to sitting on the side of a flat bed without using bedrails: A lot  Moving to and from bed to chair (including a wheelchair): A lot  Standing up from a chair using your arms (e.g. wheelchair or bedside chair): A lot  To walk in hospital room: Total  Climbing 3-5 steps with railing: Total  Basic Mobility - Total Score: 10    Education Documentation  Precautions, taught by Kacie Corona PT at 6/5/2024  2:33 PM.  Learner: Patient  Readiness: Acceptance  Method: Explanation  Response: Needs  Reinforcement    Body Mechanics, taught by Kacie Corona PT at 6/5/2024  2:33 PM.  Learner: Patient  Readiness: Acceptance  Method: Explanation  Response: Needs Reinforcement    Mobility Training, taught by Kacie Corona PT at 6/5/2024  2:33 PM.  Learner: Patient  Readiness: Acceptance  Method: Explanation  Response: Needs Reinforcement    Education Comments  No comments found.        OP EDUCATION:       Encounter Problems       Encounter Problems (Active)       PT Problem       BED MOBILITY Patient will transfer supine to sit and sit to supine with minimal assist to facilitate mobility.  (Progressing)       Start:  06/03/24    Expected End:  07/01/24            TRANSFER Patient will transfer sit to stand and stand to sit with  minimal 1-2 with RW  assist to facilitate mobility.  (Progressing)       Start:  06/03/24    Expected End:  07/01/24            AMBULATION Patient will amb 25 feet with rolling walker device including two turns on even surface with minimal assist to facilitate safe mobility.  (Progressing)       Start:  06/03/24    Expected End:  07/01/24            STRENGTH Patient will increase BLE strength to 4/5 to improve functional mobility.    (Progressing)       Start:  06/03/24    Expected End:  07/01/24            ACTIVITY TOLERANCE Pt will tolerate standing activities including reaching for items, standing 3-4 minutes to improve overall activity tolerance.  (Progressing)       Start:  06/03/24    Expected End:  07/01/24               Pain - Adult          Safety       LTG - Patient will adhere to hip precautions during ADL's and transfers       Start:  06/03/24            LTG - Patient will demonstrate safety requirements appropriate to situation/environment       Start:  06/03/24            LTG - Patient will utilize safety techniques       Start:  06/03/24            STG - Patient locks brakes on wheelchair       Start:  06/03/24            STG - Patient uses call light consistently to request  assistance with transfers       Start:  06/03/24            STG - Patient uses gait belt during all transfers       Start:  06/03/24            Goal 1       Start:  06/03/24            Goal 2       Start:  06/03/24            Goal 3       Start:  06/03/24

## 2024-06-05 NOTE — NURSING NOTE
"Called [facility] at [phone] and spoke with the Medical Records Department to obtain their Fax number.   Faxed Medical Release form To [facility] Medical Records at Fax #[phone] requesting STAT medical records.  Confirmation sheet obtained confirming fax was sent successfully.  Waiting on requested records to be received.      Received Fax Transmission back from [facility] Medical Records  Ph #[phone] informing us that they have \" No such patient in their system\".  Notified Kacie Bird CNP of these findings.    6/6/24  Called [facility] Medical Records and spoke with Aaron asking for them to recheck their records to see if Brittanie Smart was  a patient back in April, 2024.  Aaron was able to find this patient in their system.  Aaron faxed the medical records requested.  Kacie Bird CNP and Dr. Siddhartha Tovar made aware of records being received.  284 pages of medical records received.  "

## 2024-06-05 NOTE — PROGRESS NOTES
In room to discuss again the renal mass, pt at first implies she does not know anything about it, reminded her of our long discussion yesterday, then she does relate details of this, she now states will do mri if can go in backwards, unsure if they are able to do this here, if not will do as outpatient in [facility].  Following w primary service

## 2024-06-05 NOTE — PROGRESS NOTES
06/05/24 0959   Discharge Planning   Patient expects to be discharged to: [facility] pending pre-cert. Reached out to [facility] to check on status.   Does the patient need discharge transport arranged? Yes   RoundTrip coordination needed? Yes   Has discharge transport been arranged? No

## 2024-06-05 NOTE — PROGRESS NOTES
Brittanie Smart is a 63 y.o. female on day 2 of admission presenting with Unable to care for self.      Subjective   Patient reports fatigue otherwise no complaints.  Her creatinine is slowly trending up.  I/Os not recorded yesterday.  Postvoid bladder scan negative.       Objective          Vitals 24HR  Heart Rate:  [66-78]   Temp:  [36 °C (96.8 °F)-36.8 °C (98.2 °F)]   Resp:  [16-17]   BP: (109-134)/(50-80)   SpO2:  [92 %-99 %]       Intake/Output last 3 Shifts:  No intake or output data in the 24 hours ending 06/05/24 1211    Physical Exam  Constitutional:       General: She is awake. She is not in acute distress.  Cardiovascular:      Rate and Rhythm: Regular rhythm.      Heart sounds:      No friction rub.   Pulmonary:      Effort: Pulmonary effort is normal.      Breath sounds: Normal breath sounds.   Abdominal:      General: Bowel sounds are normal.      Palpations: Abdomen is soft.      Tenderness: There is no guarding or rebound.   Musculoskeletal:      Comments: 2+ edema     Relevant Results  Results for orders placed or performed during the hospital encounter of 06/02/24 (from the past 24 hour(s))   POCT GLUCOSE   Result Value Ref Range    POCT Glucose 272 (H) 74 - 99 mg/dL   POCT GLUCOSE   Result Value Ref Range    POCT Glucose 272 (H) 74 - 99 mg/dL   CBC   Result Value Ref Range    WBC 7.3 4.4 - 11.3 x10*3/uL    nRBC 0.3 (H) 0.0 - 0.0 /100 WBCs    RBC 2.93 (L) 4.00 - 5.20 x10*6/uL    Hemoglobin 7.9 (L) 12.0 - 16.0 g/dL    Hematocrit 26.4 (L) 36.0 - 46.0 %    MCV 90 80 - 100 fL    MCH 27.0 26.0 - 34.0 pg    MCHC 29.9 (L) 32.0 - 36.0 g/dL    RDW 21.3 (H) 11.5 - 14.5 %    Platelets 158 150 - 450 x10*3/uL   Comprehensive metabolic panel   Result Value Ref Range    Glucose 380 (H) 65 - 99 mg/dL    Sodium 134 133 - 145 mmol/L    Potassium 4.8 3.4 - 5.1 mmol/L    Chloride 99 97 - 107 mmol/L    Bicarbonate 26 24 - 31 mmol/L    Urea Nitrogen 59 (H) 8 - 25 mg/dL    Creatinine 2.40 (H) 0.40 - 1.60 mg/dL    eGFR 22 (L)  >60 mL/min/1.73m*2    Calcium 8.1 (L) 8.5 - 10.4 mg/dL    Albumin 2.9 (L) 3.5 - 5.0 g/dL    Alkaline Phosphatase 170 (H) 35 - 125 U/L    Total Protein 6.3 5.9 - 7.9 g/dL    AST 13 5 - 40 U/L    Bilirubin, Total 0.3 0.1 - 1.2 mg/dL    ALT 8 5 - 40 U/L    Anion Gap 9 <=19 mmol/L   POCT GLUCOSE   Result Value Ref Range    POCT Glucose 355 (H) 74 - 99 mg/dL   POCT GLUCOSE   Result Value Ref Range    POCT Glucose 344 (H) 74 - 99 mg/dL            Assessment/Plan   Elevated creatinine I believe she likely has chronic kidney disease secondary to diabetic nephropathy and that she is close to her baseline however I have no records at this time to confirm this, still awaiting records  Diabetes mellitus type 2  Hypertension  Anemia   Possible UTI  Generalized weakness  Renal Mass  Diastolic CHF     Plan: Urology consulted. Monitor culture results. Hold losartan.  Continue lasix 60 mg PO daily.  Dose medications for GFR.  Continue IV venofer. No indications for dialysis.  Strict I/O's.  Still awaiting records from [place], the nurse to check with the  once she is back to see if we received any records.     Siddhartha Tovar MD

## 2024-06-05 NOTE — PROGRESS NOTES
Occupational Therapy    OT Treatment    Patient Name: Brittanie Smart  MRN: [id number]  Today's Date: 6/5/2024  Time Calculation  Start Time: 1320  Stop Time: 1406  Time Calculation (min): 46 min         Assessment:  OT Assessment: Pt limited with standing tolerance < 1 minute, continues to need assist for bed mobility and tranfers, encouragement to participate.  Barriers to Discharge: Decreased caregiver support (assist of 2)  Evaluation/Treatment Tolerance: Patient limited by fatigue  End of Session Communication: Bedside nurse  End of Session Patient Position: Up in chair, Alarm on (all needs in reach)  OT Assessment Results: Decreased ADL status, Decreased upper extremity strength, Decreased safe judgment during ADL, Decreased cognition, Decreased endurance, Decreased sensation, Decreased fine motor control, Decreased functional mobility, Decreased IADLs  Barriers to Discharge: Decreased caregiver support (assist of 2)  Evaluation/Treatment Tolerance: Patient limited by fatigue  Plan:  Treatment Interventions: ADL retraining, Functional transfer training  OT Frequency: 3 times per week  OT Discharge Recommendations: Moderate intensity level of continued care  Equipment Recommended upon Discharge: Wheeled walker  OT Recommended Transfer Status: Assist of 2  OT - OK to Discharge: Yes  Treatment Interventions: ADL retraining, Functional transfer training    Subjective   Previous Visit Info:  OT Last Visit  OT Received On: 06/05/24  General:  General  Reason for Referral: Decline in ADL function, unable to care for herself.  Referred By: Dr Turner  Past Medical History Relevant to Rehab: Behavior problem, adult      Decubitus ulcer     Depression     GERD (gastroesophageal reflux disease)     Hemorrhoids     Hyperlipidemia     Hypertension     Hypothyroid     Morbid obesity (Multi)     Peripheral vascular disease (CMS-HCC)  Missed Visit: Yes  Missed Visit Reason: Patient refused (therapist unable to encourage pt to get  out of bed despite multiple attempts, pt did agree to get up after lunch if she can get some sleep.)  Prior to Session Communication: Bedside nurse  Patient Position Received: Bed, 3 rail up, Alarm off, not on at start of session  General Comment: Pt agreeable to treatment with Max encouragement and additional time to agree.  Precautions:  Hearing/Visual Limitations: WfL hearing, glasses for reading.  Medical Precautions: Fall precautions, Oxygen therapy device and L/min  Precautions Comment: Skin, Multiple Area BLE of Skin Breakdown.  Skin Protectors dressings both feet/heels.  Pain:  Pain Assessment  Pain Assessment: 0-10  Pain Score: 0 - No pain    Objective    Cognition:  Cognition  Insight: Moderate  Impulsive: Mildly  Processing Speed: Within funtional limits     Activities of Daily Living: Grooming  Grooming Level of Assistance: Setup  Grooming Where Assessed: Chair  Grooming Comments: pt washed face with washrag    UE Bathing  UE Bathing Level of Assistance: Minimum assistance  UE Bathing Where Assessed:  (chair)  UE Bathing Comments: Pt sponge bathed UB with set up, however, pt or therapist unable to wash under abdominal folds while in chair. nurse aid will complete once back in bed.    LE Bathing  LE Bathing Level of Assistance: Maximum assistance  LE Bathing Where Assessed:  (chair)  LE Bathing Comments: vargas area in stance, BLE to ankles, seated     Bed Mobility/Transfers: Bed Mobility 1  Bed Mobility 1: Supine to sitting  Level of Assistance 1: Moderate assistance  Bed Mobility Comments 1: HOB elevated ~ 60 degrees, cues for bedrail use    Transfer 1  Transfer From 1: Bed to  Transfer to 1: Chair with arms  Transfer Device 1: Walker  Transfer Level of Assistance 1: Moderate assistance, +2  Trials/Comments 1: to take ~ 6 steps from bed>chair    Sitting Balance:  Static Sitting Balance  Static Sitting-Balance Support: Feet supported  Static Sitting-Level of Assistance: Close supervision  Static  Sitting-Comment/Number of Minutes: ~ 10 minutes unsupported sitting, fair balance    Outcome Measures:[facility] Daily Activity  Putting on and taking off regular lower body clothing: Total  Bathing (including washing, rinsing, drying): A lot  Putting on and taking off regular upper body clothing: A lot  Toileting, which includes using toilet, bedpan or urinal: Total  Taking care of personal grooming such as brushing teeth: A little  Eating Meals: A little  Daily Activity - Total Score: 12          Goals:  Encounter Problems       Encounter Problems (Active)       OT Goals       Patient will be able to complete UB/LB dressing, bathing, grooming with modified independence using good safety and AE as needed. (Progressing)       Start:  06/03/24    Expected End:  06/17/24            Patient will be able to complete toileting tasks with Close Supervision using good safety and with G balance.  (Progressing)       Start:  06/03/24    Expected End:  06/17/24            Patient will be able to complete functional transfers/mobility with the walker with Close Supervision with G balance using good safety.  (Progressing)       Start:  06/03/24    Expected End:  06/17/24            Patient will be able to tolerate 7-8min of functional standing with G balance in prep for ADL/transfers. (Progressing)       Start:  06/03/24    Expected End:  06/17/24

## 2024-06-05 NOTE — PROGRESS NOTES
Occupational Therapy                 Therapy Communication Note    Patient Name: Brittanie Smart  MRN: [id number]  Today's Date: 6/5/2024     Discipline: Occupational Therapy    Missed Visit Reason: Missed Visit Reason: Patient refused (therapist unable to encourage pt to get out of bed despite multiple attempts, pt did agree to get up after lunch if she can get some sleep.)    Missed Time: Attempt    Comment:

## 2024-06-05 NOTE — PROGRESS NOTES
Brittanie Smart is a 63 y.o. female on day 2 of admission presenting with Unable to care for self.      Subjective   Patient resting in bed, denies pain or sob.        Objective     Last Recorded Vitals  /67 (BP Location: Left arm, Patient Position: Lying)   Pulse 78   Temp 36.8 °C (98.2 °F) (Oral)   Resp 16   Wt 126 kg (277 lb 3.2 oz)   SpO2 97%   Intake/Output last 3 Shifts:  No intake or output data in the 24 hours ending 06/05/24 1457    Admission Weight  Weight: 119 kg (263 lb 4.8 oz) (06/02/24 2217)    Daily Weight  06/03/24 : 126 kg (277 lb 3.2 oz)    Image Results  CT kidney wo IV contrast  Narrative: Interpreted By:  Otilio Hernandez,   STUDY:  CT KIDNEY WO CONTRAST; 6/4/2024 3:50 pm      INDICATION:  Signs/Symptoms:mass on left kidney.      COMPARISON:  None.      ACCESSION NUMBER(S):  [id number]      ORDERING CLINICIAN:  KYLIE GARCIA      TECHNIQUE:  Without INTRAVENOUS  CONTRAST  WithoutORAL CONTRAST  (  Intravenous contrast was not able to utilized due to renal  insufficiency. One or more of the following dose reduction techniques  were used: Automated exposure control Adjustment of the mA and/or kV  according to patient size, and/or use of iterative reconstruction  technique.      FINDINGS:  ABDOMEN CT:  SOLID ORGAN ASSESSMENT:  Moderate bilateral pleural effusions are present. Bibasilar  atelectasis is present. Infiltrate may be obscured by the atelectatic  changes.      Within limits of this unenhanced study no focal masses are identified  within the liver, spleen, pancreas, right kidney or adrenal glands.  Spleen is enlarged with a craniocaudal extensive 16.4 cm.      CT examination does confirm a rim calcified mass within the mid to  lower pole left kidney measuring on the order of 3.1 cm in diameter.  Delineation and characterization of the mass is somewhat limited due  to the absence of contrast. There is suspicion of mildly enlarged  retroperitoneal lymph nodes on the left at the level  of the left  kidney, again not well characterized due to the patient's body  habitus and absence of contrast.      Multiple stones are identified along the dependent portion the  gallbladder. Gallbladder wall is not well defined raising possibility  of gallbladder wall thickening and/or pericholecystic fluid. Please  correlate with patient's clinical assessment to rule out acute  cholecystitis. Right upper quadrant ultrasound could be obtained to  further assess this finding.      RETROPERITONEUM:  AORTA:  There is no evidence for abdominal aortic aneurysm.      BOWEL ASSESSMENT:  No intraperitoneal free air is identified. There is a nonspecific  appearance of the stomach. Visualized portions of the large and small  bowel are unremarkable.      ABDOMINAL WILKINS: There is no evidence for a hernia. Anasarca is  present.      OSSEOUS STRUCTURES: No lytic or blastic lesions are identified.  Spurring is noted throughout the spine.      Impression: 1. Indeterminate rim calcified mass left kidney as was suspected on  the ultrasound study. Both benign and malignant etiologies are  considered in the differential this time with characterization of the  mass limited due to the technical issues on this study and the  ultrasound study. There is suspicion of left-sided periaortic  lymphadenopathy, again not well characterized due to the absence of  intravenous contrast.  2. Cholelithiasis with poorly defined gallbladder wall margins.  Please correlate with clinical history to rule out the possibility of  acute cholecystitis. Right upper quadrant ultrasound may be attempted  for further characterization the gallbladder wall to rule out  gallbladder wall thickening and pericholecystic fluid.  3. Anasarca.  4. Moderate bilateral pleural effusions with bibasilar atelectasis  most marked involving the lower lobes.  5. Splenomegaly. Spleen measures 16.4 cm in craniocaudal extent.          MACRO:  none      Signed by: Otilio Hernandez  6/5/2024 12:36 PM  Dictation workstation:   [id number]      Physical Exam  Constitutional:       Appearance: She is obese.   Cardiovascular:      Rate and Rhythm: Normal rate and regular rhythm.      Pulses: Normal pulses.      Heart sounds: Normal heart sounds.   Pulmonary:      Effort: Pulmonary effort is normal.      Breath sounds: Normal breath sounds.   Abdominal:      General: Bowel sounds are normal.      Palpations: Abdomen is soft.   Musculoskeletal:         General: Normal range of motion.   Skin:     General: Skin is warm and dry.   Neurological:      Mental Status: She is alert and oriented to person, place, and time.         Relevant Results             Results for orders placed or performed during the hospital encounter of 06/02/24 (from the past 24 hour(s))   POCT GLUCOSE   Result Value Ref Range    POCT Glucose 272 (H) 74 - 99 mg/dL   POCT GLUCOSE   Result Value Ref Range    POCT Glucose 272 (H) 74 - 99 mg/dL   CBC   Result Value Ref Range    WBC 7.3 4.4 - 11.3 x10*3/uL    nRBC 0.3 (H) 0.0 - 0.0 /100 WBCs    RBC 2.93 (L) 4.00 - 5.20 x10*6/uL    Hemoglobin 7.9 (L) 12.0 - 16.0 g/dL    Hematocrit 26.4 (L) 36.0 - 46.0 %    MCV 90 80 - 100 fL    MCH 27.0 26.0 - 34.0 pg    MCHC 29.9 (L) 32.0 - 36.0 g/dL    RDW 21.3 (H) 11.5 - 14.5 %    Platelets 158 150 - 450 x10*3/uL   Comprehensive metabolic panel   Result Value Ref Range    Glucose 380 (H) 65 - 99 mg/dL    Sodium 134 133 - 145 mmol/L    Potassium 4.8 3.4 - 5.1 mmol/L    Chloride 99 97 - 107 mmol/L    Bicarbonate 26 24 - 31 mmol/L    Urea Nitrogen 59 (H) 8 - 25 mg/dL    Creatinine 2.40 (H) 0.40 - 1.60 mg/dL    eGFR 22 (L) >60 mL/min/1.73m*2    Calcium 8.1 (L) 8.5 - 10.4 mg/dL    Albumin 2.9 (L) 3.5 - 5.0 g/dL    Alkaline Phosphatase 170 (H) 35 - 125 U/L    Total Protein 6.3 5.9 - 7.9 g/dL    AST 13 5 - 40 U/L    Bilirubin, Total 0.3 0.1 - 1.2 mg/dL    ALT 8 5 - 40 U/L    Anion Gap 9 <=19 mmol/L   POCT GLUCOSE   Result Value Ref Range    POCT Glucose  355 (H) 74 - 99 mg/dL   POCT GLUCOSE   Result Value Ref Range    POCT Glucose 344 (H) 74 - 99 mg/dL       Assessment/Plan                  Principal Problem:    Unable to care for self  Active Problems:    Type 2 diabetes mellitus (Multi)    Morbid obesity (Multi)    Peripheral vascular disease (CMS-[facility])    Hypertension    Pressure injury of sacral region, stage 3 (Multi)    Behavior problem, adult    Renal failure      Unable to care for self   No indication for APS at this time; defer to . Patient is angry at her sister and brother in law because she was brought to the hospital and not her new apartment.   Insurance will likely cover discharge to SNF, arrangements are being made        Type 2 diabetes mellitus  markedly uncontrolled blood sugar on arrival, Hbg A1c 7.5  Increase Lantus to 15 units  Hypoglycemic protocol  AC/HS blood sugars     Morbid obesity   limited ability to care for herself  Safety precautions   PT/OT to evaluate and treat     Peripheral vascular disease   unsure what previous diagnostics have been done[place]     Hypertension   continue medications     Chronic Kidney Disease   - unsure chronicity  Patient has been on hemodialysis temporarily.  US shows a possible mass/malignancy on left kidney, CT done, urology following  -patient states she did have a biopsy in [place] and was found to have an infection in her kidney and had 6 weeks of antibiotic, record retrieval is in process         Pressure injury of sacral region, stage 3 and both ankles   wound care gave recommendations  Continue MediHoney     Behavior problem, adult   Unsure of previous diagnosis patient denies any  Consult to Behavior Health was done and found no needs at present     Hypothyroid   Continue e her home medications, TSH elevated and free t 4 only slightly decreased will continue will same dose and recommend recheck in 6 weeks.     Anemia   on iron supplementation      Plan of Care  Patient will likely need SNF at discharge. Social Service will need to verify insurance benefits.   Plan of care discussed with patient and collaborating physician, Dr. Tobin.                  ACE Kaiser-CNP

## 2024-06-05 NOTE — PROGRESS NOTES
"Physical Therapy                 Therapy Communication Note    Patient Name: Brittanie Smart  MRN: [id number]  Today's Date: 6/5/2024     Discipline: Physical Therapy    Missed Visit Reason: Missed Visit Reason: Patient refused    Missed Time: Attempt    Comment: Therapist spent about 5-7 minutes at patient's bedside attempting to encourage pt to get OOB. Education provided on importance of mobility and risk of immobility. \"Can't you see I'm trying to sleep,\" pt stated. Pt refusing therapy and OOB mobility at this time.  "

## 2024-06-06 ENCOUNTER — APPOINTMENT (OUTPATIENT)
Dept: RADIOLOGY | Facility: HOSPITAL | Age: 64
End: 2024-06-06

## 2024-06-06 LAB
ALBUMIN SERPL-MCNC: 2.9 G/DL (ref 3.5–5)
ALP BLD-CCNC: 186 U/L (ref 35–125)
ALT SERPL-CCNC: 8 U/L (ref 5–40)
ANION GAP SERPL CALC-SCNC: 7 MMOL/L
AST SERPL-CCNC: 12 U/L (ref 5–40)
BILIRUB SERPL-MCNC: 0.3 MG/DL (ref 0.1–1.2)
BUN SERPL-MCNC: 60 MG/DL (ref 8–25)
CALCIUM SERPL-MCNC: 8.6 MG/DL (ref 8.5–10.4)
CHLORIDE SERPL-SCNC: 98 MMOL/L (ref 97–107)
CO2 SERPL-SCNC: 28 MMOL/L (ref 24–31)
CREAT SERPL-MCNC: 2.7 MG/DL (ref 0.4–1.6)
EGFRCR SERPLBLD CKD-EPI 2021: 19 ML/MIN/1.73M*2
ERYTHROCYTE [DISTWIDTH] IN BLOOD BY AUTOMATED COUNT: 21.2 % (ref 11.5–14.5)
GLUCOSE BLD MANUAL STRIP-MCNC: 276 MG/DL (ref 74–99)
GLUCOSE BLD MANUAL STRIP-MCNC: 300 MG/DL (ref 74–99)
GLUCOSE BLD MANUAL STRIP-MCNC: 301 MG/DL (ref 74–99)
GLUCOSE BLD MANUAL STRIP-MCNC: 307 MG/DL (ref 74–99)
GLUCOSE SERPL-MCNC: 324 MG/DL (ref 65–99)
HCT VFR BLD AUTO: 24.9 % (ref 36–46)
HGB BLD-MCNC: 7.3 G/DL (ref 12–16)
MCH RBC QN AUTO: 26.4 PG (ref 26–34)
MCHC RBC AUTO-ENTMCNC: 29.3 G/DL (ref 32–36)
MCV RBC AUTO: 90 FL (ref 80–100)
NRBC BLD-RTO: 0.2 /100 WBCS (ref 0–0)
PLATELET # BLD AUTO: 167 X10*3/UL (ref 150–450)
POTASSIUM SERPL-SCNC: 4.9 MMOL/L (ref 3.4–5.1)
PROT SERPL-MCNC: 6.7 G/DL (ref 5.9–7.9)
RBC # BLD AUTO: 2.76 X10*6/UL (ref 4–5.2)
SODIUM SERPL-SCNC: 133 MMOL/L (ref 133–145)
WBC # BLD AUTO: 8 X10*3/UL (ref 4.4–11.3)

## 2024-06-06 PROCEDURE — 2500000002 HC RX 250 W HCPCS SELF ADMINISTERED DRUGS (ALT 637 FOR MEDICARE OP, ALT 636 FOR OP/ED): Performed by: EMERGENCY MEDICINE

## 2024-06-06 PROCEDURE — 84075 ASSAY ALKALINE PHOSPHATASE: CPT | Performed by: EMERGENCY MEDICINE

## 2024-06-06 PROCEDURE — 9420000001 HC RT PATIENT EDUCATION 5 MIN

## 2024-06-06 PROCEDURE — 94760 N-INVAS EAR/PLS OXIMETRY 1: CPT

## 2024-06-06 PROCEDURE — 36415 COLL VENOUS BLD VENIPUNCTURE: CPT | Performed by: EMERGENCY MEDICINE

## 2024-06-06 PROCEDURE — 2500000004 HC RX 250 GENERAL PHARMACY W/ HCPCS (ALT 636 FOR OP/ED): Performed by: INTERNAL MEDICINE

## 2024-06-06 PROCEDURE — 2500000005 HC RX 250 GENERAL PHARMACY W/O HCPCS: Performed by: EMERGENCY MEDICINE

## 2024-06-06 PROCEDURE — 76705 ECHO EXAM OF ABDOMEN: CPT

## 2024-06-06 PROCEDURE — 2500000001 HC RX 250 WO HCPCS SELF ADMINISTERED DRUGS (ALT 637 FOR MEDICARE OP): Performed by: INTERNAL MEDICINE

## 2024-06-06 PROCEDURE — 2500000004 HC RX 250 GENERAL PHARMACY W/ HCPCS (ALT 636 FOR OP/ED): Performed by: NURSE PRACTITIONER

## 2024-06-06 PROCEDURE — 94640 AIRWAY INHALATION TREATMENT: CPT

## 2024-06-06 PROCEDURE — 80053 COMPREHEN METABOLIC PANEL: CPT | Performed by: EMERGENCY MEDICINE

## 2024-06-06 PROCEDURE — 85027 COMPLETE CBC AUTOMATED: CPT | Performed by: EMERGENCY MEDICINE

## 2024-06-06 PROCEDURE — 1100000001 HC PRIVATE ROOM DAILY

## 2024-06-06 PROCEDURE — 76705 ECHO EXAM OF ABDOMEN: CPT | Performed by: RADIOLOGY

## 2024-06-06 PROCEDURE — 2500000002 HC RX 250 W HCPCS SELF ADMINISTERED DRUGS (ALT 637 FOR MEDICARE OP, ALT 636 FOR OP/ED): Performed by: NURSE PRACTITIONER

## 2024-06-06 PROCEDURE — 82947 ASSAY GLUCOSE BLOOD QUANT: CPT

## 2024-06-06 PROCEDURE — 2500000001 HC RX 250 WO HCPCS SELF ADMINISTERED DRUGS (ALT 637 FOR MEDICARE OP): Performed by: EMERGENCY MEDICINE

## 2024-06-06 RX ORDER — INSULIN GLARGINE 100 [IU]/ML
20 INJECTION, SOLUTION SUBCUTANEOUS NIGHTLY
Status: DISCONTINUED | OUTPATIENT
Start: 2024-06-06 | End: 2024-06-07

## 2024-06-06 RX ORDER — DOXAZOSIN 1 MG/1
1 TABLET ORAL DAILY
Status: DISPENSED | OUTPATIENT
Start: 2024-06-06

## 2024-06-06 RX ORDER — GABAPENTIN 300 MG/1
300 CAPSULE ORAL 2 TIMES DAILY
Status: DISPENSED | OUTPATIENT
Start: 2024-06-06

## 2024-06-06 RX ORDER — CEFTRIAXONE 1 G/50ML
1 INJECTION, SOLUTION INTRAVENOUS EVERY 24 HOURS
Status: DISCONTINUED | OUTPATIENT
Start: 2024-06-06 | End: 2024-06-16

## 2024-06-06 RX ADMIN — NYSTATIN 1 APPLICATION: 100000 POWDER TOPICAL at 06:41

## 2024-06-06 RX ADMIN — INSULIN LISPRO 9 UNITS: 100 INJECTION, SOLUTION INTRAVENOUS; SUBCUTANEOUS at 08:54

## 2024-06-06 RX ADMIN — IPRATROPIUM BROMIDE AND ALBUTEROL SULFATE 3 ML: .5; 3 SOLUTION RESPIRATORY (INHALATION) at 19:49

## 2024-06-06 RX ADMIN — INSULIN GLARGINE 20 UNITS: 100 INJECTION, SOLUTION SUBCUTANEOUS at 21:11

## 2024-06-06 RX ADMIN — IPRATROPIUM BROMIDE AND ALBUTEROL SULFATE 3 ML: .5; 3 SOLUTION RESPIRATORY (INHALATION) at 07:09

## 2024-06-06 RX ADMIN — PANTOPRAZOLE SODIUM 40 MG: 40 TABLET, DELAYED RELEASE ORAL at 06:41

## 2024-06-06 RX ADMIN — FERROUS SULFATE TAB 325 MG (65 MG ELEMENTAL FE) 1 TABLET: 325 (65 FE) TAB at 08:51

## 2024-06-06 RX ADMIN — Medication 1 TABLET: at 08:53

## 2024-06-06 RX ADMIN — ROSUVASTATIN CALCIUM 5 MG: 10 TABLET, COATED ORAL at 08:53

## 2024-06-06 RX ADMIN — Medication 2 L/MIN: at 07:00

## 2024-06-06 RX ADMIN — ENOXAPARIN SODIUM 40 MG: 40 INJECTION SUBCUTANEOUS at 08:50

## 2024-06-06 RX ADMIN — KETOTIFEN FUMARATE 1 DROP: 0.25 SOLUTION/ DROPS OPHTHALMIC at 21:14

## 2024-06-06 RX ADMIN — IPRATROPIUM BROMIDE AND ALBUTEROL SULFATE 3 ML: .5; 3 SOLUTION RESPIRATORY (INHALATION) at 13:19

## 2024-06-06 RX ADMIN — INSULIN LISPRO 12 UNITS: 100 INJECTION, SOLUTION INTRAVENOUS; SUBCUTANEOUS at 12:52

## 2024-06-06 RX ADMIN — GABAPENTIN 300 MG: 300 CAPSULE ORAL at 08:52

## 2024-06-06 RX ADMIN — IRON SUCROSE 200 MG: 20 INJECTION, SOLUTION INTRAVENOUS at 06:40

## 2024-06-06 RX ADMIN — Medication 2 L/MIN: at 23:15

## 2024-06-06 RX ADMIN — NYSTATIN 1 APPLICATION: 100000 POWDER TOPICAL at 21:15

## 2024-06-06 RX ADMIN — NYSTATIN 1 APPLICATION: 100000 POWDER TOPICAL at 14:41

## 2024-06-06 RX ADMIN — KETOTIFEN FUMARATE 1 DROP: 0.25 SOLUTION/ DROPS OPHTHALMIC at 08:52

## 2024-06-06 RX ADMIN — GABAPENTIN 300 MG: 300 CAPSULE ORAL at 21:11

## 2024-06-06 RX ADMIN — CEFTRIAXONE SODIUM 1 G: 1 INJECTION, SOLUTION INTRAVENOUS at 17:09

## 2024-06-06 RX ADMIN — LEVOTHYROXINE SODIUM 75 MCG: 0.07 TABLET ORAL at 06:40

## 2024-06-06 RX ADMIN — INSULIN LISPRO 9 UNITS: 100 INJECTION, SOLUTION INTRAVENOUS; SUBCUTANEOUS at 17:19

## 2024-06-06 RX ADMIN — METOPROLOL SUCCINATE 50 MG: 50 TABLET, EXTENDED RELEASE ORAL at 08:53

## 2024-06-06 RX ADMIN — FUROSEMIDE 60 MG: 40 TABLET ORAL at 08:51

## 2024-06-06 ASSESSMENT — PAIN SCALES - GENERAL
PAINLEVEL_OUTOF10: 0 - NO PAIN

## 2024-06-06 ASSESSMENT — COGNITIVE AND FUNCTIONAL STATUS - GENERAL
STANDING UP FROM CHAIR USING ARMS: A LOT
TOILETING: TOTAL
DAILY ACTIVITIY SCORE: 12
MOVING TO AND FROM BED TO CHAIR: A LOT
MOBILITY SCORE: 10
MOVING FROM LYING ON BACK TO SITTING ON SIDE OF FLAT BED WITH BEDRAILS: A LOT
WALKING IN HOSPITAL ROOM: TOTAL
EATING MEALS: A LITTLE
DRESSING REGULAR UPPER BODY CLOTHING: A LOT
DRESSING REGULAR LOWER BODY CLOTHING: TOTAL
TURNING FROM BACK TO SIDE WHILE IN FLAT BAD: A LOT
CLIMB 3 TO 5 STEPS WITH RAILING: TOTAL
HELP NEEDED FOR BATHING: A LOT
PERSONAL GROOMING: A LITTLE

## 2024-06-06 NOTE — NURSING NOTE
Encouraged pt to work with therapy states she will do it later in the day ,no c/o pain or discomfort at this time.

## 2024-06-06 NOTE — CARE PLAN
The patient's goals for the shift include      The clinical goals for the shift include No SOB    Over the shift, the patient did not make progress toward the following goals. Barriers to progression include . Recommendations to address these barriers include   Problem: Respiratory  Goal: Wean oxygen to maintain O2 saturation per order/standard this shift  Outcome: Progressing   .

## 2024-06-06 NOTE — PROGRESS NOTES
Brittanie Smart is a 63 y.o. female on day 3 of admission presenting with No Principal Problem: There is no principal problem currently on the Problem List. Please update the Problem List and refresh..      Subjective   Patient resting in bed, denies pain or sob.        Objective     Last Recorded Vitals  /68 (BP Location: Left arm, Patient Position: Lying)   Pulse 81   Temp 36.3 °C (97.3 °F) (Oral)   Resp 18   Wt 126 kg (277 lb 3.2 oz)   SpO2 94%   Intake/Output last 3 Shifts:  No intake or output data in the 24 hours ending 06/06/24 1024    Admission Weight  Weight: 119 kg (263 lb 4.8 oz) (06/02/24 2217)    Daily Weight  06/03/24 : 126 kg (277 lb 3.2 oz)    Image Results  US gallbladder  Narrative: Interpreted By:  Otilio Hernandez,   STUDY:  US GALLBLADDER; 9:13 am      INDICATION:  Signs/Symptoms:abnormal CT. Abnormal appearance of gallbladder on CT  from 06/04/2024.      COMPARISON:  CT 06/04/2024      ACCESSION NUMBER(S):  [id number]      ORDERING CLINICIAN:  KYLIE GARCIA      TECHNIQUE:  Limited abdominal ultrasound of the right upper quadrant was  performed utilizing gray scale imaging.      FINDINGS:  COMMENTS: Technologist note states that the exam was limited due to  patient being uncooperative.      Liver: There is a very coarse echotexture of the hepatic parenchyma  with a lobulated hepatic contour consistent with underlying  cirrhosis. Gallbladder: Gallbladder is filled with stones. There is  diffuse gallbladder wall thickening measuring 5 mm with no  pericholecystic fluid identified. Sonographic Latham's sign: Negative  Pancreas: Pancreas is predominantly obscured by bowel gas.  CBD: 0.36 cm  Right Kidney:  Minimal amount of fluid is identified adjacent to the  kidney. The kidney itself is unremarkable.      Impression: 1. Abnormal appearance of the liver in a pattern consistent with  cirrhosis. The splenomegaly noted on CT may be related to portal  venous hypertension considering this  appearance.  2. Cholelithiasis with gallbladder wall thickening but negative  sonographic Latham's sign and no pericholecystic fluid. Findings may  be indicative of chronic cholecystitis. Please correlate clinically.  3. Nonspecific perinephric fluid.  4. Please note exam is technically limited due to suboptimal  cooperation from the patient according to technologist note.      MACRO:  None.      Signed by: Otilio Hernandez 6/6/2024 9:17 AM  Dictation workstation:   [id number]      Physical Exam  Constitutional:       Appearance: She is obese. She is ill-appearing.   Cardiovascular:      Rate and Rhythm: Normal rate and regular rhythm.      Pulses: Normal pulses.      Heart sounds: Normal heart sounds.   Pulmonary:      Effort: Pulmonary effort is normal.      Breath sounds: Normal breath sounds.   Abdominal:      General: Bowel sounds are normal.      Palpations: Abdomen is soft.   Musculoskeletal:         General: Normal range of motion.      Right lower leg: Edema present.      Left lower leg: Edema present.   Skin:     General: Skin is warm and dry.   Neurological:      Mental Status: She is alert and oriented to person, place, and time.         Relevant Results             Results for orders placed or performed during the hospital encounter of 06/02/24 (from the past 24 hour(s))   POCT GLUCOSE   Result Value Ref Range    POCT Glucose 344 (H) 74 - 99 mg/dL   POCT GLUCOSE   Result Value Ref Range    POCT Glucose 333 (H) 74 - 99 mg/dL   POCT GLUCOSE   Result Value Ref Range    POCT Glucose 318 (H) 74 - 99 mg/dL   CBC   Result Value Ref Range    WBC 8.0 4.4 - 11.3 x10*3/uL    nRBC 0.2 (H) 0.0 - 0.0 /100 WBCs    RBC 2.76 (L) 4.00 - 5.20 x10*6/uL    Hemoglobin 7.3 (L) 12.0 - 16.0 g/dL    Hematocrit 24.9 (L) 36.0 - 46.0 %    MCV 90 80 - 100 fL    MCH 26.4 26.0 - 34.0 pg    MCHC 29.3 (L) 32.0 - 36.0 g/dL    RDW 21.2 (H) 11.5 - 14.5 %    Platelets 167 150 - 450 x10*3/uL   Comprehensive metabolic panel   Result Value Ref  Range    Glucose 324 (H) 65 - 99 mg/dL    Sodium 133 133 - 145 mmol/L    Potassium 4.9 3.4 - 5.1 mmol/L    Chloride 98 97 - 107 mmol/L    Bicarbonate 28 24 - 31 mmol/L    Urea Nitrogen 60 (H) 8 - 25 mg/dL    Creatinine 2.70 (H) 0.40 - 1.60 mg/dL    eGFR 19 (L) >60 mL/min/1.73m*2    Calcium 8.6 8.5 - 10.4 mg/dL    Albumin 2.9 (L) 3.5 - 5.0 g/dL    Alkaline Phosphatase 186 (H) 35 - 125 U/L    Total Protein 6.7 5.9 - 7.9 g/dL    AST 12 5 - 40 U/L    Bilirubin, Total 0.3 0.1 - 1.2 mg/dL    ALT 8 5 - 40 U/L    Anion Gap 7 <=19 mmol/L   POCT GLUCOSE   Result Value Ref Range    POCT Glucose 276 (H) 74 - 99 mg/dL       Assessment/Plan                  Active Problems:    Type 2 diabetes mellitus (Multi)    Morbid obesity (Multi)    Peripheral vascular disease (CMS-[facility])    Hypertension    Pressure injury of sacral region, stage 3 (Multi)    Behavior problem, adult    Renal failure    Unable to care for self   No indication for APS at this time; defer to . Patient is angry at her sister and brother in law because she was brought to the hospital and not her new apartment.   Insurance will likely cover discharge to SNF, arrangements are being made.     Type 2 diabetes mellitus  markedly uncontrolled blood sugar on arrival, Hbg A1c 7.5  Increase Lantus to 20 units  Hypoglycemic protocol  AC/HS blood sugars     Morbid obesity   limited ability to care for herself  Safety precautions   PT/OT to evaluate and treat     Peripheral vascular disease   unsure what previous diagnostics have been done, records have been requested from [place]     Hypertension   continue medications     Chronic Kidney Disease   - unsure chronicity  BUN and creatinine are increasing, monitoring  Patient has been on hemodialysis temporarily.  US shows a possible mass/malignancy on left kidney, CT done, urology following  -patient states she did have a biopsy in [place] and was found to have an infection in her kidney and had 6 weeks of  antibiotic, record retrieval is in process         Pressure injury of sacral region, stage 3 and both ankles   wound care gave recommendations  Continue MediHoney     Behavior problem, adult   Unsure of previous diagnosis patient denies any  Consult to Behavior Health was done and found no needs at present     Hypothyroid   Continue her home medications, TSH elevated and free T 4 only slightly decreased will continue will same dose and recommend recheck in 6 weeks.     Anemia   on iron supplementation     Plan of Care  Patient will likely need SNF at discharge. Social Service will need to verify insurance benefits.   Plan of care discussed with patient and collaborating physician, Dr. Tobin.               Kaylan Bird, APRN-CNP

## 2024-06-06 NOTE — PROGRESS NOTES
Brittanie Smart is a 63 y.o. female on day 3 of admission presenting with No Principal Problem: There is no principal problem currently on the Problem List. Please update the Problem List and refresh..      Subjective   Patient still very fatigued otherwise no complaints.  Apparently there was some mixup with getting records from [place] and we are still awaiting the records which should be coming in shortly.  The patient's creatinine has been slowly trending up to 2.7.  Her hemoglobin is trending down to 7.3.       Objective          Vitals 24HR  Heart Rate:  [66-81]   Temp:  [36.3 °C (97.3 °F)-36.9 °C (98.4 °F)]   Resp:  [16-18]   BP: (109-135)/(51-68)   SpO2:  [91 %-97 %]       Intake/Output last 3 Shifts:  No intake or output data in the 24 hours ending 06/06/24 1101    Physical Exam  Constitutional:       General: She is awake but mildly somnolent, alert. She is not in acute distress.  Cardiovascular:      Rate and Rhythm: Regular rhythm.      Heart sounds:      No friction rub.   Pulmonary:      Effort: Pulmonary effort is normal.      Breath sounds: Diminished breath sounds.   Abdominal:      General: Bowel sounds are normal.      Palpations: Abdomen is soft.      Tenderness: There is no guarding or rebound.   Musculoskeletal:      Comments: 1-2+ edema     Relevant Results  Results for orders placed or performed during the hospital encounter of 06/02/24 (from the past 24 hour(s))   POCT GLUCOSE   Result Value Ref Range    POCT Glucose 344 (H) 74 - 99 mg/dL   POCT GLUCOSE   Result Value Ref Range    POCT Glucose 333 (H) 74 - 99 mg/dL   POCT GLUCOSE   Result Value Ref Range    POCT Glucose 318 (H) 74 - 99 mg/dL   CBC   Result Value Ref Range    WBC 8.0 4.4 - 11.3 x10*3/uL    nRBC 0.2 (H) 0.0 - 0.0 /100 WBCs    RBC 2.76 (L) 4.00 - 5.20 x10*6/uL    Hemoglobin 7.3 (L) 12.0 - 16.0 g/dL    Hematocrit 24.9 (L) 36.0 - 46.0 %    MCV 90 80 - 100 fL    MCH 26.4 26.0 - 34.0 pg    MCHC 29.3 (L) 32.0 - 36.0 g/dL    RDW 21.2  (H) 11.5 - 14.5 %    Platelets 167 150 - 450 x10*3/uL   Comprehensive metabolic panel   Result Value Ref Range    Glucose 324 (H) 65 - 99 mg/dL    Sodium 133 133 - 145 mmol/L    Potassium 4.9 3.4 - 5.1 mmol/L    Chloride 98 97 - 107 mmol/L    Bicarbonate 28 24 - 31 mmol/L    Urea Nitrogen 60 (H) 8 - 25 mg/dL    Creatinine 2.70 (H) 0.40 - 1.60 mg/dL    eGFR 19 (L) >60 mL/min/1.73m*2    Calcium 8.6 8.5 - 10.4 mg/dL    Albumin 2.9 (L) 3.5 - 5.0 g/dL    Alkaline Phosphatase 186 (H) 35 - 125 U/L    Total Protein 6.7 5.9 - 7.9 g/dL    AST 12 5 - 40 U/L    Bilirubin, Total 0.3 0.1 - 1.2 mg/dL    ALT 8 5 - 40 U/L    Anion Gap 7 <=19 mmol/L   POCT GLUCOSE   Result Value Ref Range    POCT Glucose 276 (H) 74 - 99 mg/dL            Assessment/Plan   Elevated creatinine I believe she likely has chronic kidney disease secondary to diabetic nephropathy however still awaiting records, she appears to be also developing acute kidney injury at this time.  Unclear what her baseline creatinine is  Diabetes mellitus type 2  Hypertension  Anemia   Possible UTI  Generalized weakness  Renal Mass  Diastolic CHF     Plan: Stop Lasix.  Stop Norvasc due to peripheral edema and instead substitute Cardura 1 mg once a night.  GI consult for abnormal abdominal ultrasound findings.  Will also need to rule out GI bleed.  Discussed with primary team.  Urology consulted. Monitor culture results. Hold losartan.  Dose medications for GFR.  Continue IV venofer. No indications for dialysis.  Strict I/O's.  Still awaiting records from [place].    Siddhartha Tovar MD

## 2024-06-06 NOTE — PROGRESS NOTES
06/06/24 0915   Discharge Planning   Patient expects to be discharged to: [facility] received pre-cert but are awaiting one time contract for the patient to enter their facility. Medical team updated.   Does the patient need discharge transport arranged? Yes   RoundTrip coordination needed? Yes   Has discharge transport been arranged? No

## 2024-06-06 NOTE — PROGRESS NOTES
Physical Therapy                 Therapy Communication Note    Patient Name: Brittanie Smart  MRN: [id number]  Today's Date: 6/6/2024     Discipline: Physical Therapy    Missed Visit Reason:      Missed Time: Attempt    Comment: Pt refused any therapy at this time.

## 2024-06-06 NOTE — CARE PLAN
The patient's goals for the shift include      The clinical goals for the shift include No SOB      Problem: Respiratory  Goal: Wean oxygen to maintain O2 saturation per order/standard this shift  Outcome: Progressing     Problem: Fall/Injury  Goal: Not fall by end of shift  Outcome: Met  Goal: Be free from injury by end of the shift  Outcome: Met     Problem: Skin  Goal: Prevent/manage excess moisture  Outcome: Met  Flowsheets (Taken 6/6/2024 0964)  Prevent/manage excess moisture:   Cleanse incontinence/protect with barrier cream   Use wicking fabric (obtain order)  Note: purewick     Problem: Pain  Goal: My pain/discomfort is manageable  Outcome: Met     Problem: Daily Care  Goal: Daily care needs are met  Outcome: Met     Problem: Respiratory  Goal: No signs of respiratory distress (eg. Use of accessory muscles. Peds grunting)  Outcome: Met

## 2024-06-06 NOTE — PROGRESS NOTES
"Occupational Therapy                 Therapy Communication Note    Patient Name: Brittanie Smart  MRN: [id number]  Today's Date: 6/6/2024     Discipline: Occupational Therapy    Missed Visit Reason:  Pt refused. No reason given. Pt stated \" they are not going to let me out anyways until they have my medical reasons figured out\".  Did not respond to encouragement to participate. OT treatment deferred.     Missed Time: Attempt    "

## 2024-06-07 ENCOUNTER — APPOINTMENT (OUTPATIENT)
Dept: RADIOLOGY | Facility: HOSPITAL | Age: 64
End: 2024-06-07
Payer: MEDICARE

## 2024-06-07 ENCOUNTER — APPOINTMENT (OUTPATIENT)
Dept: RADIOLOGY | Facility: HOSPITAL | Age: 64
End: 2024-06-07

## 2024-06-07 LAB
ALBUMIN SERPL-MCNC: 2.9 G/DL (ref 3.5–5)
ALBUMIN SERPL-MCNC: 2.9 G/DL (ref 3.5–5)
ALBUMIN SERPL-MCNC: 3 G/DL (ref 3.5–5)
ALBUMIN SERPL-MCNC: 3 G/DL (ref 3.5–5)
ANION GAP SERPL CALC-SCNC: 10 MMOL/L
ANION GAP SERPL CALC-SCNC: 10 MMOL/L
ANION GAP SERPL CALC-SCNC: 7 MMOL/L
ANION GAP SERPL CALC-SCNC: 9 MMOL/L
ANION GAP SERPL CALC-SCNC: 9 MMOL/L
BACTERIA UR CULT: ABNORMAL
BUN SERPL-MCNC: 64 MG/DL (ref 8–25)
BUN SERPL-MCNC: 65 MG/DL (ref 8–25)
BUN SERPL-MCNC: 65 MG/DL (ref 8–25)
BUN SERPL-MCNC: 66 MG/DL (ref 8–25)
BUN SERPL-MCNC: 67 MG/DL (ref 8–25)
CALCIUM SERPL-MCNC: 8.2 MG/DL (ref 8.5–10.4)
CALCIUM SERPL-MCNC: 8.3 MG/DL (ref 8.5–10.4)
CALCIUM SERPL-MCNC: 8.3 MG/DL (ref 8.5–10.4)
CALCIUM SERPL-MCNC: 8.4 MG/DL (ref 8.5–10.4)
CALCIUM SERPL-MCNC: 8.4 MG/DL (ref 8.5–10.4)
CHLORIDE SERPL-SCNC: 100 MMOL/L (ref 97–107)
CHLORIDE SERPL-SCNC: 101 MMOL/L (ref 97–107)
CHLORIDE SERPL-SCNC: 98 MMOL/L (ref 97–107)
CHLORIDE SERPL-SCNC: 98 MMOL/L (ref 97–107)
CHLORIDE SERPL-SCNC: 99 MMOL/L (ref 97–107)
CO2 SERPL-SCNC: 26 MMOL/L (ref 24–31)
CO2 SERPL-SCNC: 27 MMOL/L (ref 24–31)
CO2 SERPL-SCNC: 28 MMOL/L (ref 24–31)
CREAT SERPL-MCNC: 2.9 MG/DL (ref 0.4–1.6)
CREAT SERPL-MCNC: 3 MG/DL (ref 0.4–1.6)
CREAT SERPL-MCNC: 3.2 MG/DL (ref 0.4–1.6)
EGFRCR SERPLBLD CKD-EPI 2021: 16 ML/MIN/1.73M*2
EGFRCR SERPLBLD CKD-EPI 2021: 17 ML/MIN/1.73M*2
ERYTHROCYTE [DISTWIDTH] IN BLOOD BY AUTOMATED COUNT: 21.4 % (ref 11.5–14.5)
GGT SERPL-CCNC: 36 U/L (ref 5–55)
GLUCOSE BLD MANUAL STRIP-MCNC: 260 MG/DL (ref 74–99)
GLUCOSE BLD MANUAL STRIP-MCNC: 261 MG/DL (ref 74–99)
GLUCOSE BLD MANUAL STRIP-MCNC: 264 MG/DL (ref 74–99)
GLUCOSE BLD MANUAL STRIP-MCNC: 302 MG/DL (ref 74–99)
GLUCOSE SERPL-MCNC: 269 MG/DL (ref 65–99)
GLUCOSE SERPL-MCNC: 277 MG/DL (ref 65–99)
GLUCOSE SERPL-MCNC: 296 MG/DL (ref 65–99)
GLUCOSE SERPL-MCNC: 299 MG/DL (ref 65–99)
GLUCOSE SERPL-MCNC: 321 MG/DL (ref 65–99)
HCT VFR BLD AUTO: 25.8 % (ref 36–46)
HGB BLD-MCNC: 7.7 G/DL (ref 12–16)
INR PPP: 1.1 (ref 0.9–1.2)
MCH RBC QN AUTO: 27 PG (ref 26–34)
MCHC RBC AUTO-ENTMCNC: 29.8 G/DL (ref 32–36)
MCV RBC AUTO: 91 FL (ref 80–100)
NRBC BLD-RTO: 0 /100 WBCS (ref 0–0)
PHOSPHATE SERPL-MCNC: 3.7 MG/DL (ref 2.5–4.5)
PHOSPHATE SERPL-MCNC: 3.7 MG/DL (ref 2.5–4.5)
PHOSPHATE SERPL-MCNC: 3.8 MG/DL (ref 2.5–4.5)
PHOSPHATE SERPL-MCNC: 4 MG/DL (ref 2.5–4.5)
PLATELET # BLD AUTO: 148 X10*3/UL (ref 150–450)
POTASSIUM SERPL-SCNC: 5.4 MMOL/L (ref 3.4–5.1)
POTASSIUM SERPL-SCNC: 5.7 MMOL/L (ref 3.4–5.1)
POTASSIUM SERPL-SCNC: 5.9 MMOL/L (ref 3.4–5.1)
PROTHROMBIN TIME: 11.7 SECONDS (ref 9.3–12.7)
RBC # BLD AUTO: 2.85 X10*6/UL (ref 4–5.2)
SODIUM SERPL-SCNC: 134 MMOL/L (ref 133–145)
SODIUM SERPL-SCNC: 134 MMOL/L (ref 133–145)
SODIUM SERPL-SCNC: 135 MMOL/L (ref 133–145)
SODIUM SERPL-SCNC: 135 MMOL/L (ref 133–145)
SODIUM SERPL-SCNC: 136 MMOL/L (ref 133–145)
WBC # BLD AUTO: 8.1 X10*3/UL (ref 4.4–11.3)

## 2024-06-07 PROCEDURE — 2500000005 HC RX 250 GENERAL PHARMACY W/O HCPCS: Performed by: EMERGENCY MEDICINE

## 2024-06-07 PROCEDURE — 2500000001 HC RX 250 WO HCPCS SELF ADMINISTERED DRUGS (ALT 637 FOR MEDICARE OP): Performed by: EMERGENCY MEDICINE

## 2024-06-07 PROCEDURE — 36415 COLL VENOUS BLD VENIPUNCTURE: CPT | Performed by: NURSE PRACTITIONER

## 2024-06-07 PROCEDURE — 36415 COLL VENOUS BLD VENIPUNCTURE: CPT | Performed by: INTERNAL MEDICINE

## 2024-06-07 PROCEDURE — 80074 ACUTE HEPATITIS PANEL: CPT | Mod: TRILAB,WESLAB

## 2024-06-07 PROCEDURE — 82105 ALPHA-FETOPROTEIN SERUM: CPT | Mod: TRILAB,WESLAB

## 2024-06-07 PROCEDURE — 2500000001 HC RX 250 WO HCPCS SELF ADMINISTERED DRUGS (ALT 637 FOR MEDICARE OP): Performed by: INTERNAL MEDICINE

## 2024-06-07 PROCEDURE — 86015 ACTIN ANTIBODY EACH: CPT | Mod: TRILAB,WESLAB

## 2024-06-07 PROCEDURE — 2500000002 HC RX 250 W HCPCS SELF ADMINISTERED DRUGS (ALT 637 FOR MEDICARE OP, ALT 636 FOR OP/ED): Performed by: EMERGENCY MEDICINE

## 2024-06-07 PROCEDURE — 84080 ASSAY ALKALINE PHOSPHATASES: CPT

## 2024-06-07 PROCEDURE — 80069 RENAL FUNCTION PANEL: CPT | Performed by: INTERNAL MEDICINE

## 2024-06-07 PROCEDURE — 97535 SELF CARE MNGMENT TRAINING: CPT | Mod: GO

## 2024-06-07 PROCEDURE — 85027 COMPLETE CBC AUTOMATED: CPT | Performed by: NURSE PRACTITIONER

## 2024-06-07 PROCEDURE — 2500000001 HC RX 250 WO HCPCS SELF ADMINISTERED DRUGS (ALT 637 FOR MEDICARE OP): Performed by: NURSE PRACTITIONER

## 2024-06-07 PROCEDURE — 80048 BASIC METABOLIC PNL TOTAL CA: CPT | Mod: CCI | Performed by: INTERNAL MEDICINE

## 2024-06-07 PROCEDURE — 2500000002 HC RX 250 W HCPCS SELF ADMINISTERED DRUGS (ALT 637 FOR MEDICARE OP, ALT 636 FOR OP/ED): Performed by: NURSE PRACTITIONER

## 2024-06-07 PROCEDURE — 94664 DEMO&/EVAL PT USE INHALER: CPT

## 2024-06-07 PROCEDURE — 71045 X-RAY EXAM CHEST 1 VIEW: CPT | Performed by: RADIOLOGY

## 2024-06-07 PROCEDURE — 94640 AIRWAY INHALATION TREATMENT: CPT

## 2024-06-07 PROCEDURE — 82103 ALPHA-1-ANTITRYPSIN TOTAL: CPT | Mod: TRILAB,WESLAB

## 2024-06-07 PROCEDURE — 1100000001 HC PRIVATE ROOM DAILY

## 2024-06-07 PROCEDURE — 82977 ASSAY OF GGT: CPT

## 2024-06-07 PROCEDURE — 82947 ASSAY GLUCOSE BLOOD QUANT: CPT

## 2024-06-07 PROCEDURE — 2500000002 HC RX 250 W HCPCS SELF ADMINISTERED DRUGS (ALT 637 FOR MEDICARE OP, ALT 636 FOR OP/ED): Performed by: INTERNAL MEDICINE

## 2024-06-07 PROCEDURE — 94760 N-INVAS EAR/PLS OXIMETRY 1: CPT

## 2024-06-07 PROCEDURE — 76981 USE PARENCHYMA: CPT

## 2024-06-07 PROCEDURE — 85610 PROTHROMBIN TIME: CPT

## 2024-06-07 PROCEDURE — 86381 MITOCHONDRIAL ANTIBODY EACH: CPT | Mod: TRILAB,WESLAB

## 2024-06-07 PROCEDURE — 76981 USE PARENCHYMA: CPT | Performed by: RADIOLOGY

## 2024-06-07 PROCEDURE — 82390 ASSAY OF CERULOPLASMIN: CPT | Mod: TRILAB,WESLAB

## 2024-06-07 PROCEDURE — 86038 ANTINUCLEAR ANTIBODIES: CPT | Mod: TRILAB,WESLAB

## 2024-06-07 PROCEDURE — 2500000004 HC RX 250 GENERAL PHARMACY W/ HCPCS (ALT 636 FOR OP/ED): Performed by: NURSE PRACTITIONER

## 2024-06-07 PROCEDURE — 9420000001 HC RT PATIENT EDUCATION 5 MIN

## 2024-06-07 PROCEDURE — 86256 FLUORESCENT ANTIBODY TITER: CPT | Mod: TRILAB,WESLAB

## 2024-06-07 PROCEDURE — 2500000004 HC RX 250 GENERAL PHARMACY W/ HCPCS (ALT 636 FOR OP/ED): Performed by: INTERNAL MEDICINE

## 2024-06-07 PROCEDURE — 71045 X-RAY EXAM CHEST 1 VIEW: CPT

## 2024-06-07 PROCEDURE — 80069 RENAL FUNCTION PANEL: CPT | Performed by: NURSE PRACTITIONER

## 2024-06-07 RX ORDER — BISACODYL 5 MG
5 TABLET, DELAYED RELEASE (ENTERIC COATED) ORAL DAILY PRN
Status: ACTIVE | OUTPATIENT
Start: 2024-06-07

## 2024-06-07 RX ORDER — INSULIN LISPRO 100 [IU]/ML
0-20 INJECTION, SOLUTION INTRAVENOUS; SUBCUTANEOUS
Status: DISPENSED | OUTPATIENT
Start: 2024-06-07

## 2024-06-07 RX ORDER — INSULIN GLARGINE 100 [IU]/ML
25 INJECTION, SOLUTION SUBCUTANEOUS NIGHTLY
Status: DISCONTINUED | OUTPATIENT
Start: 2024-06-07 | End: 2024-06-07

## 2024-06-07 RX ORDER — ALBUTEROL SULFATE 0.83 MG/ML
10 SOLUTION RESPIRATORY (INHALATION) ONCE
Status: COMPLETED | OUTPATIENT
Start: 2024-06-07 | End: 2024-06-08

## 2024-06-07 RX ORDER — CALCIUM GLUCONATE 20 MG/ML
1 INJECTION, SOLUTION INTRAVENOUS ONCE
Status: COMPLETED | OUTPATIENT
Start: 2024-06-07 | End: 2024-06-08

## 2024-06-07 RX ORDER — SODIUM POLYSTYRENE SULFONATE 15 G/60ML
30 SUSPENSION ORAL; RECTAL ONCE
Status: COMPLETED | OUTPATIENT
Start: 2024-06-07 | End: 2024-06-07

## 2024-06-07 RX ORDER — DOCUSATE SODIUM 100 MG/1
100 CAPSULE, LIQUID FILLED ORAL 2 TIMES DAILY
Status: DISPENSED | OUTPATIENT
Start: 2024-06-07

## 2024-06-07 RX ORDER — DEXTROSE 50 % IN WATER (D50W) INTRAVENOUS SYRINGE
25 ONCE
Status: DISCONTINUED | OUTPATIENT
Start: 2024-06-07 | End: 2024-06-07

## 2024-06-07 RX ORDER — DEXTROSE MONOHYDRATE 100 MG/ML
50 INJECTION, SOLUTION INTRAVENOUS CONTINUOUS
Status: DISCONTINUED | OUTPATIENT
Start: 2024-06-07 | End: 2024-06-07

## 2024-06-07 RX ORDER — INSULIN GLARGINE 100 [IU]/ML
35 INJECTION, SOLUTION SUBCUTANEOUS NIGHTLY
Status: DISPENSED | OUTPATIENT
Start: 2024-06-07

## 2024-06-07 RX ORDER — SODIUM CHLORIDE 9 MG/ML
75 INJECTION, SOLUTION INTRAVENOUS CONTINUOUS
Status: DISCONTINUED | OUTPATIENT
Start: 2024-06-07 | End: 2024-06-08

## 2024-06-07 RX ORDER — POLYETHYLENE GLYCOL 3350 17 G/17G
17 POWDER, FOR SOLUTION ORAL DAILY
Status: DISPENSED | OUTPATIENT
Start: 2024-06-07

## 2024-06-07 RX ADMIN — CEFTRIAXONE SODIUM 1 G: 1 INJECTION, SOLUTION INTRAVENOUS at 15:37

## 2024-06-07 RX ADMIN — NYSTATIN 1 APPLICATION: 100000 POWDER TOPICAL at 22:56

## 2024-06-07 RX ADMIN — ROSUVASTATIN CALCIUM 5 MG: 10 TABLET, COATED ORAL at 08:53

## 2024-06-07 RX ADMIN — PANTOPRAZOLE SODIUM 40 MG: 40 TABLET, DELAYED RELEASE ORAL at 06:37

## 2024-06-07 RX ADMIN — SODIUM CHLORIDE 75 ML/HR: 900 INJECTION, SOLUTION INTRAVENOUS at 08:03

## 2024-06-07 RX ADMIN — LEVOTHYROXINE SODIUM 75 MCG: 0.07 TABLET ORAL at 06:37

## 2024-06-07 RX ADMIN — IRON SUCROSE 200 MG: 20 INJECTION, SOLUTION INTRAVENOUS at 06:37

## 2024-06-07 RX ADMIN — Medication 2 L/MIN: at 22:00

## 2024-06-07 RX ADMIN — GABAPENTIN 300 MG: 300 CAPSULE ORAL at 08:58

## 2024-06-07 RX ADMIN — ENOXAPARIN SODIUM 40 MG: 40 INJECTION SUBCUTANEOUS at 08:50

## 2024-06-07 RX ADMIN — INSULIN LISPRO 12 UNITS: 100 INJECTION, SOLUTION INTRAVENOUS; SUBCUTANEOUS at 18:35

## 2024-06-07 RX ADMIN — FERROUS SULFATE TAB 325 MG (65 MG ELEMENTAL FE) 1 TABLET: 325 (65 FE) TAB at 08:51

## 2024-06-07 RX ADMIN — DOCUSATE SODIUM 100 MG: 100 CAPSULE, LIQUID FILLED ORAL at 22:55

## 2024-06-07 RX ADMIN — Medication 2 L/MIN: at 15:00

## 2024-06-07 RX ADMIN — SODIUM ZIRCONIUM CYCLOSILICATE 10 G: 10 POWDER, FOR SUSPENSION ORAL at 22:55

## 2024-06-07 RX ADMIN — NYSTATIN 1 APPLICATION: 100000 POWDER TOPICAL at 06:38

## 2024-06-07 RX ADMIN — POLYETHYLENE GLYCOL 3350 17 G: 17 POWDER, FOR SOLUTION ORAL at 18:40

## 2024-06-07 RX ADMIN — Medication 2 L/MIN: at 07:36

## 2024-06-07 RX ADMIN — INSULIN LISPRO 9 UNITS: 100 INJECTION, SOLUTION INTRAVENOUS; SUBCUTANEOUS at 08:51

## 2024-06-07 RX ADMIN — INSULIN HUMAN 8 UNITS: 100 INJECTION, SOLUTION PARENTERAL at 23:16

## 2024-06-07 RX ADMIN — GABAPENTIN 300 MG: 300 CAPSULE ORAL at 22:55

## 2024-06-07 RX ADMIN — INSULIN GLARGINE 35 UNITS: 100 INJECTION, SOLUTION SUBCUTANEOUS at 22:55

## 2024-06-07 RX ADMIN — SODIUM ZIRCONIUM CYCLOSILICATE 10 G: 10 POWDER, FOR SUSPENSION ORAL at 08:54

## 2024-06-07 RX ADMIN — SODIUM ZIRCONIUM CYCLOSILICATE 10 G: 10 POWDER, FOR SUSPENSION ORAL at 15:37

## 2024-06-07 RX ADMIN — Medication 1 TABLET: at 08:53

## 2024-06-07 RX ADMIN — IPRATROPIUM BROMIDE AND ALBUTEROL SULFATE 3 ML: .5; 3 SOLUTION RESPIRATORY (INHALATION) at 07:36

## 2024-06-07 RX ADMIN — INSULIN HUMAN 8 UNITS: 100 INJECTION, SOLUTION PARENTERAL at 14:28

## 2024-06-07 RX ADMIN — INSULIN LISPRO 9 UNITS: 100 INJECTION, SOLUTION INTRAVENOUS; SUBCUTANEOUS at 13:00

## 2024-06-07 RX ADMIN — KETOTIFEN FUMARATE 1 DROP: 0.25 SOLUTION/ DROPS OPHTHALMIC at 22:57

## 2024-06-07 RX ADMIN — KETOTIFEN FUMARATE 1 DROP: 0.25 SOLUTION/ DROPS OPHTHALMIC at 08:53

## 2024-06-07 RX ADMIN — IPRATROPIUM BROMIDE AND ALBUTEROL SULFATE 3 ML: .5; 3 SOLUTION RESPIRATORY (INHALATION) at 13:50

## 2024-06-07 RX ADMIN — CALCIUM GLUCONATE 1 G: 20 INJECTION, SOLUTION INTRAVENOUS at 23:31

## 2024-06-07 RX ADMIN — METOPROLOL SUCCINATE 50 MG: 50 TABLET, EXTENDED RELEASE ORAL at 08:53

## 2024-06-07 RX ADMIN — SODIUM POLYSTYRENE SULFONATE 30 G: 15 SUSPENSION ORAL; RECTAL at 23:15

## 2024-06-07 ASSESSMENT — COGNITIVE AND FUNCTIONAL STATUS - GENERAL
DRESSING REGULAR LOWER BODY CLOTHING: TOTAL
TOILETING: TOTAL
DAILY ACTIVITIY SCORE: 12
DRESSING REGULAR UPPER BODY CLOTHING: A LOT
EATING MEALS: A LITTLE
PERSONAL GROOMING: A LITTLE
HELP NEEDED FOR BATHING: A LOT

## 2024-06-07 ASSESSMENT — PAIN - FUNCTIONAL ASSESSMENT
PAIN_FUNCTIONAL_ASSESSMENT: 0-10
PAIN_FUNCTIONAL_ASSESSMENT: 0-10

## 2024-06-07 ASSESSMENT — ACTIVITIES OF DAILY LIVING (ADL)
HOME_MANAGEMENT_TIME_ENTRY: 24
BATHING_LEVEL_OF_ASSISTANCE: DEPENDENT

## 2024-06-07 ASSESSMENT — PAIN SCALES - GENERAL
PAINLEVEL_OUTOF10: 0 - NO PAIN
PAINLEVEL_OUTOF10: 8

## 2024-06-07 NOTE — PROGRESS NOTES
Brittanie Smart is a 63 y.o. female on day 4 of admission presenting with No Principal Problem: There is no principal problem currently on the Problem List. Please update the Problem List and refresh..      Subjective   Patient resting in bed comfortably eating breakfast. Denies any pain or sob.        Objective     Last Recorded Vitals  /55 (BP Location: Right arm, Patient Position: Lying)   Pulse 72   Temp 36.8 °C (98.2 °F) (Oral)   Resp 16   Wt 126 kg (277 lb 3.2 oz)   SpO2 96%   Intake/Output last 3 Shifts:  No intake or output data in the 24 hours ending 06/07/24 0954    Admission Weight  Weight: 119 kg (263 lb 4.8 oz) (06/02/24 2217)    Daily Weight  06/03/24 : 126 kg (277 lb 3.2 oz)    Image Results  XR chest 1 view  Narrative: Interpreted By:  Gerri Hsu,   STUDY:  XR CHEST 1 VIEW 6/7/2024 8:12 am      INDICATION:  Signs/Symptoms:Pleural effusions      COMPARISON:  06/03/2024      ACCESSION NUMBER(S):  [id number]      ORDERING CLINICIAN:  DEBORAH KIM      TECHNIQUE:  AP erect view of the chest at bedside      FINDINGS:  The heart is enlarged with mild pulmonary vascular congestion seen.  No focal infiltrate or focal airspace consolidation is identified.      No obvious pleural effusions are seen on the AP projection.      Impression: Mild cardiomegaly and mild pulmonary vascular congestion.      The bilateral pleural effusions seen on 06/03/2024 are not obvious on  the AP projection. PA and lateral views of the chest are recommended  when the patient's condition permits.      Signed by: Gerri Hsu 6/7/2024 9:43 AM  Dictation workstation:   [id number]      Physical Exam  Constitutional:       Appearance: She is obese. She is ill-appearing.   Cardiovascular:      Rate and Rhythm: Normal rate and regular rhythm.      Pulses: Normal pulses.      Heart sounds: Normal heart sounds.   Pulmonary:      Effort: Pulmonary effort is normal.      Breath sounds: Normal breath sounds.   Abdominal:      General:  Bowel sounds are normal.      Palpations: Abdomen is soft.   Musculoskeletal:         General: Normal range of motion.   Skin:     General: Skin is warm and dry.   Neurological:      Mental Status: She is alert and oriented to person, place, and time.         Relevant Results             Results for orders placed or performed during the hospital encounter of 06/02/24 (from the past 24 hour(s))   POCT GLUCOSE   Result Value Ref Range    POCT Glucose 307 (H) 74 - 99 mg/dL   POCT GLUCOSE   Result Value Ref Range    POCT Glucose 300 (H) 74 - 99 mg/dL   POCT GLUCOSE   Result Value Ref Range    POCT Glucose 301 (H) 74 - 99 mg/dL   CBC   Result Value Ref Range    WBC 8.1 4.4 - 11.3 x10*3/uL    nRBC 0.0 0.0 - 0.0 /100 WBCs    RBC 2.85 (L) 4.00 - 5.20 x10*6/uL    Hemoglobin 7.7 (L) 12.0 - 16.0 g/dL    Hematocrit 25.8 (L) 36.0 - 46.0 %    MCV 91 80 - 100 fL    MCH 27.0 26.0 - 34.0 pg    MCHC 29.8 (L) 32.0 - 36.0 g/dL    RDW 21.4 (H) 11.5 - 14.5 %    Platelets 148 (L) 150 - 450 x10*3/uL   Basic Metabolic Panel   Result Value Ref Range    Glucose 269 (H) 65 - 99 mg/dL    Sodium 135 133 - 145 mmol/L    Potassium 5.4 (H) 3.4 - 5.1 mmol/L    Chloride 100 97 - 107 mmol/L    Bicarbonate 26 24 - 31 mmol/L    Urea Nitrogen 65 (H) 8 - 25 mg/dL    Creatinine 2.90 (H) 0.40 - 1.60 mg/dL    eGFR 17 (L) >60 mL/min/1.73m*2    Calcium 8.4 (L) 8.5 - 10.4 mg/dL    Anion Gap 9 <=19 mmol/L   POCT GLUCOSE   Result Value Ref Range    POCT Glucose 264 (H) 74 - 99 mg/dL       Assessment/Plan                  Active Problems:    Type 2 diabetes mellitus (Multi)    Morbid obesity (Multi)    Peripheral vascular disease (CMS-HCC)    Hypertension    Pressure injury of sacral region, stage 3 (Multi)    Behavior problem, adult    Renal failure    Acute on Chronic Kidney Disease  BUN and creatinine are increasing, monitoring.  Patient states she has been on hemodialysis temporarily.  US shows a possible mass/malignancy on left kidney, CT done, urology  following  -patient states she did have a biopsy in [place] and was found to have an infection in her kidney and had 6 weeks of antibiotic, record retrieval done culture is included in the record    Unable to care for self   No indication for APS at this time; defer to . Patient is angry at her sister and brother in law because she was brought to the hospital and not her new apartment.   Insurance will likely cover discharge to SNF, arrangements are being made.     Type 2 diabetes mellitus  markedly uncontrolled blood sugar on arrival, Hbg A1c 7.5  Increased Lantus to 25 units  Hypoglycemic protocol  AC/HS blood sugars     Morbid obesity   limited ability to care for herself  Safety precautions   PT/OT to evaluate and treat     Hypertension   continue medications     Pressure injury of sacral region, stage 3 and both ankles   Wounds are healing well no sign of infection  wound care gave recommendations  Continue MediHoney     Behavior problem, adult   Unsure of previous diagnosis patient denies any  Consult to Behavior Health was done and found no needs at present     Hypothyroid   Continue her home medications, TSH elevated and free T 4 only slightly decreased will continue will same dose and recommend recheck in 6 weeks.     Anemia   on iron supplementation     Plan of Care  Patient will likely need SNF at discharge. Social Service will need to verify insurance benefits.   Plan of care discussed with patient and collaborating physician, Dr. Tobin.                    Kaylan Bird, APRN-CNP

## 2024-06-07 NOTE — PROGRESS NOTES
Brittanie Smart is a 63 y.o. female on day 4 of admission presenting with No Principal Problem: There is no principal problem currently on the Problem List. Please update the Problem List and refresh..      Subjective   Patient reports fatigue otherwise no complaints.  Her creatinine continues to slowly trend up to 2.9.  Potassium was mildly elevated at 5.4 this morning.       Objective          Vitals 24HR  Heart Rate:  [66-76]   Temp:  [36.4 °C (97.5 °F)-36.9 °C (98.4 °F)]   Resp:  [16-18]   BP: (100-143)/(44-76)   SpO2:  [93 %-100 %]       Intake/Output last 3 Shifts:    Intake/Output Summary (Last 24 hours) at 6/7/2024 1136  Last data filed at 6/7/2024 1106  Gross per 24 hour   Intake 300 ml   Output 800 ml   Net -500 ml       Physical Exam  Constitutional:       General: She is awake, alert, not in acute distress.  Cardiovascular:      Rate and Rhythm: Regular rhythm.      Heart sounds:      No friction rub.   Pulmonary:      Effort: Pulmonary effort is normal.      Breath sounds: Fairly clear breath sounds anteriorly  Abdominal:      General: Bowel sounds are normal.      Palpations: Abdomen is soft.      Tenderness: There is no guarding or rebound.   Musculoskeletal:      Comments: 1-2+ edema     Relevant Results  Results for orders placed or performed during the hospital encounter of 06/02/24 (from the past 24 hour(s))   POCT GLUCOSE   Result Value Ref Range    POCT Glucose 307 (H) 74 - 99 mg/dL   POCT GLUCOSE   Result Value Ref Range    POCT Glucose 300 (H) 74 - 99 mg/dL   POCT GLUCOSE   Result Value Ref Range    POCT Glucose 301 (H) 74 - 99 mg/dL   CBC   Result Value Ref Range    WBC 8.1 4.4 - 11.3 x10*3/uL    nRBC 0.0 0.0 - 0.0 /100 WBCs    RBC 2.85 (L) 4.00 - 5.20 x10*6/uL    Hemoglobin 7.7 (L) 12.0 - 16.0 g/dL    Hematocrit 25.8 (L) 36.0 - 46.0 %    MCV 91 80 - 100 fL    MCH 27.0 26.0 - 34.0 pg    MCHC 29.8 (L) 32.0 - 36.0 g/dL    RDW 21.4 (H) 11.5 - 14.5 %    Platelets 148 (L) 150 - 450 x10*3/uL   Basic  Metabolic Panel   Result Value Ref Range    Glucose 269 (H) 65 - 99 mg/dL    Sodium 135 133 - 145 mmol/L    Potassium 5.4 (H) 3.4 - 5.1 mmol/L    Chloride 100 97 - 107 mmol/L    Bicarbonate 26 24 - 31 mmol/L    Urea Nitrogen 65 (H) 8 - 25 mg/dL    Creatinine 2.90 (H) 0.40 - 1.60 mg/dL    eGFR 17 (L) >60 mL/min/1.73m*2    Calcium 8.4 (L) 8.5 - 10.4 mg/dL    Anion Gap 9 <=19 mmol/L   POCT GLUCOSE   Result Value Ref Range    POCT Glucose 264 (H) 74 - 99 mg/dL   POCT GLUCOSE   Result Value Ref Range    POCT Glucose 302 (H) 74 - 99 mg/dL            Assessment/Plan   Acute kidney injury on chronic kidney disease stage III   - Received records from [facility], appears her baseline creatinine is around 1.3 and she does have acute kidney injury now with rising Cr, may have ATN   Diabetes mellitus type 2  Hypertension  Anemia   UTI  Generalized weakness  Renal Mass  Diastolic CHF  Hyperkalemia, mild     Plan:  I will start gentle IV fluid hydration, continue to hold ARB, holding Lasix.  Pleural effusions are improved on the chest x-ray notably.  Discussed with the nurse to monitor respiratory status closely while on IV fluids and to let me know if she develops any worsening respiratory status and also will need strict I/O's, discussed with nurse as well.  No indications for dialysis now but will monitor closely.  She is being treated for UTI. Stopped Norvasc due to peripheral edema and instead substituted Cardura 1 mg once a night.  GI consult for abnormal abdominal ultrasound findings.  Will also need to rule out GI bleed.  Urology consulted. Dose medications for GFR.  Continue IV venofer.  Started on Lokelma, low potassium diet and will check a repeat renal function panel at noon.    Siddhartha Tovar MD

## 2024-06-07 NOTE — CONSULTS
Reason For Consult  Abnormal ultrasound    History Of Present Illness  Brittanie Smart is a 63 y.o. female presenting with failure to thrive.  He was being treated at a hospital in [place] and was recently moved to [place].  We are consulted for abnormal right upper quadrant ultrasound    Patient denies any abdominal pain, nausea/vomiting, meta emesis, melena, dysphagia, weight loss, or hematemesis    Labs show elevated alk phos of 186, albumin of 2.9, remainder of liver enzymes are normal.  No PT/INR.     Ultrasound RUQ showed abnormal appearance of the liver consistent with possible cirrhosis.  Also noted splenomegaly with portal venous hypertension.  There is also evidence of cholelithiasis with gallbladder wall thickening.      She reports having 2 previous colonoscopies, last one was done approximately 3 years ago in [place].  Never had an EGD    She denies any current alcohol use, was a heavy drinker in her 20s.  No tobacco or marijuana.  Occasional NSAIDs  Past Medical History  She has a past medical history of Anemia, Behavior problem, adult, Decubitus ulcer, Depression, Diabetes mellitus (Multi), GERD (gastroesophageal reflux disease), Hemorrhoids, Hyperlipidemia, Hypertension, Hypothyroid, Kidney disease, Morbid obesity (Multi), and Peripheral vascular disease (CMS-HCC).    Surgical History  She has no past surgical history on file.     Social History  She reports that she has never smoked. She has never used smokeless tobacco. She reports that she does not currently use alcohol. She reports that she does not use drugs.    Family History  Family History   Problem Relation Name Age of Onset    COPD Mother      Heart disease Father          Allergies  Patient has no known allergies.    Review of Systems  HPI     Physical Exam  Neuro: AOX3, appropriate affect, symmetric facial movements  Cardiac: S1/S2 equal, RRR, no clicks/gallops/murmurs  Resp: Lung sounds clear, no adventigous breath sounds auscultated  GI:  "abdomen is soft, nontender, no guarding or rebound tenderness  : no flank pain, no hematuria, or dysuria  M/S: normal strength, active range of motion  Skin: normal skin tone and color, no ertyhema       Last Recorded Vitals  Blood pressure 143/76, pulse 76, temperature 36.6 °C (97.9 °F), temperature source Oral, resp. rate 16, height 1.727 m (5' 7.99\"), weight 126 kg (277 lb 3.2 oz), SpO2 97%.    Relevant Results  Results for orders placed or performed during the hospital encounter of 06/02/24 (from the past 24 hour(s))   POCT GLUCOSE   Result Value Ref Range    POCT Glucose 300 (H) 74 - 99 mg/dL   POCT GLUCOSE   Result Value Ref Range    POCT Glucose 301 (H) 74 - 99 mg/dL   CBC   Result Value Ref Range    WBC 8.1 4.4 - 11.3 x10*3/uL    nRBC 0.0 0.0 - 0.0 /100 WBCs    RBC 2.85 (L) 4.00 - 5.20 x10*6/uL    Hemoglobin 7.7 (L) 12.0 - 16.0 g/dL    Hematocrit 25.8 (L) 36.0 - 46.0 %    MCV 91 80 - 100 fL    MCH 27.0 26.0 - 34.0 pg    MCHC 29.8 (L) 32.0 - 36.0 g/dL    RDW 21.4 (H) 11.5 - 14.5 %    Platelets 148 (L) 150 - 450 x10*3/uL   Basic Metabolic Panel   Result Value Ref Range    Glucose 269 (H) 65 - 99 mg/dL    Sodium 135 133 - 145 mmol/L    Potassium 5.4 (H) 3.4 - 5.1 mmol/L    Chloride 100 97 - 107 mmol/L    Bicarbonate 26 24 - 31 mmol/L    Urea Nitrogen 65 (H) 8 - 25 mg/dL    Creatinine 2.90 (H) 0.40 - 1.60 mg/dL    eGFR 17 (L) >60 mL/min/1.73m*2    Calcium 8.4 (L) 8.5 - 10.4 mg/dL    Anion Gap 9 <=19 mmol/L   POCT GLUCOSE   Result Value Ref Range    POCT Glucose 264 (H) 74 - 99 mg/dL   POCT GLUCOSE   Result Value Ref Range    POCT Glucose 302 (H) 74 - 99 mg/dL   Renal Function Panel   Result Value Ref Range    Glucose 296 (H) 65 - 99 mg/dL    Sodium 134 133 - 145 mmol/L    Potassium 5.7 (H) 3.4 - 5.1 mmol/L    Chloride 98 97 - 107 mmol/L    Bicarbonate 27 24 - 31 mmol/L    Urea Nitrogen 65 (H) 8 - 25 mg/dL    Creatinine 2.90 (H) 0.40 - 1.60 mg/dL    eGFR 17 (L) >60 mL/min/1.73m*2    Calcium 8.4 (L) 8.5 - 10.4 " mg/dL    Phosphorus 3.8 2.5 - 4.5 mg/dL    Albumin 2.9 (L) 3.5 - 5.0 g/dL    Anion Gap 9 <=19 mmol/L         Assessment/Plan     Abnormal ultrasound   -Findings are concerning for suspected [place] cirrhosis.  Will obtain FibroScan to assess degree of fibrosis and steatosis.  Will add on acute hep panel, AFP, PT/INR, GGT and isoenzymes.    -Can also consider HIDA scan without ejection fraction if patient develops abdominal pain, currently asymptomatic at this time.     Discussed with Dr. Burch will continue to monitor      ACE Gusman-CNP

## 2024-06-07 NOTE — PROGRESS NOTES
06/07/24 0921   Discharge Planning   Patient expects to be discharged to: [facility] received pre-cert but are awaiting one time contract for the patient to enter their facility. Medical team and patient updated.   Does the patient need discharge transport arranged? Yes   RoundTrip coordination needed? Yes   Has discharge transport been arranged? No

## 2024-06-07 NOTE — PROGRESS NOTES
Occupational Therapy Treatment    Name: Brittanie Smart  MRN: [id number]  : 1960  Date: 24  Time Calculation  Start Time: 1320  Stop Time: 1344  Time Calculation (min): 24 min    Assessment:  Barriers to Discharge: Decreased caregiver support, Other (Comment) (2-3 person assist for transfers)  Medical Staff Made Aware: Yes  End of Session Communication: PCT/NA/CTA  End of Session Patient Position: Bed, 3 rail up, Alarm off, not on at start of session    Plan:  Treatment Interventions: ADL retraining, Functional transfer training, Cognitive reorientation, Patient/family training, Compensatory technique education  OT Frequency: 3 times per week  OT Discharge Recommendations: Moderate intensity level of continued care  Equipment Recommended upon Discharge: Wheeled walker  OT Recommended Transfer Status: Assist of 2 (2-3 (fluctuates))  OT - OK to Discharge: Yes        Subjective     Previous Visit Info:  OT Last Visit  OT Received On: 24    General:  General  Reason for Referral: Impaired ADLs  Past Medical History Relevant to Rehab: Behavior problem, adult      Decubitus ulcer     Depression     GERD (gastroesophageal reflux disease)     Hemorrhoids     Hyperlipidemia     Hypertension     Hypothyroid     Morbid obesity (Multi)     Peripheral vascular disease (CMS-[facility])  Family/Caregiver Present: No  Prior to Session Communication: Bedside nurse, PCT/NA/CTA  Patient Position Received: Bed, 2 rail up, Alarm off, caregiver present  Preferred Learning Style: verbal, visual  General Comment: Pt cleared by nursing for therapy and agreeable to same.    Precautions:  Medical Precautions: Fall precautions    Pain Assessment:  Pain Assessment  Pain Assessment: 0-10  Pain Score: 8  Pain Location: Breast  Pain Orientation: Right, Left (d/t excoriation)       Objective     Activities of Daily Living: Grooming  Grooming Level of Assistance: Setup  Grooming Where Assessed: Bed level  Grooming Comments: oral care    UE  Bathing  UE Bathing Level of Assistance: Minimum assistance  UE Bathing Where Assessed: Bed level  UE Bathing Comments: 2 person assist to wash under abdominal folds while third person (nurse) applied powder    LE Bathing  LE Bathing Level of Assistance: Dependent    LE Dressing  LE Dressing: Yes  Sock Level of Assistance: Dependent  LE Dressing Where Assessed: Bed level  LE Dressing Comments: donning/doffing    Bed Mobility/Transfers: Bed Mobility 1  Bed Mobility 1: Rolling right, Rolling left  Level of Assistance 1: Maximum assistance, +2    Outcome Measures:  [facility] Daily Activity  Putting on and taking off regular lower body clothing: Total  Bathing (including washing, rinsing, drying): A lot  Putting on and taking off regular upper body clothing: A lot  Toileting, which includes using toilet, bedpan or urinal: Total  Taking care of personal grooming such as brushing teeth: A little  Eating Meals: A little  Daily Activity - Total Score: 12      Education Documentation  ADL Training, taught by Stacy Mcclure OT at 6/7/2024  2:11 PM.  Learner: Patient  Readiness: Acceptance  Method: Explanation  Response: Verbalizes Understanding, Needs Reinforcement  Comment: ADL and functional transfer retraining utilizing compensatory techniques in order to maximize functional capacity    Goals:  Encounter Problems       Encounter Problems (Active)       OT Goals       Patient will be able to complete UB/LB dressing, bathing, grooming with modified independence using good safety and AE as needed. (Progressing)       Start:  06/03/24    Expected End:  06/17/24            Patient will be able to complete toileting tasks with Close Supervision using good safety and with G balance.  (Progressing)       Start:  06/03/24    Expected End:  06/17/24            Patient will be able to complete functional transfers/mobility with the walker with Close Supervision with G balance using good safety.  (Progressing)       Start:  06/03/24     Expected End:  06/17/24            Patient will be able to tolerate 7-8min of functional standing with G balance in prep for ADL/transfers. (Progressing)       Start:  06/03/24    Expected End:  06/17/24

## 2024-06-07 NOTE — PROGRESS NOTES
"Nutrition Follow up Note    Nutrition Assessment      Plan for discharge to [facility]    Nutrition History:  Food and Nutrient History: pt continues to eat well.  Energy Intake: Good > 75 %    Anthropometrics:  Ht: 172.7 cm (5' 7.99\"), Wt: 126 kg (277 lb 3.2 oz), BMI: 42.16  IBW/kg (Dietitian Calculated): 63.64 kg  Percent of IBW: 198 %  Adjusted Body Weight (kg): 79.09 kg    Weight Change:  Daily Weight  06/03/24 : 126 kg (277 lb 3.2 oz)    Nutrition Focused Physical Exam Findings:   Subcutaneous Fat Loss  Orbital Fat Pads: Well nourished (slightly bulging fat pads)  Buccal Fat Pads: Well nourished (full, rounded cheeks)  Triceps: Well nourished (ample fat tissue)    Muscle Wasting  Temporalis: Well nourished (well-defined muscle)  Pectoralis (Clavicular Region): Well nourished (clavicle not visible)  Deltoid/Trapezius: Well nourished (rounded appearance at arm, shoulder, neck)    Physical Findings (Nutrition Deficiency/Toxicity)  Skin: Positive (pressure injury to R heel and sacrum, multiple BLE wounds - see ET RN assessment)    Nutrition Significant Labs:  Lab Results   Component Value Date    WBC 8.1 06/07/2024    HGB 7.7 (L) 06/07/2024    HCT 25.8 (L) 06/07/2024     (L) 06/07/2024    ALT 8 06/06/2024    AST 12 06/06/2024     06/07/2024    K 5.4 (H) 06/07/2024     06/07/2024    CREATININE 2.90 (H) 06/07/2024    BUN 65 (H) 06/07/2024    CO2 26 06/07/2024    TSH 7.13 (H) 06/02/2024    HGBA1C 7.5 (H) 06/02/2024     Nutrition Specific Medications:  cefTRIAXone, 1 g, intravenous, q24h  doxazosin, 1 mg, oral, Daily  enoxaparin, 40 mg, subcutaneous, Daily  ferrous sulfate (325 mg ferrous sulfate), 1 tablet, oral, Daily with breakfast  gabapentin, 300 mg, oral, BID  insulin glargine, 20 Units, subcutaneous, Nightly  insulin lispro, 0-15 Units, subcutaneous, TID after meals  ipratropium-albuteroL, 3 mL, nebulization, TID  iron sucrose, 200 mg, intravenous, Daily  ketotifen, 1 drop, Both Eyes, " BID  levothyroxine, 75 mcg, oral, Daily  [Held by provider] losartan, 50 mg, oral, Daily  metoprolol succinate XL, 50 mg, oral, Daily  multivitamin with minerals, 1 tablet, oral, Daily  nystatin, 1 Application, Topical, q8h  oxygen, , inhalation, q8h  pantoprazole, 40 mg, oral, Daily before breakfast  perflutren protein A microsphere, 0.5 mL, intravenous, Once in imaging  rosuvastatin, 5 mg, oral, Daily  sodium zirconium cyclosilicate, 10 g, oral, TID  sulfur hexafluoride microsphr, 2 mL, intravenous, Once in imaging      sodium chloride 0.9%, 75 mL/hr, Last Rate: 75 mL/hr (06/07/24 0803)      Dietary Orders (From admission, onward)       Start     Ordered    06/07/24 0740  Adult diet Carb Controlled, Cardiac, Renal; 90 gram carb/meal, 60 gram Carb evening snack; 70 gm fat; 2 - 3 grams Sodium; Potassium Restricted 2 gm (50mEq)  Diet effective now        Question Answer Comment   Diet type Carb Controlled    Diet type Cardiac    Diet type Renal    Carb diet selection: 90 gram carb/meal, 60 gram Carb evening snack    Fat restriction: 70 gm fat    Sodium restriction: 2 - 3 grams Sodium    Potassium restriction: Potassium Restricted 2 gm (50mEq)        06/07/24 0742    06/03/24 1344  Oral nutritional supplements  Until discontinued        Comments: Unflavored in applesauce   Question Answer Comment   Deliver with Breakfast    Select supplement: Davi        06/03/24 1344                  Estimated Needs:   Estimated Energy Needs  Total Energy Estimated Needs (kCal): 2376 kCal  Total Estimated Energy Need per Day (kCal/kg): 30 kCal/kg  Method for Estimating Needs: adj wt    Estimated Protein Needs  Total Protein Estimated Needs (g): 99 g  Total Protein Estimated Needs (g/kg): 1.25 g/kg  Method for Estimating Needs: adj wt    Estimated Fluid Needs  Total Fluid Estimated Needs (mL): 2376 mL  Total Fluid Estimated Needs (mL/kg): 30 mL/kg  Method for Estimating Needs: adj wt        Nutrition Diagnosis   Nutrition  Diagnosis:  Malnutrition Diagnosis  Patient has Malnutrition Diagnosis: No    Nutrition Diagnosis  Patient has Nutrition Diagnosis: Yes  Diagnosis Status (1): Ongoing  Nutrition Diagnosis 1: Increased nutrient needs  Related to (1): increased demand for nutrients  As Evidenced by (1): pressure injury       Nutrition Interventions/Recommendations   Nutrition Interventions and Recommendations:    Nutrition Prescription:  Individualized Nutrition Prescription Provided for : 2376 kcals and 99g protein to be provided via diet and supplements    Nutrition Interventions:   Food and/or Nutrient Delivery Interventions  Interventions: Meals and snacks, Medical food supplement  Meals and Snacks: Carbohydrate-modified diet, Mineral-modified diet  Goal: provide as ordered  Medical Food Supplement: Commercial beverage  Goal: unflavored mecca mixed in applesauce BID to aid in wound healing    Education Documentation  No documentation found.           Nutrition Monitoring and Evaluation   Monitoring/Evaluation:   Food/Nutrient Related History Monitoring  Monitoring and Evaluation Plan: Energy intake  Energy Intake: Estimated energy intake  Criteria: pt to consume >/= 75% estimated needs    Nutrition Focused Physical Findings  Monitoring and Evaluation Plan: Skin  Skin: Impaired wound healing  Criteria: pt will show signs of improvement in skin integrity       Time Spent/Follow-up:   Follow Up  Time Spent (min): 20 minutes  Last Date of Nutrition Visit: 06/07/24  Nutrition Follow-Up Needed?: 5-7 days  Follow up Comment: 6/12/24

## 2024-06-07 NOTE — PROGRESS NOTES
Physical Therapy                 Therapy Communication Note    Patient Name: Brittanie Smart  MRN: [id number]  Today's Date: 6/7/2024     Discipline: Physical Therapy    Missed Visit Reason:      Missed Time: Cancel    Comment:Potassium 5.7, ns recommends hold PT.

## 2024-06-07 NOTE — NURSING NOTE
Dr ROCIO Tovar into see pt he wants to be notified if pt becomes short of breath also accurate intake /output.

## 2024-06-08 LAB
A1AT SERPL NEPH-MCNC: 224 MG/DL (ref 84–218)
AFP SERPL-MCNC: <4 NG/ML (ref 0–9)
ALBUMIN SERPL-MCNC: 2.7 G/DL (ref 3.5–5)
ALBUMIN SERPL-MCNC: 2.8 G/DL (ref 3.5–5)
ALBUMIN SERPL-MCNC: 2.8 G/DL (ref 3.5–5)
ALBUMIN SERPL-MCNC: 2.9 G/DL (ref 3.5–5)
ANION GAP SERPL CALC-SCNC: 10 MMOL/L
ANION GAP SERPL CALC-SCNC: 7 MMOL/L
ANION GAP SERPL CALC-SCNC: 8 MMOL/L
ANION GAP SERPL CALC-SCNC: 9 MMOL/L
BUN SERPL-MCNC: 64 MG/DL (ref 8–25)
BUN SERPL-MCNC: 65 MG/DL (ref 8–25)
BUN SERPL-MCNC: 65 MG/DL (ref 8–25)
BUN SERPL-MCNC: 68 MG/DL (ref 8–25)
CALCIUM SERPL-MCNC: 8.4 MG/DL (ref 8.5–10.4)
CALCIUM SERPL-MCNC: 8.5 MG/DL (ref 8.5–10.4)
CALCIUM SERPL-MCNC: 8.5 MG/DL (ref 8.5–10.4)
CALCIUM SERPL-MCNC: 8.6 MG/DL (ref 8.5–10.4)
CHLORIDE SERPL-SCNC: 100 MMOL/L (ref 97–107)
CHLORIDE SERPL-SCNC: 101 MMOL/L (ref 97–107)
CHLORIDE SERPL-SCNC: 102 MMOL/L (ref 97–107)
CHLORIDE SERPL-SCNC: 99 MMOL/L (ref 97–107)
CO2 SERPL-SCNC: 26 MMOL/L (ref 24–31)
CO2 SERPL-SCNC: 26 MMOL/L (ref 24–31)
CO2 SERPL-SCNC: 28 MMOL/L (ref 24–31)
CO2 SERPL-SCNC: 28 MMOL/L (ref 24–31)
CREAT SERPL-MCNC: 3 MG/DL (ref 0.4–1.6)
CREAT SERPL-MCNC: 3.1 MG/DL (ref 0.4–1.6)
EGFRCR SERPLBLD CKD-EPI 2021: 16 ML/MIN/1.73M*2
EGFRCR SERPLBLD CKD-EPI 2021: 17 ML/MIN/1.73M*2
ERYTHROCYTE [DISTWIDTH] IN BLOOD BY AUTOMATED COUNT: 22.1 % (ref 11.5–14.5)
GLUCOSE BLD MANUAL STRIP-MCNC: 166 MG/DL (ref 74–99)
GLUCOSE BLD MANUAL STRIP-MCNC: 169 MG/DL (ref 74–99)
GLUCOSE BLD MANUAL STRIP-MCNC: 170 MG/DL (ref 74–99)
GLUCOSE BLD MANUAL STRIP-MCNC: 191 MG/DL (ref 74–99)
GLUCOSE BLD MANUAL STRIP-MCNC: 191 MG/DL (ref 74–99)
GLUCOSE SERPL-MCNC: 168 MG/DL (ref 65–99)
GLUCOSE SERPL-MCNC: 170 MG/DL (ref 65–99)
GLUCOSE SERPL-MCNC: 191 MG/DL (ref 65–99)
GLUCOSE SERPL-MCNC: 249 MG/DL (ref 65–99)
HAV IGM SER QL: NONREACTIVE
HBV CORE IGM SER QL: NONREACTIVE
HBV SURFACE AG SERPL QL IA: NONREACTIVE
HCT VFR BLD AUTO: 24.8 % (ref 36–46)
HCV AB SER QL: NONREACTIVE
HGB BLD-MCNC: 7.4 G/DL (ref 12–16)
MCH RBC QN AUTO: 27 PG (ref 26–34)
MCHC RBC AUTO-ENTMCNC: 29.8 G/DL (ref 32–36)
MCV RBC AUTO: 91 FL (ref 80–100)
NRBC BLD-RTO: 0.2 /100 WBCS (ref 0–0)
PHOSPHATE SERPL-MCNC: 4.2 MG/DL (ref 2.5–4.5)
PHOSPHATE SERPL-MCNC: 4.3 MG/DL (ref 2.5–4.5)
PLATELET # BLD AUTO: 151 X10*3/UL (ref 150–450)
POTASSIUM SERPL-SCNC: 5.3 MMOL/L (ref 3.4–5.1)
POTASSIUM SERPL-SCNC: 5.4 MMOL/L (ref 3.4–5.1)
POTASSIUM SERPL-SCNC: 5.5 MMOL/L (ref 3.4–5.1)
POTASSIUM SERPL-SCNC: 5.7 MMOL/L (ref 3.4–5.1)
RBC # BLD AUTO: 2.74 X10*6/UL (ref 4–5.2)
SODIUM SERPL-SCNC: 135 MMOL/L (ref 133–145)
SODIUM SERPL-SCNC: 136 MMOL/L (ref 133–145)
SODIUM SERPL-SCNC: 136 MMOL/L (ref 133–145)
SODIUM SERPL-SCNC: 137 MMOL/L (ref 133–145)
WBC # BLD AUTO: 8.2 X10*3/UL (ref 4.4–11.3)

## 2024-06-08 PROCEDURE — 83615 LACTATE (LD) (LDH) ENZYME: CPT | Performed by: INTERNAL MEDICINE

## 2024-06-08 PROCEDURE — 2500000001 HC RX 250 WO HCPCS SELF ADMINISTERED DRUGS (ALT 637 FOR MEDICARE OP): Performed by: NURSE PRACTITIONER

## 2024-06-08 PROCEDURE — 2500000001 HC RX 250 WO HCPCS SELF ADMINISTERED DRUGS (ALT 637 FOR MEDICARE OP): Performed by: EMERGENCY MEDICINE

## 2024-06-08 PROCEDURE — 2500000005 HC RX 250 GENERAL PHARMACY W/O HCPCS: Performed by: EMERGENCY MEDICINE

## 2024-06-08 PROCEDURE — 2500000002 HC RX 250 W HCPCS SELF ADMINISTERED DRUGS (ALT 637 FOR MEDICARE OP, ALT 636 FOR OP/ED): Performed by: INTERNAL MEDICINE

## 2024-06-08 PROCEDURE — 80069 RENAL FUNCTION PANEL: CPT | Performed by: INTERNAL MEDICINE

## 2024-06-08 PROCEDURE — 2500000002 HC RX 250 W HCPCS SELF ADMINISTERED DRUGS (ALT 637 FOR MEDICARE OP, ALT 636 FOR OP/ED): Performed by: NURSE PRACTITIONER

## 2024-06-08 PROCEDURE — 2500000004 HC RX 250 GENERAL PHARMACY W/ HCPCS (ALT 636 FOR OP/ED): Performed by: NURSE PRACTITIONER

## 2024-06-08 PROCEDURE — 1100000001 HC PRIVATE ROOM DAILY

## 2024-06-08 PROCEDURE — 36415 COLL VENOUS BLD VENIPUNCTURE: CPT | Performed by: INTERNAL MEDICINE

## 2024-06-08 PROCEDURE — 86160 COMPLEMENT ANTIGEN: CPT | Mod: TRILAB,WESLAB | Performed by: INTERNAL MEDICINE

## 2024-06-08 PROCEDURE — 2500000004 HC RX 250 GENERAL PHARMACY W/ HCPCS (ALT 636 FOR OP/ED): Performed by: INTERNAL MEDICINE

## 2024-06-08 PROCEDURE — 82947 ASSAY GLUCOSE BLOOD QUANT: CPT

## 2024-06-08 PROCEDURE — 9420000001 HC RT PATIENT EDUCATION 5 MIN

## 2024-06-08 PROCEDURE — 83010 ASSAY OF HAPTOGLOBIN QUANT: CPT | Performed by: INTERNAL MEDICINE

## 2024-06-08 PROCEDURE — 36415 COLL VENOUS BLD VENIPUNCTURE: CPT | Performed by: NURSE PRACTITIONER

## 2024-06-08 PROCEDURE — 94640 AIRWAY INHALATION TREATMENT: CPT

## 2024-06-08 PROCEDURE — 2500000001 HC RX 250 WO HCPCS SELF ADMINISTERED DRUGS (ALT 637 FOR MEDICARE OP): Performed by: INTERNAL MEDICINE

## 2024-06-08 PROCEDURE — 85027 COMPLETE CBC AUTOMATED: CPT | Performed by: NURSE PRACTITIONER

## 2024-06-08 PROCEDURE — 94760 N-INVAS EAR/PLS OXIMETRY 1: CPT

## 2024-06-08 PROCEDURE — 2500000005 HC RX 250 GENERAL PHARMACY W/O HCPCS: Performed by: INTERNAL MEDICINE

## 2024-06-08 PROCEDURE — 2500000002 HC RX 250 W HCPCS SELF ADMINISTERED DRUGS (ALT 637 FOR MEDICARE OP, ALT 636 FOR OP/ED): Performed by: EMERGENCY MEDICINE

## 2024-06-08 PROCEDURE — 86036 ANCA SCREEN EACH ANTIBODY: CPT | Performed by: INTERNAL MEDICINE

## 2024-06-08 RX ORDER — INSULIN LISPRO 100 [IU]/ML
10 INJECTION, SOLUTION INTRAVENOUS; SUBCUTANEOUS ONCE
Status: COMPLETED | OUTPATIENT
Start: 2024-06-08 | End: 2024-06-08

## 2024-06-08 RX ORDER — INSULIN LISPRO 100 [IU]/ML
10 INJECTION, SOLUTION INTRAVENOUS; SUBCUTANEOUS
Status: DISPENSED | OUTPATIENT
Start: 2024-06-08

## 2024-06-08 RX ORDER — HYDROXYZINE PAMOATE 25 MG/1
25 CAPSULE ORAL EVERY 6 HOURS PRN
Status: DISPENSED | OUTPATIENT
Start: 2024-06-08

## 2024-06-08 RX ORDER — DEXTROSE 50 % IN WATER (D50W) INTRAVENOUS SYRINGE
25 ONCE
Status: COMPLETED | OUTPATIENT
Start: 2024-06-08 | End: 2024-06-08

## 2024-06-08 RX ORDER — SODIUM POLYSTYRENE SULFONATE 15 G/60ML
30 SUSPENSION ORAL; RECTAL ONCE
Status: COMPLETED | OUTPATIENT
Start: 2024-06-08 | End: 2024-06-08

## 2024-06-08 RX ADMIN — IPRATROPIUM BROMIDE AND ALBUTEROL SULFATE 3 ML: .5; 3 SOLUTION RESPIRATORY (INHALATION) at 12:25

## 2024-06-08 RX ADMIN — INSULIN GLARGINE 35 UNITS: 100 INJECTION, SOLUTION SUBCUTANEOUS at 21:42

## 2024-06-08 RX ADMIN — Medication 4 L/MIN: at 23:00

## 2024-06-08 RX ADMIN — SODIUM ZIRCONIUM CYCLOSILICATE 10 G: 10 POWDER, FOR SUSPENSION ORAL at 21:42

## 2024-06-08 RX ADMIN — GABAPENTIN 300 MG: 300 CAPSULE ORAL at 21:42

## 2024-06-08 RX ADMIN — INSULIN LISPRO 10 UNITS: 100 INJECTION, SOLUTION INTRAVENOUS; SUBCUTANEOUS at 12:01

## 2024-06-08 RX ADMIN — INSULIN LISPRO 4 UNITS: 100 INJECTION, SOLUTION INTRAVENOUS; SUBCUTANEOUS at 08:26

## 2024-06-08 RX ADMIN — Medication 1 TABLET: at 08:26

## 2024-06-08 RX ADMIN — INSULIN LISPRO 10 UNITS: 100 INJECTION, SOLUTION INTRAVENOUS; SUBCUTANEOUS at 16:54

## 2024-06-08 RX ADMIN — POLYETHYLENE GLYCOL 3350 17 G: 17 POWDER, FOR SOLUTION ORAL at 08:26

## 2024-06-08 RX ADMIN — GABAPENTIN 300 MG: 300 CAPSULE ORAL at 08:26

## 2024-06-08 RX ADMIN — NYSTATIN 1 APPLICATION: 100000 POWDER TOPICAL at 21:42

## 2024-06-08 RX ADMIN — NYSTATIN 1 APPLICATION: 100000 POWDER TOPICAL at 06:32

## 2024-06-08 RX ADMIN — FERROUS SULFATE TAB 325 MG (65 MG ELEMENTAL FE) 1 TABLET: 325 (65 FE) TAB at 08:26

## 2024-06-08 RX ADMIN — ENOXAPARIN SODIUM 40 MG: 40 INJECTION SUBCUTANEOUS at 08:26

## 2024-06-08 RX ADMIN — SODIUM ZIRCONIUM CYCLOSILICATE 10 G: 10 POWDER, FOR SUSPENSION ORAL at 15:18

## 2024-06-08 RX ADMIN — LEVOTHYROXINE SODIUM 75 MCG: 0.07 TABLET ORAL at 06:28

## 2024-06-08 RX ADMIN — Medication 2 L/MIN: at 07:00

## 2024-06-08 RX ADMIN — INSULIN LISPRO 10 UNITS: 100 INJECTION, SOLUTION INTRAVENOUS; SUBCUTANEOUS at 09:14

## 2024-06-08 RX ADMIN — DOCUSATE SODIUM 100 MG: 100 CAPSULE, LIQUID FILLED ORAL at 08:26

## 2024-06-08 RX ADMIN — HYDROXYZINE PAMOATE 25 MG: 25 CAPSULE ORAL at 21:42

## 2024-06-08 RX ADMIN — SODIUM CHLORIDE 75 ML/HR: 900 INJECTION, SOLUTION INTRAVENOUS at 00:14

## 2024-06-08 RX ADMIN — SODIUM ZIRCONIUM CYCLOSILICATE 10 G: 10 POWDER, FOR SUSPENSION ORAL at 08:25

## 2024-06-08 RX ADMIN — IRON SUCROSE 200 MG: 20 INJECTION, SOLUTION INTRAVENOUS at 06:28

## 2024-06-08 RX ADMIN — ROSUVASTATIN CALCIUM 5 MG: 10 TABLET, COATED ORAL at 08:26

## 2024-06-08 RX ADMIN — INSULIN LISPRO 4 UNITS: 100 INJECTION, SOLUTION INTRAVENOUS; SUBCUTANEOUS at 12:00

## 2024-06-08 RX ADMIN — Medication 25 G: at 13:47

## 2024-06-08 RX ADMIN — KETOTIFEN FUMARATE 1 DROP: 0.25 SOLUTION/ DROPS OPHTHALMIC at 09:00

## 2024-06-08 RX ADMIN — KETOTIFEN FUMARATE 1 DROP: 0.25 SOLUTION/ DROPS OPHTHALMIC at 21:45

## 2024-06-08 RX ADMIN — DOXAZOSIN 1 MG: 1 TABLET ORAL at 09:14

## 2024-06-08 RX ADMIN — Medication 4 L/MIN: at 15:00

## 2024-06-08 RX ADMIN — INSULIN HUMAN 10 UNITS: 100 INJECTION, SOLUTION PARENTERAL at 13:46

## 2024-06-08 RX ADMIN — SODIUM POLYSTYRENE SULFONATE 30 G: 15 SUSPENSION ORAL; RECTAL at 13:47

## 2024-06-08 RX ADMIN — HYDROXYZINE PAMOATE 25 MG: 25 CAPSULE ORAL at 15:18

## 2024-06-08 RX ADMIN — ALBUTEROL SULFATE 10 MG: 2.5 SOLUTION RESPIRATORY (INHALATION) at 00:25

## 2024-06-08 RX ADMIN — CEFTRIAXONE SODIUM 1 G: 1 INJECTION, SOLUTION INTRAVENOUS at 16:17

## 2024-06-08 RX ADMIN — INSULIN LISPRO 4 UNITS: 100 INJECTION, SOLUTION INTRAVENOUS; SUBCUTANEOUS at 16:53

## 2024-06-08 RX ADMIN — IPRATROPIUM BROMIDE AND ALBUTEROL SULFATE 3 ML: .5; 3 SOLUTION RESPIRATORY (INHALATION) at 06:50

## 2024-06-08 RX ADMIN — PANTOPRAZOLE SODIUM 40 MG: 40 TABLET, DELAYED RELEASE ORAL at 06:28

## 2024-06-08 ASSESSMENT — COGNITIVE AND FUNCTIONAL STATUS - GENERAL
CLIMB 3 TO 5 STEPS WITH RAILING: TOTAL
TOILETING: TOTAL
STANDING UP FROM CHAIR USING ARMS: TOTAL
TURNING FROM BACK TO SIDE WHILE IN FLAT BAD: A LOT
WALKING IN HOSPITAL ROOM: TOTAL
PERSONAL GROOMING: A LITTLE
DRESSING REGULAR LOWER BODY CLOTHING: TOTAL
MOVING TO AND FROM BED TO CHAIR: TOTAL
MOBILITY SCORE: 8
HELP NEEDED FOR BATHING: A LOT
DRESSING REGULAR LOWER BODY CLOTHING: TOTAL
DRESSING REGULAR UPPER BODY CLOTHING: A LOT
WALKING IN HOSPITAL ROOM: TOTAL
MOVING TO AND FROM BED TO CHAIR: TOTAL
CLIMB 3 TO 5 STEPS WITH RAILING: TOTAL
DAILY ACTIVITIY SCORE: 12
MOBILITY SCORE: 8
TURNING FROM BACK TO SIDE WHILE IN FLAT BAD: A LOT
STANDING UP FROM CHAIR USING ARMS: TOTAL
MOVING FROM LYING ON BACK TO SITTING ON SIDE OF FLAT BED WITH BEDRAILS: A LOT
MOVING FROM LYING ON BACK TO SITTING ON SIDE OF FLAT BED WITH BEDRAILS: A LOT
EATING MEALS: A LITTLE

## 2024-06-08 ASSESSMENT — PAIN - FUNCTIONAL ASSESSMENT: PAIN_FUNCTIONAL_ASSESSMENT: 0-10

## 2024-06-08 ASSESSMENT — PAIN SCALES - GENERAL
PAINLEVEL_OUTOF10: 0 - NO PAIN

## 2024-06-08 NOTE — PROGRESS NOTES
"Brittanie Smart is a 63 y.o. female on day 5 of admission presenting with No Principal Problem: There is no principal problem currently on the Problem List. Please update the Problem List and refresh..    Subjective   Doing okay.  No acute events overnight.  Had 2 bowel movements since yesterday       Objective     Physical Exam  Vitals reviewed.   Constitutional:       General: She is not in acute distress.  HENT:      Head: Normocephalic.   Eyes:      Pupils: Pupils are equal, round, and reactive to light.   Cardiovascular:      Rate and Rhythm: Normal rate and regular rhythm.      Heart sounds: No murmur heard.     No gallop.   Pulmonary:      Breath sounds: Normal breath sounds. No stridor. No wheezing, rhonchi or rales.   Abdominal:      General: Bowel sounds are normal. There is no distension.      Palpations: Abdomen is soft.      Tenderness: There is no abdominal tenderness. There is no guarding or rebound.   Skin:     General: Skin is warm.      Capillary Refill: Capillary refill takes less than 2 seconds.      Coloration: Skin is not jaundiced or pale.   Neurological:      Mental Status: She is alert and oriented to person, place, and time.         Last Recorded Vitals  Blood pressure 138/66, pulse 85, temperature 36.9 °C (98.4 °F), temperature source Oral, resp. rate 16, height 1.727 m (5' 7.99\"), weight 126 kg (277 lb 3.2 oz), SpO2 92%.  Intake/Output last 3 Shifts:  I/O last 3 completed shifts:  In: 1816.3 (14.4 mL/kg) [P.O.:300; I.V.:1466.3 (11.7 mL/kg); IV Piggyback:50]  Out: 1100 (8.7 mL/kg) [Urine:1100 (0.2 mL/kg/hr)]  Weight: 125.7 kg     Relevant Results  Results for orders placed or performed during the hospital encounter of 06/02/24 (from the past 24 hour(s))   POCT GLUCOSE   Result Value Ref Range    POCT Glucose 302 (H) 74 - 99 mg/dL   Renal Function Panel   Result Value Ref Range    Glucose 296 (H) 65 - 99 mg/dL    Sodium 134 133 - 145 mmol/L    Potassium 5.7 (H) 3.4 - 5.1 mmol/L    Chloride 98 " 97 - 107 mmol/L    Bicarbonate 27 24 - 31 mmol/L    Urea Nitrogen 65 (H) 8 - 25 mg/dL    Creatinine 2.90 (H) 0.40 - 1.60 mg/dL    eGFR 17 (L) >60 mL/min/1.73m*2    Calcium 8.4 (L) 8.5 - 10.4 mg/dL    Phosphorus 3.8 2.5 - 4.5 mg/dL    Albumin 2.9 (L) 3.5 - 5.0 g/dL    Anion Gap 9 <=19 mmol/L   Protime-INR   Result Value Ref Range    Protime 11.7 9.3 - 12.7 seconds    INR 1.1 0.9 - 1.2   Hepatitis panel, acute   Result Value Ref Range    Hepatitis B Surface AG Nonreactive Nonreactive    Hepatitis A  AB- IgM Nonreactive Nonreactive    Hepatitis B Core AB; IgM Nonreactive Nonreactive    Hepatitis C AB Nonreactive Nonreactive   Alpha-Fetoprotein   Result Value Ref Range    Alpha-Fetoprotein <4 0 - 9 ng/mL   Gamma-Glutamyl Transferase   Result Value Ref Range    GGT 36 5 - 55 U/L   Renal Function Panel   Result Value Ref Range    Glucose 321 (H) 65 - 99 mg/dL    Sodium 134 133 - 145 mmol/L    Potassium 5.7 (H) 3.4 - 5.1 mmol/L    Chloride 98 97 - 107 mmol/L    Bicarbonate 26 24 - 31 mmol/L    Urea Nitrogen 64 (H) 8 - 25 mg/dL    Creatinine 2.90 (H) 0.40 - 1.60 mg/dL    eGFR 17 (L) >60 mL/min/1.73m*2    Calcium 8.3 (L) 8.5 - 10.4 mg/dL    Phosphorus 3.7 2.5 - 4.5 mg/dL    Albumin 3.0 (L) 3.5 - 5.0 g/dL    Anion Gap 10 <=19 mmol/L   POCT GLUCOSE   Result Value Ref Range    POCT Glucose 260 (H) 74 - 99 mg/dL   Renal Function Panel   Result Value Ref Range    Glucose 299 (H) 65 - 99 mg/dL    Sodium 135 133 - 145 mmol/L    Potassium 5.7 (H) 3.4 - 5.1 mmol/L    Chloride 99 97 - 107 mmol/L    Bicarbonate 26 24 - 31 mmol/L    Urea Nitrogen 66 (H) 8 - 25 mg/dL    Creatinine 3.00 (H) 0.40 - 1.60 mg/dL    eGFR 17 (L) >60 mL/min/1.73m*2    Calcium 8.3 (L) 8.5 - 10.4 mg/dL    Phosphorus 3.7 2.5 - 4.5 mg/dL    Albumin 3.0 (L) 3.5 - 5.0 g/dL    Anion Gap 10 <=19 mmol/L   POCT GLUCOSE   Result Value Ref Range    POCT Glucose 261 (H) 74 - 99 mg/dL   Renal Function Panel   Result Value Ref Range    Glucose 277 (H) 65 - 99 mg/dL    Sodium  136 133 - 145 mmol/L    Potassium 5.9 (H) 3.4 - 5.1 mmol/L    Chloride 101 97 - 107 mmol/L    Bicarbonate 28 24 - 31 mmol/L    Urea Nitrogen 67 (H) 8 - 25 mg/dL    Creatinine 3.20 (H) 0.40 - 1.60 mg/dL    eGFR 16 (L) >60 mL/min/1.73m*2    Calcium 8.2 (L) 8.5 - 10.4 mg/dL    Phosphorus 4.0 2.5 - 4.5 mg/dL    Albumin 2.9 (L) 3.5 - 5.0 g/dL    Anion Gap 7 <=19 mmol/L   Renal Function Panel   Result Value Ref Range    Glucose 249 (H) 65 - 99 mg/dL    Sodium 136 133 - 145 mmol/L    Potassium 5.5 (H) 3.4 - 5.1 mmol/L    Chloride 101 97 - 107 mmol/L    Bicarbonate 28 24 - 31 mmol/L    Urea Nitrogen 65 (H) 8 - 25 mg/dL    Creatinine 3.10 (H) 0.40 - 1.60 mg/dL    eGFR 16 (L) >60 mL/min/1.73m*2    Calcium 8.4 (L) 8.5 - 10.4 mg/dL    Phosphorus 4.2 2.5 - 4.5 mg/dL    Albumin 2.8 (L) 3.5 - 5.0 g/dL    Anion Gap 7 <=19 mmol/L   CBC   Result Value Ref Range    WBC 8.2 4.4 - 11.3 x10*3/uL    nRBC 0.2 (H) 0.0 - 0.0 /100 WBCs    RBC 2.74 (L) 4.00 - 5.20 x10*6/uL    Hemoglobin 7.4 (L) 12.0 - 16.0 g/dL    Hematocrit 24.8 (L) 36.0 - 46.0 %    MCV 91 80 - 100 fL    MCH 27.0 26.0 - 34.0 pg    MCHC 29.8 (L) 32.0 - 36.0 g/dL    RDW 22.1 (H) 11.5 - 14.5 %    Platelets 151 150 - 450 x10*3/uL   Renal Function Panel   Result Value Ref Range    Glucose 191 (H) 65 - 99 mg/dL    Sodium 136 133 - 145 mmol/L    Potassium 5.4 (H) 3.4 - 5.1 mmol/L    Chloride 102 97 - 107 mmol/L    Bicarbonate 26 24 - 31 mmol/L    Urea Nitrogen 64 (H) 8 - 25 mg/dL    Creatinine 3.10 (H) 0.40 - 1.60 mg/dL    eGFR 16 (L) >60 mL/min/1.73m*2    Calcium 8.5 8.5 - 10.4 mg/dL    Phosphorus 4.2 2.5 - 4.5 mg/dL    Albumin 2.8 (L) 3.5 - 5.0 g/dL    Anion Gap 8 <=19 mmol/L   POCT GLUCOSE   Result Value Ref Range    POCT Glucose 166 (H) 74 - 99 mg/dL   POCT GLUCOSE   Result Value Ref Range    POCT Glucose 191 (H) 74 - 99 mg/dL      US elastography parenchyma EG organ    Result Date: 6/8/2024  Interpreted By:  Otilio Hernandez, STUDY: US ELASTOGRAPHY PARENCHYMA EG ORGAN; 6/7/2024  6:37 pm   INDICATION: Signs/Symptoms:Elastography, r/o WINSTON CIRRHOSIS.   COMPARISON: None   ACCESSION NUMBER(S): [id number]   ORDERING CLINICIAN: SHANNON WESTON   TECHNIQUE: Limited abdominal ultrasound of the right upper quadrant was performed utilizing gray scale imaging.   FINDINGS: COMMENTS: Technologist note indicates the examination is technically suboptimal with measurements not thought to be accurate due to the patient's condition. Patient was unable to hold breath or to roll. Increased body habitus limits assessment as well.   Measurements obtained at this time are as follows:   Elastography Median: 12.5 kPA   Elastography IQR/Median: 26.6 %       Measurements were obtained as above but are now thought to be accurate due to the patient's body habitus and suboptimal cooperation at this time. Repeat evaluation at a later date may be appropriate.   Liver Fibrosis Staging Metavir Score kPa m/s Normal- Mild  F1 5.48 kPa-8.29 kPa 1.35 m/s- 1.66 m/s Mild-Moderate  F2 8.29 kPa-9.40 kPa 1.66 m/s- 1.77 m/s Moderate- Severe F3 9.40 kPa-11.9 kPa 1.77 m/s- 1.99 m/s Cirrhosis F4 >11.9 kPa >1.99 m/s   CAUTION: The values for the shear wave speed and tissue modulus are relative indices intended only for the purpose of comparison with other measurements performed using the LOGIQ E9 and E10 Absolute values for these measurements may vary among different measurement devices.   MACRO: none   Signed by: Otilio Hernandez 6/8/2024 10:02 AM Dictation workstation:   [id number]    XR chest 1 view    Result Date: 6/7/2024  Interpreted By:  Gerri Hsu, STUDY: XR CHEST 1 VIEW 6/7/2024 8:12 am   INDICATION: Signs/Symptoms:Pleural effusions   COMPARISON: 06/03/2024   ACCESSION NUMBER(S): [id number]   ORDERING CLINICIAN: DEBORAH KIM   TECHNIQUE: AP erect view of the chest at bedside   FINDINGS: The heart is enlarged with mild pulmonary vascular congestion seen. No focal infiltrate or focal airspace consolidation is identified.   No  obvious pleural effusions are seen on the AP projection.       Mild cardiomegaly and mild pulmonary vascular congestion.   The bilateral pleural effusions seen on 06/03/2024 are not obvious on the AP projection. PA and lateral views of the chest are recommended when the patient's condition permits.   Signed by: Gerri Hsu 6/7/2024 9:43 AM Dictation workstation:   [id number]    US gallbladder    Result Date: 6/6/2024  Interpreted By:  Otilio Hernandez, STUDY: US GALLBLADDER; 9:13 am   INDICATION: Signs/Symptoms:abnormal CT. Abnormal appearance of gallbladder on CT from 06/04/2024.   COMPARISON: CT 06/04/2024   ACCESSION NUMBER(S): [id number]   ORDERING CLINICIAN: KYLIE GARCIA   TECHNIQUE: Limited abdominal ultrasound of the right upper quadrant was performed utilizing gray scale imaging.   FINDINGS: COMMENTS: Technologist note states that the exam was limited due to patient being uncooperative.   Liver: There is a very coarse echotexture of the hepatic parenchyma with a lobulated hepatic contour consistent with underlying cirrhosis. Gallbladder: Gallbladder is filled with stones. There is diffuse gallbladder wall thickening measuring 5 mm with no pericholecystic fluid identified. Sonographic Latham's sign: Negative Pancreas: Pancreas is predominantly obscured by bowel gas. CBD: 0.36 cm Right Kidney:  Minimal amount of fluid is identified adjacent to the kidney. The kidney itself is unremarkable.       1. Abnormal appearance of the liver in a pattern consistent with cirrhosis. The splenomegaly noted on CT may be related to portal venous hypertension considering this appearance. 2. Cholelithiasis with gallbladder wall thickening but negative sonographic Latham's sign and no pericholecystic fluid. Findings may be indicative of chronic cholecystitis. Please correlate clinically. 3. Nonspecific perinephric fluid. 4. Please note exam is technically limited due to suboptimal cooperation from the patient according to  technologist note.   MACRO: None.   Signed by: Otilio Hernandez 6/6/2024 9:17 AM Dictation workstation:   [id number]    CT kidney wo IV contrast    Result Date: 6/5/2024  Interpreted By:  Otilio Hernandez, STUDY: CT KIDNEY WO CONTRAST; 6/4/2024 3:50 pm   INDICATION: Signs/Symptoms:mass on left kidney.   COMPARISON: None.   ACCESSION NUMBER(S): [id number]   ORDERING CLINICIAN: KYLIE GARCIA   TECHNIQUE: Without INTRAVENOUS  CONTRAST WithoutORAL CONTRAST  ( Intravenous contrast was not able to utilized due to renal insufficiency. One or more of the following dose reduction techniques were used: Automated exposure control Adjustment of the mA and/or kV according to patient size, and/or use of iterative reconstruction technique.   FINDINGS: ABDOMEN CT: SOLID ORGAN ASSESSMENT: Moderate bilateral pleural effusions are present. Bibasilar atelectasis is present. Infiltrate may be obscured by the atelectatic changes.   Within limits of this unenhanced study no focal masses are identified within the liver, spleen, pancreas, right kidney or adrenal glands. Spleen is enlarged with a craniocaudal extensive 16.4 cm.   CT examination does confirm a rim calcified mass within the mid to lower pole left kidney measuring on the order of 3.1 cm in diameter. Delineation and characterization of the mass is somewhat limited due to the absence of contrast. There is suspicion of mildly enlarged retroperitoneal lymph nodes on the left at the level of the left kidney, again not well characterized due to the patient's body habitus and absence of contrast.   Multiple stones are identified along the dependent portion the gallbladder. Gallbladder wall is not well defined raising possibility of gallbladder wall thickening and/or pericholecystic fluid. Please correlate with patient's clinical assessment to rule out acute cholecystitis. Right upper quadrant ultrasound could be obtained to further assess this finding.   RETROPERITONEUM: AORTA:  There  is no evidence for abdominal aortic aneurysm.   BOWEL ASSESSMENT: No intraperitoneal free air is identified. There is a nonspecific appearance of the stomach. Visualized portions of the large and small bowel are unremarkable.   ABDOMINAL WILKINS: There is no evidence for a hernia. Anasarca is present.   OSSEOUS STRUCTURES: No lytic or blastic lesions are identified. Spurring is noted throughout the spine.       1. Indeterminate rim calcified mass left kidney as was suspected on the ultrasound study. Both benign and malignant etiologies are considered in the differential this time with characterization of the mass limited due to the technical issues on this study and the ultrasound study. There is suspicion of left-sided periaortic lymphadenopathy, again not well characterized due to the absence of intravenous contrast. 2. Cholelithiasis with poorly defined gallbladder wall margins. Please correlate with clinical history to rule out the possibility of acute cholecystitis. Right upper quadrant ultrasound may be attempted for further characterization the gallbladder wall to rule out gallbladder wall thickening and pericholecystic fluid. 3. Anasarca. 4. Moderate bilateral pleural effusions with bibasilar atelectasis most marked involving the lower lobes. 5. Splenomegaly. Spleen measures 16.4 cm in craniocaudal extent.     MACRO: none   Signed by: Otilio Hernandez 6/5/2024 12:36 PM Dictation workstation:   [id number]    ECG 12 lead    Result Date: 6/4/2024  Normal sinus rhythm Minimal voltage criteria for LVH, may be normal variant Nonspecific T wave abnormality Abnormal ECG No previous ECGs available Confirmed by Duong Worthington ([id number]) on 6/4/2024 11:07:09 PM    Transthoracic Echo (TTE) Complete    Result Date: 6/4/2024            [address], [place] [address]             Phone [phone] TRANSTHORACIC ECHOCARDIOGRAM REPORT  Patient Name:      DESI THAYER           Reading Physician:     [id number] Duong Worthington DO Study Date:        6/4/2024              Ordering Provider:    [id number] KYLIE GARCIA MRN/PID:           [id number]              Fellow: Accession#:        [id number]          Nurse: Date of Birth/Age: 1960 / 63 years  Sonographer:          Chantelle Carrasquillo                                                                ACS, RDCS, LUCÍA Gender:            F                     Additional Staff: Height:            170.18 cm             Admit Date: Weight:            125.65 kg             Admission Status:     Inpatient -                                                                Routine BSA / BMI:         2.32 m2 / 43.38 kg/m2 Department Location:  [facility] Blood Pressure: 124 /53 mmHg Study Type:    TRANSTHORACIC ECHO (TTE) COMPLETE Diagnosis/ICD: Dyspnea, unspecified-R06.00 Indication:    dyspnea, Htn, Renal failure CPT Codes:     Echo Complete w Full Doppler-[id number] Patient History: Pertinent History: Dyspnea, DM2, Renal failure, Htn, PVD. Study Detail: The following Echo studies were performed: 2D, M-Mode, Doppler and               color flow. Technically challenging study due to poor acoustic               windows and body habitus. Definity used as a contrast agent for               endocardial border definition. Total contrast used for this               procedure was 2 mL via IV push.  PHYSICIAN INTERPRETATION: Left Ventricle: Left ventricular systolic function is normal, with an estimated ejection fraction of 60-65%. There are no regional wall motion abnormalities. The left ventricular cavity size is normal. There is mild concentric left ventricular hypertrophy. Spectral Doppler shows an impaired relaxation pattern of left ventricular diastolic filling. Left Atrium: The left atrium is  normal in size. Right Ventricle: The right ventricle was not well visualized. Unable to determine right ventricular systolic function. RV not well visualized but suggests RV enlargemnt and RV hypokinesis in some views, with severe pulmonary hypertension. Right Atrium: The right atrium was not well visualized. Aortic Valve: The aortic valve appears abnormal. There is mild to moderate aortic valve cusp calcification. There is evidence of mildly elevated transaortic gradients consistent with sclerosis of the aortic valve. There is no evidence of aortic valve regurgitation. The peak instantaneous gradient of the aortic valve is 7.5 mmHg. Mitral Valve: The mitral valve is abnormal. There is mild mitral valve regurgitation. Tricuspid Valve: The tricuspid valve is structurally normal. There is severe tricuspid regurgitation. The Doppler estimated RVSP is severely elevated at 79.6 mmHg. Pulmonic Valve: The pulmonic valve is not well visualized. The pulmonic valve regurgitation was not well visualized. Pericardium: There is no pericardial effusion noted. Aorta: The aortic root is normal.  CONCLUSIONS:  1. Left ventricular systolic function is normal with a 60-65% estimated ejection fraction.  2. Poorly visualized anatomical structures due to suboptimal image quality.  3. Spectral Doppler shows an impaired relaxation pattern of left ventricular diastolic filling.  4. RV not well visualized but suggests RV enlargemnt and RV hypokinesis in some views, with severe pulmonary hypertension.  5. Severe tricuspid regurgitation.  6. Severely elevated right ventricular systolic pressure.  7. Aortic valve appears abnormal.  8. Aortic valve sclerosis. QUANTITATIVE DATA SUMMARY: 2D MEASUREMENTS:                           Normal Ranges: IVSd:          1.40 cm    (0.6-1.1cm) LVPWd:         1.23 cm    (0.6-1.1cm) LVIDd:         4.76 cm    (3.9-5.9cm) LVIDs:         3.21 cm LV Mass Index: 106.2 g/m2 LV % FS        32.6 % RA VOLUME BY A/L  METHOD:                       Normal Ranges: RA Area A4C: 12.7 cm2 AORTA MEASUREMENTS:                    Normal Ranges: Asc Ao, d: 3.10 cm (2.1-3.4cm) LV SYSTOLIC FUNCTION BY 2D PLANIMETRY (MOD):                     Normal Ranges: EF-A4C View: 64.1 % (>=55%) EF-A2C View: 56.5 % EF-Biplane:  60.2 % LV DIASTOLIC FUNCTION:                        Normal Ranges: MV Peak E:    1.14 m/s (0.7-1.2 m/s) MV Peak A:    1.03 m/s (0.42-0.7 m/s) E/A Ratio:    1.11     (1.0-2.2) MV e'         0.07 m/s (>8.0) MV lateral e' 0.09 m/s MV medial e'  0.05 m/s E/e' Ratio:   16.29    (<8.0) MITRAL VALVE:                 Normal Ranges: MV DT: 189 msec (150-240msec) AORTIC VALVE:                         Normal Ranges: AoV Vmax:      1.37 m/s (<=1.7m/s) AoV Peak P.5 mmHg (<20mmHg) LVOT Max Joshua:  0.72 m/s (<=1.1m/s) LVOT VTI:      15.80 cm LVOT Diameter: 1.70 cm  (1.8-2.4cm) AoV Area,Vmax: 1.19 cm2 (2.5-4.5cm2)  RIGHT VENTRICLE: RV Basal 3.89 cm TAPSE:   10.7 mm RV s'    0.08 m/s TRICUSPID VALVE/RVSP:                             Normal Ranges: Peak TR Velocity: 3.86 m/s RV Syst Pressure: 79.6 mmHg (< 30mmHg) IVC Diam:         2.45 cm  [id number] Duong Worthington DO Electronically signed on 2024 at 11:34:03 AM  ** Final **     US renal complete    Result Date: 2024  Interpreted By:  Otilio Hernandez, STUDY: US RENAL COMPLETE; 6/3/2024 7:24 pm   INDICATION: Signs/Symptoms:DELISA.   COMPARISON: None   ACCESSION NUMBER(S): [id number]   ORDERING CLINICIAN: DEBORAH KIM   TECHNIQUE: Grayscale and color Doppler imaging of the kidneys   FINDINGS: COMMENTS: Technologist note indicates that the study was quite limited due to the patient's body habitus. Exam was performed portably as well.   The right kidney measures 10.7 cm x 3.9 cm x 4.7 cm . The left kidney measures 11.8 cm x 5.4 cm x 5.4 cm. THERE IS A HETEROGENEOUS COMPLEX MASS LATERALLY MID TO LOWER POLE LEFT KIDNEY MEASURING 4.3 X 3.9 X 4.6 CM.     No renal stones are identified.  The renal  cortical thickness and echogenicity is normal.  No hydronephrosis is identified.   Urinary bladder is nonspecific in appearance with no stones or masses identified.       COMPLEX APPEARING MASS LEFT KIDNEY MEASURING UP TO 4.6 CM IN DIAMETER, WITH A DIFFERENTIAL TO INCLUDE MALIGNANCY. CT UROGRAM WITH AND WITHOUT CONTRAST IS SUGGESTED FOR MORE DEFINITIVE ASSESSMENT.   MACRO: Otilio Hernandez discussed the significance and urgency of this critical finding EPIC secure chat with  [facility] team on 6/4/2024 at 8:22 am.  (**-RCF-**) Findings:  See findings.   Signed by: Otilio Hernandez 6/4/2024 8:23 AM Dictation workstation:   [id number]    XR chest 2 views    Result Date: 6/3/2024  Interpreted By:  Gerri Hsu, STUDY: XR CHEST 2 VIEWS 6/3/2024 8:15 am   INDICATION: Signs/Symptoms:COPD with wheezing   COMPARISON: None available.   ACCESSION NUMBER(S): [id number]   ORDERING CLINICIAN: ZAHRA BATISTA   TECHNIQUE: AP and lateral views of cervical spine are performed.   FINDINGS: The cardiac size is indeterminate due to the AP projection.   Right hemidiaphragm is elevated. On lateral projection, bilateral pleural effusions are observed. No obvious focal infiltrate or airspace consolidation is seen.       Bilateral pleural effusions on lateral projection and elevated right hemidiaphragm.   Signed by: Gerri Hsu 6/3/2024 8:24 AM Dictation workstation:   [id number]    * Cannot find OR log *  Last relevant procedure:                          Assessment/Plan   Active Problems:    Type 2 diabetes mellitus (Multi)    Morbid obesity (Multi)    Peripheral vascular disease (CMS-HCC)    Hypertension    Pressure injury of sacral region, stage 3 (Multi)    Behavior problem, adult    Renal failure    Abnormal ultrasound              -Findings are concerning for suspected [place] cirrhosis.  FibroScan showing F4 fibrosis, consistant with cirrhosis.  Acute hep panel, AFP, INR, and GGT were all normal.  Will order autoimmune workup as  well. She can follow up with us as outpatient to go over results.               -Can also consider HIDA scan without ejection fraction if patient develops abdominal pain, currently asymptomatic at this time.        Terrell Erickson, ACE-CNP

## 2024-06-08 NOTE — PROGRESS NOTES
Physical Therapy                 Therapy Communication Note    Patient Name: Brittanie Smart  MRN: [id number]  Today's Date: 6/8/2024     Discipline: Physical Therapy    Missed Visit Reason: Missed Visit Reason: Cancel (K+ 5.7, nursing states to hold PT)    Missed Time: Cancel    Comment:

## 2024-06-08 NOTE — CARE PLAN
The patient's goals for the shift include      The clinical goals for the shift include encourage participation in care    Over the shift, the patient did not make progress toward the following goals.     Problem: Skin  Goal: Participates in plan/prevention/treatment measures  Outcome: Not Progressing  Goal: Promote skin healing  Outcome: Not Progressing

## 2024-06-08 NOTE — CARE PLAN
Problem: Respiratory  Goal: Wean oxygen to maintain O2 saturation per order/standard this shift  Outcome: Progressing  Goal: Clear secretions with interventions this shift  Outcome: Progressing  Goal: Minimize anxiety/maximize coping throughout shift  Outcome: Progressing  Goal: Minimal/no exertional discomfort or dyspnea this shift  Outcome: Progressing  Goal: No signs of respiratory distress (eg. Use of accessory muscles. Peds grunting)  Outcome: Progressing  Goal: Patent airway maintained this shift  Outcome: Progressing  Goal: Tolerate pulmonary toileting this shift  Outcome: Progressing  Goal: Verbalize decreased shortness of breath this shift  Outcome: Progressing  Goal: Increase self care and/or family involvement in next 24 hours  Outcome: Progressing

## 2024-06-08 NOTE — PROGRESS NOTES
Brittanie Smart is a 63 y.o. female on day 5 of admission presenting with No Principal Problem: There is no principal problem currently on the Problem List. Please update the Problem List and refresh..      Subjective   Patient resting in bed. Patient denies pain or sob.        Objective     Last Recorded Vitals  /54 (BP Location: Right arm, Patient Position: Lying)   Pulse 89   Temp 36.9 °C (98.4 °F) (Oral)   Resp 16   Wt 126 kg (277 lb 3.2 oz)   SpO2 93%   Intake/Output last 3 Shifts:    Intake/Output Summary (Last 24 hours) at 6/8/2024 1221  Last data filed at 6/8/2024 1015  Gross per 24 hour   Intake 1791.25 ml   Output 302 ml   Net 1489.25 ml       Admission Weight  Weight: 119 kg (263 lb 4.8 oz) (06/02/24 2217)    Daily Weight  06/03/24 : 126 kg (277 lb 3.2 oz)    Image Results  US elastography parenchyma EG organ  Narrative: Interpreted By:  Otilio Hernandez,   STUDY:  US ELASTOGRAPHY PARENCHYMA EG ORGAN; 6/7/2024 6:37 pm      INDICATION:  Signs/Symptoms:Elastography, r/o WINSTON CIRRHOSIS.      COMPARISON:  None      ACCESSION NUMBER(S):  [id number]      ORDERING CLINICIAN:  SHANNON WESTON      TECHNIQUE:  Limited abdominal ultrasound of the right upper quadrant was  performed utilizing gray scale imaging.      FINDINGS:  COMMENTS: Technologist note indicates the examination is technically  suboptimal with measurements not thought to be accurate due to the  patient's condition. Patient was unable to hold breath or to roll.  Increased body habitus limits assessment as well.      Measurements obtained at this time are as follows:      Elastography Median: 12.5 kPA      Elastography IQR/Median: 26.6 %      Impression: Measurements were obtained as above but are now thought to be  accurate due to the patient's body habitus and suboptimal cooperation  at this time. Repeat evaluation at a later date may be appropriate.      Liver Fibrosis Staging Metavir Score kPa m/s  Normal- Mild  F1 5.48 kPa-8.29 kPa 1.35  m/s- 1.66 m/s  Mild-Moderate  F2 8.29 kPa-9.40 kPa 1.66 m/s- 1.77 m/s  Moderate- Severe F3 9.40 kPa-11.9 kPa 1.77 m/s- 1.99 m/s  Cirrhosis F4 >11.9 kPa >1.99 m/s      CAUTION:  The values for the shear wave speed and tissue modulus are relative  indices intended only for the purpose of comparison with other  measurements performed using the LOGIQ E9 and E10 Absolute values for  these measurements may vary among different measurement devices.      MACRO:  none      Signed by: Otilio Hernandez 6/8/2024 10:02 AM  Dictation workstation:   [id number]      Physical Exam  Constitutional:       Appearance: Normal appearance.   Cardiovascular:      Rate and Rhythm: Normal rate and regular rhythm.      Pulses: Normal pulses.      Heart sounds: Normal heart sounds.   Pulmonary:      Effort: Pulmonary effort is normal.      Breath sounds: Normal breath sounds.   Abdominal:      General: Bowel sounds are normal.      Palpations: Abdomen is soft.   Musculoskeletal:         General: Normal range of motion.   Skin:     General: Skin is warm and dry.   Neurological:      Mental Status: She is alert and oriented to person, place, and time.         Relevant Results             Results for orders placed or performed during the hospital encounter of 06/02/24 (from the past 24 hour(s))   Protime-INR   Result Value Ref Range    Protime 11.7 9.3 - 12.7 seconds    INR 1.1 0.9 - 1.2   Hepatitis panel, acute   Result Value Ref Range    Hepatitis B Surface AG Nonreactive Nonreactive    Hepatitis A  AB- IgM Nonreactive Nonreactive    Hepatitis B Core AB; IgM Nonreactive Nonreactive    Hepatitis C AB Nonreactive Nonreactive   Alpha-Fetoprotein   Result Value Ref Range    Alpha-Fetoprotein <4 0 - 9 ng/mL   Gamma-Glutamyl Transferase   Result Value Ref Range    GGT 36 5 - 55 U/L   Renal Function Panel   Result Value Ref Range    Glucose 321 (H) 65 - 99 mg/dL    Sodium 134 133 - 145 mmol/L    Potassium 5.7 (H) 3.4 - 5.1 mmol/L    Chloride 98 97 -  107 mmol/L    Bicarbonate 26 24 - 31 mmol/L    Urea Nitrogen 64 (H) 8 - 25 mg/dL    Creatinine 2.90 (H) 0.40 - 1.60 mg/dL    eGFR 17 (L) >60 mL/min/1.73m*2    Calcium 8.3 (L) 8.5 - 10.4 mg/dL    Phosphorus 3.7 2.5 - 4.5 mg/dL    Albumin 3.0 (L) 3.5 - 5.0 g/dL    Anion Gap 10 <=19 mmol/L   POCT GLUCOSE   Result Value Ref Range    POCT Glucose 260 (H) 74 - 99 mg/dL   Renal Function Panel   Result Value Ref Range    Glucose 299 (H) 65 - 99 mg/dL    Sodium 135 133 - 145 mmol/L    Potassium 5.7 (H) 3.4 - 5.1 mmol/L    Chloride 99 97 - 107 mmol/L    Bicarbonate 26 24 - 31 mmol/L    Urea Nitrogen 66 (H) 8 - 25 mg/dL    Creatinine 3.00 (H) 0.40 - 1.60 mg/dL    eGFR 17 (L) >60 mL/min/1.73m*2    Calcium 8.3 (L) 8.5 - 10.4 mg/dL    Phosphorus 3.7 2.5 - 4.5 mg/dL    Albumin 3.0 (L) 3.5 - 5.0 g/dL    Anion Gap 10 <=19 mmol/L   POCT GLUCOSE   Result Value Ref Range    POCT Glucose 261 (H) 74 - 99 mg/dL   Renal Function Panel   Result Value Ref Range    Glucose 277 (H) 65 - 99 mg/dL    Sodium 136 133 - 145 mmol/L    Potassium 5.9 (H) 3.4 - 5.1 mmol/L    Chloride 101 97 - 107 mmol/L    Bicarbonate 28 24 - 31 mmol/L    Urea Nitrogen 67 (H) 8 - 25 mg/dL    Creatinine 3.20 (H) 0.40 - 1.60 mg/dL    eGFR 16 (L) >60 mL/min/1.73m*2    Calcium 8.2 (L) 8.5 - 10.4 mg/dL    Phosphorus 4.0 2.5 - 4.5 mg/dL    Albumin 2.9 (L) 3.5 - 5.0 g/dL    Anion Gap 7 <=19 mmol/L   Renal Function Panel   Result Value Ref Range    Glucose 249 (H) 65 - 99 mg/dL    Sodium 136 133 - 145 mmol/L    Potassium 5.5 (H) 3.4 - 5.1 mmol/L    Chloride 101 97 - 107 mmol/L    Bicarbonate 28 24 - 31 mmol/L    Urea Nitrogen 65 (H) 8 - 25 mg/dL    Creatinine 3.10 (H) 0.40 - 1.60 mg/dL    eGFR 16 (L) >60 mL/min/1.73m*2    Calcium 8.4 (L) 8.5 - 10.4 mg/dL    Phosphorus 4.2 2.5 - 4.5 mg/dL    Albumin 2.8 (L) 3.5 - 5.0 g/dL    Anion Gap 7 <=19 mmol/L   CBC   Result Value Ref Range    WBC 8.2 4.4 - 11.3 x10*3/uL    nRBC 0.2 (H) 0.0 - 0.0 /100 WBCs    RBC 2.74 (L) 4.00 - 5.20  x10*6/uL    Hemoglobin 7.4 (L) 12.0 - 16.0 g/dL    Hematocrit 24.8 (L) 36.0 - 46.0 %    MCV 91 80 - 100 fL    MCH 27.0 26.0 - 34.0 pg    MCHC 29.8 (L) 32.0 - 36.0 g/dL    RDW 22.1 (H) 11.5 - 14.5 %    Platelets 151 150 - 450 x10*3/uL   Renal Function Panel   Result Value Ref Range    Glucose 191 (H) 65 - 99 mg/dL    Sodium 136 133 - 145 mmol/L    Potassium 5.4 (H) 3.4 - 5.1 mmol/L    Chloride 102 97 - 107 mmol/L    Bicarbonate 26 24 - 31 mmol/L    Urea Nitrogen 64 (H) 8 - 25 mg/dL    Creatinine 3.10 (H) 0.40 - 1.60 mg/dL    eGFR 16 (L) >60 mL/min/1.73m*2    Calcium 8.5 8.5 - 10.4 mg/dL    Phosphorus 4.2 2.5 - 4.5 mg/dL    Albumin 2.8 (L) 3.5 - 5.0 g/dL    Anion Gap 8 <=19 mmol/L   POCT GLUCOSE   Result Value Ref Range    POCT Glucose 166 (H) 74 - 99 mg/dL   POCT GLUCOSE   Result Value Ref Range    POCT Glucose 191 (H) 74 - 99 mg/dL   POCT GLUCOSE   Result Value Ref Range    POCT Glucose 170 (H) 74 - 99 mg/dL       Assessment/Plan                  Active Problems:    Type 2 diabetes mellitus (Multi)    Morbid obesity (Multi)    Peripheral vascular disease (CMS-[facility])    Hypertension    Pressure injury of sacral region, stage 3 (Multi)    Behavior problem, adult    Renal failure      Acute on Chronic Kidney Disease  BUN and creatinine are increasing, monitoring.  Patient states she has been on hemodialysis temporarily.  US shows a possible mass/malignancy on left kidney, CT done, urology following  -patient states she did have a biopsy in [place] and was found to have an infection in her kidney and had 6 weeks of antibiotic, record retrieval done culture is included in the record     Unable to care for self   No indication for APS at this time; defer to . Patient is angry at her sister and brother in law because she was brought to the hospital and not her new apartment.   Insurance will likely cover discharge to SNF, arrangements are being made.     Type 2 diabetes mellitus  markedly uncontrolled  blood sugar on arrival, Hbg A1c 7.5  Increased Lantus to 35 units  10 units of Humalog added to sliding scale with meals to control blood sugars better.   Hypoglycemic protocol  AC/HS blood sugars     Morbid obesity   limited ability to care for herself  Safety precautions   PT/OT to evaluate and treat     Hypertension   continue medications     Pressure injury of sacral region, stage 3 and both ankles   Wounds are healing well no sign of infection  wound care gave recommendations  Continue MediHoVega     Behavior problem, adult   Unsure of previous diagnosis patient denies any  Consult to Behavior Health was done and found no needs at present     Hypothyroid   Continue her home medications, TSH elevated and free T 4 only slightly decreased will continue will same dose and recommend recheck in 6 weeks.     Anemia   on iron supplementation     Plan of Care  Patient will likely need SNF at discharge. Social Service will need to verify insurance benefits.   Plan of care discussed with patient and collaborating physician, Dr. Tobin.               Kaylan Bird, APRN-CNP

## 2024-06-09 ENCOUNTER — APPOINTMENT (OUTPATIENT)
Dept: RADIOLOGY | Facility: HOSPITAL | Age: 64
End: 2024-06-09
Payer: MEDICARE

## 2024-06-09 PROBLEM — L89.609 PRESSURE INJURY OF SKIN OF HEEL: Status: ACTIVE | Noted: 2024-06-09

## 2024-06-09 PROBLEM — J96.01 ACUTE RESPIRATORY FAILURE WITH HYPOXIA AND HYPERCAPNIA (MULTI): Status: ACTIVE | Noted: 2024-06-09

## 2024-06-09 PROBLEM — J96.02 ACUTE RESPIRATORY FAILURE WITH HYPOXIA AND HYPERCAPNIA (MULTI): Status: ACTIVE | Noted: 2024-06-09

## 2024-06-09 PROBLEM — L89.159 PRESSURE SORE ON SACRUM: Status: ACTIVE | Noted: 2024-06-03

## 2024-06-09 LAB
ALBUMIN SERPL-MCNC: 2.6 G/DL (ref 3.5–5)
ALP BLD-CCNC: 141 U/L (ref 35–125)
ALT SERPL-CCNC: 9 U/L (ref 5–40)
ANION GAP SERPL CALC-SCNC: 10 MMOL/L
APPARATUS: ABNORMAL
ARTERIAL PATENCY WRIST A: POSITIVE
AST SERPL-CCNC: 19 U/L (ref 5–40)
BASE EXCESS BLDA CALC-SCNC: 0.8 MMOL/L (ref -2–3)
BILIRUB SERPL-MCNC: 0.3 MG/DL (ref 0.1–1.2)
BODY TEMPERATURE: 37 DEGREES CELSIUS
BUN SERPL-MCNC: 63 MG/DL (ref 8–25)
C3 SERPL-MCNC: 129 MG/DL (ref 87–200)
C4 SERPL-MCNC: 37 MG/DL (ref 10–50)
CALCIUM SERPL-MCNC: 8.4 MG/DL (ref 8.5–10.4)
CERULOPLASMIN SERPL-MCNC: 33.1 MG/DL (ref 20–60)
CHLORIDE SERPL-SCNC: 100 MMOL/L (ref 97–107)
CO2 SERPL-SCNC: 26 MMOL/L (ref 24–31)
CREAT SERPL-MCNC: 3.1 MG/DL (ref 0.4–1.6)
EGFRCR SERPLBLD CKD-EPI 2021: 16 ML/MIN/1.73M*2
ERYTHROCYTE [DISTWIDTH] IN BLOOD BY AUTOMATED COUNT: 22.6 % (ref 11.5–14.5)
GLUCOSE BLD MANUAL STRIP-MCNC: 119 MG/DL (ref 74–99)
GLUCOSE BLD MANUAL STRIP-MCNC: 139 MG/DL (ref 74–99)
GLUCOSE BLD MANUAL STRIP-MCNC: 158 MG/DL (ref 74–99)
GLUCOSE BLD MANUAL STRIP-MCNC: 172 MG/DL (ref 74–99)
GLUCOSE SERPL-MCNC: 130 MG/DL (ref 65–99)
HAPTOGLOB SERPL-MCNC: 155 MG/DL (ref 37–246)
HCO3 BLDA-SCNC: 28.5 MMOL/L (ref 22–26)
HCT VFR BLD AUTO: 27.3 % (ref 36–46)
HGB BLD-MCNC: 7.8 G/DL (ref 12–16)
INHALED O2 CONCENTRATION: 32 %
LDH SERPL-CCNC: 281 U/L (ref 91–227)
MCH RBC QN AUTO: 27 PG (ref 26–34)
MCHC RBC AUTO-ENTMCNC: 28.6 G/DL (ref 32–36)
MCV RBC AUTO: 95 FL (ref 80–100)
NRBC BLD-RTO: 0 /100 WBCS (ref 0–0)
OXYHGB MFR BLDA: 85.7 % (ref 94–98)
PCO2 BLDA: 58 MM HG (ref 38–42)
PH BLDA: 7.3 PH (ref 7.38–7.42)
PLATELET # BLD AUTO: 125 X10*3/UL (ref 150–450)
PO2 BLDA: 58 MM HG (ref 85–95)
POTASSIUM SERPL-SCNC: 5 MMOL/L (ref 3.4–5.1)
PROT SERPL-MCNC: 6.2 G/DL (ref 5.9–7.9)
RBC # BLD AUTO: 2.89 X10*6/UL (ref 4–5.2)
SAO2 % BLDA: 90 % (ref 94–100)
SODIUM SERPL-SCNC: 136 MMOL/L (ref 133–145)
SPECIMEN DRAWN FROM PATIENT: ABNORMAL
WBC # BLD AUTO: 7.3 X10*3/UL (ref 4.4–11.3)

## 2024-06-09 PROCEDURE — 71045 X-RAY EXAM CHEST 1 VIEW: CPT

## 2024-06-09 PROCEDURE — 71045 X-RAY EXAM CHEST 1 VIEW: CPT | Performed by: RADIOLOGY

## 2024-06-09 PROCEDURE — 84156 ASSAY OF PROTEIN URINE: CPT | Mod: TRILAB,WESLAB | Performed by: INTERNAL MEDICINE

## 2024-06-09 PROCEDURE — 1100000001 HC PRIVATE ROOM DAILY

## 2024-06-09 PROCEDURE — 2500000001 HC RX 250 WO HCPCS SELF ADMINISTERED DRUGS (ALT 637 FOR MEDICARE OP): Performed by: EMERGENCY MEDICINE

## 2024-06-09 PROCEDURE — 2500000002 HC RX 250 W HCPCS SELF ADMINISTERED DRUGS (ALT 637 FOR MEDICARE OP, ALT 636 FOR OP/ED): Performed by: INTERNAL MEDICINE

## 2024-06-09 PROCEDURE — 85027 COMPLETE CBC AUTOMATED: CPT | Performed by: NURSE PRACTITIONER

## 2024-06-09 PROCEDURE — 9420000001 HC RT PATIENT EDUCATION 5 MIN

## 2024-06-09 PROCEDURE — 82810 BLOOD GASES O2 SAT ONLY: CPT | Performed by: NURSE PRACTITIONER

## 2024-06-09 PROCEDURE — 2500000004 HC RX 250 GENERAL PHARMACY W/ HCPCS (ALT 636 FOR OP/ED): Performed by: NURSE PRACTITIONER

## 2024-06-09 PROCEDURE — 2500000002 HC RX 250 W HCPCS SELF ADMINISTERED DRUGS (ALT 637 FOR MEDICARE OP, ALT 636 FOR OP/ED): Performed by: EMERGENCY MEDICINE

## 2024-06-09 PROCEDURE — 86335 IMMUNFIX E-PHORSIS/URINE/CSF: CPT | Mod: TRILAB | Performed by: INTERNAL MEDICINE

## 2024-06-09 PROCEDURE — 84166 PROTEIN E-PHORESIS/URINE/CSF: CPT | Performed by: STUDENT IN AN ORGANIZED HEALTH CARE EDUCATION/TRAINING PROGRAM

## 2024-06-09 PROCEDURE — 2500000001 HC RX 250 WO HCPCS SELF ADMINISTERED DRUGS (ALT 637 FOR MEDICARE OP): Performed by: INTERNAL MEDICINE

## 2024-06-09 PROCEDURE — 2500000002 HC RX 250 W HCPCS SELF ADMINISTERED DRUGS (ALT 637 FOR MEDICARE OP, ALT 636 FOR OP/ED): Performed by: NURSE PRACTITIONER

## 2024-06-09 PROCEDURE — 2500000004 HC RX 250 GENERAL PHARMACY W/ HCPCS (ALT 636 FOR OP/ED): Performed by: INTERNAL MEDICINE

## 2024-06-09 PROCEDURE — 94760 N-INVAS EAR/PLS OXIMETRY 1: CPT

## 2024-06-09 PROCEDURE — 82805 BLOOD GASES W/O2 SATURATION: CPT | Performed by: NURSE PRACTITIONER

## 2024-06-09 PROCEDURE — 94640 AIRWAY INHALATION TREATMENT: CPT

## 2024-06-09 PROCEDURE — 2500000005 HC RX 250 GENERAL PHARMACY W/O HCPCS: Performed by: EMERGENCY MEDICINE

## 2024-06-09 PROCEDURE — 86325 OTHER IMMUNOELECTROPHORESIS: CPT | Performed by: STUDENT IN AN ORGANIZED HEALTH CARE EDUCATION/TRAINING PROGRAM

## 2024-06-09 PROCEDURE — 36600 WITHDRAWAL OF ARTERIAL BLOOD: CPT

## 2024-06-09 PROCEDURE — 80053 COMPREHEN METABOLIC PANEL: CPT

## 2024-06-09 PROCEDURE — 82947 ASSAY GLUCOSE BLOOD QUANT: CPT

## 2024-06-09 PROCEDURE — 36415 COLL VENOUS BLD VENIPUNCTURE: CPT

## 2024-06-09 RX ORDER — FUROSEMIDE 10 MG/ML
40 INJECTION INTRAMUSCULAR; INTRAVENOUS ONCE
Status: COMPLETED | OUTPATIENT
Start: 2024-06-09 | End: 2024-06-09

## 2024-06-09 RX ORDER — IPRATROPIUM BROMIDE AND ALBUTEROL SULFATE 2.5; .5 MG/3ML; MG/3ML
3 SOLUTION RESPIRATORY (INHALATION) ONCE
Status: COMPLETED | OUTPATIENT
Start: 2024-06-09 | End: 2024-06-09

## 2024-06-09 RX ORDER — FUROSEMIDE 10 MG/ML
40 INJECTION INTRAMUSCULAR; INTRAVENOUS EVERY 12 HOURS
Status: DISCONTINUED | OUTPATIENT
Start: 2024-06-09 | End: 2024-06-15

## 2024-06-09 RX ADMIN — DOXAZOSIN 1 MG: 1 TABLET ORAL at 08:33

## 2024-06-09 RX ADMIN — FUROSEMIDE 40 MG: 10 INJECTION, SOLUTION INTRAMUSCULAR; INTRAVENOUS at 22:07

## 2024-06-09 RX ADMIN — ENOXAPARIN SODIUM 40 MG: 40 INJECTION SUBCUTANEOUS at 08:17

## 2024-06-09 RX ADMIN — INSULIN LISPRO 10 UNITS: 100 INJECTION, SOLUTION INTRAVENOUS; SUBCUTANEOUS at 08:24

## 2024-06-09 RX ADMIN — CEFTRIAXONE SODIUM 1 G: 1 INJECTION, SOLUTION INTRAVENOUS at 16:10

## 2024-06-09 RX ADMIN — HYDROXYZINE PAMOATE 25 MG: 25 CAPSULE ORAL at 08:18

## 2024-06-09 RX ADMIN — PANTOPRAZOLE SODIUM 40 MG: 40 TABLET, DELAYED RELEASE ORAL at 06:12

## 2024-06-09 RX ADMIN — INSULIN GLARGINE 35 UNITS: 100 INJECTION, SOLUTION SUBCUTANEOUS at 22:07

## 2024-06-09 RX ADMIN — GABAPENTIN 300 MG: 300 CAPSULE ORAL at 08:17

## 2024-06-09 RX ADMIN — GABAPENTIN 300 MG: 300 CAPSULE ORAL at 22:07

## 2024-06-09 RX ADMIN — NYSTATIN 1 APPLICATION: 100000 POWDER TOPICAL at 22:07

## 2024-06-09 RX ADMIN — FERROUS SULFATE TAB 325 MG (65 MG ELEMENTAL FE) 1 TABLET: 325 (65 FE) TAB at 08:17

## 2024-06-09 RX ADMIN — Medication 3 L/MIN: at 23:00

## 2024-06-09 RX ADMIN — HYDROXYZINE PAMOATE 25 MG: 25 CAPSULE ORAL at 22:13

## 2024-06-09 RX ADMIN — ROSUVASTATIN CALCIUM 5 MG: 10 TABLET, COATED ORAL at 08:17

## 2024-06-09 RX ADMIN — INSULIN LISPRO 4 UNITS: 100 INJECTION, SOLUTION INTRAVENOUS; SUBCUTANEOUS at 16:38

## 2024-06-09 RX ADMIN — Medication 1 TABLET: at 08:17

## 2024-06-09 RX ADMIN — FUROSEMIDE 40 MG: 10 INJECTION, SOLUTION INTRAMUSCULAR; INTRAVENOUS at 11:41

## 2024-06-09 RX ADMIN — IPRATROPIUM BROMIDE AND ALBUTEROL SULFATE 3 ML: .5; 3 SOLUTION RESPIRATORY (INHALATION) at 11:49

## 2024-06-09 RX ADMIN — SODIUM ZIRCONIUM CYCLOSILICATE 10 G: 10 POWDER, FOR SUSPENSION ORAL at 08:19

## 2024-06-09 RX ADMIN — IPRATROPIUM BROMIDE AND ALBUTEROL SULFATE 3 ML: .5; 3 SOLUTION RESPIRATORY (INHALATION) at 19:38

## 2024-06-09 RX ADMIN — Medication 4 L/MIN: at 07:00

## 2024-06-09 RX ADMIN — Medication 4 L/MIN: at 15:00

## 2024-06-09 RX ADMIN — LEVOTHYROXINE SODIUM 75 MCG: 0.07 TABLET ORAL at 06:12

## 2024-06-09 RX ADMIN — NYSTATIN 1 APPLICATION: 100000 POWDER TOPICAL at 06:12

## 2024-06-09 RX ADMIN — KETOTIFEN FUMARATE 1 DROP: 0.25 SOLUTION/ DROPS OPHTHALMIC at 08:33

## 2024-06-09 ASSESSMENT — COGNITIVE AND FUNCTIONAL STATUS - GENERAL
WALKING IN HOSPITAL ROOM: TOTAL
MOVING TO AND FROM BED TO CHAIR: TOTAL
DRESSING REGULAR UPPER BODY CLOTHING: A LOT
EATING MEALS: A LITTLE
MOVING FROM LYING ON BACK TO SITTING ON SIDE OF FLAT BED WITH BEDRAILS: A LOT
TOILETING: TOTAL
HELP NEEDED FOR BATHING: A LOT
MOBILITY SCORE: 8
TURNING FROM BACK TO SIDE WHILE IN FLAT BAD: A LOT
DRESSING REGULAR UPPER BODY CLOTHING: A LOT
EATING MEALS: A LITTLE
EATING MEALS: A LITTLE
DRESSING REGULAR LOWER BODY CLOTHING: TOTAL
MOVING FROM LYING ON BACK TO SITTING ON SIDE OF FLAT BED WITH BEDRAILS: A LOT
DAILY ACTIVITIY SCORE: 12
DRESSING REGULAR LOWER BODY CLOTHING: TOTAL
PERSONAL GROOMING: A LITTLE
PERSONAL GROOMING: A LITTLE
STANDING UP FROM CHAIR USING ARMS: TOTAL
TOILETING: TOTAL
MOVING FROM LYING ON BACK TO SITTING ON SIDE OF FLAT BED WITH BEDRAILS: A LOT
DAILY ACTIVITIY SCORE: 12
HELP NEEDED FOR BATHING: A LOT
STANDING UP FROM CHAIR USING ARMS: TOTAL
TURNING FROM BACK TO SIDE WHILE IN FLAT BAD: A LOT
WALKING IN HOSPITAL ROOM: TOTAL
CLIMB 3 TO 5 STEPS WITH RAILING: TOTAL
DAILY ACTIVITIY SCORE: 12
MOVING TO AND FROM BED TO CHAIR: TOTAL
PERSONAL GROOMING: A LITTLE
STANDING UP FROM CHAIR USING ARMS: TOTAL
HELP NEEDED FOR BATHING: A LOT
CLIMB 3 TO 5 STEPS WITH RAILING: TOTAL
DRESSING REGULAR LOWER BODY CLOTHING: TOTAL
CLIMB 3 TO 5 STEPS WITH RAILING: TOTAL
TOILETING: TOTAL
MOBILITY SCORE: 8
TURNING FROM BACK TO SIDE WHILE IN FLAT BAD: A LOT
DRESSING REGULAR UPPER BODY CLOTHING: A LOT
MOVING TO AND FROM BED TO CHAIR: TOTAL
MOBILITY SCORE: 8
WALKING IN HOSPITAL ROOM: TOTAL

## 2024-06-09 ASSESSMENT — PAIN SCALES - GENERAL
PAINLEVEL_OUTOF10: 0 - NO PAIN
PAINLEVEL_OUTOF10: 0 - NO PAIN

## 2024-06-09 ASSESSMENT — PAIN - FUNCTIONAL ASSESSMENT
PAIN_FUNCTIONAL_ASSESSMENT: 0-10
PAIN_FUNCTIONAL_ASSESSMENT: 0-10

## 2024-06-09 NOTE — PROGRESS NOTES
Brittanie Smart is a 63 y.o. female on day 6 of admission presenting with Unable to care for self.      Subjective   I was notified by nursing this morning that patient was having increased work of breathing.  Found to be in the 80s on 3 L.  RT increased to 4 L.  Upon assessment patient states she is felt an increase in labored breathing since yesterday.  She did refuse nebulizer treatments overnight and this morning because she states they make her feel jittery.  She denies significant cough.  No chest pain         Objective     Last Recorded Vitals  /59 (BP Location: Left arm, Patient Position: Lying)   Pulse 106   Temp 36.4 °C (97.5 °F) (Oral)   Resp 18   Wt 126 kg (277 lb 3.2 oz)   SpO2 92%   Intake/Output last 3 Shifts:    Intake/Output Summary (Last 24 hours) at 6/9/2024 1212  Last data filed at 6/8/2024 1518  Gross per 24 hour   Intake 240 ml   Output 1 ml   Net 239 ml       Admission Weight  Weight: 119 kg (263 lb 4.8 oz) (06/02/24 2217)    Daily Weight  06/03/24 : 126 kg (277 lb 3.2 oz)                    Image Results  US elastography parenchyma EG organ  Narrative: Interpreted By:  Otilio Hernandez,   STUDY:  US ELASTOGRAPHY PARENCHYMA EG ORGAN; 6/7/2024 6:37 pm      INDICATION:  Signs/Symptoms:Elastography, r/o WINSTON CIRRHOSIS.      COMPARISON:  None      ACCESSION NUMBER(S):  [id number]      ORDERING CLINICIAN:  SHANNON WESTON      TECHNIQUE:  Limited abdominal ultrasound of the right upper quadrant was  performed utilizing gray scale imaging.      FINDINGS:  COMMENTS: Technologist note indicates the examination is technically  suboptimal with measurements not thought to be accurate due to the  patient's condition. Patient was unable to hold breath or to roll.  Increased body habitus limits assessment as well.      Measurements obtained at this time are as follows:      Elastography Median: 12.5 kPA      Elastography IQR/Median: 26.6 %      Impression: Measurements were obtained as above but are now  thought to be  accurate due to the patient's body habitus and suboptimal cooperation  at this time. Repeat evaluation at a later date may be appropriate.      Liver Fibrosis Staging Metavir Score kPa m/s  Normal- Mild  F1 5.48 kPa-8.29 kPa 1.35 m/s- 1.66 m/s  Mild-Moderate  F2 8.29 kPa-9.40 kPa 1.66 m/s- 1.77 m/s  Moderate- Severe F3 9.40 kPa-11.9 kPa 1.77 m/s- 1.99 m/s  Cirrhosis F4 >11.9 kPa >1.99 m/s      CAUTION:  The values for the shear wave speed and tissue modulus are relative  indices intended only for the purpose of comparison with other  measurements performed using the LOGIQ E9 and E10 Absolute values for  these measurements may vary among different measurement devices.      MACRO:  none      Signed by: Otilio Hernandez 6/8/2024 10:02 AM  Dictation workstation:   [id number]      Physical Exam  Vitals and nursing note reviewed.   Constitutional:       General: She is in acute distress (Slightly distressed with respirations).      Appearance: She is normal weight. She is ill-appearing.   HENT:      Head: Normocephalic and atraumatic.      Mouth/Throat:      Mouth: Mucous membranes are moist.      Pharynx: Oropharynx is clear.   Eyes:      Extraocular Movements: Extraocular movements intact.      Pupils: Pupils are equal, round, and reactive to light.   Cardiovascular:      Rate and Rhythm: Normal rate and regular rhythm.      Heart sounds: No murmur heard.     No gallop.   Pulmonary:      Effort: Respiratory distress (Tachypneic, mildly distressed) present.      Comments: Bibasilar Rales/crackles  Musculoskeletal:      Cervical back: Neck supple.      Right lower leg: Edema present.      Left lower leg: Edema present.   Skin:     General: Skin is warm and dry.   Neurological:      General: No focal deficit present.      Mental Status: She is alert and oriented to person, place, and time.   Psychiatric:         Mood and Affect: Mood normal.         Behavior: Behavior normal.         Relevant  Results             Scheduled medications  cefTRIAXone, 1 g, intravenous, q24h  docusate sodium, 100 mg, oral, BID  doxazosin, 1 mg, oral, Daily  enoxaparin, 40 mg, subcutaneous, Daily  ferrous sulfate (325 mg ferrous sulfate), 1 tablet, oral, Daily with breakfast  gabapentin, 300 mg, oral, BID  insulin glargine, 35 Units, subcutaneous, Nightly  insulin lispro, 0-20 Units, subcutaneous, TID  insulin lispro, 10 Units, subcutaneous, TID  ipratropium-albuteroL, 3 mL, nebulization, TID  ketotifen, 1 drop, Both Eyes, BID  levothyroxine, 75 mcg, oral, Daily  [Held by provider] losartan, 50 mg, oral, Daily  [Held by provider] metoprolol succinate XL, 50 mg, oral, Daily  multivitamin with minerals, 1 tablet, oral, Daily  nystatin, 1 Application, Topical, q8h  oxygen, , inhalation, q8h  pantoprazole, 40 mg, oral, Daily before breakfast  perflutren protein A microsphere, 0.5 mL, intravenous, Once in imaging  polyethylene glycol, 17 g, oral, Daily  rosuvastatin, 5 mg, oral, Daily  sodium zirconium cyclosilicate, 10 g, oral, Daily  sulfur hexafluoride microsphr, 2 mL, intravenous, Once in imaging      Continuous medications     PRN medications  PRN medications: acetaminophen **OR** [DISCONTINUED] acetaminophen **OR** [DISCONTINUED] acetaminophen, ammonium lactate, bisacodyl, dextrose, glucagon, hydrOXYzine pamoate, ipratropium-albuteroL, ondansetron ODT **OR** [DISCONTINUED] ondansetron        Results for orders placed or performed during the hospital encounter of 06/02/24 (from the past 24 hour(s))   C3 Complement   Result Value Ref Range    C3 Complement 129 87 - 200 mg/dL   C4 Complement   Result Value Ref Range    C4 Complement 37 10 - 50 mg/dL   Renal Function Panel   Result Value Ref Range    Glucose 170 (H) 65 - 99 mg/dL    Sodium 137 133 - 145 mmol/L    Potassium 5.7 (H) 3.4 - 5.1 mmol/L    Chloride 100 97 - 107 mmol/L    Bicarbonate 28 24 - 31 mmol/L    Urea Nitrogen 68 (H) 8 - 25 mg/dL    Creatinine 3.10 (H) 0.40 -  1.60 mg/dL    eGFR 16 (L) >60 mL/min/1.73m*2    Calcium 8.6 8.5 - 10.4 mg/dL    Phosphorus 4.3 2.5 - 4.5 mg/dL    Albumin 2.9 (L) 3.5 - 5.0 g/dL    Anion Gap 9 <=19 mmol/L   Lactate Dehydrogenase   Result Value Ref Range     (H) 91 - 227 U/L   POCT GLUCOSE   Result Value Ref Range    POCT Glucose 169 (H) 74 - 99 mg/dL   Renal Function Panel   Result Value Ref Range    Glucose 168 (H) 65 - 99 mg/dL    Sodium 135 133 - 145 mmol/L    Potassium 5.3 (H) 3.4 - 5.1 mmol/L    Chloride 99 97 - 107 mmol/L    Bicarbonate 26 24 - 31 mmol/L    Urea Nitrogen 65 (H) 8 - 25 mg/dL    Creatinine 3.00 (H) 0.40 - 1.60 mg/dL    eGFR 17 (L) >60 mL/min/1.73m*2    Calcium 8.5 8.5 - 10.4 mg/dL    Phosphorus 4.2 2.5 - 4.5 mg/dL    Albumin 2.7 (L) 3.5 - 5.0 g/dL    Anion Gap 10 <=19 mmol/L   Haptoglobin   Result Value Ref Range    Haptoglobin 155 37 - 246 mg/dL   POCT GLUCOSE   Result Value Ref Range    POCT Glucose 191 (H) 74 - 99 mg/dL   CBC   Result Value Ref Range    WBC 7.3 4.4 - 11.3 x10*3/uL    nRBC 0.0 0.0 - 0.0 /100 WBCs    RBC 2.89 (L) 4.00 - 5.20 x10*6/uL    Hemoglobin 7.8 (L) 12.0 - 16.0 g/dL    Hematocrit 27.3 (L) 36.0 - 46.0 %    MCV 95 80 - 100 fL    MCH 27.0 26.0 - 34.0 pg    MCHC 28.6 (L) 32.0 - 36.0 g/dL    RDW 22.6 (H) 11.5 - 14.5 %    Platelets 125 (L) 150 - 450 x10*3/uL   Comprehensive Metabolic Panel   Result Value Ref Range    Glucose 130 (H) 65 - 99 mg/dL    Sodium 136 133 - 145 mmol/L    Potassium 5.0 3.4 - 5.1 mmol/L    Chloride 100 97 - 107 mmol/L    Bicarbonate 26 24 - 31 mmol/L    Urea Nitrogen 63 (H) 8 - 25 mg/dL    Creatinine 3.10 (H) 0.40 - 1.60 mg/dL    eGFR 16 (L) >60 mL/min/1.73m*2    Calcium 8.4 (L) 8.5 - 10.4 mg/dL    Albumin 2.6 (L) 3.5 - 5.0 g/dL    Alkaline Phosphatase 141 (H) 35 - 125 U/L    Total Protein 6.2 5.9 - 7.9 g/dL    AST 19 5 - 40 U/L    Bilirubin, Total 0.3 0.1 - 1.2 mg/dL    ALT 9 5 - 40 U/L    Anion Gap 10 <=19 mmol/L   POCT GLUCOSE   Result Value Ref Range    POCT Glucose 119 (H)  74 - 99 mg/dL   Blood Gas Arterial   Result Value Ref Range    POCT pH, Arterial 7.30 (L) 7.38 - 7.42 pH    POCT pCO2, Arterial 58 (H) 38 - 42 mm Hg    POCT pO2, Arterial 58 (L) 85 - 95 mm Hg    POCT SO2, Arterial 90 (L) 94 - 100 %    POCT Oxy Hemoglobin, Arterial 85.7 (L) 94.0 - 98.0 %    POCT Base Excess, Arterial 0.8 -2.0 - 3.0 mmol/L    POCT HCO3 Calculated, Arterial 28.5 (H) 22.0 - 26.0 mmol/L    Patient Temperature 37.0 degrees Celsius    FiO2 32 %    Apparatus CANNULA     Site of Arterial Puncture Radial Right     Tyrone's Test Positive    POCT GLUCOSE   Result Value Ref Range    POCT Glucose 139 (H) 74 - 99 mg/dL       Assessment/Plan      Principal Problem:    Unable to care for self  Active Problems:    Type 2 diabetes mellitus (Multi)    Morbid obesity (Multi)    Peripheral vascular disease (CMS-HCC)    Hypertension    Pressure sore on sacrum    Behavior problem, adult    Renal failure    Acute respiratory failure with hypoxia and hypercapnia (Multi)    Pressure injury of skin of heel    Acute (??on Chronic) Respiratory Failure, hypoxia & hypercapnia   --  cxray on admission 6/3 showed bilat pleurl effusions  --  pt has been on 2L/NC, now --->  increased to 4L 2/2 SaO2s in 80s  --  cxray 6/7 showed mild cardiomegaly and mild pulmonary vascular congestion   --  needs Pulm and Cardiology consult  --  REPEAT CXRAY this AM pending...  and ABG ->  C02 58, PO2 58      ECHO 6/4:    CONCLUSIONS:   1. Left ventricular systolic function is normal with a 60-65% estimated ejection fraction.   2. Poorly visualized anatomical structures due to suboptimal image quality.   3. Spectral Doppler shows an impaired relaxation pattern of left ventricular diastolic filling.   4. RV not well visualized but suggests RV enlargemnt and   RV hypokinesis in some views, with severe pulmonary hypertension.  <------   5. Severe tricuspid regurgitation.   <------   6. Severely elevated right ventricular systolic pressure.   <------   7.  "Aortic valve appears abnormal.   8. Aortic valve sclerosis.       Acute on Chronic Kidney Disease  -- BUN and creatinine not improved   -- Pt report hx brief HD in past    --  US shows a possible mass/malignancy on left kidney, CT done, urology consulted-->  advised MRI-->  pt. Refusing   --  patient states she did have a biopsy in [place]??  and was found to have an I\"nfection in her kidney\" and had 6 weeks of antibiotic, record retrieval done culture is included in the record     Unable to care for self   --  No indication for APS at this time; defer to . Patient is angry at her sister and brother in law because she was brought to the hospital and not her new apartment.   --  Insurance will likely cover discharge to SNF, arrangements are being made.     Type 2 diabetes mellitus  -- markedly uncontrolled blood sugar on arrival, HbgA1C surprisingly only  7.5  -- Increased Lantus to 35 units  --  10 units of Humalog added to sliding scale with meals to control blood sugars better.   --  Hypoglycemic protocol  --  AC/HS blood sugars     Morbid obesity   -- limited ability to care for herself  -- Safety precautions   -- PT/OT to evaluate and treat     Hypertension   --  continue medications     Pressure injury of sacral region, stage 3 and both ankles   -- Wounds are healing well no sign of infection  -- wound care gave recommendations  --  Continue MediHoney, Heel protectors      Behavior problem, adult   --  Unsure of previous diagnosis patient denies any  --  Consult to Behavior Health was done and found no needs at present     Hypothyroid   --  Continue her home medications,    Anemia   --  continue iron supplementation     Plan of Care  Patient will need SNF at discharge. Social Service will need to verify insurance benefits.               Chanell Holland, APRN-CNP      "

## 2024-06-09 NOTE — PROGRESS NOTES
Brittanie Smart is a 63 y.o. female on day 6 of admission presenting with Unable to care for self.      Subjective   The patient reports dyspnea and anxiety with panic attacks otherwise no complaints.  Her creatinine is unchanged at 3.1, her potassium is now normalized.       Objective          Vitals 24HR  Heart Rate:  []   Temp:  [36.3 °C (97.3 °F)-36.4 °C (97.5 °F)]   Resp:  [16-18]   BP: (122-138)/(50-68)   SpO2:  [92 %-96 %]       Intake/Output last 3 Shifts:    Intake/Output Summary (Last 24 hours) at 6/9/2024 1126  Last data filed at 6/8/2024 1518  Gross per 24 hour   Intake 240 ml   Output 1 ml   Net 239 ml       Physical Exam  Constitutional:       General: She is awake, alert, not in acute distress.  Cardiovascular:      No friction rub.   Pulmonary:      Effort: Pulmonary effort is normal.      Breath sounds: Some diminished breath sounds  Abdominal:      General: Bowel sounds are normal.      Palpations: Abdomen is soft.      Tenderness: There is no guarding or rebound.   Musculoskeletal:      Comments: 1-2+ edema     Relevant Results  Results for orders placed or performed during the hospital encounter of 06/02/24 (from the past 24 hour(s))   C3 Complement   Result Value Ref Range    C3 Complement 129 87 - 200 mg/dL   C4 Complement   Result Value Ref Range    C4 Complement 37 10 - 50 mg/dL   Renal Function Panel   Result Value Ref Range    Glucose 170 (H) 65 - 99 mg/dL    Sodium 137 133 - 145 mmol/L    Potassium 5.7 (H) 3.4 - 5.1 mmol/L    Chloride 100 97 - 107 mmol/L    Bicarbonate 28 24 - 31 mmol/L    Urea Nitrogen 68 (H) 8 - 25 mg/dL    Creatinine 3.10 (H) 0.40 - 1.60 mg/dL    eGFR 16 (L) >60 mL/min/1.73m*2    Calcium 8.6 8.5 - 10.4 mg/dL    Phosphorus 4.3 2.5 - 4.5 mg/dL    Albumin 2.9 (L) 3.5 - 5.0 g/dL    Anion Gap 9 <=19 mmol/L   Lactate Dehydrogenase   Result Value Ref Range     (H) 91 - 227 U/L   POCT GLUCOSE   Result Value Ref Range    POCT Glucose 169 (H) 74 - 99 mg/dL   Renal  Function Panel   Result Value Ref Range    Glucose 168 (H) 65 - 99 mg/dL    Sodium 135 133 - 145 mmol/L    Potassium 5.3 (H) 3.4 - 5.1 mmol/L    Chloride 99 97 - 107 mmol/L    Bicarbonate 26 24 - 31 mmol/L    Urea Nitrogen 65 (H) 8 - 25 mg/dL    Creatinine 3.00 (H) 0.40 - 1.60 mg/dL    eGFR 17 (L) >60 mL/min/1.73m*2    Calcium 8.5 8.5 - 10.4 mg/dL    Phosphorus 4.2 2.5 - 4.5 mg/dL    Albumin 2.7 (L) 3.5 - 5.0 g/dL    Anion Gap 10 <=19 mmol/L   Haptoglobin   Result Value Ref Range    Haptoglobin 155 37 - 246 mg/dL   POCT GLUCOSE   Result Value Ref Range    POCT Glucose 191 (H) 74 - 99 mg/dL   CBC   Result Value Ref Range    WBC 7.3 4.4 - 11.3 x10*3/uL    nRBC 0.0 0.0 - 0.0 /100 WBCs    RBC 2.89 (L) 4.00 - 5.20 x10*6/uL    Hemoglobin 7.8 (L) 12.0 - 16.0 g/dL    Hematocrit 27.3 (L) 36.0 - 46.0 %    MCV 95 80 - 100 fL    MCH 27.0 26.0 - 34.0 pg    MCHC 28.6 (L) 32.0 - 36.0 g/dL    RDW 22.6 (H) 11.5 - 14.5 %    Platelets 125 (L) 150 - 450 x10*3/uL   Comprehensive Metabolic Panel   Result Value Ref Range    Glucose 130 (H) 65 - 99 mg/dL    Sodium 136 133 - 145 mmol/L    Potassium 5.0 3.4 - 5.1 mmol/L    Chloride 100 97 - 107 mmol/L    Bicarbonate 26 24 - 31 mmol/L    Urea Nitrogen 63 (H) 8 - 25 mg/dL    Creatinine 3.10 (H) 0.40 - 1.60 mg/dL    eGFR 16 (L) >60 mL/min/1.73m*2    Calcium 8.4 (L) 8.5 - 10.4 mg/dL    Albumin 2.6 (L) 3.5 - 5.0 g/dL    Alkaline Phosphatase 141 (H) 35 - 125 U/L    Total Protein 6.2 5.9 - 7.9 g/dL    AST 19 5 - 40 U/L    Bilirubin, Total 0.3 0.1 - 1.2 mg/dL    ALT 9 5 - 40 U/L    Anion Gap 10 <=19 mmol/L   POCT GLUCOSE   Result Value Ref Range    POCT Glucose 119 (H) 74 - 99 mg/dL   Blood Gas Arterial   Result Value Ref Range    POCT pH, Arterial 7.30 (L) 7.38 - 7.42 pH    POCT pCO2, Arterial 58 (H) 38 - 42 mm Hg    POCT pO2, Arterial 58 (L) 85 - 95 mm Hg    POCT SO2, Arterial 90 (L) 94 - 100 %    POCT Oxy Hemoglobin, Arterial 85.7 (L) 94.0 - 98.0 %    POCT Base Excess, Arterial 0.8 -2.0 - 3.0  mmol/L    POCT HCO3 Calculated, Arterial 28.5 (H) 22.0 - 26.0 mmol/L    Patient Temperature 37.0 degrees Celsius    FiO2 32 %    Apparatus CANNULA     Site of Arterial Puncture Radial Right     Tyrone's Test Positive    POCT GLUCOSE   Result Value Ref Range    POCT Glucose 139 (H) 74 - 99 mg/dL            Assessment/Plan   Acute kidney injury on chronic kidney disease stage III   - Received records from [place], [facility] her baseline creatinine is around 1.3 and she does have acute kidney injury likely ATN  Diabetes mellitus type 2  Hypertension  Anemia   UTI  Generalized weakness  Renal Mass  Diastolic CHF  Hyperkalemia, resolved     Plan: Stopped IV fluids yesterday, pending chest x-ray, continue to hold ARB.  Strict I/O's. Pending serologies, monoclonals to further work-up DELISA. No indications for dialysis now but will monitor closely. She did give consent to proceed with dialysis if needed, but no indications at this time.  She is being treated for UTI.  Stopped Norvasc due to peripheral edema.  GI consult for abnormal abdominal ultrasound findings.  Pulmonology and Urology consulted. Dose medications for GFR.  Continue IV venofer.  Pending LDH and Haptoglobin. Continue Lokelma, low potassium diet.    Siddhartha Tovar MD

## 2024-06-09 NOTE — CARE PLAN
Problem: Respiratory  Goal: Wean oxygen to maintain O2 saturation per order/standard this shift  Outcome: Not Progressing  Goal: Clear secretions with interventions this shift  Outcome: Progressing  Goal: Minimize anxiety/maximize coping throughout shift  Outcome: Progressing  Goal: Minimal/no exertional discomfort or dyspnea this shift  Outcome: Progressing  Goal: No signs of respiratory distress (eg. Use of accessory muscles. Peds grunting)  Outcome: Progressing  Goal: Patent airway maintained this shift  Outcome: Progressing  Goal: Tolerate mechanical ventilation evidenced by VS/agitation level this shift  Outcome: Progressing  Goal: Tolerate pulmonary toileting this shift  Outcome: Progressing  Goal: Verbalize decreased shortness of breath this shift  Outcome: Progressing     Problem: Respiratory  Goal: Wean oxygen to maintain O2 saturation per order/standard this shift  Outcome: Not Progressing

## 2024-06-09 NOTE — PROGRESS NOTES
Brittanie Smart is a 63 y.o. female on day 5 of admission presenting with No Principal Problem: There is no principal problem currently on the Problem List. Please update the Problem List and refresh..      Subjective   Cr slowly trending down, potassium improving with medical treatment. Patient reports dyspnea and anxiety. Non-oliguric.        Objective          Vitals 24HR  Heart Rate:  [72-92]   Temp:  [36.3 °C (97.3 °F)-36.9 °C (98.4 °F)]   Resp:  [16-17]   BP: (122-138)/(50-66)   SpO2:  [92 %-95 %]       Intake/Output last 3 Shifts:    Intake/Output Summary (Last 24 hours) at 6/8/2024 2043  Last data filed at 6/8/2024 1518  Gross per 24 hour   Intake 2391.25 ml   Output 3 ml   Net 2388.25 ml       Physical Exam  Constitutional:       General: She is awake, alert, not in acute distress.  Cardiovascular:      Rate and Rhythm: Regular rhythm.      Heart sounds:      No friction rub.   Pulmonary:      Effort: Pulmonary effort is normal.      Breath sounds: Some diminished breath sounds  Abdominal:      General: Bowel sounds are normal.      Palpations: Abdomen is soft.      Tenderness: There is no guarding or rebound.   Musculoskeletal:      Comments: 1-2+ edema     Relevant Results  Results for orders placed or performed during the hospital encounter of 06/02/24 (from the past 24 hour(s))   Renal Function Panel   Result Value Ref Range    Glucose 277 (H) 65 - 99 mg/dL    Sodium 136 133 - 145 mmol/L    Potassium 5.9 (H) 3.4 - 5.1 mmol/L    Chloride 101 97 - 107 mmol/L    Bicarbonate 28 24 - 31 mmol/L    Urea Nitrogen 67 (H) 8 - 25 mg/dL    Creatinine 3.20 (H) 0.40 - 1.60 mg/dL    eGFR 16 (L) >60 mL/min/1.73m*2    Calcium 8.2 (L) 8.5 - 10.4 mg/dL    Phosphorus 4.0 2.5 - 4.5 mg/dL    Albumin 2.9 (L) 3.5 - 5.0 g/dL    Anion Gap 7 <=19 mmol/L   Renal Function Panel   Result Value Ref Range    Glucose 249 (H) 65 - 99 mg/dL    Sodium 136 133 - 145 mmol/L    Potassium 5.5 (H) 3.4 - 5.1 mmol/L    Chloride 101 97 - 107 mmol/L     Bicarbonate 28 24 - 31 mmol/L    Urea Nitrogen 65 (H) 8 - 25 mg/dL    Creatinine 3.10 (H) 0.40 - 1.60 mg/dL    eGFR 16 (L) >60 mL/min/1.73m*2    Calcium 8.4 (L) 8.5 - 10.4 mg/dL    Phosphorus 4.2 2.5 - 4.5 mg/dL    Albumin 2.8 (L) 3.5 - 5.0 g/dL    Anion Gap 7 <=19 mmol/L   CBC   Result Value Ref Range    WBC 8.2 4.4 - 11.3 x10*3/uL    nRBC 0.2 (H) 0.0 - 0.0 /100 WBCs    RBC 2.74 (L) 4.00 - 5.20 x10*6/uL    Hemoglobin 7.4 (L) 12.0 - 16.0 g/dL    Hematocrit 24.8 (L) 36.0 - 46.0 %    MCV 91 80 - 100 fL    MCH 27.0 26.0 - 34.0 pg    MCHC 29.8 (L) 32.0 - 36.0 g/dL    RDW 22.1 (H) 11.5 - 14.5 %    Platelets 151 150 - 450 x10*3/uL   Renal Function Panel   Result Value Ref Range    Glucose 191 (H) 65 - 99 mg/dL    Sodium 136 133 - 145 mmol/L    Potassium 5.4 (H) 3.4 - 5.1 mmol/L    Chloride 102 97 - 107 mmol/L    Bicarbonate 26 24 - 31 mmol/L    Urea Nitrogen 64 (H) 8 - 25 mg/dL    Creatinine 3.10 (H) 0.40 - 1.60 mg/dL    eGFR 16 (L) >60 mL/min/1.73m*2    Calcium 8.5 8.5 - 10.4 mg/dL    Phosphorus 4.2 2.5 - 4.5 mg/dL    Albumin 2.8 (L) 3.5 - 5.0 g/dL    Anion Gap 8 <=19 mmol/L   POCT GLUCOSE   Result Value Ref Range    POCT Glucose 166 (H) 74 - 99 mg/dL   POCT GLUCOSE   Result Value Ref Range    POCT Glucose 191 (H) 74 - 99 mg/dL   POCT GLUCOSE   Result Value Ref Range    POCT Glucose 170 (H) 74 - 99 mg/dL   Renal Function Panel   Result Value Ref Range    Glucose 170 (H) 65 - 99 mg/dL    Sodium 137 133 - 145 mmol/L    Potassium 5.7 (H) 3.4 - 5.1 mmol/L    Chloride 100 97 - 107 mmol/L    Bicarbonate 28 24 - 31 mmol/L    Urea Nitrogen 68 (H) 8 - 25 mg/dL    Creatinine 3.10 (H) 0.40 - 1.60 mg/dL    eGFR 16 (L) >60 mL/min/1.73m*2    Calcium 8.6 8.5 - 10.4 mg/dL    Phosphorus 4.3 2.5 - 4.5 mg/dL    Albumin 2.9 (L) 3.5 - 5.0 g/dL    Anion Gap 9 <=19 mmol/L   POCT GLUCOSE   Result Value Ref Range    POCT Glucose 169 (H) 74 - 99 mg/dL   Renal Function Panel   Result Value Ref Range    Glucose 168 (H) 65 - 99 mg/dL    Sodium  135 133 - 145 mmol/L    Potassium 5.3 (H) 3.4 - 5.1 mmol/L    Chloride 99 97 - 107 mmol/L    Bicarbonate 26 24 - 31 mmol/L    Urea Nitrogen 65 (H) 8 - 25 mg/dL    Creatinine 3.00 (H) 0.40 - 1.60 mg/dL    eGFR 17 (L) >60 mL/min/1.73m*2    Calcium 8.5 8.5 - 10.4 mg/dL    Phosphorus 4.2 2.5 - 4.5 mg/dL    Albumin 2.7 (L) 3.5 - 5.0 g/dL    Anion Gap 10 <=19 mmol/L            Assessment/Plan   Acute kidney injury on chronic kidney disease stage III   - Received records from [place], [facility] her baseline creatinine is around 1.3 and she does have acute kidney injury likely ATN, Cr starting to improve  Diabetes mellitus type 2  Hypertension  Anemia   UTI  Generalized weakness  Renal Mass  Diastolic CHF  Hyperkalemia, improving     Plan: Stop IV fluids, continue to hold ARB.  Strict I/O's. Pending serologies, monoclonals to further work-up DELISA. No indications for dialysis now but will monitor closely. She did give consent to proceed with dialysis if needed, but no indications at this time.  She is being treated for UTI.  Stopped Norvasc due to peripheral edema.  GI consult for abnormal abdominal ultrasound findings. Urology consulted. Dose medications for GFR.  Continue IV venofer.  Check LDH and Haptoglobin. Continue Lokelma, low potassium diet.    Siddhartha Tovar MD

## 2024-06-09 NOTE — PROGRESS NOTES
"Physical Therapy    Physical Therapy Treatment    Patient Name: Brittanie Smart  MRN: [id number]  Today's Date: 6/9/2024       Assessment/Plan         PT Plan  Treatment/Interventions: Transfer training, Gait training, Balance training, Strengthening, Endurance training, Therapeutic activity  PT Plan: Skilled PT  PT Frequency: 4 times per week  PT Discharge Recommendations: Moderate intensity level of continued care  PT Recommended Transfer Status: Assist x2, Assistive device  PT - OK to Discharge: Yes      General Visit Information:   PT  Visit  PT Received On: 06/09/24  General  Reason for Referral: Impaired ADLs  Referred By: Dr Turner  Past Medical History Relevant to Rehab: Behavior problem, adult      Decubitus ulcer     Depression     GERD (gastroesophageal reflux disease)     Hemorrhoids     Hyperlipidemia     Hypertension     Hypothyroid     Morbid obesity (Multi)     Peripheral vascular disease (CMS-HCC)  Missed Visit: Yes  Missed Visit Reason: Cancel  Family/Caregiver Present: No  Prior to Session Communication: Bedside nurse  General Comment: Cancel pt per nursing d/t \"acute distress\".      Education Documentation  No documentation found.  Education Comments  No comments found.        OP EDUCATION:       Encounter Problems       Encounter Problems (Active)       PT Problem       BED MOBILITY Patient will transfer supine to sit and sit to supine with minimal assist to facilitate mobility.  (Progressing)       Start:  06/03/24    Expected End:  07/01/24            TRANSFER Patient will transfer sit to stand and stand to sit with  minimal 1-2 with RW  assist to facilitate mobility.  (Progressing)       Start:  06/03/24    Expected End:  07/01/24            AMBULATION Patient will amb 25 feet with rolling walker device including two turns on even surface with minimal assist to facilitate safe mobility.  (Progressing)       Start:  06/03/24    Expected End:  07/01/24            STRENGTH Patient will increase BLE " strength to 4/5 to improve functional mobility.    (Progressing)       Start:  06/03/24    Expected End:  07/01/24            ACTIVITY TOLERANCE Pt will tolerate standing activities including reaching for items, standing 3-4 minutes to improve overall activity tolerance.  (Progressing)       Start:  06/03/24    Expected End:  07/01/24               Pain - Adult

## 2024-06-09 NOTE — CONSULTS
"Inpatient consult to Pulmonology  Consult performed by: Rick Shirley MD  Consult ordered by: ACE Fisher-KARLO          Pulmonary Consult    Reason For Consult  Altered mentation  History Of Present Illness  Brittanie Smart is a 63 y.o. female presenting with Unable to care for self. Poorly was brought from [place].  Was in a health care facility there.  Was discharged.  Brought by her family.  Patient was reportedly driving to [place] and unable to care for self in apartment.  Nonambulatory and again wheelchair by self.  Brought to the ED. Increased work of breathing this morning.  80% on 2 L of oxygen.  Increased and breathing since yesterday.  She refused nebulizer treatments overnight.  States that previously her feel jittery.     Past Medical History  She has a past medical history of Anemia, Behavior problem, adult, Decubitus ulcer, Depression, Diabetes mellitus (Multi), GERD (gastroesophageal reflux disease), Hemorrhoids, Hyperlipidemia, Hypertension, Hypothyroid, Kidney disease, Morbid obesity (Multi), and Peripheral vascular disease (CMS-[facility]).    Surgical History  She has no past surgical history on file.     Social History  She reports that she has never smoked. She has never used smokeless tobacco. She reports that she does not currently use alcohol. She reports that she does not use drugs.      Family History  Family History   Problem Relation Name Age of Onset    COPD Mother      Heart disease Father          Allergies  Patient has no known allergies.    Review of Systems   All other systems reviewed and are negative.      Last Recorded Vitals  Blood pressure 149/76, pulse 90, temperature 36.3 °C (97.3 °F), temperature source Oral, resp. rate 18, height 1.727 m (5' 7.99\"), weight 126 kg (277 lb 3.2 oz), SpO2 90%.    Intake/Output  No intake or output data in the 24 hours ending 06/09/24 1922    Physical Exam  Vitals reviewed.   Constitutional:       Appearance: Normal appearance.   HENT:      " Head: Normocephalic and atraumatic.      Mouth/Throat:      Mouth: Mucous membranes are moist.   Eyes:      Extraocular Movements: Extraocular movements intact.      Pupils: Pupils are equal, round, and reactive to light.   Cardiovascular:      Rate and Rhythm: Normal rate and regular rhythm.   Pulmonary:      Effort: Pulmonary effort is normal.      Breath sounds: Normal breath sounds and air entry.   Abdominal:      General: Abdomen is flat. Bowel sounds are normal.      Palpations: Abdomen is soft.   Musculoskeletal:         General: Normal range of motion.      Cervical back: Normal range of motion and neck supple.   Skin:     General: Skin is warm and dry.   Neurological:      General: No focal deficit present.      Mental Status: She is alert and oriented to person, place, and time. Mental status is at baseline.      ...    Oxygen Therapy  SpO2: 90 %  Medical Gas Therapy: Supplemental oxygen  O2 Delivery Method: Nasal cannula  FiO2 (%):  [36 %] 36 %    Lines and Tubes:  Peripheral IV 06/05/24 20 G Left;Anterior Forearm (Active)   Placement Date/Time: 06/05/24 1536   Size (Gauge): 20 G  Orientation: Left;Anterior  Location: Forearm  Site Prep: Chlorhexidine   Technique: Ultrasound guidance  Placed by: lorraine lu RN CRNI  Insertion attempts: 1  Patient Tolerance: Tolera...   Number of days: 4       External Urinary Catheter (Active)   Earliest Known Present: 06/03/24     Number of days: 6         Scheduled medications  cefTRIAXone, 1 g, intravenous, q24h  docusate sodium, 100 mg, oral, BID  doxazosin, 1 mg, oral, Daily  enoxaparin, 40 mg, subcutaneous, Daily  ferrous sulfate (325 mg ferrous sulfate), 1 tablet, oral, Daily with breakfast  furosemide, 40 mg, intravenous, q12h  gabapentin, 300 mg, oral, BID  insulin glargine, 35 Units, subcutaneous, Nightly  insulin lispro, 0-20 Units, subcutaneous, TID  insulin lispro, 10 Units, subcutaneous, TID  ipratropium-albuteroL, 3 mL, nebulization, TID  ketotifen,  1 drop, Both Eyes, BID  levothyroxine, 75 mcg, oral, Daily  [Held by provider] losartan, 50 mg, oral, Daily  [Held by provider] metoprolol succinate XL, 50 mg, oral, Daily  multivitamin with minerals, 1 tablet, oral, Daily  nystatin, 1 Application, Topical, q8h  oxygen, , inhalation, q8h  pantoprazole, 40 mg, oral, Daily before breakfast  perflutren protein A microsphere, 0.5 mL, intravenous, Once in imaging  polyethylene glycol, 17 g, oral, Daily  rosuvastatin, 5 mg, oral, Daily  sodium zirconium cyclosilicate, 10 g, oral, Daily  sulfur hexafluoride microsphr, 2 mL, intravenous, Once in imaging      Continuous medications     PRN medications  PRN medications: acetaminophen **OR** [DISCONTINUED] acetaminophen **OR** [DISCONTINUED] acetaminophen, ammonium lactate, bisacodyl, dextrose, glucagon, hydrOXYzine pamoate, ipratropium-albuteroL, ondansetron ODT **OR** [DISCONTINUED] ondansetron    Relevant Results  Results from last 7 days   Lab Units 06/09/24  0440 06/08/24  0436 06/07/24  0425   WBC AUTO x10*3/uL 7.3 8.2 8.1   HEMOGLOBIN g/dL 7.8* 7.4* 7.7*   HEMATOCRIT % 27.3* 24.8* 25.8*   PLATELETS AUTO x10*3/uL 125* 151 148*      Results from last 7 days   Lab Units 06/09/24  0440 06/08/24  1640 06/08/24  1223   SODIUM mmol/L 136 135 137   POTASSIUM mmol/L 5.0 5.3* 5.7*   CHLORIDE mmol/L 100 99 100   CO2 mmol/L 26 26 28   BUN mg/dL 63* 65* 68*   CREATININE mg/dL 3.10* 3.00* 3.10*   GLUCOSE mg/dL 130* 168* 170*   CALCIUM mg/dL 8.4* 8.5 8.6      Results from last 7 days   Lab Units 06/09/24  1109   POCT PH, ARTERIAL pH 7.30*   POCT PCO2, ARTERIAL mm Hg 58*   POCT PO2, ARTERIAL mm Hg 58*   POCT HCO3 CALCULATED, ARTERIAL mmol/L 28.5*   POCT BASE EXCESS, ARTERIAL mmol/L 0.8     XR chest 1 view 06/09/2024    Narrative  Interpreted By:  Otilio Hernandez,  STUDY:  XR CHEST 1 VIEW; 6/9/2024 12:03 pm    INDICATION:  CLINICAL INFORMATION: Signs/Symptoms:SOB, persistent hypoxia,  re-eval pleural  effusions.    COMPARISON:  06/07/2024    ACCESSION NUMBER(S):  [id number]    ORDERING CLINICIAN:  LEMUEL STACY    TECHNIQUE:  Portable chest one view.    FINDINGS:  Exam is limited by the patient's body habitus. Cardiac size  indeterminate in view of the AP projection. Diffuse bilateral  infiltrates are present with more focal density at the left base  silhouetting left hemidiaphragm with air bronchograms at the left  base. The findings suggest left lower lobe consolidation . Small  effusions may be obscured on this portable study.    Impression  Diffuse bilateral infiltrates in a pattern suggesting pulmonary edema  with more focal infiltrate suspected at the left base suggesting  consolidation and perhaps pneumonia. Infiltrates appear more marked  compared to 06/07/2024.    MACRO:  none    Signed by: Otilio Hernandez 6/9/2024 1:16 PM  Dictation workstation:   [id number]       Assessment/Plan   Principal Problem:    Unable to care for self  Active Problems:    Type 2 diabetes mellitus (Multi)    Morbid obesity (Multi)    Peripheral vascular disease (CMS-HCC)    Hypertension    Pressure sore on sacrum    Behavior problem, adult    Renal failure    Acute respiratory failure with hypoxia and hypercapnia (Multi)    Pressure injury of skin of heel    Acute on chronic respiratory failure  Acute on chronic kidney disease  Failure to thrive.  Unable to care for self.  Diabetes  Sacral decubitus ulcer    ABG shows some CO2 retention.  pH 7.30 with pCO2 58 and pO2 58 on nasal cannula    Trial of BiPAP  Continue with oxygen wean as tolerated.  Diuresis  IV Lasix as ordered.      Rick Shirley MD  [facility]

## 2024-06-10 ENCOUNTER — APPOINTMENT (OUTPATIENT)
Dept: RADIOLOGY | Facility: HOSPITAL | Age: 64
End: 2024-06-10
Payer: MEDICARE

## 2024-06-10 LAB
ANION GAP BLDA CALCULATED.4IONS-SCNC: 8 MMO/L (ref 10–25)
ANION GAP BLDA CALCULATED.4IONS-SCNC: 8 MMO/L (ref 10–25)
ANION GAP SERPL CALC-SCNC: 8 MMOL/L
APPARATUS: ABNORMAL
ARTERIAL PATENCY WRIST A: POSITIVE
ARTERIAL PATENCY WRIST A: POSITIVE
BASE EXCESS BLDA CALC-SCNC: 2.6 MMOL/L (ref -2–3)
BASE EXCESS BLDA CALC-SCNC: 3.2 MMOL/L (ref -2–3)
BODY TEMPERATURE: 37 DEGREES CELSIUS
BODY TEMPERATURE: 37 DEGREES CELSIUS
BUN SERPL-MCNC: 67 MG/DL (ref 8–25)
CA-I BLDA-SCNC: 1.17 MMOL/L (ref 1.1–1.33)
CA-I BLDA-SCNC: 1.2 MMOL/L (ref 1.1–1.33)
CALCIUM SERPL-MCNC: 8.6 MG/DL (ref 8.5–10.4)
CHLORIDE BLDA-SCNC: 101 MMOL/L (ref 98–107)
CHLORIDE BLDA-SCNC: 102 MMOL/L (ref 98–107)
CHLORIDE SERPL-SCNC: 100 MMOL/L (ref 97–107)
CO2 SERPL-SCNC: 29 MMOL/L (ref 24–31)
CREAT SERPL-MCNC: 3.1 MG/DL (ref 0.4–1.6)
EGFRCR SERPLBLD CKD-EPI 2021: 16 ML/MIN/1.73M*2
ERYTHROCYTE [DISTWIDTH] IN BLOOD BY AUTOMATED COUNT: 22.9 % (ref 11.5–14.5)
FLOW: 4 LPM
GLUCOSE BLD MANUAL STRIP-MCNC: 128 MG/DL (ref 74–99)
GLUCOSE BLD MANUAL STRIP-MCNC: 162 MG/DL (ref 74–99)
GLUCOSE BLD MANUAL STRIP-MCNC: 176 MG/DL (ref 74–99)
GLUCOSE BLD MANUAL STRIP-MCNC: 209 MG/DL (ref 74–99)
GLUCOSE BLDA-MCNC: 137 MG/DL (ref 74–99)
GLUCOSE BLDA-MCNC: 177 MG/DL (ref 74–99)
GLUCOSE SERPL-MCNC: 206 MG/DL (ref 65–99)
HCO3 BLDA-SCNC: 29.9 MMOL/L (ref 22–26)
HCO3 BLDA-SCNC: 30.1 MMOL/L (ref 22–26)
HCT VFR BLD AUTO: 28 % (ref 36–46)
HCT VFR BLD EST: 24 % (ref 36–46)
HCT VFR BLD EST: 26 % (ref 36–46)
HGB BLD-MCNC: 8.4 G/DL (ref 12–16)
HGB BLDA-MCNC: 7.9 G/DL (ref 12–16)
HGB BLDA-MCNC: 8.5 G/DL (ref 12–16)
HIGH PEEP CMH2O: 18 CM H2O
INHALED O2 CONCENTRATION: 36 %
INHALED O2 CONCENTRATION: 50 %
LACTATE BLDA-SCNC: 0.9 MMOL/L (ref 0.4–2)
LACTATE BLDA-SCNC: 1 MMOL/L (ref 0.4–2)
LOW PEEP CMH2O: 8 CM H2O
MCH RBC QN AUTO: 27.6 PG (ref 26–34)
MCHC RBC AUTO-ENTMCNC: 30 G/DL (ref 32–36)
MCV RBC AUTO: 92 FL (ref 80–100)
NRBC BLD-RTO: 0 /100 WBCS (ref 0–0)
OXYHGB MFR BLDA: 89.8 % (ref 94–98)
OXYHGB MFR BLDA: 94.6 % (ref 94–98)
PCO2 BLDA: 58 MM HG (ref 38–42)
PCO2 BLDA: 64 MM HG (ref 38–42)
PH BLDA: 7.28 PH (ref 7.38–7.42)
PH BLDA: 7.32 PH (ref 7.38–7.42)
PLATELET # BLD AUTO: 116 X10*3/UL (ref 150–450)
PO2 BLDA: 103 MM HG (ref 85–95)
PO2 BLDA: 68 MM HG (ref 85–95)
POTASSIUM BLDA-SCNC: 4.4 MMOL/L (ref 3.5–5.3)
POTASSIUM BLDA-SCNC: 4.5 MMOL/L (ref 3.5–5.3)
POTASSIUM SERPL-SCNC: 4.9 MMOL/L (ref 3.4–5.1)
PROT UR-ACNC: 47 MG/DL (ref 5–25)
RBC # BLD AUTO: 3.04 X10*6/UL (ref 4–5.2)
SAO2 % BLDA: 100 % (ref 94–100)
SAO2 % BLDA: 95 % (ref 94–100)
SODIUM BLDA-SCNC: 135 MMOL/L (ref 136–145)
SODIUM BLDA-SCNC: 135 MMOL/L (ref 136–145)
SODIUM SERPL-SCNC: 137 MMOL/L (ref 133–145)
SPECIMEN DRAWN FROM PATIENT: ABNORMAL
SPECIMEN DRAWN FROM PATIENT: ABNORMAL
TIDAL VOLUME: 464 ML
VENTILATOR MODE: ABNORMAL
VENTILATOR RATE: 16 BPM
WBC # BLD AUTO: 7.3 X10*3/UL (ref 4.4–11.3)

## 2024-06-10 PROCEDURE — 99232 SBSQ HOSP IP/OBS MODERATE 35: CPT | Performed by: NURSE PRACTITIONER

## 2024-06-10 PROCEDURE — 71250 CT THORAX DX C-: CPT | Performed by: RADIOLOGY

## 2024-06-10 PROCEDURE — 2500000004 HC RX 250 GENERAL PHARMACY W/ HCPCS (ALT 636 FOR OP/ED): Performed by: NURSE PRACTITIONER

## 2024-06-10 PROCEDURE — 94660 CPAP INITIATION&MGMT: CPT

## 2024-06-10 PROCEDURE — 36600 WITHDRAWAL OF ARTERIAL BLOOD: CPT

## 2024-06-10 PROCEDURE — 9420000001 HC RT PATIENT EDUCATION 5 MIN

## 2024-06-10 PROCEDURE — 2500000005 HC RX 250 GENERAL PHARMACY W/O HCPCS: Performed by: NURSE PRACTITIONER

## 2024-06-10 PROCEDURE — 2500000005 HC RX 250 GENERAL PHARMACY W/O HCPCS: Performed by: EMERGENCY MEDICINE

## 2024-06-10 PROCEDURE — 94640 AIRWAY INHALATION TREATMENT: CPT

## 2024-06-10 PROCEDURE — 84132 ASSAY OF SERUM POTASSIUM: CPT | Performed by: NURSE PRACTITIONER

## 2024-06-10 PROCEDURE — 2500000004 HC RX 250 GENERAL PHARMACY W/ HCPCS (ALT 636 FOR OP/ED): Performed by: INTERNAL MEDICINE

## 2024-06-10 PROCEDURE — 2500000002 HC RX 250 W HCPCS SELF ADMINISTERED DRUGS (ALT 637 FOR MEDICARE OP, ALT 636 FOR OP/ED): Performed by: INTERNAL MEDICINE

## 2024-06-10 PROCEDURE — 36415 COLL VENOUS BLD VENIPUNCTURE: CPT | Performed by: NURSE PRACTITIONER

## 2024-06-10 PROCEDURE — 97530 THERAPEUTIC ACTIVITIES: CPT | Mod: GP,CQ

## 2024-06-10 PROCEDURE — 2500000002 HC RX 250 W HCPCS SELF ADMINISTERED DRUGS (ALT 637 FOR MEDICARE OP, ALT 636 FOR OP/ED): Performed by: EMERGENCY MEDICINE

## 2024-06-10 PROCEDURE — 82947 ASSAY GLUCOSE BLOOD QUANT: CPT

## 2024-06-10 PROCEDURE — 1100000001 HC PRIVATE ROOM DAILY

## 2024-06-10 PROCEDURE — 80048 BASIC METABOLIC PNL TOTAL CA: CPT | Performed by: NURSE PRACTITIONER

## 2024-06-10 PROCEDURE — 99223 1ST HOSP IP/OBS HIGH 75: CPT | Performed by: INTERNAL MEDICINE

## 2024-06-10 PROCEDURE — 97116 GAIT TRAINING THERAPY: CPT | Mod: GP,CQ

## 2024-06-10 PROCEDURE — 97110 THERAPEUTIC EXERCISES: CPT | Mod: GP,CQ

## 2024-06-10 PROCEDURE — 85027 COMPLETE CBC AUTOMATED: CPT | Performed by: NURSE PRACTITIONER

## 2024-06-10 PROCEDURE — 5A09357 ASSISTANCE WITH RESPIRATORY VENTILATION, LESS THAN 24 CONSECUTIVE HOURS, CONTINUOUS POSITIVE AIRWAY PRESSURE: ICD-10-PCS | Performed by: NURSE PRACTITIONER

## 2024-06-10 PROCEDURE — 2500000001 HC RX 250 WO HCPCS SELF ADMINISTERED DRUGS (ALT 637 FOR MEDICARE OP): Performed by: INTERNAL MEDICINE

## 2024-06-10 PROCEDURE — 2500000002 HC RX 250 W HCPCS SELF ADMINISTERED DRUGS (ALT 637 FOR MEDICARE OP, ALT 636 FOR OP/ED): Performed by: NURSE PRACTITIONER

## 2024-06-10 PROCEDURE — 71250 CT THORAX DX C-: CPT

## 2024-06-10 PROCEDURE — 2500000001 HC RX 250 WO HCPCS SELF ADMINISTERED DRUGS (ALT 637 FOR MEDICARE OP): Performed by: EMERGENCY MEDICINE

## 2024-06-10 RX ADMIN — IPRATROPIUM BROMIDE AND ALBUTEROL SULFATE 3 ML: .5; 3 SOLUTION RESPIRATORY (INHALATION) at 15:11

## 2024-06-10 RX ADMIN — Medication 1 TABLET: at 08:21

## 2024-06-10 RX ADMIN — HYDROXYZINE PAMOATE 25 MG: 25 CAPSULE ORAL at 08:21

## 2024-06-10 RX ADMIN — INSULIN LISPRO 8 UNITS: 100 INJECTION, SOLUTION INTRAVENOUS; SUBCUTANEOUS at 12:19

## 2024-06-10 RX ADMIN — FERROUS SULFATE TAB 325 MG (65 MG ELEMENTAL FE) 1 TABLET: 325 (65 FE) TAB at 08:20

## 2024-06-10 RX ADMIN — INSULIN LISPRO 4 UNITS: 100 INJECTION, SOLUTION INTRAVENOUS; SUBCUTANEOUS at 08:51

## 2024-06-10 RX ADMIN — Medication 4 L/MIN: at 17:24

## 2024-06-10 RX ADMIN — GABAPENTIN 300 MG: 300 CAPSULE ORAL at 08:21

## 2024-06-10 RX ADMIN — PANTOPRAZOLE SODIUM 40 MG: 40 TABLET, DELAYED RELEASE ORAL at 05:06

## 2024-06-10 RX ADMIN — DOXAZOSIN 1 MG: 1 TABLET ORAL at 08:22

## 2024-06-10 RX ADMIN — IPRATROPIUM BROMIDE AND ALBUTEROL SULFATE 3 ML: .5; 3 SOLUTION RESPIRATORY (INHALATION) at 08:12

## 2024-06-10 RX ADMIN — FUROSEMIDE 40 MG: 10 INJECTION, SOLUTION INTRAMUSCULAR; INTRAVENOUS at 20:46

## 2024-06-10 RX ADMIN — Medication 4 L/MIN: at 20:00

## 2024-06-10 RX ADMIN — CEFTRIAXONE SODIUM 1 G: 1 INJECTION, SOLUTION INTRAVENOUS at 16:32

## 2024-06-10 RX ADMIN — ENOXAPARIN SODIUM 40 MG: 40 INJECTION SUBCUTANEOUS at 08:23

## 2024-06-10 RX ADMIN — ROSUVASTATIN CALCIUM 5 MG: 10 TABLET, COATED ORAL at 08:28

## 2024-06-10 RX ADMIN — INSULIN GLARGINE 35 UNITS: 100 INJECTION, SOLUTION SUBCUTANEOUS at 20:47

## 2024-06-10 RX ADMIN — IPRATROPIUM BROMIDE AND ALBUTEROL SULFATE 3 ML: .5; 3 SOLUTION RESPIRATORY (INHALATION) at 12:20

## 2024-06-10 RX ADMIN — Medication 2 L/MIN: at 15:00

## 2024-06-10 RX ADMIN — IPRATROPIUM BROMIDE AND ALBUTEROL SULFATE 3 ML: .5; 3 SOLUTION RESPIRATORY (INHALATION) at 20:10

## 2024-06-10 RX ADMIN — Medication 4 L/MIN: at 07:00

## 2024-06-10 RX ADMIN — SODIUM ZIRCONIUM CYCLOSILICATE 10 G: 10 POWDER, FOR SUSPENSION ORAL at 08:25

## 2024-06-10 RX ADMIN — INSULIN LISPRO 4 UNITS: 100 INJECTION, SOLUTION INTRAVENOUS; SUBCUTANEOUS at 17:12

## 2024-06-10 RX ADMIN — FUROSEMIDE 40 MG: 10 INJECTION, SOLUTION INTRAMUSCULAR; INTRAVENOUS at 08:21

## 2024-06-10 RX ADMIN — LEVOTHYROXINE SODIUM 75 MCG: 0.07 TABLET ORAL at 05:06

## 2024-06-10 RX ADMIN — INSULIN LISPRO 10 UNITS: 100 INJECTION, SOLUTION INTRAVENOUS; SUBCUTANEOUS at 17:13

## 2024-06-10 RX ADMIN — HYDROXYZINE PAMOATE 25 MG: 25 CAPSULE ORAL at 22:19

## 2024-06-10 RX ADMIN — NYSTATIN 1 APPLICATION: 100000 POWDER TOPICAL at 22:19

## 2024-06-10 RX ADMIN — Medication 4 L/MIN: at 23:00

## 2024-06-10 RX ADMIN — NYSTATIN 1 APPLICATION: 100000 POWDER TOPICAL at 14:55

## 2024-06-10 RX ADMIN — INSULIN LISPRO 10 UNITS: 100 INJECTION, SOLUTION INTRAVENOUS; SUBCUTANEOUS at 12:20

## 2024-06-10 RX ADMIN — KETOTIFEN FUMARATE 1 DROP: 0.25 SOLUTION/ DROPS OPHTHALMIC at 20:46

## 2024-06-10 RX ADMIN — GABAPENTIN 300 MG: 300 CAPSULE ORAL at 20:45

## 2024-06-10 RX ADMIN — KETOTIFEN FUMARATE 1 DROP: 0.25 SOLUTION/ DROPS OPHTHALMIC at 08:23

## 2024-06-10 RX ADMIN — INSULIN LISPRO 10 UNITS: 100 INJECTION, SOLUTION INTRAVENOUS; SUBCUTANEOUS at 08:54

## 2024-06-10 ASSESSMENT — PAIN SCALES - GENERAL
PAINLEVEL_OUTOF10: 0 - NO PAIN

## 2024-06-10 ASSESSMENT — COGNITIVE AND FUNCTIONAL STATUS - GENERAL
WALKING IN HOSPITAL ROOM: TOTAL
STANDING UP FROM CHAIR USING ARMS: TOTAL
MOBILITY SCORE: 10
CLIMB 3 TO 5 STEPS WITH RAILING: TOTAL
MOBILITY SCORE: 8
MOVING FROM LYING ON BACK TO SITTING ON SIDE OF FLAT BED WITH BEDRAILS: A LOT
MOVING TO AND FROM BED TO CHAIR: A LOT
CLIMB 3 TO 5 STEPS WITH RAILING: TOTAL
TURNING FROM BACK TO SIDE WHILE IN FLAT BAD: A LOT
WALKING IN HOSPITAL ROOM: TOTAL
MOVING TO AND FROM BED TO CHAIR: TOTAL
TURNING FROM BACK TO SIDE WHILE IN FLAT BAD: A LOT
MOVING FROM LYING ON BACK TO SITTING ON SIDE OF FLAT BED WITH BEDRAILS: A LOT
STANDING UP FROM CHAIR USING ARMS: A LOT

## 2024-06-10 ASSESSMENT — PAIN - FUNCTIONAL ASSESSMENT
PAIN_FUNCTIONAL_ASSESSMENT: 0-10

## 2024-06-10 NOTE — CARE PLAN
The patient's goals for the shift include  decrease anxiety and SOB    The clinical goals for the shift include less SOB    Problem: Fall/Injury  Goal: Verbalize understanding of personal risk factors for fall in the hospital  Outcome: Progressing  Goal: Verbalize understanding of risk factor reduction measures to prevent injury from fall in the home  Outcome: Progressing  Goal: Use assistive devices by end of the shift  Outcome: Progressing  Goal: Pace activities to prevent fatigue by end of the shift  Outcome: Progressing     Problem: Skin  Goal: Decreased wound size/increased tissue granulation at next dressing change  Outcome: Progressing  Goal: Participates in plan/prevention/treatment measures  Outcome: Progressing  Flowsheets (Taken 6/9/2024 2349)  Participates in plan/prevention/treatment measures: Discuss with provider PT/OT consult  Goal: Prevent/minimize sheer/friction injuries  Outcome: Progressing  Goal: Promote/optimize nutrition  Outcome: Progressing  Flowsheets (Taken 6/9/2024 2349)  Promote/optimize nutrition: Consume > 50% meals/supplements  Goal: Promote skin healing  Outcome: Progressing     Problem: Safety  Goal: Patient will be injury free during hospitalization  Outcome: Progressing  Goal: I will remain free of falls  Outcome: Progressing     Problem: Psychosocial Needs  Goal: Demonstrates ability to cope with hospitalization/illness  Outcome: Progressing  Goal: Collaborate with me, my family, and caregiver to identify my specific goals  Outcome: Progressing     Problem: Discharge Barriers  Goal: My discharge needs are met  Outcome: Progressing     Problem: Respiratory  Goal: Wean oxygen to maintain O2 saturation per order/standard this shift  Outcome: Progressing  Goal: Clear secretions with interventions this shift  Outcome: Progressing  Goal: Minimize anxiety/maximize coping throughout shift  Outcome: Progressing  Goal: Minimal/no exertional discomfort or dyspnea this shift  Outcome:  Progressing  Goal: No signs of respiratory distress (eg. Use of accessory muscles. Peds grunting)  Outcome: Progressing  Goal: Patent airway maintained this shift  Outcome: Progressing  Goal: Tolerate mechanical ventilation evidenced by VS/agitation level this shift  Outcome: Progressing  Goal: Tolerate pulmonary toileting this shift  Outcome: Progressing  Goal: Verbalize decreased shortness of breath this shift  Outcome: Progressing  Goal: Increase self care and/or family involvement in next 24 hours  Outcome: Progressing     Problem: Pain - Adult  Goal: Verbalizes/displays adequate comfort level or baseline comfort level  Outcome: Progressing     Problem: Safety - Adult  Goal: Free from fall injury  Outcome: Progressing     Problem: Discharge Planning  Goal: Discharge to home or other facility with appropriate resources  Outcome: Progressing     Problem: Chronic Conditions and Co-morbidities  Goal: Patient's chronic conditions and co-morbidity symptoms are monitored and maintained or improved  Outcome: Progressing     Problem: Diabetes  Goal: Achieve decreasing blood glucose levels by end of shift  Outcome: Progressing  Goal: Increase stability of blood glucose readings by end of shift  Outcome: Progressing  Goal: Decrease in ketones present in urine by end of shift  Outcome: Progressing  Goal: Maintain electrolyte levels within acceptable range throughout shift  Outcome: Progressing  Goal: Maintain glucose levels >70mg/dl to <250mg/dl throughout shift  Outcome: Progressing  Goal: No changes in neurological exam by end of shift  Outcome: Progressing  Goal: Learn about and adhere to nutrition recommendations by end of shift  Outcome: Progressing  Goal: Vital signs within normal range for age by end of shift  Outcome: Progressing  Goal: Increase self care and/or family involovement by end of shift  Outcome: Progressing  Goal: Receive DSME education by end of shift  Outcome: Progressing

## 2024-06-10 NOTE — PROGRESS NOTES
"Physical Therapy    Physical Therapy Treatment    Patient Name: Brittanie Smart  MRN: [id number]  Today's Date: 6/10/2024  Time Calculation  Start Time: 1120  Stop Time: 1151  Time Calculation (min): 31 min    Assessment/Plan   PT Assessment  Rehab Prognosis: Fair  Barriers to Discharge: assist x 2 for mobility. self-limiting at times.  Evaluation/Treatment Tolerance: Patient limited by fatigue  Medical Staff Made Aware: Yes  End of Session Communication: PCT/NA/CTA  End of Session Patient Position: Up in chair, Alarm on     PT Plan  Treatment/Interventions: Bed mobility, Transfer training, Gait training, Therapeutic exercise, Therapeutic activity  PT Plan: Skilled PT  PT Frequency: 4 times per week  PT Discharge Recommendations: Moderate intensity level of continued care  Equipment Recommended upon Discharge: Wheeled walker  PT Recommended Transfer Status: Assist x2, Assistive device  PT - OK to Discharge: Yes      General Visit Information:   PT  Visit  PT Received On: 06/10/24  General  Prior to Session Communication: Bedside nurse  Patient Position Received: Bed, 3 rail up, Alarm off, not on at start of session  Preferred Learning Style: verbal, visual  General Comment: Pt agreeable to therapy. Pt c/o \"I can't move around in this bed and it makes it hard to breathe.    Subjective   Precautions:  Precautions  Medical Precautions: Fall precautions, Oxygen therapy device and L/min (3L oxygen)  Vital Signs:       Objective   Pain:  Pain Assessment  Pain Assessment: 0-10  Pain Score: 0 - No pain  Cognition:  Cognition  Overall Cognitive Status: Within Functional Limits  Orientation Level: Oriented X4  Coordination:     Postural Control:     Extremity/Trunk Assessments:    Activity Tolerance:     Treatments:  Therapeutic Exercise  Therapeutic Exercise Activity 1: Pt performed seated bilat LE ankle pumps, heel raises, glute sets, LAQ, pamela hip adduction and resisted hip abduction x15 reps each.    Bed Mobility 1  Bed " Mobility 1: Supine to sitting  Level of Assistance 1: Maximum assistance, +2  Bed Mobility Comments 1: Min assist with bilat LE's off bed, max assist for trunk up supine to sitting.  Bed Mobility 2  Bed Mobility  2: Scooting  Level of Assistance 2: Maximum assistance  Bed Mobility Comments 2: Max assist using draw sheet to scoot pt to edge of bed.    Ambulation/Gait Training 1  Surface 1: Level tile  Device 1: Rolling walker  Assistance 1: Moderate assistance  Quality of Gait 1: Shuffling gait, Forward flexed posture  Comments/Distance (ft) 1: Pt used RW to take 6-7 steps to chair, min/mod assist of 2. Slow, shuffling steps, very forward flexed posture, cues to improve as able.  Transfer 1  Transfer From 1: Bed to  Transfer to 1: Stand  Technique 1: Sit to stand  Transfer Device 1: Walker  Transfer Level of Assistance 1: Minimum assistance, +2  Transfers 2  Transfer From 2: Stand to  Transfer to 2: Sit, Chair with arms  Technique 2: Stand to sit  Transfer Level of Assistance 2: Moderate assistance, +2, Minimal verbal cues  Trials/Comments 2: Verbal cues to reach back for chair arms stand to sit--poor carryover.    Outcome Measures:  [facility] Basic Mobility  Turning from your back to your side while in a flat bed without using bedrails: A lot  Moving from lying on your back to sitting on the side of a flat bed without using bedrails: A lot  Moving to and from bed to chair (including a wheelchair): A lot  Standing up from a chair using your arms (e.g. wheelchair or bedside chair): A lot  To walk in hospital room: Total  Climbing 3-5 steps with railing: Total  Basic Mobility - Total Score: 10    Education Documentation  Home Exercise Program, taught by Falguni Townsend PTA at 6/10/2024 11:59 AM.  Learner: Patient  Readiness: Acceptance  Method: Explanation  Response: Verbalizes Understanding, Needs Reinforcement    Mobility Training, taught by Falguni Townsend PTA at 6/10/2024 11:59 AM.  Learner: Patient  Readiness:  Acceptance  Method: Explanation  Response: Verbalizes Understanding, Needs Reinforcement    Education Comments  No comments found.        OP EDUCATION:       Encounter Problems       Encounter Problems (Active)       PT Problem       BED MOBILITY Patient will transfer supine to sit and sit to supine with minimal assist to facilitate mobility.  (Progressing)       Start:  06/03/24    Expected End:  07/01/24            TRANSFER Patient will transfer sit to stand and stand to sit with  minimal 1-2 with RW  assist to facilitate mobility.  (Progressing)       Start:  06/03/24    Expected End:  07/01/24            AMBULATION Patient will amb 25 feet with rolling walker device including two turns on even surface with minimal assist to facilitate safe mobility.  (Progressing)       Start:  06/03/24    Expected End:  07/01/24            STRENGTH Patient will increase BLE strength to 4/5 to improve functional mobility.    (Progressing)       Start:  06/03/24    Expected End:  07/01/24            ACTIVITY TOLERANCE Pt will tolerate standing activities including reaching for items, standing 3-4 minutes to improve overall activity tolerance.  (Progressing)       Start:  06/03/24    Expected End:  07/01/24               Pain - Adult

## 2024-06-10 NOTE — PROGRESS NOTES
"Brittanie Smart is a 63 y.o. female on day 7 of admission presenting with Unable to care for self.    Subjective   Patient seen for acute kidney injury admitted with congestive heart failure and urinary tract infection he is on IV Rocephin for that clinically she is about the same she is on oxygen by nasal cannula is no improvement in her condition ultrasound of the kidney showed renal mass she was seen by urology they recommended MRI however she is absolutely refusing to have MRI unless it is open MRI with dynamically stable       Objective     Physical Exam  Neck:      Vascular: No carotid bruit.   Cardiovascular:      Rate and Rhythm: Normal rate and regular rhythm.      Heart sounds: No murmur heard.     No friction rub. No gallop.   Pulmonary:      Breath sounds: No wheezing, rhonchi or rales.   Chest:      Chest wall: No tenderness.   Abdominal:      General: There is no distension.      Tenderness: There is no abdominal tenderness. There is no guarding or rebound.   Musculoskeletal:         General: No swelling or tenderness.      Cervical back: Neck supple.      Right lower leg: No edema.      Left lower leg: No edema.   Lymphadenopathy:      Cervical: No cervical adenopathy.         Last Recorded Vitals  Blood pressure 124/55, pulse 94, temperature 36.6 °C (97.9 °F), temperature source Oral, resp. rate 20, height 1.727 m (5' 7.99\"), weight 126 kg (277 lb 3.2 oz), SpO2 92%.    Intake/Output last 3 Shifts:  I/O last 3 completed shifts:  In: 200 (1.6 mL/kg) [P.O.:200]  Out: 700 (5.6 mL/kg) [Urine:700 (0.2 mL/kg/hr)]  Weight: 125.7 kg     Current Facility-Administered Medications:     acetaminophen (Tylenol) tablet 650 mg, 650 mg, oral, q4h PRN, 650 mg at 06/04/24 1607 **OR** [DISCONTINUED] acetaminophen (Tylenol) oral liquid 650 mg, 650 mg, oral, q4h PRN **OR** [DISCONTINUED] acetaminophen (Tylenol) suppository 650 mg, 650 mg, rectal, q4h PRN, Noelle Turner, DO    ammonium lactate (Lac-Hydrin) 12 % lotion, , " Topical, q1h PRN, ACE Kaiser-CNP    bisacodyl (Dulcolax) EC tablet 5 mg, 5 mg, oral, Daily PRN, ACE Kaiser-CNP    cefTRIAXone (Rocephin) IVPB 1 g, 1 g, intravenous, q24h, ACE Kaiser-CNP, Stopped at 06/09/24 1640    dextrose 50 % injection 12.5 g, 12.5 g, intravenous, q15 min PRN, Noelle Turner DO    docusate sodium (Colace) capsule 100 mg, 100 mg, oral, BID, ACE Kaiser-CNP, 100 mg at 06/08/24 0826    doxazosin (Cardura) tablet 1 mg, 1 mg, oral, Daily, Siddhartha Tovar MD, 1 mg at 06/10/24 0822    enoxaparin (Lovenox) syringe 40 mg, 40 mg, subcutaneous, Daily, Siddhartha Tovar MD, 40 mg at 06/10/24 0823    ferrous sulfate (325 mg ferrous sulfate) tablet 1 tablet, 1 tablet, oral, Daily with breakfast, Noelle Turner DO, 1 tablet at 06/10/24 0820    furosemide (Lasix) injection 40 mg, 40 mg, intravenous, q12h, RHONDA Fisher, 40 mg at 06/10/24 0821    gabapentin (Neurontin) capsule 300 mg, 300 mg, oral, BID, Siddhartha Tovar MD, 300 mg at 06/10/24 0821    glucagon (Glucagen) injection 1 mg, 1 mg, intramuscular, q15 min PRN, Noelle Turner DO    hydrOXYzine pamoate (Vistaril) capsule 25 mg, 25 mg, oral, q6h PRN, ACE Kaiser-CNP, 25 mg at 06/10/24 0821    insulin glargine (Lantus) injection 35 Units, 35 Units, subcutaneous, Nightly, ACE Kaiser-CNP, 35 Units at 06/09/24 2207    insulin lispro (HumaLOG) injection 0-20 Units, 0-20 Units, subcutaneous, TID, ACE Kaiser-CNP, 4 Units at 06/10/24 0851    insulin lispro (HumaLOG) injection 10 Units, 10 Units, subcutaneous, TID, RHONDA Kaiser, 10 Units at 06/10/24 0854    ipratropium-albuteroL (Duo-Neb) 0.5-2.5 mg/3 mL nebulizer solution 3 mL, 3 mL, nebulization, TID, Noelle Turner DO, 3 mL at 06/10/24 0812    ipratropium-albuteroL (Duo-Neb) 0.5-2.5 mg/3 mL nebulizer solution 3 mL, 3 mL, nebulization, q2h PRN, Noelle Turner DO    ketotifen (Zaditor) 0.025 % (0.035  %) ophthalmic solution 1 drop, 1 drop, Both Eyes, BID, Noelle Turner DO, 1 drop at 06/10/24 0823    levothyroxine (Synthroid, Levoxyl) tablet 75 mcg, 75 mcg, oral, Daily, Noelle Turner DO, 75 mcg at 06/10/24 0506    [Held by provider] losartan (Cozaar) tablet 50 mg, 50 mg, oral, Daily, Noelle Turner DO, 50 mg at 06/03/24 0839    [Held by provider] metoprolol succinate XL (Toprol-XL) 24 hr tablet 50 mg, 50 mg, oral, Daily, Siddhartha Tovar MD, 50 mg at 06/07/24 0853    multivitamin with minerals 1 tablet, 1 tablet, oral, Daily, Noelle Turner DO, 1 tablet at 06/10/24 0821    nystatin (Mycostatin) 100,000 unit/gram powder 1 Application, 1 Application, Topical, q8h, Noelle Turner DO, 1 Application at 06/09/24 2207    ondansetron ODT (Zofran-ODT) disintegrating tablet 4 mg, 4 mg, oral, q8h PRN **OR** [DISCONTINUED] ondansetron (Zofran) injection 4 mg, 4 mg, intravenous, q8h PRN, Noelle Turner DO    oxygen (O2) therapy, , inhalation, q8h, Noelle Turner DO, 4 L/min at 06/10/24 0700    pantoprazole (ProtoNix) EC tablet 40 mg, 40 mg, oral, Daily before breakfast, Noelle Turner DO, 40 mg at 06/10/24 0506    perflutren protein A microsphere (Optison) injection 0.5 mL, 0.5 mL, intravenous, Once in imaging, ACE Kaiser-CNP    polyethylene glycol (Glycolax, Miralax) packet 17 g, 17 g, oral, Daily, Kaylan Bird APRN-CNP, 17 g at 06/08/24 0826    rosuvastatin (Crestor) tablet 5 mg, 5 mg, oral, Daily, Noelle Turner DO, 5 mg at 06/10/24 0828    sodium zirconium cyclosilicate (Lokelma) packet 10 g, 10 g, oral, Daily, Siddhartha Tovar MD, 10 g at 06/10/24 0825    sulfur hexafluoride microsphr (Lumason) injection 24.28 mg, 2 mL, intravenous, Once in imaging, ACE Kaiser-CNP   Relevant Results    Results for orders placed or performed during the hospital encounter of 06/02/24 (from the past 96 hour(s))   POCT GLUCOSE   Result Value Ref Range    POCT Glucose 307 (H) 74 - 99  mg/dL   POCT GLUCOSE   Result Value Ref Range    POCT Glucose 300 (H) 74 - 99 mg/dL   POCT GLUCOSE   Result Value Ref Range    POCT Glucose 301 (H) 74 - 99 mg/dL   CBC   Result Value Ref Range    WBC 8.1 4.4 - 11.3 x10*3/uL    nRBC 0.0 0.0 - 0.0 /100 WBCs    RBC 2.85 (L) 4.00 - 5.20 x10*6/uL    Hemoglobin 7.7 (L) 12.0 - 16.0 g/dL    Hematocrit 25.8 (L) 36.0 - 46.0 %    MCV 91 80 - 100 fL    MCH 27.0 26.0 - 34.0 pg    MCHC 29.8 (L) 32.0 - 36.0 g/dL    RDW 21.4 (H) 11.5 - 14.5 %    Platelets 148 (L) 150 - 450 x10*3/uL   Basic Metabolic Panel   Result Value Ref Range    Glucose 269 (H) 65 - 99 mg/dL    Sodium 135 133 - 145 mmol/L    Potassium 5.4 (H) 3.4 - 5.1 mmol/L    Chloride 100 97 - 107 mmol/L    Bicarbonate 26 24 - 31 mmol/L    Urea Nitrogen 65 (H) 8 - 25 mg/dL    Creatinine 2.90 (H) 0.40 - 1.60 mg/dL    eGFR 17 (L) >60 mL/min/1.73m*2    Calcium 8.4 (L) 8.5 - 10.4 mg/dL    Anion Gap 9 <=19 mmol/L   POCT GLUCOSE   Result Value Ref Range    POCT Glucose 264 (H) 74 - 99 mg/dL   POCT GLUCOSE   Result Value Ref Range    POCT Glucose 302 (H) 74 - 99 mg/dL   Renal Function Panel   Result Value Ref Range    Glucose 296 (H) 65 - 99 mg/dL    Sodium 134 133 - 145 mmol/L    Potassium 5.7 (H) 3.4 - 5.1 mmol/L    Chloride 98 97 - 107 mmol/L    Bicarbonate 27 24 - 31 mmol/L    Urea Nitrogen 65 (H) 8 - 25 mg/dL    Creatinine 2.90 (H) 0.40 - 1.60 mg/dL    eGFR 17 (L) >60 mL/min/1.73m*2    Calcium 8.4 (L) 8.5 - 10.4 mg/dL    Phosphorus 3.8 2.5 - 4.5 mg/dL    Albumin 2.9 (L) 3.5 - 5.0 g/dL    Anion Gap 9 <=19 mmol/L   Protime-INR   Result Value Ref Range    Protime 11.7 9.3 - 12.7 seconds    INR 1.1 0.9 - 1.2   Hepatitis panel, acute   Result Value Ref Range    Hepatitis B Surface AG Nonreactive Nonreactive    Hepatitis A  AB- IgM Nonreactive Nonreactive    Hepatitis B Core AB; IgM Nonreactive Nonreactive    Hepatitis C AB Nonreactive Nonreactive   Alpha-Fetoprotein   Result Value Ref Range    Alpha-Fetoprotein <4 0 - 9 ng/mL    Gamma-Glutamyl Transferase   Result Value Ref Range    GGT 36 5 - 55 U/L   Renal Function Panel   Result Value Ref Range    Glucose 321 (H) 65 - 99 mg/dL    Sodium 134 133 - 145 mmol/L    Potassium 5.7 (H) 3.4 - 5.1 mmol/L    Chloride 98 97 - 107 mmol/L    Bicarbonate 26 24 - 31 mmol/L    Urea Nitrogen 64 (H) 8 - 25 mg/dL    Creatinine 2.90 (H) 0.40 - 1.60 mg/dL    eGFR 17 (L) >60 mL/min/1.73m*2    Calcium 8.3 (L) 8.5 - 10.4 mg/dL    Phosphorus 3.7 2.5 - 4.5 mg/dL    Albumin 3.0 (L) 3.5 - 5.0 g/dL    Anion Gap 10 <=19 mmol/L   Alpha-1-Antitrypsin   Result Value Ref Range    Alpha-1 Antitrypsin 224 (H) 84 - 218 mg/dL   Ceruloplasmin   Result Value Ref Range    Ceruloplasmin 33.1 20.0 - 60.0 mg/dL   POCT GLUCOSE   Result Value Ref Range    POCT Glucose 260 (H) 74 - 99 mg/dL   Renal Function Panel   Result Value Ref Range    Glucose 299 (H) 65 - 99 mg/dL    Sodium 135 133 - 145 mmol/L    Potassium 5.7 (H) 3.4 - 5.1 mmol/L    Chloride 99 97 - 107 mmol/L    Bicarbonate 26 24 - 31 mmol/L    Urea Nitrogen 66 (H) 8 - 25 mg/dL    Creatinine 3.00 (H) 0.40 - 1.60 mg/dL    eGFR 17 (L) >60 mL/min/1.73m*2    Calcium 8.3 (L) 8.5 - 10.4 mg/dL    Phosphorus 3.7 2.5 - 4.5 mg/dL    Albumin 3.0 (L) 3.5 - 5.0 g/dL    Anion Gap 10 <=19 mmol/L   POCT GLUCOSE   Result Value Ref Range    POCT Glucose 261 (H) 74 - 99 mg/dL   Renal Function Panel   Result Value Ref Range    Glucose 277 (H) 65 - 99 mg/dL    Sodium 136 133 - 145 mmol/L    Potassium 5.9 (H) 3.4 - 5.1 mmol/L    Chloride 101 97 - 107 mmol/L    Bicarbonate 28 24 - 31 mmol/L    Urea Nitrogen 67 (H) 8 - 25 mg/dL    Creatinine 3.20 (H) 0.40 - 1.60 mg/dL    eGFR 16 (L) >60 mL/min/1.73m*2    Calcium 8.2 (L) 8.5 - 10.4 mg/dL    Phosphorus 4.0 2.5 - 4.5 mg/dL    Albumin 2.9 (L) 3.5 - 5.0 g/dL    Anion Gap 7 <=19 mmol/L   Renal Function Panel   Result Value Ref Range    Glucose 249 (H) 65 - 99 mg/dL    Sodium 136 133 - 145 mmol/L    Potassium 5.5 (H) 3.4 - 5.1 mmol/L    Chloride 101 97 -  107 mmol/L    Bicarbonate 28 24 - 31 mmol/L    Urea Nitrogen 65 (H) 8 - 25 mg/dL    Creatinine 3.10 (H) 0.40 - 1.60 mg/dL    eGFR 16 (L) >60 mL/min/1.73m*2    Calcium 8.4 (L) 8.5 - 10.4 mg/dL    Phosphorus 4.2 2.5 - 4.5 mg/dL    Albumin 2.8 (L) 3.5 - 5.0 g/dL    Anion Gap 7 <=19 mmol/L   CBC   Result Value Ref Range    WBC 8.2 4.4 - 11.3 x10*3/uL    nRBC 0.2 (H) 0.0 - 0.0 /100 WBCs    RBC 2.74 (L) 4.00 - 5.20 x10*6/uL    Hemoglobin 7.4 (L) 12.0 - 16.0 g/dL    Hematocrit 24.8 (L) 36.0 - 46.0 %    MCV 91 80 - 100 fL    MCH 27.0 26.0 - 34.0 pg    MCHC 29.8 (L) 32.0 - 36.0 g/dL    RDW 22.1 (H) 11.5 - 14.5 %    Platelets 151 150 - 450 x10*3/uL   Renal Function Panel   Result Value Ref Range    Glucose 191 (H) 65 - 99 mg/dL    Sodium 136 133 - 145 mmol/L    Potassium 5.4 (H) 3.4 - 5.1 mmol/L    Chloride 102 97 - 107 mmol/L    Bicarbonate 26 24 - 31 mmol/L    Urea Nitrogen 64 (H) 8 - 25 mg/dL    Creatinine 3.10 (H) 0.40 - 1.60 mg/dL    eGFR 16 (L) >60 mL/min/1.73m*2    Calcium 8.5 8.5 - 10.4 mg/dL    Phosphorus 4.2 2.5 - 4.5 mg/dL    Albumin 2.8 (L) 3.5 - 5.0 g/dL    Anion Gap 8 <=19 mmol/L   POCT GLUCOSE   Result Value Ref Range    POCT Glucose 166 (H) 74 - 99 mg/dL   POCT GLUCOSE   Result Value Ref Range    POCT Glucose 191 (H) 74 - 99 mg/dL   POCT GLUCOSE   Result Value Ref Range    POCT Glucose 170 (H) 74 - 99 mg/dL   C3 Complement   Result Value Ref Range    C3 Complement 129 87 - 200 mg/dL   C4 Complement   Result Value Ref Range    C4 Complement 37 10 - 50 mg/dL   Renal Function Panel   Result Value Ref Range    Glucose 170 (H) 65 - 99 mg/dL    Sodium 137 133 - 145 mmol/L    Potassium 5.7 (H) 3.4 - 5.1 mmol/L    Chloride 100 97 - 107 mmol/L    Bicarbonate 28 24 - 31 mmol/L    Urea Nitrogen 68 (H) 8 - 25 mg/dL    Creatinine 3.10 (H) 0.40 - 1.60 mg/dL    eGFR 16 (L) >60 mL/min/1.73m*2    Calcium 8.6 8.5 - 10.4 mg/dL    Phosphorus 4.3 2.5 - 4.5 mg/dL    Albumin 2.9 (L) 3.5 - 5.0 g/dL    Anion Gap 9 <=19 mmol/L    Lactate Dehydrogenase   Result Value Ref Range     (H) 91 - 227 U/L   POCT GLUCOSE   Result Value Ref Range    POCT Glucose 169 (H) 74 - 99 mg/dL   Renal Function Panel   Result Value Ref Range    Glucose 168 (H) 65 - 99 mg/dL    Sodium 135 133 - 145 mmol/L    Potassium 5.3 (H) 3.4 - 5.1 mmol/L    Chloride 99 97 - 107 mmol/L    Bicarbonate 26 24 - 31 mmol/L    Urea Nitrogen 65 (H) 8 - 25 mg/dL    Creatinine 3.00 (H) 0.40 - 1.60 mg/dL    eGFR 17 (L) >60 mL/min/1.73m*2    Calcium 8.5 8.5 - 10.4 mg/dL    Phosphorus 4.2 2.5 - 4.5 mg/dL    Albumin 2.7 (L) 3.5 - 5.0 g/dL    Anion Gap 10 <=19 mmol/L   Haptoglobin   Result Value Ref Range    Haptoglobin 155 37 - 246 mg/dL   POCT GLUCOSE   Result Value Ref Range    POCT Glucose 191 (H) 74 - 99 mg/dL   CBC   Result Value Ref Range    WBC 7.3 4.4 - 11.3 x10*3/uL    nRBC 0.0 0.0 - 0.0 /100 WBCs    RBC 2.89 (L) 4.00 - 5.20 x10*6/uL    Hemoglobin 7.8 (L) 12.0 - 16.0 g/dL    Hematocrit 27.3 (L) 36.0 - 46.0 %    MCV 95 80 - 100 fL    MCH 27.0 26.0 - 34.0 pg    MCHC 28.6 (L) 32.0 - 36.0 g/dL    RDW 22.6 (H) 11.5 - 14.5 %    Platelets 125 (L) 150 - 450 x10*3/uL   Comprehensive Metabolic Panel   Result Value Ref Range    Glucose 130 (H) 65 - 99 mg/dL    Sodium 136 133 - 145 mmol/L    Potassium 5.0 3.4 - 5.1 mmol/L    Chloride 100 97 - 107 mmol/L    Bicarbonate 26 24 - 31 mmol/L    Urea Nitrogen 63 (H) 8 - 25 mg/dL    Creatinine 3.10 (H) 0.40 - 1.60 mg/dL    eGFR 16 (L) >60 mL/min/1.73m*2    Calcium 8.4 (L) 8.5 - 10.4 mg/dL    Albumin 2.6 (L) 3.5 - 5.0 g/dL    Alkaline Phosphatase 141 (H) 35 - 125 U/L    Total Protein 6.2 5.9 - 7.9 g/dL    AST 19 5 - 40 U/L    Bilirubin, Total 0.3 0.1 - 1.2 mg/dL    ALT 9 5 - 40 U/L    Anion Gap 10 <=19 mmol/L   POCT GLUCOSE   Result Value Ref Range    POCT Glucose 119 (H) 74 - 99 mg/dL   Blood Gas Arterial   Result Value Ref Range    POCT pH, Arterial 7.30 (L) 7.38 - 7.42 pH    POCT pCO2, Arterial 58 (H) 38 - 42 mm Hg    POCT pO2, Arterial 58  (L) 85 - 95 mm Hg    POCT SO2, Arterial 90 (L) 94 - 100 %    POCT Oxy Hemoglobin, Arterial 85.7 (L) 94.0 - 98.0 %    POCT Base Excess, Arterial 0.8 -2.0 - 3.0 mmol/L    POCT HCO3 Calculated, Arterial 28.5 (H) 22.0 - 26.0 mmol/L    Patient Temperature 37.0 degrees Celsius    FiO2 32 %    Apparatus CANNULA     Site of Arterial Puncture Radial Right     Tyrone's Test Positive    POCT GLUCOSE   Result Value Ref Range    POCT Glucose 139 (H) 74 - 99 mg/dL   Protein, Urine Random   Result Value Ref Range    Total Protein, Urine Random 47 (H) 5 - 25 mg/dL   POCT GLUCOSE   Result Value Ref Range    POCT Glucose 158 (H) 74 - 99 mg/dL   POCT GLUCOSE   Result Value Ref Range    POCT Glucose 172 (H) 74 - 99 mg/dL   CBC   Result Value Ref Range    WBC 7.3 4.4 - 11.3 x10*3/uL    nRBC 0.0 0.0 - 0.0 /100 WBCs    RBC 3.04 (L) 4.00 - 5.20 x10*6/uL    Hemoglobin 8.4 (L) 12.0 - 16.0 g/dL    Hematocrit 28.0 (L) 36.0 - 46.0 %    MCV 92 80 - 100 fL    MCH 27.6 26.0 - 34.0 pg    MCHC 30.0 (L) 32.0 - 36.0 g/dL    RDW 22.9 (H) 11.5 - 14.5 %    Platelets 116 (L) 150 - 450 x10*3/uL   Basic Metabolic Panel   Result Value Ref Range    Glucose 206 (H) 65 - 99 mg/dL    Sodium 137 133 - 145 mmol/L    Potassium 4.9 3.4 - 5.1 mmol/L    Chloride 100 97 - 107 mmol/L    Bicarbonate 29 24 - 31 mmol/L    Urea Nitrogen 67 (H) 8 - 25 mg/dL    Creatinine 3.10 (H) 0.40 - 1.60 mg/dL    eGFR 16 (L) >60 mL/min/1.73m*2    Calcium 8.6 8.5 - 10.4 mg/dL    Anion Gap 8 <=19 mmol/L   POCT GLUCOSE   Result Value Ref Range    POCT Glucose 176 (H) 74 - 99 mg/dL       Assessment/Plan   Acute kidney injury on chronic kidney disease stage III renal function is stable creatinine 3.1 nonoliguric no uremia no indication for dialysis we will continue to monitor renal function very closely  Chronic kidney disease stage III creatinine baseline 1.3   Diabetes mellitus type 2  Hypertension  Anemia   UTI continue with IV Rocephin  Renal Mass followed by urology refused MRI  Diastolic  CHF continue diuresis per I want to make sure there is no underlying pneumonia will proceed with CAT scan of the chest and obtain pulmonary consult  Hyperkalemia, resolved             Paco Tovar MD

## 2024-06-10 NOTE — PROGRESS NOTES
Physical Therapy    Physical Therapy Treatment    Patient Name: Brittanie Smart  MRN: [id number]  Today's Date: 6/10/2024  Time Calculation  Start Time: 1418  Stop Time: 1433  Time Calculation (min): 15 min    Assessment/Plan   PT Assessment  Rehab Prognosis: Fair  Barriers to Discharge: assist x 2-3 for mobility. self-limiting at times. Required cuong stedy for back to bed from bedside chair  Evaluation/Treatment Tolerance: Patient limited by fatigue  Medical Staff Made Aware: Yes  End of Session Communication: PCT/NA/CTA  End of Session Patient Position: Bed, 3 rail up (Nurse and PCA present with pt.)     PT Plan  Treatment/Interventions: Transfer training, Bed mobility  PT Plan: Skilled PT  PT Frequency: 4 times per week  PT Discharge Recommendations: Moderate intensity level of continued care  Equipment Recommended upon Discharge: Wheeled walker  PT Recommended Transfer Status: Assist x2, Assistive device (Noted difficulty with back to bed this date, required max assist of 3 and use of cuong stedy.)  PT - OK to Discharge: Yes      General Visit Information:   PT  Visit  PT Received On: 06/10/24  General  Prior to Session Communication: Bedside nurse (Called by nurse, unable to get pt up from chair for back to bed.)  Patient Position Received: Up in chair, Alarm on  Preferred Learning Style: verbal, visual  General Comment:   Subjective   Precautions:  Precautions  Medical Precautions: Fall precautions, Oxygen therapy device and L/min (3L)  Vital Signs:       Objective   Pain:  Pain Assessment  Pain Assessment: 0-10  Pain Score: 0 - No pain  Cognition:  Cognition  Overall Cognitive Status: Within Functional Limits  Orientation Level: Oriented X4  Coordination:     Postural Control:     Extremity/Trunk Assessments:    Activity Tolerance:     Treatments:  Bed Mobility 1  Bed Mobility 1: Sitting to supine  Level of Assistance 1: Maximum assistance, +2  Bed Mobility Comments 1: Max assist with trunk down and bilat LE's onto  bed    Transfer 1  Transfer From 1: Chair with arms to  Transfer to 1: Stand  Technique 1: Sit to stand  Transfer Device 1: Cuong Stedy  Transfer Level of Assistance 1: Maximum assistance, x 3  Trials/Comments 1: x4 attempts sit to stand from chair with arms, max assist of 3. On 4th attempt able to get pt standing in cuong youngdy. Cuong singh used to transfer pt to bedside.  Transfers 2  Transfer From 2: Stand to  Transfer to 2: Sit, Bed  Technique 2: Stand to sit  Transfer Device 2: Cuong Stedy  Transfer Level of Assistance 2: Maximum assistance, +2  Trials/Comments 2: Max assist of 2 to control descent onto bed.    Outcome Measures:  [facility] Basic Mobility  Turning from your back to your side while in a flat bed without using bedrails: A lot  Moving from lying on your back to sitting on the side of a flat bed without using bedrails: A lot  Moving to and from bed to chair (including a wheelchair): Total  Standing up from a chair using your arms (e.g. wheelchair or bedside chair): Total  To walk in hospital room: Total  Climbing 3-5 steps with railing: Total  Basic Mobility - Total Score: 8    Education Documentation  Home Exercise Program, taught by Falguni Townsend PTA at 6/10/2024 11:59 AM.  Learner: Patient  Readiness: Acceptance  Method: Explanation  Response: Verbalizes Understanding, Needs Reinforcement    Mobility Training, taught by Falguni Townsend PTA at 6/10/2024 11:59 AM.  Learner: Patient  Readiness: Acceptance  Method: Explanation  Response: Verbalizes Understanding, Needs Reinforcement    Education Comments  No comments found.        OP EDUCATION:       Encounter Problems       Encounter Problems (Active)       PT Problem       BED MOBILITY Patient will transfer supine to sit and sit to supine with minimal assist to facilitate mobility.  (Progressing)       Start:  06/03/24    Expected End:  07/01/24            TRANSFER Patient will transfer sit to stand and stand to sit with  minimal 1-2 with RW  assist to  facilitate mobility.  (Progressing)       Start:  06/03/24    Expected End:  07/01/24            AMBULATION Patient will amb 25 feet with rolling walker device including two turns on even surface with minimal assist to facilitate safe mobility.  (Progressing)       Start:  06/03/24    Expected End:  07/01/24            STRENGTH Patient will increase BLE strength to 4/5 to improve functional mobility.    (Progressing)       Start:  06/03/24    Expected End:  07/01/24            ACTIVITY TOLERANCE Pt will tolerate standing activities including reaching for items, standing 3-4 minutes to improve overall activity tolerance.  (Progressing)       Start:  06/03/24    Expected End:  07/01/24               Pain - Adult

## 2024-06-10 NOTE — CARE PLAN
Problem: Fall/Injury  Goal: Verbalize understanding of personal risk factors for fall in the hospital  Outcome: Progressing  Goal: Verbalize understanding of risk factor reduction measures to prevent injury from fall in the home  Outcome: Progressing  Goal: Use assistive devices by end of the shift  Outcome: Progressing  Goal: Pace activities to prevent fatigue by end of the shift  Outcome: Progressing     Problem: Skin  Goal: Decreased wound size/increased tissue granulation at next dressing change  Outcome: Progressing  Goal: Participates in plan/prevention/treatment measures  Outcome: Progressing  Goal: Prevent/minimize sheer/friction injuries  Outcome: Progressing  Goal: Promote/optimize nutrition  Outcome: Progressing  Flowsheets (Taken 6/10/2024 1125)  Promote/optimize nutrition:   Assist with feeding   Monitor/record intake including meals   Consume > 50% meals/supplements   Offer water/supplements/favorite foods  Goal: Promote skin healing  Outcome: Progressing     Problem: Safety  Goal: Patient will be injury free during hospitalization  Outcome: Progressing  Goal: I will remain free of falls  Outcome: Progressing     Problem: Psychosocial Needs  Goal: Demonstrates ability to cope with hospitalization/illness  Outcome: Progressing  Goal: Collaborate with me, my family, and caregiver to identify my specific goals  Outcome: Progressing     Problem: Discharge Barriers  Goal: My discharge needs are met  Outcome: Progressing     Problem: Respiratory  Goal: Wean oxygen to maintain O2 saturation per order/standard this shift  Outcome: Progressing  Goal: Clear secretions with interventions this shift  Outcome: Progressing  Goal: Minimize anxiety/maximize coping throughout shift  Outcome: Progressing  Goal: Minimal/no exertional discomfort or dyspnea this shift  Outcome: Progressing  Goal: No signs of respiratory distress (eg. Use of accessory muscles. Peds grunting)  Outcome: Progressing  Goal: Patent airway  maintained this shift  Outcome: Progressing  Goal: Tolerate mechanical ventilation evidenced by VS/agitation level this shift  Outcome: Progressing  Goal: Tolerate pulmonary toileting this shift  Outcome: Progressing  Goal: Verbalize decreased shortness of breath this shift  Outcome: Progressing  Goal: Increase self care and/or family involvement in next 24 hours  Outcome: Progressing     Problem: Pain - Adult  Goal: Verbalizes/displays adequate comfort level or baseline comfort level  Outcome: Progressing     Problem: Safety - Adult  Goal: Free from fall injury  Outcome: Progressing     Problem: Discharge Planning  Goal: Discharge to home or other facility with appropriate resources  Outcome: Progressing     Problem: Chronic Conditions and Co-morbidities  Goal: Patient's chronic conditions and co-morbidity symptoms are monitored and maintained or improved  Outcome: Progressing     Problem: Diabetes  Goal: Achieve decreasing blood glucose levels by end of shift  Outcome: Progressing  Goal: Increase stability of blood glucose readings by end of shift  Outcome: Progressing  Goal: Decrease in ketones present in urine by end of shift  Outcome: Progressing  Goal: Maintain electrolyte levels within acceptable range throughout shift  Outcome: Progressing  Goal: Maintain glucose levels >70mg/dl to <250mg/dl throughout shift  Outcome: Progressing  Goal: No changes in neurological exam by end of shift  Outcome: Progressing  Goal: Learn about and adhere to nutrition recommendations by end of shift  Outcome: Progressing  Goal: Vital signs within normal range for age by end of shift  Outcome: Progressing  Goal: Increase self care and/or family involovement by end of shift  Outcome: Progressing  Goal: Receive DSME education by end of shift  Outcome: Progressing   The patient's goals for the shift include      The clinical goals for the shift include decrease anxiety, increase mobility    Over the shift, the patient did not make  progress toward the following goals. Barriers to progression include anxiety. Recommendations to address these barriers include offer emotional support, prn anxiety medications.

## 2024-06-10 NOTE — PROGRESS NOTES
"Brittanie Smart is a 63 y.o. female on day 7 of admission presenting with Unable to care for self.    Subjective   Pt seen and examined.  No documented concerns/events overnight from nursing.  Patient sitting up in chair.  Continues to have dyspnea with minimal exertion.  Febrile overnight.  Denies chills.       Objective     Physical Exam  Constitutional:       General: She is not in acute distress.     Appearance: She is obese. She is ill-appearing. She is not toxic-appearing.   Cardiovascular:      Rate and Rhythm: Normal rate and regular rhythm.      Pulses: Normal pulses.      Heart sounds: Normal heart sounds.   Pulmonary:      Effort: No respiratory distress.      Breath sounds: Rales present.   Abdominal:      General: Bowel sounds are normal.      Palpations: Abdomen is soft.   Musculoskeletal:      Right lower leg: Edema present.      Left lower leg: Edema present.   Skin:     General: Skin is warm and dry.   Neurological:      General: No focal deficit present.      Mental Status: She is alert and oriented to person, place, and time.   Psychiatric:         Mood and Affect: Mood normal.         Behavior: Behavior normal.         Last Recorded Vitals  Blood pressure 124/55, pulse 94, temperature 36.6 °C (97.9 °F), temperature source Oral, resp. rate 20, height 1.727 m (5' 7.99\"), weight 126 kg (277 lb 3.2 oz), SpO2 92%.  Intake/Output last 3 Shifts:  I/O last 3 completed shifts:  In: 200 (1.6 mL/kg) [P.O.:200]  Out: 700 (5.6 mL/kg) [Urine:700 (0.2 mL/kg/hr)]  Weight: 125.7 kg     Relevant Results    Scheduled medications  cefTRIAXone, 1 g, intravenous, q24h  docusate sodium, 100 mg, oral, BID  doxazosin, 1 mg, oral, Daily  enoxaparin, 40 mg, subcutaneous, Daily  ferrous sulfate (325 mg ferrous sulfate), 1 tablet, oral, Daily with breakfast  furosemide, 40 mg, intravenous, q12h  gabapentin, 300 mg, oral, BID  insulin glargine, 35 Units, subcutaneous, Nightly  insulin lispro, 0-20 Units, subcutaneous, " TID  insulin lispro, 10 Units, subcutaneous, TID  ipratropium-albuteroL, 3 mL, nebulization, TID  ketotifen, 1 drop, Both Eyes, BID  levothyroxine, 75 mcg, oral, Daily  [Held by provider] losartan, 50 mg, oral, Daily  [Held by provider] metoprolol succinate XL, 50 mg, oral, Daily  multivitamin with minerals, 1 tablet, oral, Daily  nystatin, 1 Application, Topical, q8h  oxygen, , inhalation, q8h  pantoprazole, 40 mg, oral, Daily before breakfast  perflutren protein A microsphere, 0.5 mL, intravenous, Once in imaging  polyethylene glycol, 17 g, oral, Daily  rosuvastatin, 5 mg, oral, Daily  sodium zirconium cyclosilicate, 10 g, oral, Daily  sulfur hexafluoride microsphr, 2 mL, intravenous, Once in imaging      Continuous medications     PRN medications  PRN medications: acetaminophen **OR** [DISCONTINUED] acetaminophen **OR** [DISCONTINUED] acetaminophen, ammonium lactate, bisacodyl, dextrose, glucagon, hydrOXYzine pamoate, ipratropium-albuteroL, ondansetron ODT **OR** [DISCONTINUED] ondansetron       following data reviewed    WBC-7.3  Hgb-8.4  Hct-28.0  Platelets-116        Na-137  K+-4.9  Cl-100  Bicarb-29  BUN-67  creatinine-3.1           CXR  Impression:     Diffuse bilateral infiltrates in a pattern suggesting pulmonary edema  with more focal infiltrate suspected at the left base suggesting  consolidation and perhaps pneumonia. Infiltrates appear more marked  compared to 06/07/2024.                        Assessment/Plan   Principal Problem:    Unable to care for self  Active Problems:    Type 2 diabetes mellitus (Multi)    Morbid obesity (Multi)    Peripheral vascular disease (CMS-HCC)    Hypertension    Pressure sore on sacrum    Behavior problem, adult    Renal failure    Acute respiratory failure with hypoxia and hypercapnia (Multi)    Pressure injury of skin of heel    Acute on Chronic Respiratory Failure with hypoxia & hypercapnia   -ECHO with an EF of 60-65%, severe pulm. HTN  -pulmonology consult  -oxygen  PRN, wean as tolerated       Acute on Chronic Kidney Disease  -monitor   -nephrology following  -hold nephrotoxic agents  -renally dose medications  -US with possible mass/malignancy, pt refused MRI  urology recommended    Unable to care for self   -PT,OT recommend moderate intensity rehab  -social service consult       DM II  -monitor blood glucose  -ISS  -diabetic diet  -HgbA1C 7.5  -started on Lantus    Morbid obesity   -encourage dietary and lifestyle modifications       Hypertension   -continue medications     Pressure injury of sacral region, stage 3 and both ankles   -Wounds are healing well no sign of infection  - wound care gave recommendations  -  Continue MediHoney, Heel protectors         Hypothyroid   - Continue levothyroxine     Anemia   -continue iron supplementation     Plan of Care  Above plan discussed with pt and she is in agreement            Velvet Mortensen, ACE-CNP

## 2024-06-10 NOTE — PROGRESS NOTES
06/10/24 0909   Discharge Planning   Patient expects to be discharged to: [facility] when medically appropriate pending authorization and contract from patient's insurance. Reached out to [place] to check on status.   Does the patient need discharge transport arranged? Yes   RoundTrip coordination needed? Yes   Has discharge transport been arranged? No

## 2024-06-10 NOTE — PROGRESS NOTES
"Brittanie Smart is a 63 y.o. female on day 7 of admission presenting with Unable to care for self.    Subjective   Patient sitting up in chair sleeping opens eyes to verbal stimuli then quickly falls back asleep.  Respiratory complaints.  Denies pain.  Afebrile.       Objective     Physical Exam  Vitals and nursing note reviewed.   Constitutional:       Appearance: She is obese. She is ill-appearing.   HENT:      Head: Normocephalic and atraumatic.      Nose: Nose normal.      Mouth/Throat:      Mouth: Mucous membranes are moist.   Eyes:      Extraocular Movements: Extraocular movements intact.      Conjunctiva/sclera: Conjunctivae normal.      Pupils: Pupils are equal, round, and reactive to light.   Cardiovascular:      Rate and Rhythm: Normal rate and regular rhythm.      Pulses: Normal pulses.      Heart sounds: Normal heart sounds.   Pulmonary:      Effort: Pulmonary effort is normal.      Breath sounds: Rales present.      Comments: Lungs diminished with bibasilar rales.  Abdominal:      General: Bowel sounds are normal.      Palpations: Abdomen is soft.   Musculoskeletal:         General: Normal range of motion.      Right lower leg: Edema present.      Left lower leg: Edema present.   Skin:     General: Skin is warm and dry.      Capillary Refill: Capillary refill takes less than 2 seconds.   Neurological:      General: No focal deficit present.      Mental Status: She is alert and oriented to person, place, and time.   Psychiatric:         Mood and Affect: Mood normal.         Behavior: Behavior normal.         Last Recorded Vitals  Blood pressure 124/55, pulse 94, temperature 36.6 °C (97.9 °F), temperature source Oral, resp. rate 20, height 1.727 m (5' 7.99\"), weight 126 kg (277 lb 3.2 oz), SpO2 93%.  Intake/Output last 3 Shifts:  I/O last 3 completed shifts:  In: 250 (2 mL/kg) [P.O.:200; IV Piggyback:50]  Out: 700 (5.6 mL/kg) [Urine:700 (0.2 mL/kg/hr)]  Weight: 125.7 kg       Intake/Output Summary (Last 24 " hours) at 6/10/2024 1502  Last data filed at 6/10/2024 0856  Gross per 24 hour   Intake 254 ml   Output 700 ml   Net -446 ml      cefTRIAXone, 1 g, intravenous, q24h  docusate sodium, 100 mg, oral, BID  doxazosin, 1 mg, oral, Daily  enoxaparin, 40 mg, subcutaneous, Daily  ferrous sulfate (325 mg ferrous sulfate), 1 tablet, oral, Daily with breakfast  furosemide, 40 mg, intravenous, q12h  gabapentin, 300 mg, oral, BID  insulin glargine, 35 Units, subcutaneous, Nightly  insulin lispro, 0-20 Units, subcutaneous, TID  insulin lispro, 10 Units, subcutaneous, TID  ipratropium-albuteroL, 3 mL, nebulization, TID  ketotifen, 1 drop, Both Eyes, BID  levothyroxine, 75 mcg, oral, Daily  [Held by provider] losartan, 50 mg, oral, Daily  [Held by provider] metoprolol succinate XL, 50 mg, oral, Daily  multivitamin with minerals, 1 tablet, oral, Daily  nystatin, 1 Application, Topical, q8h  oxygen, , inhalation, q8h  pantoprazole, 40 mg, oral, Daily before breakfast  perflutren protein A microsphere, 0.5 mL, intravenous, Once in imaging  polyethylene glycol, 17 g, oral, Daily  rosuvastatin, 5 mg, oral, Daily  sodium zirconium cyclosilicate, 10 g, oral, Daily  sulfur hexafluoride microsphr, 2 mL, intravenous, Once in imaging       PRN medications: acetaminophen **OR** [DISCONTINUED] acetaminophen **OR** [DISCONTINUED] acetaminophen, ammonium lactate, bisacodyl, dextrose, glucagon, hydrOXYzine pamoate, ipratropium-albuteroL, ondansetron ODT **OR** [DISCONTINUED] ondansetron       Relevant Results  Results for orders placed or performed during the hospital encounter of 06/02/24 (from the past 24 hour(s))   POCT GLUCOSE   Result Value Ref Range    POCT Glucose 158 (H) 74 - 99 mg/dL   POCT GLUCOSE   Result Value Ref Range    POCT Glucose 172 (H) 74 - 99 mg/dL   CBC   Result Value Ref Range    WBC 7.3 4.4 - 11.3 x10*3/uL    nRBC 0.0 0.0 - 0.0 /100 WBCs    RBC 3.04 (L) 4.00 - 5.20 x10*6/uL    Hemoglobin 8.4 (L) 12.0 - 16.0 g/dL     Hematocrit 28.0 (L) 36.0 - 46.0 %    MCV 92 80 - 100 fL    MCH 27.6 26.0 - 34.0 pg    MCHC 30.0 (L) 32.0 - 36.0 g/dL    RDW 22.9 (H) 11.5 - 14.5 %    Platelets 116 (L) 150 - 450 x10*3/uL   Basic Metabolic Panel   Result Value Ref Range    Glucose 206 (H) 65 - 99 mg/dL    Sodium 137 133 - 145 mmol/L    Potassium 4.9 3.4 - 5.1 mmol/L    Chloride 100 97 - 107 mmol/L    Bicarbonate 29 24 - 31 mmol/L    Urea Nitrogen 67 (H) 8 - 25 mg/dL    Creatinine 3.10 (H) 0.40 - 1.60 mg/dL    eGFR 16 (L) >60 mL/min/1.73m*2    Calcium 8.6 8.5 - 10.4 mg/dL    Anion Gap 8 <=19 mmol/L   POCT GLUCOSE   Result Value Ref Range    POCT Glucose 176 (H) 74 - 99 mg/dL   POCT GLUCOSE   Result Value Ref Range    POCT Glucose 209 (H) 74 - 99 mg/dL      XR chest 1 view  Result Date: 6/9/2024  FINDINGS: Exam is limited by the patient's body habitus. Cardiac size indeterminate in view of the AP projection. Diffuse bilateral infiltrates are present with more focal density at the left base silhouetting left hemidiaphragm with air bronchograms at the left base. The findings suggest left lower lobe consolidation . Small effusions may be obscured on this portable study.   Diffuse bilateral infiltrates in a pattern suggesting pulmonary edema with more focal infiltrate suspected at the left base suggesting consolidation and perhaps pneumonia. Infiltrates appear more marked compared to 06/07/2024.       US elastography parenchyma EG organ  Result Date: 6/8/2024  Interpreted By:  Otilio Hernandez, STUDY: US ELASTOGRAPHY PARENCHYMA EG ORGAN; 6/7/2024 6:37 pm   INDICATION: Signs/Symptoms:Elastography, r/o WINSTON CIRRHOSIS.   COMPARISON: None   ACCESSION NUMBER(S): [id number]   ORDERING CLINICIAN: SHANNON WESTON   TECHNIQUE: Limited abdominal ultrasound of the right upper quadrant was performed utilizing gray scale imaging.   FINDINGS: COMMENTS: Technologist note indicates the examination is technically suboptimal with measurements not thought to be accurate due to  the patient's condition. Patient was unable to hold breath or to roll. Increased body habitus limits assessment as well.   Measurements obtained at this time are as follows:   Elastography Median: 12.5 kPA   Elastography IQR/Median: 26.6 %. Measurements were obtained as above but are now thought to be accurate due to the patient's body habitus and suboptimal cooperation at this time. Repeat evaluation at a later date may be appropriate.   Liver Fibrosis Staging Metavir Score kPa m/s Normal- Mild  F1 5.48 kPa-8.29 kPa 1.35 m/s- 1.66 m/s Mild-Moderate  F2 8.29 kPa-9.40 kPa 1.66 m/s- 1.77 m/s Moderate- Severe F3 9.40 kPa-11.9 kPa 1.77 m/s- 1.99 m/s Cirrhosis F4 >11.9 kPa >1.99 m/s   CAUTION: The values for the shear wave speed and tissue modulus are relative indices intended only for the purpose of comparison with other measurements performed using the LOGIQ E9 and E10 Absolute values for these measurements may vary among different measurement devices.       XR chest 1 view  Result Date: 6/7/2024  FINDINGS: The heart is enlarged with mild pulmonary vascular congestion seen. No focal infiltrate or focal airspace consolidation is identified.   No obvious pleural effusions are seen on the AP projection.  Mild cardiomegaly and mild pulmonary vascular congestion.   The bilateral pleural effusions seen on 06/03/2024 are not obvious on the AP projection. PA and lateral views of the chest are recommended when the patient's condition permits.       US gallbladder  Result Date: 6/6/2024  FINDINGS: COMMENTS: Technologist note states that the exam was limited due to patient being uncooperative.   Liver: There is a very coarse echotexture of the hepatic parenchyma with a lobulated hepatic contour consistent with underlying cirrhosis. Gallbladder: Gallbladder is filled with stones. There is diffuse gallbladder wall thickening measuring 5 mm with no pericholecystic fluid identified. Sonographic Latham's sign: Negative Pancreas:  Pancreas is predominantly obscured by bowel gas. CBD: 0.36 cm Right Kidney:  Minimal amount of fluid is identified adjacent to the kidney. The kidney itself is unremarkable.  1. Abnormal appearance of the liver in a pattern consistent with cirrhosis. The splenomegaly noted on CT may be related to portal venous hypertension considering this appearance. 2. Cholelithiasis with gallbladder wall thickening but negative sonographic Latham's sign and no pericholecystic fluid. Findings may be indicative of chronic cholecystitis. Please correlate clinically. 3. Nonspecific perinephric fluid. 4. Please note exam is technically limited due to suboptimal cooperation from the patient according to technologist note.       CT kidney wo IV contrast  Result Date: 6/5/2024  FINDINGS: ABDOMEN CT: SOLID ORGAN ASSESSMENT: Moderate bilateral pleural effusions are present. Bibasilar atelectasis is present. Infiltrate may be obscured by the atelectatic changes.   Within limits of this unenhanced study no focal masses are identified within the liver, spleen, pancreas, right kidney or adrenal glands. Spleen is enlarged with a craniocaudal extensive 16.4 cm.   CT examination does confirm a rim calcified mass within the mid to lower pole left kidney measuring on the order of 3.1 cm in diameter. Delineation and characterization of the mass is somewhat limited due to the absence of contrast. There is suspicion of mildly enlarged retroperitoneal lymph nodes on the left at the level of the left kidney, again not well characterized due to the patient's body habitus and absence of contrast.   Multiple stones are identified along the dependent portion the gallbladder. Gallbladder wall is not well defined raising possibility of gallbladder wall thickening and/or pericholecystic fluid. Please correlate with patient's clinical assessment to rule out acute cholecystitis. Right upper quadrant ultrasound could be obtained to further assess this finding.    RETROPERITONEUM: AORTA:  There is no evidence for abdominal aortic aneurysm.   BOWEL ASSESSMENT: No intraperitoneal free air is identified. There is a nonspecific appearance of the stomach. Visualized portions of the large and small bowel are unremarkable.   ABDOMINAL WILKINS: There is no evidence for a hernia. Anasarca is present.   OSSEOUS STRUCTURES: No lytic or blastic lesions are identified. Spurring is noted throughout the spine.   1. Indeterminate rim calcified mass left kidney as was suspected on the ultrasound study. Both benign and malignant etiologies are considered in the differential this time with characterization of the mass limited due to the technical issues on this study and the ultrasound study. There is suspicion of left-sided periaortic lymphadenopathy, again not well characterized due to the absence of intravenous contrast. 2. Cholelithiasis with poorly defined gallbladder wall margins. Please correlate with clinical history to rule out the possibility of acute cholecystitis. Right upper quadrant ultrasound may be attempted for further characterization the gallbladder wall to rule out gallbladder wall thickening and pericholecystic fluid. 3. Anasarca. 4. Moderate bilateral pleural effusions with bibasilar atelectasis most marked involving the lower lobes. 5. Splenomegaly. Spleen measures 16.4 cm in craniocaudal extent.           Transthoracic Echo (TTE) Complete  Result Date: 6/4/2024  CONCLUSIONS:  1. Left ventricular systolic function is normal with a 60-65% estimated ejection fraction.  2. Poorly visualized anatomical structures due to suboptimal image quality.  3. Spectral Doppler shows an impaired relaxation pattern of left ventricular diastolic filling.  4. RV not well visualized but suggests RV enlargemnt and RV hypokinesis in some views, with severe pulmonary hypertension.  5. Severe tricuspid regurgitation.  6. Severely elevated right ventricular systolic pressure.  7. Aortic valve  appears abnormal.  8. Aortic valve sclerosis.     US renal complete  Result Date: 6/4/2024  FINDINGS: COMMENTS: Technologist note indicates that the study was quite limited due to the patient's body habitus. Exam was performed portably as well.   The right kidney measures 10.7 cm x 3.9 cm x 4.7 cm . The left kidney measures 11.8 cm x 5.4 cm x 5.4 cm. THERE IS A HETEROGENEOUS COMPLEX MASS LATERALLY MID TO LOWER POLE LEFT KIDNEY MEASURING 4.3 X 3.9 X 4.6 CM.     No renal stones are identified.  The renal cortical thickness and echogenicity is normal.  No hydronephrosis is identified.   Urinary bladder is nonspecific in appearance with no stones or masses identified.   COMPLEX APPEARING MASS LEFT KIDNEY MEASURING UP TO 4.6 CM IN DIAMETER, WITH A DIFFERENTIAL TO INCLUDE MALIGNANCY. CT UROGRAM WITH AND WITHOUT CONTRAST IS SUGGESTED FOR MORE DEFINITIVE ASSESSMENT.      XR chest 2 views  Result Date: 6/3/2024  FINDINGS: The cardiac size is indeterminate due to the AP projection.   Right hemidiaphragm is elevated. On lateral projection, bilateral pleural effusions are observed. No obvious focal infiltrate or airspace consolidation is seen.   Bilateral pleural effusions on lateral projection and elevated right hemidiaphragm.       Assessment/Plan   Principal Problem:    Unable to care for self  Active Problems:    Type 2 diabetes mellitus (Multi)    Morbid obesity (Multi)    Peripheral vascular disease (CMS-HCC)    Hypertension    Pressure sore on sacrum    Behavior problem, adult    Renal failure    Acute respiratory failure with hypoxia and hypercapnia (Multi)    Pressure injury of skin of heel    Impression  Acute on chronic hypoxic, hypercapnic respiratory failure  Acute kidney injury on chronic kidney disease stage III  Left-sided renal mass-patient refused MRI recommended by Urology  Acute on chronic congestive heart failure  Severe pulmonary hypertension  Acute urinary tract infection  Diabetes mellitus type 2  Morbid  obesity    Plan  Wean oxygen as sats allow  Continue IBD/ICS  Continuous pulse oximetry  Incentive spirometry/pulmonary hygiene  IV ceftriaxone for UTI  IV Lasix  Arterial blood gas  CT scan chest without contrast pending  Prophylaxis  PT/OT/out of bed    Radha Corey, APRN-CNP  [facility]

## 2024-06-11 LAB
A2 MACROGLOB SERPL-MCNC: 267 MG/DL (ref 110–276)
ALBUMIN SERPL-MCNC: 2.6 G/DL (ref 3.5–5)
ALP BONE SERPL-CCNC: 71 U/L (ref 0–55)
ALP LIVER SERPL-CCNC: 97 U/L (ref 0–94)
ALP OTHER SERPL-CCNC: 0 U/L
ALP SERPL-CCNC: 168 U/L (ref 40–120)
ALT SERPL W P-5'-P-CCNC: 10 IU/L (ref 0–40)
ANA SER QL HEP2 SUBST: NEGATIVE
ANION GAP SERPL CALC-SCNC: 10 MMOL/L
APO A-I SERPL-MCNC: 100 MG/DL (ref 116–209)
AST SERPL W P-5'-P-CCNC: 20 IU/L (ref 0–40)
BILIRUB SERPL-MCNC: 0.2 MG/DL (ref 0–1.2)
BUN SERPL-MCNC: 66 MG/DL (ref 8–25)
CALCIUM SERPL-MCNC: 8.6 MG/DL (ref 8.5–10.4)
CHLORIDE SERPL-SCNC: 101 MMOL/L (ref 97–107)
CHOLEST SERPL-MCNC: 107 MG/DL (ref 100–199)
CO2 SERPL-SCNC: 27 MMOL/L (ref 24–31)
CREAT SERPL-MCNC: 2.9 MG/DL (ref 0.4–1.6)
EGFRCR SERPLBLD CKD-EPI 2021: 17 ML/MIN/1.73M*2
FIBROSIS SCORING:: ABNORMAL
FIBROSIS STAGE SERPL QL: ABNORMAL
GGT SERPL-CCNC: 34 IU/L (ref 0–60)
GLUCOSE BLD MANUAL STRIP-MCNC: 152 MG/DL (ref 74–99)
GLUCOSE BLD MANUAL STRIP-MCNC: 196 MG/DL (ref 74–99)
GLUCOSE BLD MANUAL STRIP-MCNC: 196 MG/DL (ref 74–99)
GLUCOSE BLD MANUAL STRIP-MCNC: 205 MG/DL (ref 74–99)
GLUCOSE SERPL-MCNC: 164 MG/DL (ref 65–99)
GLUCOSE SERPL-MCNC: 313 MG/DL (ref 70–99)
HAPTOGLOB SERPL-MCNC: 153 MG/DL (ref 37–355)
LABORATORY COMMENT REPORT: ABNORMAL
LIVER FIBR SCORE SERPL CALC.FIBROSURE: 0.32 (ref 0–0.21)
LIVER STEATOSIS GRADE SERPL QL: ABNORMAL
LIVER STEATOSIS SCORE SERPL: 0.77 (ref 0–0.4)
MAGNESIUM SERPL-MCNC: 2 MG/DL (ref 1.6–3.1)
MITOCHONDRIA AB SER QL IF: NEGATIVE
NASH GRADE SERPL QL: ABNORMAL
NASH INTERPRETATION SERPL-IMP: ABNORMAL
NASH SCORE SERPL: 0.43 (ref 0–0.25)
NASH SCORING: ABNORMAL
NT-PROBNP SERPL-MCNC: ABNORMAL PG/ML (ref 0–353)
PHOSPHATE SERPL-MCNC: 5.2 MG/DL (ref 2.5–4.5)
POTASSIUM SERPL-SCNC: 4.4 MMOL/L (ref 3.4–5.1)
SODIUM SERPL-SCNC: 138 MMOL/L (ref 133–145)
STEATOSIS SCORING: ABNORMAL
TEST PERFORMANCE INFO SPEC: ABNORMAL
TEST PERFORMANCE INFO SPEC: ABNORMAL
TRIGL SERPL-MCNC: 137 MG/DL (ref 0–149)
TROPONIN T SERPL-MCNC: 79 NG/L

## 2024-06-11 PROCEDURE — 83880 ASSAY OF NATRIURETIC PEPTIDE: CPT | Performed by: INTERNAL MEDICINE

## 2024-06-11 PROCEDURE — 2500000001 HC RX 250 WO HCPCS SELF ADMINISTERED DRUGS (ALT 637 FOR MEDICARE OP): Performed by: EMERGENCY MEDICINE

## 2024-06-11 PROCEDURE — 2500000002 HC RX 250 W HCPCS SELF ADMINISTERED DRUGS (ALT 637 FOR MEDICARE OP, ALT 636 FOR OP/ED): Performed by: NURSE PRACTITIONER

## 2024-06-11 PROCEDURE — 97530 THERAPEUTIC ACTIVITIES: CPT | Mod: GO

## 2024-06-11 PROCEDURE — 2500000004 HC RX 250 GENERAL PHARMACY W/ HCPCS (ALT 636 FOR OP/ED): Performed by: NURSE PRACTITIONER

## 2024-06-11 PROCEDURE — 99233 SBSQ HOSP IP/OBS HIGH 50: CPT | Performed by: INTERNAL MEDICINE

## 2024-06-11 PROCEDURE — 2500000005 HC RX 250 GENERAL PHARMACY W/O HCPCS: Performed by: INTERNAL MEDICINE

## 2024-06-11 PROCEDURE — 94760 N-INVAS EAR/PLS OXIMETRY 1: CPT

## 2024-06-11 PROCEDURE — 99232 SBSQ HOSP IP/OBS MODERATE 35: CPT | Performed by: NURSE PRACTITIONER

## 2024-06-11 PROCEDURE — 83735 ASSAY OF MAGNESIUM: CPT | Performed by: INTERNAL MEDICINE

## 2024-06-11 PROCEDURE — 80069 RENAL FUNCTION PANEL: CPT | Performed by: INTERNAL MEDICINE

## 2024-06-11 PROCEDURE — 2500000005 HC RX 250 GENERAL PHARMACY W/O HCPCS: Performed by: EMERGENCY MEDICINE

## 2024-06-11 PROCEDURE — 84484 ASSAY OF TROPONIN QUANT: CPT | Performed by: INTERNAL MEDICINE

## 2024-06-11 PROCEDURE — 82947 ASSAY GLUCOSE BLOOD QUANT: CPT

## 2024-06-11 PROCEDURE — 2500000002 HC RX 250 W HCPCS SELF ADMINISTERED DRUGS (ALT 637 FOR MEDICARE OP, ALT 636 FOR OP/ED): Performed by: INTERNAL MEDICINE

## 2024-06-11 PROCEDURE — 94640 AIRWAY INHALATION TREATMENT: CPT

## 2024-06-11 PROCEDURE — 2500000001 HC RX 250 WO HCPCS SELF ADMINISTERED DRUGS (ALT 637 FOR MEDICARE OP): Performed by: INTERNAL MEDICINE

## 2024-06-11 PROCEDURE — 1100000001 HC PRIVATE ROOM DAILY

## 2024-06-11 PROCEDURE — 2500000001 HC RX 250 WO HCPCS SELF ADMINISTERED DRUGS (ALT 637 FOR MEDICARE OP): Performed by: NURSE PRACTITIONER

## 2024-06-11 PROCEDURE — 36415 COLL VENOUS BLD VENIPUNCTURE: CPT | Performed by: INTERNAL MEDICINE

## 2024-06-11 PROCEDURE — 2500000002 HC RX 250 W HCPCS SELF ADMINISTERED DRUGS (ALT 637 FOR MEDICARE OP, ALT 636 FOR OP/ED): Performed by: EMERGENCY MEDICINE

## 2024-06-11 PROCEDURE — 2500000004 HC RX 250 GENERAL PHARMACY W/ HCPCS (ALT 636 FOR OP/ED): Performed by: INTERNAL MEDICINE

## 2024-06-11 PROCEDURE — 9420000001 HC RT PATIENT EDUCATION 5 MIN

## 2024-06-11 RX ADMIN — INSULIN LISPRO 10 UNITS: 100 INJECTION, SOLUTION INTRAVENOUS; SUBCUTANEOUS at 12:44

## 2024-06-11 RX ADMIN — DOCUSATE SODIUM 100 MG: 100 CAPSULE, LIQUID FILLED ORAL at 21:54

## 2024-06-11 RX ADMIN — FUROSEMIDE 40 MG: 10 INJECTION, SOLUTION INTRAMUSCULAR; INTRAVENOUS at 08:58

## 2024-06-11 RX ADMIN — Medication 4 L/MIN: at 07:00

## 2024-06-11 RX ADMIN — KETOTIFEN FUMARATE 1 DROP: 0.25 SOLUTION/ DROPS OPHTHALMIC at 09:01

## 2024-06-11 RX ADMIN — DOCUSATE SODIUM 100 MG: 100 CAPSULE, LIQUID FILLED ORAL at 08:57

## 2024-06-11 RX ADMIN — INSULIN LISPRO 4 UNITS: 100 INJECTION, SOLUTION INTRAVENOUS; SUBCUTANEOUS at 12:45

## 2024-06-11 RX ADMIN — GABAPENTIN 300 MG: 300 CAPSULE ORAL at 21:54

## 2024-06-11 RX ADMIN — SODIUM ZIRCONIUM CYCLOSILICATE 10 G: 10 POWDER, FOR SUSPENSION ORAL at 08:57

## 2024-06-11 RX ADMIN — IPRATROPIUM BROMIDE AND ALBUTEROL SULFATE 3 ML: .5; 3 SOLUTION RESPIRATORY (INHALATION) at 12:00

## 2024-06-11 RX ADMIN — NYSTATIN 1 APPLICATION: 100000 POWDER TOPICAL at 22:00

## 2024-06-11 RX ADMIN — PANTOPRAZOLE SODIUM 40 MG: 40 TABLET, DELAYED RELEASE ORAL at 06:15

## 2024-06-11 RX ADMIN — IPRATROPIUM BROMIDE AND ALBUTEROL SULFATE 3 ML: .5; 3 SOLUTION RESPIRATORY (INHALATION) at 07:15

## 2024-06-11 RX ADMIN — Medication 4 L/MIN: at 20:03

## 2024-06-11 RX ADMIN — CEFTRIAXONE SODIUM 1 G: 1 INJECTION, SOLUTION INTRAVENOUS at 16:17

## 2024-06-11 RX ADMIN — NYSTATIN 1 APPLICATION: 100000 POWDER TOPICAL at 15:08

## 2024-06-11 RX ADMIN — IPRATROPIUM BROMIDE AND ALBUTEROL SULFATE 3 ML: .5; 3 SOLUTION RESPIRATORY (INHALATION) at 19:30

## 2024-06-11 RX ADMIN — DOXAZOSIN 1 MG: 1 TABLET ORAL at 09:01

## 2024-06-11 RX ADMIN — KETOTIFEN FUMARATE 1 DROP: 0.25 SOLUTION/ DROPS OPHTHALMIC at 21:50

## 2024-06-11 RX ADMIN — ROSUVASTATIN CALCIUM 5 MG: 10 TABLET, COATED ORAL at 08:57

## 2024-06-11 RX ADMIN — INSULIN LISPRO 10 UNITS: 100 INJECTION, SOLUTION INTRAVENOUS; SUBCUTANEOUS at 08:30

## 2024-06-11 RX ADMIN — FERROUS SULFATE TAB 325 MG (65 MG ELEMENTAL FE) 1 TABLET: 325 (65 FE) TAB at 08:57

## 2024-06-11 RX ADMIN — NYSTATIN 1 APPLICATION: 100000 POWDER TOPICAL at 06:15

## 2024-06-11 RX ADMIN — Medication 1 TABLET: at 08:57

## 2024-06-11 RX ADMIN — INSULIN LISPRO 4 UNITS: 100 INJECTION, SOLUTION INTRAVENOUS; SUBCUTANEOUS at 17:17

## 2024-06-11 RX ADMIN — GABAPENTIN 300 MG: 300 CAPSULE ORAL at 08:58

## 2024-06-11 RX ADMIN — INSULIN LISPRO 4 UNITS: 100 INJECTION, SOLUTION INTRAVENOUS; SUBCUTANEOUS at 08:30

## 2024-06-11 RX ADMIN — ENOXAPARIN SODIUM 40 MG: 40 INJECTION SUBCUTANEOUS at 08:57

## 2024-06-11 RX ADMIN — INSULIN GLARGINE 35 UNITS: 100 INJECTION, SOLUTION SUBCUTANEOUS at 21:52

## 2024-06-11 RX ADMIN — INSULIN LISPRO 10 UNITS: 100 INJECTION, SOLUTION INTRAVENOUS; SUBCUTANEOUS at 17:16

## 2024-06-11 RX ADMIN — FUROSEMIDE 40 MG: 10 INJECTION, SOLUTION INTRAMUSCULAR; INTRAVENOUS at 21:54

## 2024-06-11 RX ADMIN — LEVOTHYROXINE SODIUM 75 MCG: 0.07 TABLET ORAL at 06:15

## 2024-06-11 ASSESSMENT — PAIN - FUNCTIONAL ASSESSMENT
PAIN_FUNCTIONAL_ASSESSMENT: 0-10
PAIN_FUNCTIONAL_ASSESSMENT: 0-10

## 2024-06-11 ASSESSMENT — COGNITIVE AND FUNCTIONAL STATUS - GENERAL
CLIMB 3 TO 5 STEPS WITH RAILING: TOTAL
TOILETING: TOTAL
EATING MEALS: A LITTLE
WALKING IN HOSPITAL ROOM: TOTAL
EATING MEALS: A LITTLE
DRESSING REGULAR UPPER BODY CLOTHING: A LOT
HELP NEEDED FOR BATHING: A LOT
STANDING UP FROM CHAIR USING ARMS: TOTAL
PERSONAL GROOMING: A LITTLE
TOILETING: TOTAL
DRESSING REGULAR LOWER BODY CLOTHING: TOTAL
DRESSING REGULAR LOWER BODY CLOTHING: TOTAL
PERSONAL GROOMING: A LITTLE
DAILY ACTIVITIY SCORE: 12
DAILY ACTIVITIY SCORE: 12
MOVING TO AND FROM BED TO CHAIR: TOTAL
HELP NEEDED FOR BATHING: A LOT
DRESSING REGULAR UPPER BODY CLOTHING: A LOT

## 2024-06-11 ASSESSMENT — PAIN SCALES - GENERAL
PAINLEVEL_OUTOF10: 0 - NO PAIN

## 2024-06-11 ASSESSMENT — PAIN SCALES - WONG BAKER: WONGBAKER_NUMERICALRESPONSE: NO HURT

## 2024-06-11 NOTE — PROGRESS NOTES
"Subjective Data:  Patient remains on BiPAP.  Denies having any chest pain.  No shortness of breath.    Overnight Events:    Telemetry overnight reviewed no events     Objective Data:  Last Recorded Vitals:  Vitals:    06/10/24 2230 06/10/24 2354 06/11/24 0403 06/11/24 0507   BP:  134/65 130/61    BP Location:  Right arm Right arm    Patient Position:  Lying Lying    Pulse:  86 83    Resp: 20 18 18 19   Temp:  36.2 °C (97.2 °F) 36.4 °C (97.5 °F)    TempSrc:  Oral Axillary    SpO2:  100% 100%    Weight:       Height:           Last Labs:  CBC - 6/10/2024:  4:37 AM  7.3 8.4 116    28.0      CMP - 6/11/2024:  4:29 AM  8.6 6.2 19 --- 0.3   5.2 2.6 9 141      PTT - No results in last year.  1.1   11.7 _     HGBA1C   Date/Time Value Ref Range Status   06/02/2024 10:00 PM 7.5 See below % Final      Last I/O:  I/O last 3 completed shifts:  In: 254 (2 mL/kg) [P.O.:200; I.V.:4 (0 mL/kg); IV Piggyback:50]  Out: 700 (5.6 mL/kg) [Urine:700 (0.2 mL/kg/hr)]  Weight: 125.7 kg     Past Cardiology Tests (Last 3 Years):  EKG:  ECG 12 lead 06/02/2024    Echo:  Transthoracic Echo (TTE) Complete 06/04/2024    Ejection Fractions:  No results found for: \"EF\"  Cath:  No results found for this or any previous visit from the past 1095 days.    Stress Test:  No results found for this or any previous visit from the past 1095 days.    Cardiac Imaging:  No results found for this or any previous visit from the past 1095 days.      Inpatient Medications:  Scheduled medications   Medication Dose Route Frequency    cefTRIAXone  1 g intravenous q24h    docusate sodium  100 mg oral BID    doxazosin  1 mg oral Daily    enoxaparin  40 mg subcutaneous Daily    ferrous sulfate (325 mg ferrous sulfate)  1 tablet oral Daily with breakfast    furosemide  40 mg intravenous q12h    gabapentin  300 mg oral BID    insulin glargine  35 Units subcutaneous Nightly    insulin lispro  0-20 Units subcutaneous TID    insulin lispro  10 Units subcutaneous TID    " ipratropium-albuteroL  3 mL nebulization TID    ketotifen  1 drop Both Eyes BID    levothyroxine  75 mcg oral Daily    [Held by provider] losartan  50 mg oral Daily    [Held by provider] metoprolol succinate XL  50 mg oral Daily    multivitamin with minerals  1 tablet oral Daily    nystatin  1 Application Topical q8h    oxygen   inhalation q8h    oxygen   inhalation Continuous - Inhalation    pantoprazole  40 mg oral Daily before breakfast    perflutren protein A microsphere  0.5 mL intravenous Once in imaging    polyethylene glycol  17 g oral Daily    rosuvastatin  5 mg oral Daily    sodium zirconium cyclosilicate  10 g oral Daily    sulfur hexafluoride microsphr  2 mL intravenous Once in imaging     PRN medications   Medication    acetaminophen    ammonium lactate    bisacodyl    dextrose    glucagon    hydrOXYzine pamoate    ipratropium-albuteroL    ondansetron ODT     Continuous Medications   Medication Dose Last Rate       Physical Exam:  General: Patient is in mild respiratory distress  HEENT: atraumatic normocephalic.  Neck: is supple jugular venous pressure difficult to assess patient on BiPAP no thyromegaly.  Cardiovascular regular rate and rhythm normal heart sounds no murmurs rubs or gallops.  Heart sounds  Lungs: Creased breathing sounds at bases abdomen: is soft nontender.  Extremities warm to touch 1+ edema.  Neurologic examination: patient is awake alert oriented to person, place, date/time.  Psychiatric examination: patient has good insight denies feeling suicidal and depressed.  Pulses 2+ intact bilaterally        Assessment/Plan   1 acute on chronic respiratory failure with pulmonary hypertension seen on her echocardiogram and edema on the chest x-ray.  Patient poor historian.  Agree with diuresis for now.  Monitor renal function.  Will reassess in a.m. troponin 7.13 TSH was mildly elevated 7 range.  proBNP 42,000.  Will reassess in a.m.  We may consider using isosorbide for her pulmonary  hypertension.  Pulmonary on board.     2.  Hypertension will monitor for now.  Blood pressure is well-controlled.     3.  Severe anemia.  Iron studies within normal limits.     4.  Diabetes mellitus.     5.  Hyperlipidemia will check lipid panel.     Thank you for allowing to participate in her care  Peripheral IV 06/05/24 20 G Left;Anterior Forearm (Active)   Site Assessment Clean;Dry;Intact 06/10/24 2100   Dressing Status Dry;Clean 06/10/24 2100   Number of days: 6       Code Status:  DNR and No Intubation    Maria A Torre MD

## 2024-06-11 NOTE — PROGRESS NOTES
Occupational Therapy    Occupational Therapy Treatment    Name: Brittanie Smart  MRN: [id number]  : 1960  Date: 24  Time Calculation  Start Time: 1538  Stop Time: 1554  Time Calculation (min): 16 min    Assessment:  OT Assessment: Patient will continue to benefit from skileld OT to increase safety and independence wtih daily tasks.  Prognosis: Good  Barriers to Discharge: Decreased caregiver support (2-3 person assist for txfers. increased assist for ADL tasks)  Evaluation/Treatment Tolerance: Patient limited by fatigue  End of Session Communication: Bedside nurse  End of Session Patient Position: Bed, 4 rail up, Alarm off, not on at start of session  Plan:  Treatment Interventions: ADL retraining, Functional transfer training, UE strengthening/ROM, Endurance training, Patient/family training, Compensatory technique education  OT Frequency: 3 times per week  OT Discharge Recommendations: Moderate intensity level of continued care  Equipment Recommended upon Discharge: Wheeled walker  OT Recommended Transfer Status: Assist of 2, Dependent  OT - OK to Discharge: Yes    Subjective   Previous Visit Info:  OT Last Visit  OT Received On: 24  General:  General  Reason for Referral: decline in ADLs  Referred By: Dr Turner  Past Medical History Relevant to Rehab: Behavior problem, adult      Decubitus ulcer     Depression     GERD (gastroesophageal reflux disease)     Hemorrhoids     Hyperlipidemia     Hypertension     Hypothyroid     Morbid obesity (Multi)     Peripheral vascular disease (CMS-HCC)  Family/Caregiver Present: No  Prior to Session Communication: Bedside nurse  Patient Position Received: Bed, 4 rail up, Alarm off, not on at start of session  Preferred Learning Style: verbal, visual  General Comment: Patient cleared by nursing for therapy. Patient in bed upon arrival and agreeable to ther ex only  Precautions:  Medical Precautions: Fall precautions, Oxygen therapy device and L/min (4L O2 via  NC)  Vitals:  Vital Signs  SpO2: 92 %  Pain Assessment:  Pain Assessment  Pain Assessment: 0-10  Pain Score: 0 - No pain     Objective   Activities of Daily Living: Grooming  Grooming Comments: completed all ADLs earlier    Toileting  Toileting Comments: purewick    Bed Mobility/Transfers: Bed Mobility  Bed Mobility: No (patient declines)    Transfers  Transfer: No (patient declines)    Therapy/Activity: Therapeutic Exercise  Therapeutic Exercise Performed: Yes  Therapeutic Exercise Activity 1: at bed level, patient completes 2x10 B UE AROM exercises in all planes. exercises include bicep curls, chest press, shoulder flex/ext, shoulder horizontal abduction/adduction, forearm supination/pronation, and wrist flex/ext. patient demo fair/fair- form with cues for proper form    Therapeutic Activity  Therapeutic Activity Performed: Yes  Therapeutic Activity 1: patient completes 2x5 half sit ups with Max A and B UE strengthening    RUE   RUE : Exceptions to WFL (generalized weakness) and LUE   LUE: Exceptions to WFL (generalized weakness)    Outcome Measures:  [facility] Daily Activity  Putting on and taking off regular lower body clothing: Total  Bathing (including washing, rinsing, drying): A lot  Putting on and taking off regular upper body clothing: A lot  Toileting, which includes using toilet, bedpan or urinal: Total  Taking care of personal grooming such as brushing teeth: A little  Eating Meals: A little  Daily Activity - Total Score: 12    Education Documentation  Body Mechanics, taught by Jennifer Oswald OT at 6/11/2024  4:10 PM.  Learner: Patient  Readiness: Acceptance  Method: Explanation, Demonstration  Response: Needs Reinforcement    Home Exercise Program, taught by Jennifer Oswald OT at 6/11/2024  4:10 PM.  Learner: Patient  Readiness: Acceptance  Method: Explanation, Demonstration  Response: Needs Reinforcement    Education Comments  No comments found.      Goals:  Encounter Problems       Encounter Problems  (Active)       OT Goals       Patient will be able to complete UB/LB dressing, bathing, grooming with modified independence using good safety and AE as needed. (Progressing)       Start:  06/03/24    Expected End:  06/17/24            Patient will be able to complete toileting tasks with Close Supervision using good safety and with G balance.  (Progressing)       Start:  06/03/24    Expected End:  06/17/24            Patient will be able to complete functional transfers/mobility with the walker with Close Supervision with G balance using good safety.  (Progressing)       Start:  06/03/24    Expected End:  06/17/24            Patient will be able to tolerate 7-8min of functional standing with G balance in prep for ADL/transfers. (Progressing)       Start:  06/03/24    Expected End:  06/17/24

## 2024-06-11 NOTE — PROGRESS NOTES
"Brittanie Smart is a 63 y.o. female on day 8 of admission presenting with Unable to care for self.    Subjective   Pt seen and examined.  No documented concerns/events from nursing. Pt denies any chest pain, palpitations.  She does report dyspnea with exertion.       Objective     Physical Exam  Constitutional:       General: She is not in acute distress.     Appearance: She is obese. She is ill-appearing. She is not toxic-appearing.   Cardiovascular:      Rate and Rhythm: Normal rate and regular rhythm.      Pulses: Normal pulses.      Heart sounds: Normal heart sounds.   Pulmonary:      Effort: No respiratory distress.      Breath sounds: Rales present.   Abdominal:      General: Bowel sounds are normal.      Palpations: Abdomen is soft.   Musculoskeletal:      Right lower leg: Edema present.      Left lower leg: Edema present.   Skin:     General: Skin is warm and dry.   Neurological:      General: No focal deficit present.      Mental Status: She is alert and oriented to person, place, and time.   Psychiatric:         Mood and Affect: Mood normal.         Behavior: Behavior normal.     Last Recorded Vitals  Blood pressure 124/62, pulse 97, temperature 36.5 °C (97.7 °F), temperature source Oral, resp. rate 20, height 1.727 m (5' 7.99\"), weight 126 kg (277 lb 3.2 oz), SpO2 93%.  Intake/Output last 3 Shifts:  I/O last 3 completed shifts:  In: 204 (1.6 mL/kg) [P.O.:200; I.V.:4 (0 mL/kg)]  Out: 1100 (8.7 mL/kg) [Urine:1100 (0.2 mL/kg/hr)]  Weight: 125.7 kg     Relevant Results  Scheduled medications  cefTRIAXone, 1 g, intravenous, q24h  docusate sodium, 100 mg, oral, BID  doxazosin, 1 mg, oral, Daily  enoxaparin, 40 mg, subcutaneous, Daily  ferrous sulfate (325 mg ferrous sulfate), 1 tablet, oral, Daily with breakfast  furosemide, 40 mg, intravenous, q12h  gabapentin, 300 mg, oral, BID  insulin glargine, 35 Units, subcutaneous, Nightly  insulin lispro, 0-20 Units, subcutaneous, TID  insulin lispro, 10 Units, " subcutaneous, TID  ipratropium-albuteroL, 3 mL, nebulization, TID  ketotifen, 1 drop, Both Eyes, BID  levothyroxine, 75 mcg, oral, Daily  [Held by provider] losartan, 50 mg, oral, Daily  [Held by provider] metoprolol succinate XL, 50 mg, oral, Daily  multivitamin with minerals, 1 tablet, oral, Daily  nystatin, 1 Application, Topical, q8h  oxygen, , inhalation, q8h  pantoprazole, 40 mg, oral, Daily before breakfast  perflutren protein A microsphere, 0.5 mL, intravenous, Once in imaging  polyethylene glycol, 17 g, oral, Daily  rosuvastatin, 5 mg, oral, Daily  sodium zirconium cyclosilicate, 10 g, oral, Daily  sulfur hexafluoride microsphr, 2 mL, intravenous, Once in imaging      Continuous medications     PRN medications  PRN medications: acetaminophen **OR** [DISCONTINUED] acetaminophen **OR** [DISCONTINUED] acetaminophen, ammonium lactate, bisacodyl, dextrose, glucagon, hydrOXYzine pamoate, ipratropium-albuteroL, ondansetron ODT **OR** [DISCONTINUED] ondansetron, oxygen     following data reviewed      Na-138  K+-4.4  Cl-101  Bicarb-27  BUN-66  creatinine-2.9                              Assessment/Plan   Principal Problem:    Unable to care for self  Active Problems:    Type 2 diabetes mellitus (Multi)    Morbid obesity (Multi)    Peripheral vascular disease (CMS-HCC)    Hypertension    Pressure sore on sacrum    Behavior problem, adult    Renal failure    Acute respiratory failure with hypoxia and hypercapnia (Multi)    Pressure injury of skin of heel      Acute on Chronic Respiratory Failure with hypoxia & hypercapnia   -ECHO with an EF of 60-65%, severe pulm. HTN  -pulmonology consult  -oxygen PRN, wean as tolerated        Acute on Chronic Kidney Disease  -monitor   -nephrology following  -hold nephrotoxic agents  -renally dose medications  -US with possible mass/malignancy, pt refused MRI  urology recommended     Unable to care for self   -PT,OT recommend moderate intensity rehab  -social service consult         DM II  -monitor blood glucose  -ISS  -diabetic diet  -HgbA1C 7.5  -started on Lantus     Morbid obesity   -encourage dietary and lifestyle modifications        Hypertension   -continue medications     Pressure injury of sacral region, stage 3 and both ankles   -Wounds are healing well no sign of infection  - wound care gave recommendations  -  Continue MediHoney, Heel protectors         Hypothyroid   - Continue levothyroxine     Anemia   -continue iron supplementation     Plan of Care  Above plan discussed with pt and she is in agreement           ACE Orozco-CNP

## 2024-06-11 NOTE — CARE PLAN
Problem: Respiratory  Goal: Wean oxygen to maintain O2 saturation per order/standard this shift  Outcome: Progressing     Problem: Respiratory  Goal: Wean oxygen to maintain O2 saturation per order/standard this shift  Outcome: Progressing

## 2024-06-11 NOTE — CARE PLAN
Problem: Skin  Goal: Decreased wound size/increased tissue granulation at next dressing change  Outcome: Progressing  Goal: Participates in plan/prevention/treatment measures  Outcome: Progressing  Goal: Prevent/minimize sheer/friction injuries  Outcome: Progressing  Flowsheets (Taken 6/11/2024 6948)  Prevent/minimize sheer/friction injuries:   Complete micro-shifts as needed if patient unable. Adjust patient position to relieve pressure points, not a full turn   Turn/reposition every 2 hours/use positioning/transfer devices

## 2024-06-11 NOTE — PROGRESS NOTES
Pulmonary Progress Note 06/11/24     FOLLOWUP FOR: acute on chronic respiratory failure, MARY/OHS    SUBJECTIVE  First time evaluating.  Seen yesterday by my nurse practitioner.  Case was signed out to me by Dr. Shirley.  Yesterday had severe alteration of mental status with blood gas showing CO2 retention.  Recommended that she be placed on her BiPAP with improvement in her mentation and seems.  She tolerated the BiPAP and utilized all night.  She states it gives her headaches.  Breathing is fair.  Currently on oxygen.    PHYSICAL EXAM        6/10/2024     7:34 PM 6/10/2024    10:30 PM 6/10/2024    11:54 PM 6/11/2024     4:03 AM 6/11/2024     5:07 AM 6/11/2024     7:48 AM 6/11/2024    11:18 AM   Vitals   Systolic 113  134 130  115 124   Diastolic 59  65 61  62 62   Heart Rate 87  86 83  90 97   Temp 36.2 °C (97.2 °F)  36.2 °C (97.2 °F) 36.4 °C (97.5 °F)  36.3 °C (97.3 °F) 36.5 °C (97.7 °F)   Resp 18 20 18 18 19 18 20       Intake/Output last 3 shifts:  I/O last 3 completed shifts:  In: 204 (1.6 mL/kg) [P.O.:200; I.V.:4 (0 mL/kg)]  Out: 1100 (8.7 mL/kg) [Urine:1100 (0.2 mL/kg/hr)]  Weight: 125.7 kg   Intake/Output this shift:  No intake/output data recorded.        Physical Exam  Vitals reviewed.   Constitutional:       Appearance: She is obese. She is ill-appearing.      Interventions: Nasal cannula in place.   Cardiovascular:      Rate and Rhythm: Normal rate and regular rhythm.   Pulmonary:      Effort: Pulmonary effort is normal. No respiratory distress.      Breath sounds: Decreased air movement present. No wheezing.   Musculoskeletal:      Right lower leg: No edema.      Left lower leg: No edema.   Skin:     General: Skin is warm.   Neurological:      Mental Status: She is alert and oriented to person, place, and time.   Psychiatric:         Mood and Affect: Mood normal.         Behavior: Behavior normal.           Scheduled medications  cefTRIAXone, 1 g, intravenous, q24h  docusate sodium, 100 mg,  oral, BID  doxazosin, 1 mg, oral, Daily  enoxaparin, 40 mg, subcutaneous, Daily  ferrous sulfate (325 mg ferrous sulfate), 1 tablet, oral, Daily with breakfast  furosemide, 40 mg, intravenous, q12h  gabapentin, 300 mg, oral, BID  insulin glargine, 35 Units, subcutaneous, Nightly  insulin lispro, 0-20 Units, subcutaneous, TID  insulin lispro, 10 Units, subcutaneous, TID  ipratropium-albuteroL, 3 mL, nebulization, TID  ketotifen, 1 drop, Both Eyes, BID  levothyroxine, 75 mcg, oral, Daily  [Held by provider] losartan, 50 mg, oral, Daily  [Held by provider] metoprolol succinate XL, 50 mg, oral, Daily  multivitamin with minerals, 1 tablet, oral, Daily  nystatin, 1 Application, Topical, q8h  oxygen, , inhalation, q8h  pantoprazole, 40 mg, oral, Daily before breakfast  perflutren protein A microsphere, 0.5 mL, intravenous, Once in imaging  polyethylene glycol, 17 g, oral, Daily  rosuvastatin, 5 mg, oral, Daily  sodium zirconium cyclosilicate, 10 g, oral, Daily  sulfur hexafluoride microsphr, 2 mL, intravenous, Once in imaging      Continuous medications     PRN medications  PRN medications: acetaminophen **OR** [DISCONTINUED] acetaminophen **OR** [DISCONTINUED] acetaminophen, ammonium lactate, bisacodyl, dextrose, glucagon, hydrOXYzine pamoate, ipratropium-albuteroL, ondansetron ODT **OR** [DISCONTINUED] ondansetron, oxygen     Labs:  Lab Results   Component Value Date     06/11/2024     06/10/2024     06/09/2024    K 4.4 06/11/2024    K 4.9 06/10/2024    K 5.0 06/09/2024    CO2 27 06/11/2024    CO2 29 06/10/2024    CO2 26 06/09/2024    BUN 66 (H) 06/11/2024    BUN 67 (H) 06/10/2024    BUN 63 (H) 06/09/2024    CREATININE 2.90 (H) 06/11/2024    CREATININE 3.10 (H) 06/10/2024    CREATININE 3.10 (H) 06/09/2024    GLUCOSE 164 (H) 06/11/2024    GLUCOSE 206 (H) 06/10/2024    GLUCOSE 130 (H) 06/09/2024    CALCIUM 8.6 06/11/2024    CALCIUM 8.6 06/10/2024    CALCIUM 8.4 (L) 06/09/2024     Lab Results   Component  Value Date    WBC 7.3 06/10/2024    HGB 8.4 (L) 06/10/2024    HCT 28.0 (L) 06/10/2024    MCV 92 06/10/2024     (L) 06/10/2024       Imaging:  XR chest 1 view    Result Date: 6/9/2024  Interpreted By:  Otilio Hernandez, STUDY: XR CHEST 1 VIEW; 6/9/2024 12:03 pm   INDICATION: CLINICAL INFORMATION: Signs/Symptoms:SOB, persistent hypoxia, re-eval pleural effusions.   COMPARISON: 06/07/2024   ACCESSION NUMBER(S): [id number]   ORDERING CLINICIAN: LEMUEL STACY   TECHNIQUE: Portable chest one view.   FINDINGS: Exam is limited by the patient's body habitus. Cardiac size indeterminate in view of the AP projection. Diffuse bilateral infiltrates are present with more focal density at the left base silhouetting left hemidiaphragm with air bronchograms at the left base. The findings suggest left lower lobe consolidation . Small effusions may be obscured on this portable study.       Diffuse bilateral infiltrates in a pattern suggesting pulmonary edema with more focal infiltrate suspected at the left base suggesting consolidation and perhaps pneumonia. Infiltrates appear more marked compared to 06/07/2024.   MACRO: none   Signed by: Otilio Hernandez 6/9/2024 1:16 PM Dictation workstation:   [id number]    US elastography parenchyma EG organ    Result Date: 6/8/2024  Interpreted By:  Otilio Hernandez, STUDY: US ELASTOGRAPHY PARENCHYMA EG ORGAN; 6/7/2024 6:37 pm   INDICATION: Signs/Symptoms:Elastography, r/o WINSTON CIRRHOSIS.   COMPARISON: None   ACCESSION NUMBER(S): [id number]   ORDERING CLINICIAN: SHANNON WESTON   TECHNIQUE: Limited abdominal ultrasound of the right upper quadrant was performed utilizing gray scale imaging.   FINDINGS: COMMENTS: Technologist note indicates the examination is technically suboptimal with measurements not thought to be accurate due to the patient's condition. Patient was unable to hold breath or to roll. Increased body habitus limits assessment as well.   Measurements obtained at this time are as  follows:   Elastography Median: 12.5 kPA   Elastography IQR/Median: 26.6 %       Measurements were obtained as above but are now thought to be accurate due to the patient's body habitus and suboptimal cooperation at this time. Repeat evaluation at a later date may be appropriate.   Liver Fibrosis Staging Metavir Score kPa m/s Normal- Mild  F1 5.48 kPa-8.29 kPa 1.35 m/s- 1.66 m/s Mild-Moderate  F2 8.29 kPa-9.40 kPa 1.66 m/s- 1.77 m/s Moderate- Severe F3 9.40 kPa-11.9 kPa 1.77 m/s- 1.99 m/s Cirrhosis F4 >11.9 kPa >1.99 m/s   CAUTION: The values for the shear wave speed and tissue modulus are relative indices intended only for the purpose of comparison with other measurements performed using the LOGIQ E9 and E10 Absolute values for these measurements may vary among different measurement devices.   MACRO: none   Signed by: Otilio Hernandez 6/8/2024 10:02 AM Dictation workstation:   [id number]    XR chest 1 view    Result Date: 6/7/2024  Interpreted By:  Gerri Hsu, STUDY: XR CHEST 1 VIEW 6/7/2024 8:12 am   INDICATION: Signs/Symptoms:Pleural effusions   COMPARISON: 06/03/2024   ACCESSION NUMBER(S): [id number]   ORDERING CLINICIAN: DEBORAH KIM   TECHNIQUE: AP erect view of the chest at bedside   FINDINGS: The heart is enlarged with mild pulmonary vascular congestion seen. No focal infiltrate or focal airspace consolidation is identified.   No obvious pleural effusions are seen on the AP projection.       Mild cardiomegaly and mild pulmonary vascular congestion.   The bilateral pleural effusions seen on 06/03/2024 are not obvious on the AP projection. PA and lateral views of the chest are recommended when the patient's condition permits.   Signed by: Gerri Hsu 6/7/2024 9:43 AM Dictation workstation:   [id number]    US gallbladder    Result Date: 6/6/2024  Interpreted By:  Otilio Hernandez, STUDY: US GALLBLADDER; 9:13 am   INDICATION: Signs/Symptoms:abnormal CT. Abnormal appearance of gallbladder on CT from 06/04/2024.    COMPARISON: CT 06/04/2024   ACCESSION NUMBER(S): [id number]   ORDERING CLINICIAN: KYLIE GARCIA   TECHNIQUE: Limited abdominal ultrasound of the right upper quadrant was performed utilizing gray scale imaging.   FINDINGS: COMMENTS: Technologist note states that the exam was limited due to patient being uncooperative.   Liver: There is a very coarse echotexture of the hepatic parenchyma with a lobulated hepatic contour consistent with underlying cirrhosis. Gallbladder: Gallbladder is filled with stones. There is diffuse gallbladder wall thickening measuring 5 mm with no pericholecystic fluid identified. Sonographic Latham's sign: Negative Pancreas: Pancreas is predominantly obscured by bowel gas. CBD: 0.36 cm Right Kidney:  Minimal amount of fluid is identified adjacent to the kidney. The kidney itself is unremarkable.       1. Abnormal appearance of the liver in a pattern consistent with cirrhosis. The splenomegaly noted on CT may be related to portal venous hypertension considering this appearance. 2. Cholelithiasis with gallbladder wall thickening but negative sonographic Latham's sign and no pericholecystic fluid. Findings may be indicative of chronic cholecystitis. Please correlate clinically. 3. Nonspecific perinephric fluid. 4. Please note exam is technically limited due to suboptimal cooperation from the patient according to technologist note.   MACRO: None.   Signed by: Otilio Hernandez 6/6/2024 9:17 AM Dictation workstation:   [id number]    CT kidney wo IV contrast    Result Date: 6/5/2024  Interpreted By:  Otilio Hernandez, STUDY: CT KIDNEY WO CONTRAST; 6/4/2024 3:50 pm   INDICATION: Signs/Symptoms:mass on left kidney.   COMPARISON: None.   ACCESSION NUMBER(S): [id number]   ORDERING CLINICIAN: KYLIE GARCIA   TECHNIQUE: Without INTRAVENOUS  CONTRAST WithoutORAL CONTRAST  ( Intravenous contrast was not able to utilized due to renal insufficiency. One or more of the following dose reduction techniques  were used: Automated exposure control Adjustment of the mA and/or kV according to patient size, and/or use of iterative reconstruction technique.   FINDINGS: ABDOMEN CT: SOLID ORGAN ASSESSMENT: Moderate bilateral pleural effusions are present. Bibasilar atelectasis is present. Infiltrate may be obscured by the atelectatic changes.   Within limits of this unenhanced study no focal masses are identified within the liver, spleen, pancreas, right kidney or adrenal glands. Spleen is enlarged with a craniocaudal extensive 16.4 cm.   CT examination does confirm a rim calcified mass within the mid to lower pole left kidney measuring on the order of 3.1 cm in diameter. Delineation and characterization of the mass is somewhat limited due to the absence of contrast. There is suspicion of mildly enlarged retroperitoneal lymph nodes on the left at the level of the left kidney, again not well characterized due to the patient's body habitus and absence of contrast.   Multiple stones are identified along the dependent portion the gallbladder. Gallbladder wall is not well defined raising possibility of gallbladder wall thickening and/or pericholecystic fluid. Please correlate with patient's clinical assessment to rule out acute cholecystitis. Right upper quadrant ultrasound could be obtained to further assess this finding.   RETROPERITONEUM: AORTA:  There is no evidence for abdominal aortic aneurysm.   BOWEL ASSESSMENT: No intraperitoneal free air is identified. There is a nonspecific appearance of the stomach. Visualized portions of the large and small bowel are unremarkable.   ABDOMINAL WILKINS: There is no evidence for a hernia. Anasarca is present.   OSSEOUS STRUCTURES: No lytic or blastic lesions are identified. Spurring is noted throughout the spine.       1. Indeterminate rim calcified mass left kidney as was suspected on the ultrasound study. Both benign and malignant etiologies are considered in the differential this time  with characterization of the mass limited due to the technical issues on this study and the ultrasound study. There is suspicion of left-sided periaortic lymphadenopathy, again not well characterized due to the absence of intravenous contrast. 2. Cholelithiasis with poorly defined gallbladder wall margins. Please correlate with clinical history to rule out the possibility of acute cholecystitis. Right upper quadrant ultrasound may be attempted for further characterization the gallbladder wall to rule out gallbladder wall thickening and pericholecystic fluid. 3. Anasarca. 4. Moderate bilateral pleural effusions with bibasilar atelectasis most marked involving the lower lobes. 5. Splenomegaly. Spleen measures 16.4 cm in craniocaudal extent.     MACRO: none   Signed by: Otilio Hernandez 6/5/2024 12:36 PM Dictation workstation:   [id number]    ECG 12 lead    Result Date: 6/4/2024  Normal sinus rhythm Minimal voltage criteria for LVH, may be normal variant Nonspecific T wave abnormality Abnormal ECG No previous ECGs available Confirmed by Duong Worthington ([id number]) on 6/4/2024 11:07:09 PM    Transthoracic Echo (TTE) Complete    Result Date: 6/4/2024            [facility] [address], [place] [address]             Phone [phone] TRANSTHORACIC ECHOCARDIOGRAM REPORT  Patient Name:      DESI Arias Physician:    [id number] Duong Worthington DO Study Date:        6/4/2024              Ordering Provider:    [id number] GARCIA MRN/PID:           [id number]              Fellow: Accession#:        [id number]          Nurse: Date of Birth/Age: 1960 / 63 years  Sonographer:          Chantelle Carrasquillo                                                                ACS, RDCS, LUCÍA Gender:            F                      Additional Staff: Height:            170.18 cm             Admit Date: Weight:            125.65 kg             Admission Status:     Inpatient -                                                                Routine BSA / BMI:         2.32 m2 / 43.38 kg/m2 Department Location:  [facility] Blood Pressure: 124 /53 mmHg Study Type:    TRANSTHORACIC ECHO (TTE) COMPLETE Diagnosis/ICD: Dyspnea, unspecified-R06.00 Indication:    dyspnea, Htn, Renal failure CPT Codes:     Echo Complete w Full Doppler-[id number] Patient History: Pertinent History: Dyspnea, DM2, Renal failure, Htn, PVD. Study Detail: The following Echo studies were performed: 2D, M-Mode, Doppler and               color flow. Technically challenging study due to poor acoustic               windows and body habitus. Definity used as a contrast agent for               endocardial border definition. Total contrast used for this               procedure was 2 mL via IV push.  PHYSICIAN INTERPRETATION: Left Ventricle: Left ventricular systolic function is normal, with an estimated ejection fraction of 60-65%. There are no regional wall motion abnormalities. The left ventricular cavity size is normal. There is mild concentric left ventricular hypertrophy. Spectral Doppler shows an impaired relaxation pattern of left ventricular diastolic filling. Left Atrium: The left atrium is normal in size. Right Ventricle: The right ventricle was not well visualized. Unable to determine right ventricular systolic function. RV not well visualized but suggests RV enlargemnt and RV hypokinesis in some views, with severe pulmonary hypertension. Right Atrium: The right atrium was not well visualized. Aortic Valve: The aortic valve appears abnormal. There is mild to moderate aortic valve cusp calcification. There is evidence of mildly elevated transaortic gradients consistent with sclerosis of the aortic valve. There is no evidence of aortic valve regurgitation. The peak  instantaneous gradient of the aortic valve is 7.5 mmHg. Mitral Valve: The mitral valve is abnormal. There is mild mitral valve regurgitation. Tricuspid Valve: The tricuspid valve is structurally normal. There is severe tricuspid regurgitation. The Doppler estimated RVSP is severely elevated at 79.6 mmHg. Pulmonic Valve: The pulmonic valve is not well visualized. The pulmonic valve regurgitation was not well visualized. Pericardium: There is no pericardial effusion noted. Aorta: The aortic root is normal.  CONCLUSIONS:  1. Left ventricular systolic function is normal with a 60-65% estimated ejection fraction.  2. Poorly visualized anatomical structures due to suboptimal image quality.  3. Spectral Doppler shows an impaired relaxation pattern of left ventricular diastolic filling.  4. RV not well visualized but suggests RV enlargemnt and RV hypokinesis in some views, with severe pulmonary hypertension.  5. Severe tricuspid regurgitation.  6. Severely elevated right ventricular systolic pressure.  7. Aortic valve appears abnormal.  8. Aortic valve sclerosis. QUANTITATIVE DATA SUMMARY: 2D MEASUREMENTS:                           Normal Ranges: IVSd:          1.40 cm    (0.6-1.1cm) LVPWd:         1.23 cm    (0.6-1.1cm) LVIDd:         4.76 cm    (3.9-5.9cm) LVIDs:         3.21 cm LV Mass Index: 106.2 g/m2 LV % FS        32.6 % RA VOLUME BY A/L METHOD:                       Normal Ranges: RA Area A4C: 12.7 cm2 AORTA MEASUREMENTS:                    Normal Ranges: Asc Ao, d: 3.10 cm (2.1-3.4cm) LV SYSTOLIC FUNCTION BY 2D PLANIMETRY (MOD):                     Normal Ranges: EF-A4C View: 64.1 % (>=55%) EF-A2C View: 56.5 % EF-Biplane:  60.2 % LV DIASTOLIC FUNCTION:                        Normal Ranges: MV Peak E:    1.14 m/s (0.7-1.2 m/s) MV Peak A:    1.03 m/s (0.42-0.7 m/s) E/A Ratio:    1.11     (1.0-2.2) MV e'         0.07 m/s (>8.0) MV lateral e' 0.09 m/s MV medial e'  0.05 m/s E/e' Ratio:   16.29    (<8.0) MITRAL VALVE:                  Normal Ranges: MV DT: 189 msec (150-240msec) AORTIC VALVE:                         Normal Ranges: AoV Vmax:      1.37 m/s (<=1.7m/s) AoV Peak P.5 mmHg (<20mmHg) LVOT Max Joshua:  0.72 m/s (<=1.1m/s) LVOT VTI:      15.80 cm LVOT Diameter: 1.70 cm  (1.8-2.4cm) AoV Area,Vmax: 1.19 cm2 (2.5-4.5cm2)  RIGHT VENTRICLE: RV Basal 3.89 cm TAPSE:   10.7 mm RV s'    0.08 m/s TRICUSPID VALVE/RVSP:                             Normal Ranges: Peak TR Velocity: 3.86 m/s RV Syst Pressure: 79.6 mmHg (< 30mmHg) IVC Diam:         2.45 cm  [id number] Duong Worthington DO Electronically signed on 2024 at 11:34:03 AM  ** Final **     US renal complete    Result Date: 2024  Interpreted By:  Otilio Hernandez, STUDY: US RENAL COMPLETE; 6/3/2024 7:24 pm   INDICATION: Signs/Symptoms:DELISA.   COMPARISON: None   ACCESSION NUMBER(S): [id number]   ORDERING CLINICIAN: DEBORAH KIM   TECHNIQUE: Grayscale and color Doppler imaging of the kidneys   FINDINGS: COMMENTS: Technologist note indicates that the study was quite limited due to the patient's body habitus. Exam was performed portably as well.   The right kidney measures 10.7 cm x 3.9 cm x 4.7 cm . The left kidney measures 11.8 cm x 5.4 cm x 5.4 cm. THERE IS A HETEROGENEOUS COMPLEX MASS LATERALLY MID TO LOWER POLE LEFT KIDNEY MEASURING 4.3 X 3.9 X 4.6 CM.     No renal stones are identified.  The renal cortical thickness and echogenicity is normal.  No hydronephrosis is identified.   Urinary bladder is nonspecific in appearance with no stones or masses identified.       COMPLEX APPEARING MASS LEFT KIDNEY MEASURING UP TO 4.6 CM IN DIAMETER, WITH A DIFFERENTIAL TO INCLUDE MALIGNANCY. CT UROGRAM WITH AND WITHOUT CONTRAST IS SUGGESTED FOR MORE DEFINITIVE ASSESSMENT.   MACRO: Otilio Hernandez discussed the significance and urgency of this critical finding EPIC secure chat with  [facility] medicine team on 2024 at 8:22 am.  (**-RCF-**) Findings:  See findings.   Signed by: Otilio Hernandez 2024  8:23 AM Dictation workstation:   [id number]    XR chest 2 views    Result Date: 6/3/2024  Interpreted By:  Gerri Hsu, STUDY: XR CHEST 2 VIEWS 6/3/2024 8:15 am   INDICATION: Signs/Symptoms:COPD with wheezing   COMPARISON: None available.   ACCESSION NUMBER(S): [id number]   ORDERING CLINICIAN: ZAHRA BATISTA   TECHNIQUE: AP and lateral views of cervical spine are performed.   FINDINGS: The cardiac size is indeterminate due to the AP projection.   Right hemidiaphragm is elevated. On lateral projection, bilateral pleural effusions are observed. No obvious focal infiltrate or airspace consolidation is seen.       Bilateral pleural effusions on lateral projection and elevated right hemidiaphragm.   Signed by: Gerri Hsu 6/3/2024 8:24 AM Dictation workstation:   [id number]            Assessment/Plan   Principal Problem:    Unable to care for self  Active Problems:    Type 2 diabetes mellitus (Multi)    Morbid obesity (Multi)    Peripheral vascular disease (CMS-HCC)    Hypertension    Pressure sore on sacrum    Behavior problem, adult    Renal failure    Acute respiratory failure with hypoxia and hypercapnia (Multi)    Pressure injury of skin of heel    Acute on chronic respiratory failure with hypoxia and hypercarbia  Continue with supplemental oxygen during the daytime  Discussed and emphasized the importance of utilization of her PAP therapy at bedtime  Suspected obesity hypoventilation and restrictive hemodynamics  Would benefit from NIV in future  Hypercarbic encephalopathy  Stressed importance of BiPAP therapy  Morbid obesity  Failure to thrive  Acute on chronic kidney disease  Nephrology follow-up  Congestive heart failure/pulmonary edema  Diuresis per cardiology and nephrology    We will continue to follow     LOS: 8 days       Ronald Warner MD  Pulmonary/Critical Care medicine

## 2024-06-11 NOTE — NURSING NOTE
Drsg to bilateral ankles changed and new borders applied. Rt ankle has open area. Lt ankle has small area that top surface of skin still open. Pt educated on increasing actively and turn/reposition frequently in order to prevent further wounds and help promote healing of current wounds.

## 2024-06-11 NOTE — CONSULTS
Inpatient consult to Cardiology  Consult performed by: Maria A Torre MD  Consult ordered by: ACE Fisher-CNP  Reason for consult: Respiratory failure        History Of Present Illness:    Brittanie Smart is a 63 y.o. female presenting with respiratory failure and able to take care of herself.  Apparently patient was in [place] until earlier of June 1, 2024 apparently patient was discharged home because her insurance round of coverage.  Patient moved to [place] to live by herself.  Presented to the hospital with the shortness of breath and wheelchair.  Unable to take care of herself.  Subsequently hospital course was complicated by respiratory failure.  Currently patient on BiPAP.  Unable to provide me with any history.  Underwent 2D echo showing severe pulmonary hypertension and RV failure.  Unable to obtain any further history from patient.  Chest x-ray stress of pulmonary edema.  Patient scheduled to have CT angio of the chest tomorrow.    Review of systems   unable to obtain review of systems patient is currently on BiPAP poor historian..     Last Recorded Vitals:  Vitals:    06/10/24 2230 06/10/24 2354 06/11/24 0403 06/11/24 0507   BP:  134/65 130/61    BP Location:  Right arm Right arm    Patient Position:  Lying Lying    Pulse:  86 83    Resp: 20 18 18 19   Temp:  36.2 °C (97.2 °F) 36.4 °C (97.5 °F)    TempSrc:  Oral Axillary    SpO2:  100% 100%    Weight:       Height:           Last Labs:  CBC - 6/10/2024:  4:37 AM  7.3 8.4 116    28.0      CMP - 6/11/2024:  4:29 AM  8.6 6.2 19 --- 0.3   5.2 2.6 9 141      PTT - No results in last year.  1.1   11.7 _     Hemoglobin A1C   Date/Time Value Ref Range Status   06/02/2024 10:00 PM 7.5 (H) See below % Final      Last I/O:  I/O last 3 completed shifts:  In: 254 (2 mL/kg) [P.O.:200; I.V.:4 (0 mL/kg); IV Piggyback:50]  Out: 700 (5.6 mL/kg) [Urine:700 (0.2 mL/kg/hr)]  Weight: 125.7 kg     Past Cardiology Tests (Last 3 Years):  EKG:  ECG 12 lead  "06/02/2024    Echo:  Transthoracic Echo (TTE) Complete 06/04/2024    Ejection Fractions:  No results found for: \"EF\"  Cath:  No results found for this or any previous visit from the past 1095 days.    Stress Test:  No results found for this or any previous visit from the past 1095 days.    Cardiac Imaging:  No results found for this or any previous visit from the past 1095 days.      Past Medical History:  She has a past medical history of Anemia, Behavior problem, adult, Decubitus ulcer, Depression, Diabetes mellitus (Multi), GERD (gastroesophageal reflux disease), Hemorrhoids, Hyperlipidemia, Hypertension, Hypothyroid, Kidney disease, Morbid obesity (Multi), and Peripheral vascular disease (CMS-[facility]).    Past Surgical History:  She has no past surgical history on file.      Social History:  She reports that she has never smoked. She has never used smokeless tobacco. She reports that she does not currently use alcohol. She reports that she does not use drugs.    Family History:  Family History   Problem Relation Name Age of Onset    COPD Mother      Heart disease Father          Allergies:  Patient has no known allergies.    Inpatient Medications:  Scheduled medications   Medication Dose Route Frequency    cefTRIAXone  1 g intravenous q24h    docusate sodium  100 mg oral BID    doxazosin  1 mg oral Daily    enoxaparin  40 mg subcutaneous Daily    ferrous sulfate (325 mg ferrous sulfate)  1 tablet oral Daily with breakfast    furosemide  40 mg intravenous q12h    gabapentin  300 mg oral BID    insulin glargine  35 Units subcutaneous Nightly    insulin lispro  0-20 Units subcutaneous TID    insulin lispro  10 Units subcutaneous TID    ipratropium-albuteroL  3 mL nebulization TID    ketotifen  1 drop Both Eyes BID    levothyroxine  75 mcg oral Daily    [Held by provider] losartan  50 mg oral Daily    [Held by provider] metoprolol succinate XL  50 mg oral Daily    multivitamin with minerals  1 tablet oral Daily    " nystatin  1 Application Topical q8h    oxygen   inhalation q8h    oxygen   inhalation Continuous - Inhalation    pantoprazole  40 mg oral Daily before breakfast    perflutren protein A microsphere  0.5 mL intravenous Once in imaging    polyethylene glycol  17 g oral Daily    rosuvastatin  5 mg oral Daily    sodium zirconium cyclosilicate  10 g oral Daily    sulfur hexafluoride microsphr  2 mL intravenous Once in imaging     PRN medications   Medication    acetaminophen    ammonium lactate    bisacodyl    dextrose    glucagon    hydrOXYzine pamoate    ipratropium-albuteroL    ondansetron ODT     Continuous Medications   Medication Dose Last Rate     Outpatient Medications:  Current Outpatient Medications   Medication Instructions    amLODIPine (NORVASC) 10 mg, oral, Daily    ascorbic acid (VITAMIN C) 500 mg, oral, Daily    B complex-vitamin C-folic acid (Nephro-Chavez Rx) 1- mg-mg-mcg tablet 1 tablet, oral, Daily with breakfast    ferrous sulfate (325 mg ferrous sulfate) 325 mg, oral, Daily with breakfast    furosemide (LASIX) 40 mg, oral, Daily    gabapentin (NEURONTIN) 300 mg, oral, 3 times daily    hydrocortisone (Anusol-HC) 2.5 % rectal cream 1 Application, rectal, Daily    insulin lispro (HumaLOG) 100 unit/mL injection subcutaneous, 3 times daily (morning, midday, late afternoon), Take as directed per insulin instructions.    insulin lispro (HUMALOG) 5 Units, subcutaneous, 3 times daily (morning, midday, late afternoon), Take as directed per insulin instructions.    levothyroxine (SYNTHROID, LEVOXYL) 75 mcg, oral, Daily, Take on an empty stomach at the same time each day, either 30 to 60 minutes prior to breakfast    losartan (COZAAR) 50 mg, oral, Daily    metoprolol succinate XL (TOPROL-XL) 50 mg, oral, Daily, Do not crush or chew.    multivitamin with minerals iron-free (Centrum Silver) 1 tablet, oral, Daily    pantoprazole (PROTONIX) 40 mg, oral, Daily before breakfast, Do not crush, chew, or split.     rosuvastatin (CRESTOR) 5 mg, oral, Daily       Physical Exam:  General: Patient is in mild respiratory distress  HEENT: atraumatic normocephalic.  Neck: is supple jugular venous pressure difficult to assess patient on BiPAP no thyromegaly.  Cardiovascular regular rate and rhythm normal heart sounds no murmurs rubs or gallops.  Heart sounds  Lungs: Creased breathing sounds at bases abdomen: is soft nontender.  Extremities warm to touch 1+ edema.  Neurologic examination: patient is awake alert oriented to person, place, date/time.  Psychiatric examination: patient has good insight denies feeling suicidal and depressed.  Pulses 2+ intact bilaterally     Assessment/Plan   #1 acute on chronic respiratory failure with pulmonary hypertension seen on her echocardiogram and edema on the chest x-ray.  Patient poor historian.  Agree with diuresis for now.  Monitor renal function.  Will reassess in a.m.  Will check proBNP and addition of troponin.  TSH was mildly elevated 7 range.  Will reassess in a.m.  We may consider using isosorbide for her pulmonary hypertension.  Pulmonary on board.    2.  Hypertension will monitor for now.  Blood pressure is well-controlled.    3.  Severe anemia.  Iron studies within normal limits.    4.  Diabetes mellitus.    5.  Hyperlipidemia will check lipid panel.    Thank you for allowing to participate in her care  Peripheral IV 06/05/24 20 G Left;Anterior Forearm (Active)   Site Assessment Clean;Dry;Intact 06/10/24 2100   Dressing Status Dry;Clean 06/10/24 2100   Number of days: 6       Code Status:  DNR and No Intubation      Maria A Torre MD

## 2024-06-11 NOTE — CARE PLAN
The patient's goals for the shift include      The clinical goals for the shift include decrease anxiety, increase mobility      Problem: Fall/Injury  Goal: Verbalize understanding of personal risk factors for fall in the hospital  6/11/2024 0123 by Olivia Crooks RN  Outcome: Progressing  6/11/2024 0121 by Olivia Crooks RN  Outcome: Progressing  Goal: Verbalize understanding of risk factor reduction measures to prevent injury from fall in the home  6/11/2024 0123 by Olivia Crooks RN  Outcome: Progressing  6/11/2024 0121 by Olivia Crooks RN  Outcome: Progressing  Goal: Use assistive devices by end of the shift  6/11/2024 0123 by Olivia Crooks RN  Outcome: Progressing  6/11/2024 0121 by Olivia Crooks RN  Outcome: Progressing  Goal: Pace activities to prevent fatigue by end of the shift  6/11/2024 0123 by Olivia Crooks RN  Outcome: Progressing  6/11/2024 0121 by Olivia Crooks RN  Outcome: Progressing     Problem: Skin  Goal: Decreased wound size/increased tissue granulation at next dressing change  6/11/2024 0123 by Olivia Crooks RN  Outcome: Progressing  6/11/2024 0121 by Olivia Crooks RN  Outcome: Progressing  Goal: Participates in plan/prevention/treatment measures  6/11/2024 0123 by Olivia Crooks RN  Outcome: Progressing  6/11/2024 0121 by Olivia Crooks RN  Outcome: Progressing  Goal: Prevent/minimize sheer/friction injuries  6/11/2024 0123 by Olivia Crooks RN  Outcome: Progressing  Flowsheets (Taken 6/11/2024 0123)  Prevent/minimize sheer/friction injuries:   Turn/reposition every 2 hours/use positioning/transfer devices   Complete micro-shifts as needed if patient unable. Adjust patient position to relieve pressure points, not a full turn   Increase activity/out of bed for meals   Use pull sheet   HOB 30 degrees or less  6/11/2024 0121 by Olivia Crooks RN  Outcome: Progressing  Goal: Promote/optimize nutrition  6/11/2024 0123 by Olivia Crooks RN  Outcome:  Progressing  6/11/2024 0121 by Olivia Crooks RN  Outcome: Progressing  Goal: Promote skin healing  6/11/2024 0123 by Olivia Crooks RN  Outcome: Progressing  6/11/2024 0121 by Olivia Crooks RN  Outcome: Progressing     Problem: Skin  Goal: Promote/optimize nutrition  6/11/2024 0123 by Olivia Crooks RN  Outcome: Progressing  6/11/2024 0121 by Olivia Crooks RN  Outcome: Progressing     Problem: Safety  Goal: Patient will be injury free during hospitalization  6/11/2024 0123 by Olivia Crooks RN  Outcome: Progressing  6/11/2024 0121 by Olivia Crooks RN  Outcome: Progressing  Goal: I will remain free of falls  6/11/2024 0123 by Olivia Crooks RN  Outcome: Progressing  6/11/2024 0121 by Olivia Crooks RN  Outcome: Progressing     Problem: Psychosocial Needs  Goal: Demonstrates ability to cope with hospitalization/illness  6/11/2024 0123 by Olivia Crooks RN  Outcome: Progressing  6/11/2024 0121 by Olivia Crooks RN  Outcome: Progressing  Goal: Collaborate with me, my family, and caregiver to identify my specific goals  6/11/2024 0123 by Olivia Crooks RN  Outcome: Progressing  6/11/2024 0121 by Olivia Crooks RN  Outcome: Progressing     Problem: Discharge Barriers  Goal: My discharge needs are met  6/11/2024 0123 by Olivia Crooks RN  Outcome: Progressing  6/11/2024 0121 by Olivia Crooks RN  Outcome: Progressing     Problem: Respiratory  Goal: Wean oxygen to maintain O2 saturation per order/standard this shift  6/11/2024 0123 by Olivia Crooks RN  Outcome: Progressing  6/11/2024 0121 by Olivia Crooks RN  Outcome: Progressing  Goal: Clear secretions with interventions this shift  6/11/2024 0123 by Olivia Crooks RN  Outcome: Progressing  6/11/2024 0121 by Olivia Crooks RN  Outcome: Progressing  Goal: Minimize anxiety/maximize coping throughout shift  6/11/2024 0123 by Olivia M Crooks, RN  Outcome: Progressing  6/11/2024 0121 by Olivia Crooks, RN  Outcome: Progressing  Goal:  Minimal/no exertional discomfort or dyspnea this shift  6/11/2024 0123 by Olivia Crooks RN  Outcome: Progressing  6/11/2024 0121 by Olivia Crooks RN  Outcome: Progressing  Goal: No signs of respiratory distress (eg. Use of accessory muscles. Peds grunting)  6/11/2024 0123 by Olivia Crooks RN  Outcome: Progressing  6/11/2024 0121 by lOivia Crooks RN  Outcome: Progressing  Goal: Patent airway maintained this shift  6/11/2024 0123 by Olivia Crooks RN  Outcome: Progressing  6/11/2024 0121 by Olivia Crooks RN  Outcome: Progressing  Goal: Tolerate mechanical ventilation evidenced by VS/agitation level this shift  6/11/2024 0123 by Olivia Crooks RN  Outcome: Progressing  6/11/2024 0121 by Olivia Crooks RN  Outcome: Progressing  Goal: Tolerate pulmonary toileting this shift  6/11/2024 0123 by Olivia Crooks RN  Outcome: Progressing  6/11/2024 0121 by Olivia Crooks RN  Outcome: Progressing  Goal: Verbalize decreased shortness of breath this shift  6/11/2024 0123 by Olivia Crooks RN  Outcome: Progressing  6/11/2024 0121 by Olviia Crooks RN  Outcome: Progressing  Goal: Increase self care and/or family involvement in next 24 hours  6/11/2024 0123 by Olivia Crooks RN  Outcome: Progressing  6/11/2024 0121 by Olivia Crooks RN  Outcome: Progressing     Problem: Pain - Adult  Goal: Verbalizes/displays adequate comfort level or baseline comfort level  6/11/2024 0123 by Olivia Crooks RN  Outcome: Progressing  6/11/2024 0121 by Olivia Crooks RN  Outcome: Progressing     Problem: Safety - Adult  Goal: Free from fall injury  6/11/2024 0123 by Olivia Crooks RN  Outcome: Progressing  6/11/2024 0121 by Olivia Crooks RN  Outcome: Progressing     Problem: Discharge Planning  Goal: Discharge to home or other facility with appropriate resources  6/11/2024 0123 by Olivia M Crooks, RN  Outcome: Progressing  6/11/2024 0121 by Olivia Crooks, RN  Outcome: Progressing

## 2024-06-11 NOTE — PROGRESS NOTES
06/11/24 0840   Discharge Planning   Patient expects to be discharged to: [facility] pending pre-cert; requested they advise when they have everything they need for patient to enter their facility. RHONDA Verdin updated to the same.   Does the patient need discharge transport arranged? Yes   RoundTrip coordination needed? Yes   Has discharge transport been arranged? No     1:06 PM [facility] will advise when patient is able to enter their facility. Care Transitions will continue to follow.

## 2024-06-11 NOTE — PROGRESS NOTES
"Brittanie Smart is a 63 y.o. female on day 8 of admission presenting with Unable to care for self.    Subjective   Patient seen for acute kidney injury admitted with congestive heart failure and urinary tract infection he is on IV Rocephin  patient feels better she has some shortness of breath earlier today responded  respiratory therapy very well she is much more comfortable still on oxygen by nasal cannula however she denies any nausea vomiting diarrhea or overnight events noted.       Objective     Physical Exam  Neck:      Vascular: No carotid bruit.   Cardiovascular:      Rate and Rhythm: Normal rate and regular rhythm.      Heart sounds: No murmur heard.     No friction rub. No gallop.   Pulmonary:      Breath sounds: No wheezing, rhonchi or rales.   Chest:      Chest wall: No tenderness.   Abdominal:      General: There is no distension.      Tenderness: There is no abdominal tenderness. There is no guarding or rebound.   Musculoskeletal:         General: No swelling or tenderness.      Cervical back: Neck supple.      Right lower leg: No edema.      Left lower leg: No edema.   Lymphadenopathy:      Cervical: No cervical adenopathy.         Last Recorded Vitals  Blood pressure 124/62, pulse 97, temperature 36.5 °C (97.7 °F), temperature source Oral, resp. rate 20, height 1.727 m (5' 7.99\"), weight 126 kg (277 lb 3.2 oz), SpO2 93%.    Intake/Output last 3 Shifts:  I/O last 3 completed shifts:  In: 204 (1.6 mL/kg) [P.O.:200; I.V.:4 (0 mL/kg)]  Out: 1100 (8.7 mL/kg) [Urine:1100 (0.2 mL/kg/hr)]  Weight: 125.7 kg     Current Facility-Administered Medications:     acetaminophen (Tylenol) tablet 650 mg, 650 mg, oral, q4h PRN, 650 mg at 06/04/24 1607 **OR** [DISCONTINUED] acetaminophen (Tylenol) oral liquid 650 mg, 650 mg, oral, q4h PRN **OR** [DISCONTINUED] acetaminophen (Tylenol) suppository 650 mg, 650 mg, rectal, q4h PRN, Noelle Turner,     ammonium lactate (Lac-Hydrin) 12 % lotion, , Topical, q1h PRN, Kaylan " RHONDA Holt    bisacodyl (Dulcolax) EC tablet 5 mg, 5 mg, oral, Daily PRN, RHONDA Kaiser    cefTRIAXone (Rocephin) IVPB 1 g, 1 g, intravenous, q24h, RHONDA Kaiser, Stopped at 06/10/24 1702    dextrose 50 % injection 12.5 g, 12.5 g, intravenous, q15 min PRN, Noelle Turner DO    docusate sodium (Colace) capsule 100 mg, 100 mg, oral, BID, RHONDA Kasier, 100 mg at 06/11/24 0857    doxazosin (Cardura) tablet 1 mg, 1 mg, oral, Daily, Siddhartha Tovar MD, 1 mg at 06/11/24 0901    enoxaparin (Lovenox) syringe 40 mg, 40 mg, subcutaneous, Daily, Siddhartha Tovar MD, 40 mg at 06/11/24 0857    ferrous sulfate (325 mg ferrous sulfate) tablet 1 tablet, 1 tablet, oral, Daily with breakfast, Noelle Turner DO, 1 tablet at 06/11/24 0857    furosemide (Lasix) injection 40 mg, 40 mg, intravenous, q12h, RHONDA Fisher, 40 mg at 06/11/24 0858    gabapentin (Neurontin) capsule 300 mg, 300 mg, oral, BID, Siddhartha Tovar MD, 300 mg at 06/11/24 0858    glucagon (Glucagen) injection 1 mg, 1 mg, intramuscular, q15 min PRN, Noelle Turner DO    hydrOXYzine pamoate (Vistaril) capsule 25 mg, 25 mg, oral, q6h PRN, RHONDA Kaiser, 25 mg at 06/10/24 2219    insulin glargine (Lantus) injection 35 Units, 35 Units, subcutaneous, Nightly, RHONDA Kaiser, 35 Units at 06/10/24 2047    insulin lispro (HumaLOG) injection 0-20 Units, 0-20 Units, subcutaneous, TID, RHONDA Kaiser, 4 Units at 06/11/24 1245    insulin lispro (HumaLOG) injection 10 Units, 10 Units, subcutaneous, TID, Kaylan Bird, APRN-CNP, 10 Units at 06/11/24 1244    ipratropium-albuteroL (Duo-Neb) 0.5-2.5 mg/3 mL nebulizer solution 3 mL, 3 mL, nebulization, TID, Noelle Turner DO, 3 mL at 06/11/24 1200    ipratropium-albuteroL (Duo-Neb) 0.5-2.5 mg/3 mL nebulizer solution 3 mL, 3 mL, nebulization, q2h PRN, Noelle Turner, DO, 3 mL at 06/10/24 1511    ketotifen (Zaditor) 0.025 % (0.035 %)  ophthalmic solution 1 drop, 1 drop, Both Eyes, BID, Noelle Turner DO, 1 drop at 06/11/24 0901    levothyroxine (Synthroid, Levoxyl) tablet 75 mcg, 75 mcg, oral, Daily, Noelle Turner DO, 75 mcg at 06/11/24 0615    [Held by provider] losartan (Cozaar) tablet 50 mg, 50 mg, oral, Daily, Noelle Turner DO, 50 mg at 06/03/24 0839    [Held by provider] metoprolol succinate XL (Toprol-XL) 24 hr tablet 50 mg, 50 mg, oral, Daily, Siddhartha Tovar MD, 50 mg at 06/07/24 0853    multivitamin with minerals 1 tablet, 1 tablet, oral, Daily, Noelle Turner DO, 1 tablet at 06/11/24 0857    nystatin (Mycostatin) 100,000 unit/gram powder 1 Application, 1 Application, Topical, q8h, Noelle Turner DO, 1 Application at 06/11/24 1508    ondansetron ODT (Zofran-ODT) disintegrating tablet 4 mg, 4 mg, oral, q8h PRN **OR** [DISCONTINUED] ondansetron (Zofran) injection 4 mg, 4 mg, intravenous, q8h PRN, Noelle Turner DO    oxygen (O2) therapy, , inhalation, q8h, Noelle Turner DO, 4 L/min at 06/11/24 0700    oxygen (O2) therapy, , inhalation, Continuous PRN - O2/gases, Roland Navarro DO    pantoprazole (ProtoNix) EC tablet 40 mg, 40 mg, oral, Daily before breakfast, Noelle Turner DO, 40 mg at 06/11/24 0615    perflutren protein A microsphere (Optison) injection 0.5 mL, 0.5 mL, intravenous, Once in imaging, RHONDA Kaiser    polyethylene glycol (Glycolax, Miralax) packet 17 g, 17 g, oral, Daily, ACE Kaiser-CNP, 17 g at 06/08/24 0826    rosuvastatin (Crestor) tablet 5 mg, 5 mg, oral, Daily, Noelle Turner DO, 5 mg at 06/11/24 0857    sodium zirconium cyclosilicate (Lokelma) packet 10 g, 10 g, oral, Daily, Siddhartha Tovar MD, 10 g at 06/11/24 0857    sulfur hexafluoride microsphr (Lumason) injection 24.28 mg, 2 mL, intravenous, Once in imaging, Kaylan Bird, APRN-CNP   Relevant Results    Results for orders placed or performed during the hospital encounter of 06/02/24 (from the  past 96 hour(s))   POCT GLUCOSE   Result Value Ref Range    POCT Glucose 260 (H) 74 - 99 mg/dL   Renal Function Panel   Result Value Ref Range    Glucose 299 (H) 65 - 99 mg/dL    Sodium 135 133 - 145 mmol/L    Potassium 5.7 (H) 3.4 - 5.1 mmol/L    Chloride 99 97 - 107 mmol/L    Bicarbonate 26 24 - 31 mmol/L    Urea Nitrogen 66 (H) 8 - 25 mg/dL    Creatinine 3.00 (H) 0.40 - 1.60 mg/dL    eGFR 17 (L) >60 mL/min/1.73m*2    Calcium 8.3 (L) 8.5 - 10.4 mg/dL    Phosphorus 3.7 2.5 - 4.5 mg/dL    Albumin 3.0 (L) 3.5 - 5.0 g/dL    Anion Gap 10 <=19 mmol/L   POCT GLUCOSE   Result Value Ref Range    POCT Glucose 261 (H) 74 - 99 mg/dL   Renal Function Panel   Result Value Ref Range    Glucose 277 (H) 65 - 99 mg/dL    Sodium 136 133 - 145 mmol/L    Potassium 5.9 (H) 3.4 - 5.1 mmol/L    Chloride 101 97 - 107 mmol/L    Bicarbonate 28 24 - 31 mmol/L    Urea Nitrogen 67 (H) 8 - 25 mg/dL    Creatinine 3.20 (H) 0.40 - 1.60 mg/dL    eGFR 16 (L) >60 mL/min/1.73m*2    Calcium 8.2 (L) 8.5 - 10.4 mg/dL    Phosphorus 4.0 2.5 - 4.5 mg/dL    Albumin 2.9 (L) 3.5 - 5.0 g/dL    Anion Gap 7 <=19 mmol/L   Renal Function Panel   Result Value Ref Range    Glucose 249 (H) 65 - 99 mg/dL    Sodium 136 133 - 145 mmol/L    Potassium 5.5 (H) 3.4 - 5.1 mmol/L    Chloride 101 97 - 107 mmol/L    Bicarbonate 28 24 - 31 mmol/L    Urea Nitrogen 65 (H) 8 - 25 mg/dL    Creatinine 3.10 (H) 0.40 - 1.60 mg/dL    eGFR 16 (L) >60 mL/min/1.73m*2    Calcium 8.4 (L) 8.5 - 10.4 mg/dL    Phosphorus 4.2 2.5 - 4.5 mg/dL    Albumin 2.8 (L) 3.5 - 5.0 g/dL    Anion Gap 7 <=19 mmol/L   CBC   Result Value Ref Range    WBC 8.2 4.4 - 11.3 x10*3/uL    nRBC 0.2 (H) 0.0 - 0.0 /100 WBCs    RBC 2.74 (L) 4.00 - 5.20 x10*6/uL    Hemoglobin 7.4 (L) 12.0 - 16.0 g/dL    Hematocrit 24.8 (L) 36.0 - 46.0 %    MCV 91 80 - 100 fL    MCH 27.0 26.0 - 34.0 pg    MCHC 29.8 (L) 32.0 - 36.0 g/dL    RDW 22.1 (H) 11.5 - 14.5 %    Platelets 151 150 - 450 x10*3/uL   Renal Function Panel   Result Value Ref  Range    Glucose 191 (H) 65 - 99 mg/dL    Sodium 136 133 - 145 mmol/L    Potassium 5.4 (H) 3.4 - 5.1 mmol/L    Chloride 102 97 - 107 mmol/L    Bicarbonate 26 24 - 31 mmol/L    Urea Nitrogen 64 (H) 8 - 25 mg/dL    Creatinine 3.10 (H) 0.40 - 1.60 mg/dL    eGFR 16 (L) >60 mL/min/1.73m*2    Calcium 8.5 8.5 - 10.4 mg/dL    Phosphorus 4.2 2.5 - 4.5 mg/dL    Albumin 2.8 (L) 3.5 - 5.0 g/dL    Anion Gap 8 <=19 mmol/L   POCT GLUCOSE   Result Value Ref Range    POCT Glucose 166 (H) 74 - 99 mg/dL   POCT GLUCOSE   Result Value Ref Range    POCT Glucose 191 (H) 74 - 99 mg/dL   POCT GLUCOSE   Result Value Ref Range    POCT Glucose 170 (H) 74 - 99 mg/dL   C3 Complement   Result Value Ref Range    C3 Complement 129 87 - 200 mg/dL   C4 Complement   Result Value Ref Range    C4 Complement 37 10 - 50 mg/dL   Renal Function Panel   Result Value Ref Range    Glucose 170 (H) 65 - 99 mg/dL    Sodium 137 133 - 145 mmol/L    Potassium 5.7 (H) 3.4 - 5.1 mmol/L    Chloride 100 97 - 107 mmol/L    Bicarbonate 28 24 - 31 mmol/L    Urea Nitrogen 68 (H) 8 - 25 mg/dL    Creatinine 3.10 (H) 0.40 - 1.60 mg/dL    eGFR 16 (L) >60 mL/min/1.73m*2    Calcium 8.6 8.5 - 10.4 mg/dL    Phosphorus 4.3 2.5 - 4.5 mg/dL    Albumin 2.9 (L) 3.5 - 5.0 g/dL    Anion Gap 9 <=19 mmol/L   Lactate Dehydrogenase   Result Value Ref Range     (H) 91 - 227 U/L   POCT GLUCOSE   Result Value Ref Range    POCT Glucose 169 (H) 74 - 99 mg/dL   Renal Function Panel   Result Value Ref Range    Glucose 168 (H) 65 - 99 mg/dL    Sodium 135 133 - 145 mmol/L    Potassium 5.3 (H) 3.4 - 5.1 mmol/L    Chloride 99 97 - 107 mmol/L    Bicarbonate 26 24 - 31 mmol/L    Urea Nitrogen 65 (H) 8 - 25 mg/dL    Creatinine 3.00 (H) 0.40 - 1.60 mg/dL    eGFR 17 (L) >60 mL/min/1.73m*2    Calcium 8.5 8.5 - 10.4 mg/dL    Phosphorus 4.2 2.5 - 4.5 mg/dL    Albumin 2.7 (L) 3.5 - 5.0 g/dL    Anion Gap 10 <=19 mmol/L   Haptoglobin   Result Value Ref Range    Haptoglobin 155 37 - 246 mg/dL   POCT GLUCOSE    Result Value Ref Range    POCT Glucose 191 (H) 74 - 99 mg/dL   CBC   Result Value Ref Range    WBC 7.3 4.4 - 11.3 x10*3/uL    nRBC 0.0 0.0 - 0.0 /100 WBCs    RBC 2.89 (L) 4.00 - 5.20 x10*6/uL    Hemoglobin 7.8 (L) 12.0 - 16.0 g/dL    Hematocrit 27.3 (L) 36.0 - 46.0 %    MCV 95 80 - 100 fL    MCH 27.0 26.0 - 34.0 pg    MCHC 28.6 (L) 32.0 - 36.0 g/dL    RDW 22.6 (H) 11.5 - 14.5 %    Platelets 125 (L) 150 - 450 x10*3/uL   Comprehensive Metabolic Panel   Result Value Ref Range    Glucose 130 (H) 65 - 99 mg/dL    Sodium 136 133 - 145 mmol/L    Potassium 5.0 3.4 - 5.1 mmol/L    Chloride 100 97 - 107 mmol/L    Bicarbonate 26 24 - 31 mmol/L    Urea Nitrogen 63 (H) 8 - 25 mg/dL    Creatinine 3.10 (H) 0.40 - 1.60 mg/dL    eGFR 16 (L) >60 mL/min/1.73m*2    Calcium 8.4 (L) 8.5 - 10.4 mg/dL    Albumin 2.6 (L) 3.5 - 5.0 g/dL    Alkaline Phosphatase 141 (H) 35 - 125 U/L    Total Protein 6.2 5.9 - 7.9 g/dL    AST 19 5 - 40 U/L    Bilirubin, Total 0.3 0.1 - 1.2 mg/dL    ALT 9 5 - 40 U/L    Anion Gap 10 <=19 mmol/L   POCT GLUCOSE   Result Value Ref Range    POCT Glucose 119 (H) 74 - 99 mg/dL   Blood Gas Arterial   Result Value Ref Range    POCT pH, Arterial 7.30 (L) 7.38 - 7.42 pH    POCT pCO2, Arterial 58 (H) 38 - 42 mm Hg    POCT pO2, Arterial 58 (L) 85 - 95 mm Hg    POCT SO2, Arterial 90 (L) 94 - 100 %    POCT Oxy Hemoglobin, Arterial 85.7 (L) 94.0 - 98.0 %    POCT Base Excess, Arterial 0.8 -2.0 - 3.0 mmol/L    POCT HCO3 Calculated, Arterial 28.5 (H) 22.0 - 26.0 mmol/L    Patient Temperature 37.0 degrees Celsius    FiO2 32 %    Apparatus CANNULA     Site of Arterial Puncture Radial Right     Tyrone's Test Positive    POCT GLUCOSE   Result Value Ref Range    POCT Glucose 139 (H) 74 - 99 mg/dL   Protein, Urine Random   Result Value Ref Range    Total Protein, Urine Random 47 (H) 5 - 25 mg/dL   POCT GLUCOSE   Result Value Ref Range    POCT Glucose 158 (H) 74 - 99 mg/dL   POCT GLUCOSE   Result Value Ref Range    POCT Glucose 172 (H)  74 - 99 mg/dL   CBC   Result Value Ref Range    WBC 7.3 4.4 - 11.3 x10*3/uL    nRBC 0.0 0.0 - 0.0 /100 WBCs    RBC 3.04 (L) 4.00 - 5.20 x10*6/uL    Hemoglobin 8.4 (L) 12.0 - 16.0 g/dL    Hematocrit 28.0 (L) 36.0 - 46.0 %    MCV 92 80 - 100 fL    MCH 27.6 26.0 - 34.0 pg    MCHC 30.0 (L) 32.0 - 36.0 g/dL    RDW 22.9 (H) 11.5 - 14.5 %    Platelets 116 (L) 150 - 450 x10*3/uL   Basic Metabolic Panel   Result Value Ref Range    Glucose 206 (H) 65 - 99 mg/dL    Sodium 137 133 - 145 mmol/L    Potassium 4.9 3.4 - 5.1 mmol/L    Chloride 100 97 - 107 mmol/L    Bicarbonate 29 24 - 31 mmol/L    Urea Nitrogen 67 (H) 8 - 25 mg/dL    Creatinine 3.10 (H) 0.40 - 1.60 mg/dL    eGFR 16 (L) >60 mL/min/1.73m*2    Calcium 8.6 8.5 - 10.4 mg/dL    Anion Gap 8 <=19 mmol/L   POCT GLUCOSE   Result Value Ref Range    POCT Glucose 176 (H) 74 - 99 mg/dL   POCT GLUCOSE   Result Value Ref Range    POCT Glucose 209 (H) 74 - 99 mg/dL   Blood Gas Arterial Full Panel   Result Value Ref Range    POCT pH, Arterial 7.28 (L) 7.38 - 7.42 pH    POCT pCO2, Arterial 64 (H) 38 - 42 mm Hg    POCT pO2, Arterial 68 (L) 85 - 95 mm Hg    POCT SO2, Arterial 95 94 - 100 %    POCT Oxy Hemoglobin, Arterial 89.8 (L) 94.0 - 98.0 %    POCT Hematocrit Calculated, Arterial 26.0 (L) 36.0 - 46.0 %    POCT Sodium, Arterial 135 (L) 136 - 145 mmol/L    POCT Potassium, Arterial 4.5 3.5 - 5.3 mmol/L    POCT Chloride, Arterial 101 98 - 107 mmol/L    POCT Ionized Calcium, Arterial 1.17 1.10 - 1.33 mmol/L    POCT Glucose, Arterial 177 (H) 74 - 99 mg/dL    POCT Lactate, Arterial 1.0 0.4 - 2.0 mmol/L    POCT Base Excess, Arterial 2.6 -2.0 - 3.0 mmol/L    POCT HCO3 Calculated, Arterial 30.1 (H) 22.0 - 26.0 mmol/L    POCT Hemoglobin, Arterial 8.5 (L) 12.0 - 16.0 g/dL    POCT Anion Gap, Arterial 8 (L) 10 - 25 mmo/L    Patient Temperature 37.0 degrees Celsius    FiO2 36 %    Apparatus CANNULA     Flow 4.0 LPM    Site of Arterial Puncture Radial Right     Tyrone's Test Positive    POCT  GLUCOSE   Result Value Ref Range    POCT Glucose 162 (H) 74 - 99 mg/dL   Blood Gas Arterial Full Panel   Result Value Ref Range    POCT pH, Arterial 7.32 (L) 7.38 - 7.42 pH    POCT pCO2, Arterial 58 (H) 38 - 42 mm Hg    POCT pO2, Arterial 103 (H) 85 - 95 mm Hg    POCT SO2, Arterial 100 94 - 100 %    POCT Oxy Hemoglobin, Arterial 94.6 94.0 - 98.0 %    POCT Hematocrit Calculated, Arterial 24.0 (L) 36.0 - 46.0 %    POCT Sodium, Arterial 135 (L) 136 - 145 mmol/L    POCT Potassium, Arterial 4.4 3.5 - 5.3 mmol/L    POCT Chloride, Arterial 102 98 - 107 mmol/L    POCT Ionized Calcium, Arterial 1.20 1.10 - 1.33 mmol/L    POCT Glucose, Arterial 137 (H) 74 - 99 mg/dL    POCT Lactate, Arterial 0.9 0.4 - 2.0 mmol/L    POCT Base Excess, Arterial 3.2 (H) -2.0 - 3.0 mmol/L    POCT HCO3 Calculated, Arterial 29.9 (H) 22.0 - 26.0 mmol/L    POCT Hemoglobin, Arterial 7.9 (L) 12.0 - 16.0 g/dL    POCT Anion Gap, Arterial 8 (L) 10 - 25 mmo/L    Patient Temperature 37.0 degrees Celsius    FiO2 50 %    Ventilator Mode BiPAP     Ventilator Rate 16 bpm    Tidal Volume 464 mL    High Peep CMH2O 18.0 cm H2O    Low Peep CMH2O 8.0 cm H2O    Site of Arterial Puncture Radial Right     Tyrone's Test Positive    POCT GLUCOSE   Result Value Ref Range    POCT Glucose 128 (H) 74 - 99 mg/dL   Renal Function Panel   Result Value Ref Range    Glucose 164 (H) 65 - 99 mg/dL    Sodium 138 133 - 145 mmol/L    Potassium 4.4 3.4 - 5.1 mmol/L    Chloride 101 97 - 107 mmol/L    Bicarbonate 27 24 - 31 mmol/L    Urea Nitrogen 66 (H) 8 - 25 mg/dL    Creatinine 2.90 (H) 0.40 - 1.60 mg/dL    eGFR 17 (L) >60 mL/min/1.73m*2    Calcium 8.6 8.5 - 10.4 mg/dL    Phosphorus 5.2 (H) 2.5 - 4.5 mg/dL    Albumin 2.6 (L) 3.5 - 5.0 g/dL    Anion Gap 10 <=19 mmol/L   Troponin T   Result Value Ref Range    Troponin T, High Sensitivity 79 (HH) <=14 ng/L   NT-PROBNP   Result Value Ref Range    PROBNP 42,132 (H) 0 - 353 pg/mL   Magnesium   Result Value Ref Range    Magnesium 2.00 1.60 -  3.10 mg/dL   POCT GLUCOSE   Result Value Ref Range    POCT Glucose 152 (H) 74 - 99 mg/dL   POCT GLUCOSE   Result Value Ref Range    POCT Glucose 196 (H) 74 - 99 mg/dL       Assessment/Plan   Acute kidney injury on chronic kidney disease stage III   Function is slightly better today creatinine is trending down from 3.1-2.9 today certainly no indication for dialysis therapy at this point  Chronic kidney disease stage III creatinine baseline 1.3   Diabetes mellitus type 2  Hypertension  Anemia   UTI continue with IV Rocephin  Renal Mass followed by urology refused MRI  Diastolic CHF continue diuresis also she had a CT scan of the chest yesterday results are still pending           Paco Tovar MD

## 2024-06-12 LAB
ANCA AB PATTERN SER IF-IMP: NORMAL
ANCA IGG TITR SER IF: NORMAL {TITER}
ANION GAP SERPL CALC-SCNC: 10 MMOL/L
BUN SERPL-MCNC: 64 MG/DL (ref 8–25)
CALCIUM SERPL-MCNC: 8.3 MG/DL (ref 8.5–10.4)
CHLORIDE SERPL-SCNC: 100 MMOL/L (ref 97–107)
CO2 SERPL-SCNC: 28 MMOL/L (ref 24–31)
CREAT SERPL-MCNC: 2.9 MG/DL (ref 0.4–1.6)
EGFRCR SERPLBLD CKD-EPI 2021: 17 ML/MIN/1.73M*2
ERYTHROCYTE [DISTWIDTH] IN BLOOD BY AUTOMATED COUNT: 23.2 % (ref 11.5–14.5)
GLUCOSE BLD MANUAL STRIP-MCNC: 169 MG/DL (ref 74–99)
GLUCOSE BLD MANUAL STRIP-MCNC: 212 MG/DL (ref 74–99)
GLUCOSE BLD MANUAL STRIP-MCNC: 230 MG/DL (ref 74–99)
GLUCOSE BLD MANUAL STRIP-MCNC: 235 MG/DL (ref 74–99)
GLUCOSE SERPL-MCNC: 183 MG/DL (ref 65–99)
HCT VFR BLD AUTO: 24.9 % (ref 36–46)
HGB BLD-MCNC: 7.3 G/DL (ref 12–16)
MCH RBC QN AUTO: 27.7 PG (ref 26–34)
MCHC RBC AUTO-ENTMCNC: 29.3 G/DL (ref 32–36)
MCV RBC AUTO: 94 FL (ref 80–100)
NRBC BLD-RTO: 0.3 /100 WBCS (ref 0–0)
PLATELET # BLD AUTO: 104 X10*3/UL (ref 150–450)
POTASSIUM SERPL-SCNC: 4.4 MMOL/L (ref 3.4–5.1)
RBC # BLD AUTO: 2.64 X10*6/UL (ref 4–5.2)
SMOOTH MUSCLE AB SER QL IF: ABNORMAL
SODIUM SERPL-SCNC: 138 MMOL/L (ref 133–145)
TROPONIN T SERPL-MCNC: 81 NG/L
WBC # BLD AUTO: 6.9 X10*3/UL (ref 4.4–11.3)

## 2024-06-12 PROCEDURE — 94640 AIRWAY INHALATION TREATMENT: CPT

## 2024-06-12 PROCEDURE — 2500000002 HC RX 250 W HCPCS SELF ADMINISTERED DRUGS (ALT 637 FOR MEDICARE OP, ALT 636 FOR OP/ED): Performed by: EMERGENCY MEDICINE

## 2024-06-12 PROCEDURE — 85027 COMPLETE CBC AUTOMATED: CPT | Performed by: NURSE PRACTITIONER

## 2024-06-12 PROCEDURE — 97110 THERAPEUTIC EXERCISES: CPT | Mod: GP

## 2024-06-12 PROCEDURE — 2500000002 HC RX 250 W HCPCS SELF ADMINISTERED DRUGS (ALT 637 FOR MEDICARE OP, ALT 636 FOR OP/ED): Performed by: INTERNAL MEDICINE

## 2024-06-12 PROCEDURE — 99232 SBSQ HOSP IP/OBS MODERATE 35: CPT | Performed by: NURSE PRACTITIONER

## 2024-06-12 PROCEDURE — 2500000005 HC RX 250 GENERAL PHARMACY W/O HCPCS: Performed by: EMERGENCY MEDICINE

## 2024-06-12 PROCEDURE — 94762 N-INVAS EAR/PLS OXIMTRY CONT: CPT

## 2024-06-12 PROCEDURE — 80048 BASIC METABOLIC PNL TOTAL CA: CPT | Performed by: NURSE PRACTITIONER

## 2024-06-12 PROCEDURE — 97530 THERAPEUTIC ACTIVITIES: CPT | Mod: GP

## 2024-06-12 PROCEDURE — 2500000001 HC RX 250 WO HCPCS SELF ADMINISTERED DRUGS (ALT 637 FOR MEDICARE OP): Performed by: INTERNAL MEDICINE

## 2024-06-12 PROCEDURE — 2500000002 HC RX 250 W HCPCS SELF ADMINISTERED DRUGS (ALT 637 FOR MEDICARE OP, ALT 636 FOR OP/ED): Performed by: NURSE PRACTITIONER

## 2024-06-12 PROCEDURE — 84484 ASSAY OF TROPONIN QUANT: CPT | Performed by: INTERNAL MEDICINE

## 2024-06-12 PROCEDURE — 36415 COLL VENOUS BLD VENIPUNCTURE: CPT | Performed by: NURSE PRACTITIONER

## 2024-06-12 PROCEDURE — 1100000001 HC PRIVATE ROOM DAILY

## 2024-06-12 PROCEDURE — 9420000001 HC RT PATIENT EDUCATION 5 MIN

## 2024-06-12 PROCEDURE — 82947 ASSAY GLUCOSE BLOOD QUANT: CPT

## 2024-06-12 PROCEDURE — 2500000004 HC RX 250 GENERAL PHARMACY W/ HCPCS (ALT 636 FOR OP/ED): Performed by: NURSE PRACTITIONER

## 2024-06-12 PROCEDURE — 97530 THERAPEUTIC ACTIVITIES: CPT | Mod: GO

## 2024-06-12 PROCEDURE — 2500000001 HC RX 250 WO HCPCS SELF ADMINISTERED DRUGS (ALT 637 FOR MEDICARE OP): Performed by: EMERGENCY MEDICINE

## 2024-06-12 PROCEDURE — 2500000004 HC RX 250 GENERAL PHARMACY W/ HCPCS (ALT 636 FOR OP/ED): Performed by: INTERNAL MEDICINE

## 2024-06-12 PROCEDURE — 99233 SBSQ HOSP IP/OBS HIGH 50: CPT | Performed by: INTERNAL MEDICINE

## 2024-06-12 PROCEDURE — 97535 SELF CARE MNGMENT TRAINING: CPT | Mod: GO

## 2024-06-12 PROCEDURE — 94660 CPAP INITIATION&MGMT: CPT

## 2024-06-12 RX ADMIN — PANTOPRAZOLE SODIUM 40 MG: 40 TABLET, DELAYED RELEASE ORAL at 06:00

## 2024-06-12 RX ADMIN — FUROSEMIDE 40 MG: 10 INJECTION, SOLUTION INTRAMUSCULAR; INTRAVENOUS at 09:06

## 2024-06-12 RX ADMIN — DOXAZOSIN 1 MG: 1 TABLET ORAL at 09:06

## 2024-06-12 RX ADMIN — NYSTATIN 1 APPLICATION: 100000 POWDER TOPICAL at 20:57

## 2024-06-12 RX ADMIN — GABAPENTIN 300 MG: 300 CAPSULE ORAL at 20:39

## 2024-06-12 RX ADMIN — FUROSEMIDE 40 MG: 10 INJECTION, SOLUTION INTRAMUSCULAR; INTRAVENOUS at 20:39

## 2024-06-12 RX ADMIN — Medication 1 TABLET: at 09:06

## 2024-06-12 RX ADMIN — INSULIN LISPRO 4 UNITS: 100 INJECTION, SOLUTION INTRAVENOUS; SUBCUTANEOUS at 09:01

## 2024-06-12 RX ADMIN — INSULIN LISPRO 10 UNITS: 100 INJECTION, SOLUTION INTRAVENOUS; SUBCUTANEOUS at 09:00

## 2024-06-12 RX ADMIN — Medication 4 L/MIN: at 15:00

## 2024-06-12 RX ADMIN — KETOTIFEN FUMARATE 1 DROP: 0.25 SOLUTION/ DROPS OPHTHALMIC at 09:19

## 2024-06-12 RX ADMIN — ROSUVASTATIN CALCIUM 5 MG: 10 TABLET, COATED ORAL at 09:06

## 2024-06-12 RX ADMIN — LEVOTHYROXINE SODIUM 75 MCG: 0.07 TABLET ORAL at 05:57

## 2024-06-12 RX ADMIN — CEFTRIAXONE SODIUM 1 G: 1 INJECTION, SOLUTION INTRAVENOUS at 17:29

## 2024-06-12 RX ADMIN — ENOXAPARIN SODIUM 40 MG: 40 INJECTION SUBCUTANEOUS at 09:07

## 2024-06-12 RX ADMIN — IPRATROPIUM BROMIDE AND ALBUTEROL SULFATE 3 ML: .5; 3 SOLUTION RESPIRATORY (INHALATION) at 12:23

## 2024-06-12 RX ADMIN — IPRATROPIUM BROMIDE AND ALBUTEROL SULFATE 3 ML: .5; 3 SOLUTION RESPIRATORY (INHALATION) at 07:22

## 2024-06-12 RX ADMIN — GABAPENTIN 300 MG: 300 CAPSULE ORAL at 09:06

## 2024-06-12 RX ADMIN — INSULIN GLARGINE 35 UNITS: 100 INJECTION, SOLUTION SUBCUTANEOUS at 20:39

## 2024-06-12 RX ADMIN — FERROUS SULFATE TAB 325 MG (65 MG ELEMENTAL FE) 1 TABLET: 325 (65 FE) TAB at 09:06

## 2024-06-12 RX ADMIN — NYSTATIN 1 APPLICATION: 100000 POWDER TOPICAL at 13:00

## 2024-06-12 RX ADMIN — SODIUM ZIRCONIUM CYCLOSILICATE 10 G: 10 POWDER, FOR SUSPENSION ORAL at 09:04

## 2024-06-12 RX ADMIN — INSULIN LISPRO 10 UNITS: 100 INJECTION, SOLUTION INTRAVENOUS; SUBCUTANEOUS at 17:26

## 2024-06-12 RX ADMIN — INSULIN LISPRO 8 UNITS: 100 INJECTION, SOLUTION INTRAVENOUS; SUBCUTANEOUS at 12:58

## 2024-06-12 RX ADMIN — IPRATROPIUM BROMIDE AND ALBUTEROL SULFATE 3 ML: .5; 3 SOLUTION RESPIRATORY (INHALATION) at 19:06

## 2024-06-12 RX ADMIN — INSULIN LISPRO 8 UNITS: 100 INJECTION, SOLUTION INTRAVENOUS; SUBCUTANEOUS at 17:26

## 2024-06-12 RX ADMIN — INSULIN LISPRO 10 UNITS: 100 INJECTION, SOLUTION INTRAVENOUS; SUBCUTANEOUS at 12:58

## 2024-06-12 ASSESSMENT — COGNITIVE AND FUNCTIONAL STATUS - GENERAL
DRESSING REGULAR UPPER BODY CLOTHING: A LOT
TURNING FROM BACK TO SIDE WHILE IN FLAT BAD: A LOT
MOVING TO AND FROM BED TO CHAIR: A LOT
MOBILITY SCORE: 10
HELP NEEDED FOR BATHING: A LOT
STANDING UP FROM CHAIR USING ARMS: A LOT
STANDING UP FROM CHAIR USING ARMS: A LOT
CLIMB 3 TO 5 STEPS WITH RAILING: TOTAL
TOILETING: TOTAL
DAILY ACTIVITIY SCORE: 11
STANDING UP FROM CHAIR USING ARMS: A LOT
EATING MEALS: A LITTLE
WALKING IN HOSPITAL ROOM: TOTAL
CLIMB 3 TO 5 STEPS WITH RAILING: TOTAL
DRESSING REGULAR LOWER BODY CLOTHING: TOTAL
MOBILITY SCORE: 10
MOVING FROM LYING ON BACK TO SITTING ON SIDE OF FLAT BED WITH BEDRAILS: A LOT
PERSONAL GROOMING: A LOT
MOVING FROM LYING ON BACK TO SITTING ON SIDE OF FLAT BED WITH BEDRAILS: A LOT
EATING MEALS: A LITTLE
DRESSING REGULAR UPPER BODY CLOTHING: A LOT
MOVING TO AND FROM BED TO CHAIR: A LOT
TOILETING: TOTAL
PERSONAL GROOMING: A LOT
HELP NEEDED FOR BATHING: A LOT
HELP NEEDED FOR BATHING: A LOT
DAILY ACTIVITIY SCORE: 11
DRESSING REGULAR LOWER BODY CLOTHING: TOTAL
TOILETING: TOTAL
MOBILITY SCORE: 10
DAILY ACTIVITIY SCORE: 11
EATING MEALS: A LITTLE
PERSONAL GROOMING: A LOT
DRESSING REGULAR LOWER BODY CLOTHING: TOTAL
MOVING FROM LYING ON BACK TO SITTING ON SIDE OF FLAT BED WITH BEDRAILS: A LOT
TURNING FROM BACK TO SIDE WHILE IN FLAT BAD: A LOT
MOVING TO AND FROM BED TO CHAIR: A LOT
TURNING FROM BACK TO SIDE WHILE IN FLAT BAD: A LOT
WALKING IN HOSPITAL ROOM: TOTAL
DRESSING REGULAR UPPER BODY CLOTHING: A LOT
CLIMB 3 TO 5 STEPS WITH RAILING: TOTAL
WALKING IN HOSPITAL ROOM: TOTAL

## 2024-06-12 ASSESSMENT — PAIN SCALES - GENERAL
PAINLEVEL_OUTOF10: 0 - NO PAIN
PAINLEVEL_OUTOF10: 4
PAINLEVEL_OUTOF10: 0 - NO PAIN

## 2024-06-12 ASSESSMENT — ACTIVITIES OF DAILY LIVING (ADL)
BATHING_LEVEL_OF_ASSISTANCE: DEPENDENT
BATHING_WHERE_ASSESSED: BED LEVEL
HOME_MANAGEMENT_TIME_ENTRY: 15

## 2024-06-12 ASSESSMENT — PAIN - FUNCTIONAL ASSESSMENT
PAIN_FUNCTIONAL_ASSESSMENT: 0-10
PAIN_FUNCTIONAL_ASSESSMENT: 0-10

## 2024-06-12 NOTE — PROGRESS NOTES
"Brittanie Smart is a 63 y.o. female on day 9 of admission seen in follow-up for acute on chronic hypoxic respiratory failure, obstructive sleep apnea, obesity hypoventilation syndrome    Subjective   On 4 L nasal cannula oxygen; O2 sats 96%.  No respiratory complaints.  Denies pain.  Afebrile.       Objective     Physical Exam  Vitals and nursing note reviewed.   Constitutional:       Appearance: She is obese.   HENT:      Head: Normocephalic and atraumatic.      Nose: Nose normal.      Mouth/Throat:      Mouth: Mucous membranes are moist.   Eyes:      Extraocular Movements: Extraocular movements intact.      Conjunctiva/sclera: Conjunctivae normal.      Pupils: Pupils are equal, round, and reactive to light.   Cardiovascular:      Rate and Rhythm: Normal rate and regular rhythm.      Pulses: Normal pulses.      Heart sounds: Normal heart sounds.   Pulmonary:      Effort: Pulmonary effort is normal.      Comments: Lungs diminished but clear.  Abdominal:      General: Bowel sounds are normal.      Palpations: Abdomen is soft.   Musculoskeletal:         General: Normal range of motion.   Skin:     General: Skin is warm and dry.      Capillary Refill: Capillary refill takes less than 2 seconds.   Neurological:      General: No focal deficit present.      Mental Status: She is alert and oriented to person, place, and time.   Psychiatric:         Mood and Affect: Mood normal.         Behavior: Behavior normal.         Last Recorded Vitals  Blood pressure 121/71, pulse 98, temperature 36.6 °C (97.9 °F), temperature source Oral, resp. rate 18, height 1.727 m (5' 7.99\"), weight 126 kg (277 lb 3.2 oz), SpO2 96%.  Intake/Output last 3 Shifts:  I/O last 3 completed shifts:  In: 100 (0.8 mL/kg) [IV Piggyback:100]  Out: 1150 (9.1 mL/kg) [Urine:1150 (0.3 mL/kg/hr)]  Weight: 125.7 kg       Intake/Output Summary (Last 24 hours) at 6/12/2024 1358  Last data filed at 6/12/2024 1125  Gross per 24 hour   Intake 100 ml   Output 1350 ml "   Net -1250 ml      cefTRIAXone, 1 g, intravenous, q24h  docusate sodium, 100 mg, oral, BID  doxazosin, 1 mg, oral, Daily  enoxaparin, 40 mg, subcutaneous, Daily  ferrous sulfate (325 mg ferrous sulfate), 1 tablet, oral, Daily with breakfast  furosemide, 40 mg, intravenous, q12h  gabapentin, 300 mg, oral, BID  insulin glargine, 35 Units, subcutaneous, Nightly  insulin lispro, 0-20 Units, subcutaneous, TID  insulin lispro, 10 Units, subcutaneous, TID  ipratropium-albuteroL, 3 mL, nebulization, TID  ketotifen, 1 drop, Both Eyes, BID  levothyroxine, 75 mcg, oral, Daily  [Held by provider] losartan, 50 mg, oral, Daily  [Held by provider] metoprolol succinate XL, 50 mg, oral, Daily  multivitamin with minerals, 1 tablet, oral, Daily  nystatin, 1 Application, Topical, q8h  oxygen, , inhalation, q8h  pantoprazole, 40 mg, oral, Daily before breakfast  perflutren protein A microsphere, 0.5 mL, intravenous, Once in imaging  polyethylene glycol, 17 g, oral, Daily  rosuvastatin, 5 mg, oral, Daily  sodium zirconium cyclosilicate, 10 g, oral, Daily  sulfur hexafluoride microsphr, 2 mL, intravenous, Once in imaging       PRN medications: acetaminophen **OR** [DISCONTINUED] acetaminophen **OR** [DISCONTINUED] acetaminophen, ammonium lactate, bisacodyl, dextrose, glucagon, hydrOXYzine pamoate, ipratropium-albuteroL, ondansetron ODT **OR** [DISCONTINUED] ondansetron, oxygen       Relevant Results  Results for orders placed or performed during the hospital encounter of 06/02/24 (from the past 24 hour(s))   POCT GLUCOSE   Result Value Ref Range    POCT Glucose 196 (H) 74 - 99 mg/dL   POCT GLUCOSE   Result Value Ref Range    POCT Glucose 205 (H) 74 - 99 mg/dL   Basic Metabolic Panel   Result Value Ref Range    Glucose 183 (H) 65 - 99 mg/dL    Sodium 138 133 - 145 mmol/L    Potassium 4.4 3.4 - 5.1 mmol/L    Chloride 100 97 - 107 mmol/L    Bicarbonate 28 24 - 31 mmol/L    Urea Nitrogen 64 (H) 8 - 25 mg/dL    Creatinine 2.90 (H) 0.40 - 1.60  mg/dL    eGFR 17 (L) >60 mL/min/1.73m*2    Calcium 8.3 (L) 8.5 - 10.4 mg/dL    Anion Gap 10 <=19 mmol/L   CBC   Result Value Ref Range    WBC 6.9 4.4 - 11.3 x10*3/uL    nRBC 0.3 (H) 0.0 - 0.0 /100 WBCs    RBC 2.64 (L) 4.00 - 5.20 x10*6/uL    Hemoglobin 7.3 (L) 12.0 - 16.0 g/dL    Hematocrit 24.9 (L) 36.0 - 46.0 %    MCV 94 80 - 100 fL    MCH 27.7 26.0 - 34.0 pg    MCHC 29.3 (L) 32.0 - 36.0 g/dL    RDW 23.2 (H) 11.5 - 14.5 %    Platelets 104 (L) 150 - 450 x10*3/uL   Troponin T   Result Value Ref Range    Troponin T, High Sensitivity 81 (HH) <=14 ng/L   POCT GLUCOSE   Result Value Ref Range    POCT Glucose 169 (H) 74 - 99 mg/dL   POCT GLUCOSE   Result Value Ref Range    POCT Glucose 235 (H) 74 - 99 mg/dL      CT chest wo IV contrast  Result Date: 6/11/2024  FINDINGS: Mediastinum demonstrates scattered subcentimeter paratracheal and AP window lymph nodes. There is no mediastinal lymphadenopathy. Esophagus visualized portions are unremarkable   Heart and great vessels demonstrate ascending thoracic aorta to measure 3.0 cm. No aneurysm. Main pulmonary artery unremarkable pre component of cardiomegaly demonstrated.   Lung parenchyma demonstrates moderate bilateral pleural effusions and compressive atelectasis. There is near-complete left lower lobar collapse with air bronchograms and volume loss within the atelectatic left lung. No heterogeneous density to suggest superimposed infiltrate in the left lower lobar collapse. Also, there is compressive atelectasis in the right lower lobe. Mild geographic attenuation of the lung parenchyma suggesting component of underlying pulmonary edema. Correlate with patient's symptoms. There is compressive atelectasis within the lingula.   Included portions visualized abdomen demonstrates cholelithiasis within the gallbladder with dependent stones in the gallbladder lumen. Mild splenomegaly noted incompletely visualized measuring 13.3 cm.   Visualized osseous structures demonstrate  multilevel endplate sclerosis as well as anterior and lateral osteophyte formation within the thoracic spine. No compression deformity demonstrated.  1. Moderate bilateral pleural effusions with bilateral basilar compressive atelectasis and left lower lobar collapse.   2. Geographic attenuation lung parenchyma with component of pulmonary edema.   3. Generalized subcutaneous edema and skin thickening within the chest in particular along the left lateral chest wall and into the upper abdomen incompletely visualized. Subcutaneous edema as well as more focal hyperdensity and subcutaneous hematoma within the left lateral abdomen not excluded. This may be artifactual in relation increased density given body habitus and arm positioning         XR chest 1 view  Result Date: 6/9/2024  FINDINGS: Exam is limited by the patient's body habitus. Cardiac size indeterminate in view of the AP projection. Diffuse bilateral infiltrates are present with more focal density at the left base silhouetting left hemidiaphragm with air bronchograms at the left base. The findings suggest left lower lobe consolidation . Small effusions may be obscured on this portable study.  Diffuse bilateral infiltrates in a pattern suggesting pulmonary edema with more focal infiltrate suspected at the left base suggesting consolidation and perhaps pneumonia. Infiltrates appear more marked compared to 06/07/2024.       US elastography parenchyma EG organ  Result Date: 6/8/2024  Interpreted By:  Otilio Hernandez, STUDY: US ELASTOGRAPHY PARENCHYMA EG ORGAN; 6/7/2024 6:37 pm   INDICATION: Signs/Symptoms:Elastography, r/o WINSTON CIRRHOSIS.   COMPARISON: None   ACCESSION NUMBER(S): [id number]   ORDERING CLINICIAN: SHANNON WESTON   TECHNIQUE: Limited abdominal ultrasound of the right upper quadrant was performed utilizing gray scale imaging.   FINDINGS: COMMENTS: Technologist note indicates the examination is technically suboptimal with measurements not thought to be  accurate due to the patient's condition. Patient was unable to hold breath or to roll. Increased body habitus limits assessment as well.   Measurements obtained at this time are as follows:   Elastography Median: 12.5 kPA   Elastography IQR/Median: 26.6 %       Measurements were obtained as above but are now thought to be accurate due to the patient's body habitus and suboptimal cooperation at this time. Repeat evaluation at a later date may be appropriate.   Liver Fibrosis Staging Metavir Score kPa m/s Normal- Mild  F1 5.48 kPa-8.29 kPa 1.35 m/s- 1.66 m/s Mild-Moderate  F2 8.29 kPa-9.40 kPa 1.66 m/s- 1.77 m/s Moderate- Severe F3 9.40 kPa-11.9 kPa 1.77 m/s- 1.99 m/s Cirrhosis F4 >11.9 kPa >1.99 m/s   CAUTION: The values for the shear wave speed and tissue modulus are relative indices intended only for the purpose of comparison with other measurements performed using the LOGIQ E9 and E10 Absolute values for these measurements may vary among different measurement devices.       XR chest 1 view  Result Date: 6/7/2024  FINDINGS: The heart is enlarged with mild pulmonary vascular congestion seen. No focal infiltrate or focal airspace consolidation is identified.   No obvious pleural effusions are seen on the AP projection.  Mild cardiomegaly and mild pulmonary vascular congestion.   The bilateral pleural effusions seen on 06/03/2024 are not obvious on the AP projection. PA and lateral views of the chest are recommended when the patient's condition permits.       US gallbladder  Result Date: 6/6/2024  FINDINGS: COMMENTS: Technologist note states that the exam was limited due to patient being uncooperative.   Liver: There is a very coarse echotexture of the hepatic parenchyma with a lobulated hepatic contour consistent with underlying cirrhosis. Gallbladder: Gallbladder is filled with stones. There is diffuse gallbladder wall thickening measuring 5 mm with no pericholecystic fluid identified. Sonographic Latham's sign:  Negative Pancreas: Pancreas is predominantly obscured by bowel gas. CBD: 0.36 cm Right Kidney:  Minimal amount of fluid is identified adjacent to the kidney. The kidney itself is unremarkable.  1. Abnormal appearance of the liver in a pattern consistent with cirrhosis. The splenomegaly noted on CT may be related to portal venous hypertension considering this appearance. 2. Cholelithiasis with gallbladder wall thickening but negative sonographic Latham's sign and no pericholecystic fluid. Findings may be indicative of chronic cholecystitis. Please correlate clinically. 3. Nonspecific perinephric fluid. 4. Please note exam is technically limited due to suboptimal cooperation from the patient according to technologist note.       CT kidney wo IV contrast  Result Date: 6/5/2024  FINDINGS: ABDOMEN CT: SOLID ORGAN ASSESSMENT: Moderate bilateral pleural effusions are present. Bibasilar atelectasis is present. Infiltrate may be obscured by the atelectatic changes.   Within limits of this unenhanced study no focal masses are identified within the liver, spleen, pancreas, right kidney or adrenal glands. Spleen is enlarged with a craniocaudal extensive 16.4 cm.   CT examination does confirm a rim calcified mass within the mid to lower pole left kidney measuring on the order of 3.1 cm in diameter. Delineation and characterization of the mass is somewhat limited due to the absence of contrast. There is suspicion of mildly enlarged retroperitoneal lymph nodes on the left at the level of the left kidney, again not well characterized due to the patient's body habitus and absence of contrast.   Multiple stones are identified along the dependent portion the gallbladder. Gallbladder wall is not well defined raising possibility of gallbladder wall thickening and/or pericholecystic fluid. Please correlate with patient's clinical assessment to rule out acute cholecystitis. Right upper quadrant ultrasound could be obtained to further  assess this finding.   RETROPERITONEUM: AORTA:  There is no evidence for abdominal aortic aneurysm.   BOWEL ASSESSMENT: No intraperitoneal free air is identified. There is a nonspecific appearance of the stomach. Visualized portions of the large and small bowel are unremarkable.   ABDOMINAL WILKINS: There is no evidence for a hernia. Anasarca is present.   OSSEOUS STRUCTURES: No lytic or blastic lesions are identified. Spurring is noted throughout the spine.  1. Indeterminate rim calcified mass left kidney as was suspected on the ultrasound study. Both benign and malignant etiologies are considered in the differential this time with characterization of the mass limited due to the technical issues on this study and the ultrasound study. There is suspicion of left-sided periaortic lymphadenopathy, again not well characterized due to the absence of intravenous contrast. 2. Cholelithiasis with poorly defined gallbladder wall margins. Please correlate with clinical history to rule out the possibility of acute cholecystitis. Right upper quadrant ultrasound may be attempted for further characterization the gallbladder wall to rule out gallbladder wall thickening and pericholecystic fluid. 3. Anasarca. 4. Moderate bilateral pleural effusions with bibasilar atelectasis most marked involving the lower lobes. 5. Splenomegaly. Spleen measures 16.4 cm in craniocaudal extent.     MACRO: none   Signed by: Otilio Hernandez 6/5/2024 12:36 PM Dictation workstation:   [id number]    Transthoracic Echo (TTE) Complete  Result Date: 6/4/2024  CONCLUSIONS:  1. Left ventricular systolic function is normal with a 60-65% estimated ejection fraction.  2. Poorly visualized anatomical structures due to suboptimal image quality.  3. Spectral Doppler shows an impaired relaxation pattern of left ventricular diastolic filling.  4. RV not well visualized but suggests RV enlargemnt and RV hypokinesis in some views, with severe pulmonary hypertension.  5.  Severe tricuspid regurgitation.  6. Severely elevated right ventricular systolic pressure.  7. Aortic valve appears abnormal.  8. Aortic valve sclerosis.     US renal complete  Result Date: 6/4/2024  FINDINGS: COMMENTS: Technologist note indicates that the study was quite limited due to the patient's body habitus. Exam was performed portably as well.   The right kidney measures 10.7 cm x 3.9 cm x 4.7 cm . The left kidney measures 11.8 cm x 5.4 cm x 5.4 cm. THERE IS A HETEROGENEOUS COMPLEX MASS LATERALLY MID TO LOWER POLE LEFT KIDNEY MEASURING 4.3 X 3.9 X 4.6 CM.     No renal stones are identified.  The renal cortical thickness and echogenicity is normal.  No hydronephrosis is identified.   Urinary bladder is nonspecific in appearance with no stones or masses identified.     COMPLEX APPEARING MASS LEFT KIDNEY MEASURING UP TO 4.6 CM IN DIAMETER, WITH A DIFFERENTIAL TO INCLUDE MALIGNANCY. CT UROGRAM WITH AND WITHOUT CONTRAST IS SUGGESTED FOR MORE DEFINITIVE ASSESSMENT.      XR chest 2 views  Result Date: 6/3/2024  FINDINGS: The cardiac size is indeterminate due to the AP projection.   Right hemidiaphragm is elevated. On lateral projection, bilateral pleural effusions are observed. No obvious focal infiltrate or airspace consolidation is seen.  Bilateral pleural effusions on lateral projection and elevated right hemidiaphragm.       Impression  Acute on chronic hypoxic, hypercarbic respiratory failure  Suspected obesity hypoventilation syndrome  Hypercarbic encephalopathy  Morbid obesity  Acute on chronic kidney disease  Congestive heart failure  Pulmonary edema  Urinary tract infection    Plan  Oxygen as sats allow  BiPAP as needed  Continue IBD/ICS  Continuous pulse oximetry  Incentive spirometry/pulmonary hygiene  IV ceftriaxone for UTI  IV Lasix  Prophylaxis  PT/OT  Discharge planning-awaiting precert for [facility]     Radha Corey APRN-[facility]

## 2024-06-12 NOTE — PROGRESS NOTES
"Subjective Data:  Patient is doing okay.  Feeling better shortness of breath has improved.  Overnight Events:    Telemetry overnight reviewed no events     Objective Data:  Last Recorded Vitals:  Vitals:    06/11/24 2340 06/12/24 0000 06/12/24 0300 06/12/24 0350   BP: 129/53   117/52   BP Location: Right arm   Right arm   Patient Position: Lying   Lying   Pulse: 92   87   Resp: 18 18 19 17   Temp:    36.8 °C (98.2 °F)   TempSrc:    Oral   SpO2: 99%   95%   Weight:       Height:           Last Labs:  CBC - 6/12/2024:  4:23 AM  6.9 7.3 104    24.9      CMP - 6/12/2024:  4:23 AM  8.3 6.2 19 --- 0.3   5.2 2.6 9 141      PTT - No results in last year.  1.1   11.7 _     HGBA1C   Date/Time Value Ref Range Status   06/02/2024 10:00 PM 7.5 See below % Final      Last I/O:  I/O last 3 completed shifts:  In: 104 (0.8 mL/kg) [I.V.:4 (0 mL/kg); IV Piggyback:100]  Out: 1150 (9.1 mL/kg) [Urine:1150 (0.3 mL/kg/hr)]  Weight: 125.7 kg     Past Cardiology Tests (Last 3 Years):  EKG:  ECG 12 lead 06/02/2024    Echo:  Transthoracic Echo (TTE) Complete 06/04/2024    Ejection Fractions:  No results found for: \"EF\"  Cath:  No results found for this or any previous visit from the past 1095 days.    Stress Test:  No results found for this or any previous visit from the past 1095 days.    Cardiac Imaging:  No results found for this or any previous visit from the past 1095 days.      Inpatient Medications:  Scheduled medications   Medication Dose Route Frequency    cefTRIAXone  1 g intravenous q24h    docusate sodium  100 mg oral BID    doxazosin  1 mg oral Daily    enoxaparin  40 mg subcutaneous Daily    ferrous sulfate (325 mg ferrous sulfate)  1 tablet oral Daily with breakfast    furosemide  40 mg intravenous q12h    gabapentin  300 mg oral BID    insulin glargine  35 Units subcutaneous Nightly    insulin lispro  0-20 Units subcutaneous TID    insulin lispro  10 Units subcutaneous TID    ipratropium-albuteroL  3 mL nebulization TID    " ketotifen  1 drop Both Eyes BID    levothyroxine  75 mcg oral Daily    [Held by provider] losartan  50 mg oral Daily    [Held by provider] metoprolol succinate XL  50 mg oral Daily    multivitamin with minerals  1 tablet oral Daily    nystatin  1 Application Topical q8h    oxygen   inhalation q8h    pantoprazole  40 mg oral Daily before breakfast    perflutren protein A microsphere  0.5 mL intravenous Once in imaging    polyethylene glycol  17 g oral Daily    rosuvastatin  5 mg oral Daily    sodium zirconium cyclosilicate  10 g oral Daily    sulfur hexafluoride microsphr  2 mL intravenous Once in imaging     PRN medications   Medication    acetaminophen    ammonium lactate    bisacodyl    dextrose    glucagon    hydrOXYzine pamoate    ipratropium-albuteroL    ondansetron ODT    oxygen     Continuous Medications   Medication Dose Last Rate       Physical Exam:  General: Patient is in mild respiratory distress  HEENT: atraumatic normocephalic.  Neck: is supple jugular venous pressure difficult to assess patient on BiPAP no thyromegaly.  Cardiovascular regular rate and rhythm normal heart sounds no murmurs rubs or gallops.  Heart sounds  Lungs: Creased breathing sounds at bases abdomen: is soft nontender.  Extremities warm to touch 1+ edema.        Assessment/Plan   1 acute on chronic respiratory failure with pulmonary hypertension seen on her echocardiogram and edema on the chest x-ray.  Patient poor historian.  Agree with diuresis for now.  Monitor renal function.   TSH was mildly elevated 7 range.  proBNP 42,000.  Will reassess in a.m.  We may consider using isosorbide for her pulmonary hypertension.  Pulmonary on board.     2.  Hypertension will monitor for now.  Blood pressure is well-controlled.     3.  Severe anemia.  Iron studies within normal limits.     4.  Diabetes mellitus.     5.  Hyperlipidemia will check lipid panel.     Thank you for allowing to participate in her care  Peripheral IV 06/05/24 20 G  Left;Anterior Forearm (Active)   Site Assessment Clean;Dry;Intact 06/11/24 2100   Dressing Status Clean;Dry;Occlusive 06/11/24 2100   Number of days: 7       Code Status:  DNR and No Intubation          Maria A Torre MD

## 2024-06-12 NOTE — PROGRESS NOTES
"Nutrition Follow up Note    Nutrition Assessment      Patient on the phone at time of assessment. States good appetite and no questions. Patient reports that she \"cannot stand\" the Davi being sent to her, will discontinue at this time. She does not want any other form of nutritional supplements.     Nutrition History:  Food and Nutrient History: pt continues to eat well  Energy Intake: Good > 75 %  Food Allergies/Intolerances:  None  GI Symptoms: None  Oral Problems: None    Anthropometrics:  Ht: 172.7 cm (5' 7.99\"), Wt: 126 kg (277 lb 3.2 oz), BMI: 42.16  IBW/kg (Dietitian Calculated): 63.64 kg  Percent of IBW: 198 %  Adjusted Body Weight (kg): 79.2 kg    Weight Change:  Daily Weight  06/03/24 : 126 kg (277 lb 3.2 oz)               Nutrition Focused Physical Exam Findings:   Subcutaneous Fat Loss  Orbital Fat Pads: Well nourished (slightly bulging fat pads)  Buccal Fat Pads: Well nourished (full, rounded cheeks)  Triceps: Well nourished (ample fat tissue)    Muscle Wasting  Temporalis: Well nourished (well-defined muscle)  Pectoralis (Clavicular Region): Well nourished (clavicle not visible)  Deltoid/Trapezius: Well nourished (rounded appearance at arm, shoulder, neck)    Edema  Edema: none    Physical Findings (Nutrition Deficiency/Toxicity)  Skin: Positive (pressure injury to R heel and sacrum, multiple BLE wounds - see ET RN assessment)    Nutrition Significant Labs:  Lab Results   Component Value Date    WBC 6.9 06/12/2024    HGB 7.3 (L) 06/12/2024    HCT 24.9 (L) 06/12/2024     (L) 06/12/2024    ALT 9 06/09/2024    AST 19 06/09/2024     06/12/2024    K 4.4 06/12/2024     06/12/2024    CREATININE 2.90 (H) 06/12/2024    BUN 64 (H) 06/12/2024    CO2 28 06/12/2024    TSH 7.13 (H) 06/02/2024    INR 1.1 06/07/2024    HGBA1C 7.5 (H) 06/02/2024     Nutrition Specific Medications:  cefTRIAXone, 1 g, intravenous, q24h  docusate sodium, 100 mg, oral, BID  doxazosin, 1 mg, oral, Daily  enoxaparin, 40 " mg, subcutaneous, Daily  ferrous sulfate (325 mg ferrous sulfate), 1 tablet, oral, Daily with breakfast  furosemide, 40 mg, intravenous, q12h  gabapentin, 300 mg, oral, BID  insulin glargine, 35 Units, subcutaneous, Nightly  insulin lispro, 0-20 Units, subcutaneous, TID  insulin lispro, 10 Units, subcutaneous, TID  ipratropium-albuteroL, 3 mL, nebulization, TID  ketotifen, 1 drop, Both Eyes, BID  levothyroxine, 75 mcg, oral, Daily  [Held by provider] losartan, 50 mg, oral, Daily  [Held by provider] metoprolol succinate XL, 50 mg, oral, Daily  multivitamin with minerals, 1 tablet, oral, Daily  nystatin, 1 Application, Topical, q8h  oxygen, , inhalation, q8h  pantoprazole, 40 mg, oral, Daily before breakfast  perflutren protein A microsphere, 0.5 mL, intravenous, Once in imaging  polyethylene glycol, 17 g, oral, Daily  rosuvastatin, 5 mg, oral, Daily  sodium zirconium cyclosilicate, 10 g, oral, Daily  sulfur hexafluoride microsphr, 2 mL, intravenous, Once in imaging       Dietary Orders (From admission, onward)       Start     Ordered    06/07/24 0740  Adult diet Carb Controlled, Cardiac, Renal; 90 gram carb/meal, 60 gram Carb evening snack; 70 gm fat; 2 - 3 grams Sodium; Potassium Restricted 2 gm (50mEq)  Diet effective now        Question Answer Comment   Diet type Carb Controlled    Diet type Cardiac    Diet type Renal    Carb diet selection: 90 gram carb/meal, 60 gram Carb evening snack    Fat restriction: 70 gm fat    Sodium restriction: 2 - 3 grams Sodium    Potassium restriction: Potassium Restricted 2 gm (50mEq)        06/07/24 0742    06/03/24 1344  Oral nutritional supplements  Until discontinued        Comments: Unflavored in applesauce   Question Answer Comment   Deliver with Breakfast    Select supplement: Davi        06/03/24 1344                   Estimated Needs:   Estimated Energy Needs  Total Energy Estimated Needs (kCal):  (9292-8682)  Total Estimated Energy Need per Day (kCal/kg):  (25-30)  Method  for Estimating Needs: adjusted wt    Estimated Protein Needs  Total Protein Estimated Needs (g):  (79-95)  Total Protein Estimated Needs (g/kg):  (1.0-1.2)  Method for Estimating Needs: adjusted wt    Estimated Fluid Needs  Total Fluid Estimated Needs (mL):  (2341-2695)  Total Fluid Estimated Needs (mL/kg):  (25-30)  Method for Estimating Needs: 1 mL/kcal      Nutrition Diagnosis   Nutrition Diagnosis:  Malnutrition Diagnosis  Patient has Malnutrition Diagnosis: No    Nutrition Diagnosis  Patient has Nutrition Diagnosis: Yes  Diagnosis Status (1): Ongoing  Nutrition Diagnosis 1: Increased nutrient needs  Related to (1): increased demand for nutrients  As Evidenced by (1): pressure injury     Nutrition Interventions/Recommendations   Nutrition Interventions and Recommendations:    Nutrition Prescription:  Individualized Nutrition Prescription Provided for : 4905-6625 calories/day and 79-95g protein/day to be provided via diet    Nutrition Interventions:   Food and/or Nutrient Delivery Interventions  Interventions: Meals and snacks  Meals and Snacks: Carbohydrate-modified diet  Goal: provide as orderef  Medical Food Supplement: Commercial beverage  Goal: unflavored mecca mixed in applesauce BID to aid in wound healing    Education Documentation  No documentation found.       Nutrition Monitoring and Evaluation   Monitoring/Evaluation:   Food/Nutrient Related History Monitoring  Monitoring and Evaluation Plan: Energy intake  Energy Intake: Estimated energy intake  Criteria: pt will maintain wt at this time        Nutrition Focused Physical Findings  Monitoring and Evaluation Plan: Skin  Skin: Impaired wound healing  Criteria: pt will show signs of improvement in skin integrity        Time Spent/Follow-up:   Follow Up  Time Spent (min): 20 minutes  Last Date of Nutrition Visit: 06/12/24  Nutrition Follow-Up Needed?: 5-7 days  Follow up Comment: 6/17/24

## 2024-06-12 NOTE — CARE PLAN
Problem: Fall/Injury  Goal: Verbalize understanding of personal risk factors for fall in the hospital  Outcome: Progressing  Goal: Verbalize understanding of risk factor reduction measures to prevent injury from fall in the home  Outcome: Progressing  Goal: Use assistive devices by end of the shift  Outcome: Progressing  Goal: Pace activities to prevent fatigue by end of the shift  Outcome: Progressing     Problem: Skin  Goal: Decreased wound size/increased tissue granulation at next dressing change  Outcome: Progressing  Goal: Participates in plan/prevention/treatment measures  Outcome: Progressing  Goal: Prevent/minimize sheer/friction injuries  Outcome: Progressing  Goal: Promote/optimize nutrition  Outcome: Progressing  Goal: Promote skin healing  Outcome: Progressing     Problem: Safety  Goal: Patient will be injury free during hospitalization  Outcome: Progressing  Goal: I will remain free of falls  Outcome: Progressing     Problem: Psychosocial Needs  Goal: Demonstrates ability to cope with hospitalization/illness  Outcome: Progressing  Goal: Collaborate with me, my family, and caregiver to identify my specific goals  Outcome: Progressing     Problem: Discharge Barriers  Goal: My discharge needs are met  Outcome: Progressing     Problem: Respiratory  Goal: Wean oxygen to maintain O2 saturation per order/standard this shift  Outcome: Progressing  Goal: Clear secretions with interventions this shift  Outcome: Progressing  Goal: Minimize anxiety/maximize coping throughout shift  Outcome: Progressing  Goal: Minimal/no exertional discomfort or dyspnea this shift  Outcome: Progressing  Goal: No signs of respiratory distress (eg. Use of accessory muscles. Peds grunting)  Outcome: Progressing  Goal: Patent airway maintained this shift  Outcome: Progressing  Goal: Tolerate mechanical ventilation evidenced by VS/agitation level this shift  Outcome: Progressing  Goal: Tolerate pulmonary toileting this shift  Outcome:  Progressing  Goal: Verbalize decreased shortness of breath this shift  Outcome: Progressing  Goal: Increase self care and/or family involvement in next 24 hours  Outcome: Progressing     Problem: Pain - Adult  Goal: Verbalizes/displays adequate comfort level or baseline comfort level  Outcome: Progressing     Problem: Safety - Adult  Goal: Free from fall injury  Outcome: Progressing

## 2024-06-12 NOTE — CARE PLAN
The patient's goals for the shift include  get a good nights rest.     The clinical goals for the shift include Stay free of falls    Brittanie refused dressing change- said she would let them do it later after getting cleaned up. Wanted to sleep more.     Problem: Skin  Goal: Decreased wound size/increased tissue granulation at next dressing change  Outcome: Not Progressing  Flowsheets (Taken 6/11/2024 1950)  Decreased wound size/increased tissue granulation at next dressing change: Promote sleep for wound healing  Goal: Participates in plan/prevention/treatment measures  Outcome: Not Progressing  Flowsheets (Taken 6/11/2024 1950)  Participates in plan/prevention/treatment measures: Discuss with provider PT/OT consult  Goal: Prevent/minimize sheer/friction injuries  Outcome: Not Progressing  Flowsheets (Taken 6/11/2024 1950)  Prevent/minimize sheer/friction injuries:   Complete micro-shifts as needed if patient unable. Adjust patient position to relieve pressure points, not a full turn   Increase activity/out of bed for meals   Use pull sheet   Turn/reposition every 2 hours/use positioning/transfer devices  Goal: Promote/optimize nutrition  Outcome: Not Progressing  Flowsheets (Taken 6/11/2024 1950)  Promote/optimize nutrition:   Assist with feeding   Monitor/record intake including meals  Goal: Promote skin healing  Outcome: Not Progressing  Flowsheets (Taken 6/12/2024 0634)  Promote skin healing: Turn/reposition every 2 hours/use positioning/transfer devices

## 2024-06-12 NOTE — NURSING NOTE
Messaged Velvet Mortensen NP to notify of critical result received from lab - troponin of 81. Received confirmation that she received message, no orders yet at this time.

## 2024-06-12 NOTE — PROGRESS NOTES
"Brittanie Smart is a 63 y.o. female on day 9 of admission presenting with Unable to care for self.    Subjective   Patient seen for acute kidney injury admitted with congestive heart failure and urinary tract infection he is on IV Rocephin patient states she feels better she is less short of breath no GI symptoms no overnight events noted.        Objective     Physical Exam  Neck:      Vascular: No carotid bruit.   Cardiovascular:      Rate and Rhythm: Normal rate and regular rhythm.      Heart sounds: No murmur heard.     No friction rub. No gallop.   Pulmonary:      Breath sounds: No wheezing, rhonchi or rales.   Chest:      Chest wall: No tenderness.   Abdominal:      General: There is no distension.      Tenderness: There is no abdominal tenderness. There is no guarding or rebound.   Musculoskeletal:         General: No swelling or tenderness.      Cervical back: Neck supple.      Right lower leg: No edema.      Left lower leg: No edema.   Lymphadenopathy:      Cervical: No cervical adenopathy.         Last Recorded Vitals  Blood pressure (!) 113/49, pulse 89, temperature 36.6 °C (97.9 °F), temperature source Oral, resp. rate 18, height 1.727 m (5' 7.99\"), weight 126 kg (277 lb 3.2 oz), SpO2 95%.    Intake/Output last 3 Shifts:  I/O last 3 completed shifts:  In: 100 (0.8 mL/kg) [IV Piggyback:100]  Out: 1150 (9.1 mL/kg) [Urine:1150 (0.3 mL/kg/hr)]  Weight: 125.7 kg     Current Facility-Administered Medications:     acetaminophen (Tylenol) tablet 650 mg, 650 mg, oral, q4h PRN, 650 mg at 06/04/24 1607 **OR** [DISCONTINUED] acetaminophen (Tylenol) oral liquid 650 mg, 650 mg, oral, q4h PRN **OR** [DISCONTINUED] acetaminophen (Tylenol) suppository 650 mg, 650 mg, rectal, q4h PRN, Noelle Turner DO    ammonium lactate (Lac-Hydrin) 12 % lotion, , Topical, q1h PRN, Kaylan Bird, APRN-CNP    bisacodyl (Dulcolax) EC tablet 5 mg, 5 mg, oral, Daily PRN, Kaylan Danielsonar, APRN-CNP    cefTRIAXone (Rocephin) IVPB 1 g, 1 g, " intravenous, q24h, RHONDA Kaiser, Stopped at 06/11/24 1647    dextrose 50 % injection 12.5 g, 12.5 g, intravenous, q15 min PRN, Noelle Turner DO    docusate sodium (Colace) capsule 100 mg, 100 mg, oral, BID, RHONDA Kaiser, 100 mg at 06/11/24 2154    doxazosin (Cardura) tablet 1 mg, 1 mg, oral, Daily, Siddhartha Tovar MD, 1 mg at 06/12/24 0906    enoxaparin (Lovenox) syringe 40 mg, 40 mg, subcutaneous, Daily, Siddhartha Tovar MD, 40 mg at 06/12/24 0907    ferrous sulfate (325 mg ferrous sulfate) tablet 1 tablet, 1 tablet, oral, Daily with breakfast, Noelle Turner DO, 1 tablet at 06/12/24 0906    furosemide (Lasix) injection 40 mg, 40 mg, intravenous, q12h, RHONDA Fisher, 40 mg at 06/12/24 0906    gabapentin (Neurontin) capsule 300 mg, 300 mg, oral, BID, Siddhartha Tovar MD, 300 mg at 06/12/24 0906    glucagon (Glucagen) injection 1 mg, 1 mg, intramuscular, q15 min PRN, Noelle Turner DO    hydrOXYzine pamoate (Vistaril) capsule 25 mg, 25 mg, oral, q6h PRN, RHONDA Kaiser, 25 mg at 06/10/24 2219    insulin glargine (Lantus) injection 35 Units, 35 Units, subcutaneous, Nightly, RHONDA Kaiser, 35 Units at 06/11/24 2152    insulin lispro (HumaLOG) injection 0-20 Units, 0-20 Units, subcutaneous, TID, RHONDA Kaiser, 4 Units at 06/12/24 0901    insulin lispro (HumaLOG) injection 10 Units, 10 Units, subcutaneous, TID, RHONDA Kaiser, 10 Units at 06/12/24 0900    ipratropium-albuteroL (Duo-Neb) 0.5-2.5 mg/3 mL nebulizer solution 3 mL, 3 mL, nebulization, TID, Noelle Turner DO, 3 mL at 06/12/24 0722    ipratropium-albuteroL (Duo-Neb) 0.5-2.5 mg/3 mL nebulizer solution 3 mL, 3 mL, nebulization, q2h PRN, Noelle Turner DO, 3 mL at 06/10/24 1511    ketotifen (Zaditor) 0.025 % (0.035 %) ophthalmic solution 1 drop, 1 drop, Both Eyes, BID, Noelle Turner DO, 1 drop at 06/12/24 0919    levothyroxine (Synthroid, Levoxyl) tablet 75 mcg,  75 mcg, oral, Daily, Noelle Turner DO, 75 mcg at 06/12/24 0557    [Held by provider] losartan (Cozaar) tablet 50 mg, 50 mg, oral, Daily, Noelle Turner DO, 50 mg at 06/03/24 0839    [Held by provider] metoprolol succinate XL (Toprol-XL) 24 hr tablet 50 mg, 50 mg, oral, Daily, Siddhartha oTvar MD, 50 mg at 06/07/24 0853    multivitamin with minerals 1 tablet, 1 tablet, oral, Daily, Noelle Turner DO, 1 tablet at 06/12/24 0906    nystatin (Mycostatin) 100,000 unit/gram powder 1 Application, 1 Application, Topical, q8h, Noelle Turner DO, 1 Application at 06/11/24 2200    ondansetron ODT (Zofran-ODT) disintegrating tablet 4 mg, 4 mg, oral, q8h PRN **OR** [DISCONTINUED] ondansetron (Zofran) injection 4 mg, 4 mg, intravenous, q8h PRN, Noelle Turner DO    oxygen (O2) therapy, , inhalation, q8h, Noelle Turner DO, 4 L/min at 06/11/24 0700    oxygen (O2) therapy, , inhalation, Continuous PRN - O2/gases, Roland Navarro, , 4 L/min at 06/11/24 2003    pantoprazole (ProtoNix) EC tablet 40 mg, 40 mg, oral, Daily before breakfast, Noelle Turner DO, 40 mg at 06/12/24 0600    perflutren protein A microsphere (Optison) injection 0.5 mL, 0.5 mL, intravenous, Once in imaging, Kaylan Bird APRN-CNP    polyethylene glycol (Glycolax, Miralax) packet 17 g, 17 g, oral, Daily, Kaylan Bird APRN-CNP, 17 g at 06/08/24 0826    rosuvastatin (Crestor) tablet 5 mg, 5 mg, oral, Daily, Noelle Turner DO, 5 mg at 06/12/24 0906    sodium zirconium cyclosilicate (Lokelma) packet 10 g, 10 g, oral, Daily, Siddhartha Tovar MD, 10 g at 06/12/24 0904    sulfur hexafluoride microsphr (Lumason) injection 24.28 mg, 2 mL, intravenous, Once in imaging, ACE Kaiser-CNP   Relevant Results    Results for orders placed or performed during the hospital encounter of 06/02/24 (from the past 96 hour(s))   POCT GLUCOSE   Result Value Ref Range    POCT Glucose 170 (H) 74 - 99 mg/dL   Anti-Neutrophilic Cytoplasmic  Antibody   Result Value Ref Range    ANCA IFA Titer <1:20 <1:20    ANCA IFA Pattern None Detected None Detected   C3 Complement   Result Value Ref Range    C3 Complement 129 87 - 200 mg/dL   C4 Complement   Result Value Ref Range    C4 Complement 37 10 - 50 mg/dL   Renal Function Panel   Result Value Ref Range    Glucose 170 (H) 65 - 99 mg/dL    Sodium 137 133 - 145 mmol/L    Potassium 5.7 (H) 3.4 - 5.1 mmol/L    Chloride 100 97 - 107 mmol/L    Bicarbonate 28 24 - 31 mmol/L    Urea Nitrogen 68 (H) 8 - 25 mg/dL    Creatinine 3.10 (H) 0.40 - 1.60 mg/dL    eGFR 16 (L) >60 mL/min/1.73m*2    Calcium 8.6 8.5 - 10.4 mg/dL    Phosphorus 4.3 2.5 - 4.5 mg/dL    Albumin 2.9 (L) 3.5 - 5.0 g/dL    Anion Gap 9 <=19 mmol/L   Lactate Dehydrogenase   Result Value Ref Range     (H) 91 - 227 U/L   POCT GLUCOSE   Result Value Ref Range    POCT Glucose 169 (H) 74 - 99 mg/dL   Renal Function Panel   Result Value Ref Range    Glucose 168 (H) 65 - 99 mg/dL    Sodium 135 133 - 145 mmol/L    Potassium 5.3 (H) 3.4 - 5.1 mmol/L    Chloride 99 97 - 107 mmol/L    Bicarbonate 26 24 - 31 mmol/L    Urea Nitrogen 65 (H) 8 - 25 mg/dL    Creatinine 3.00 (H) 0.40 - 1.60 mg/dL    eGFR 17 (L) >60 mL/min/1.73m*2    Calcium 8.5 8.5 - 10.4 mg/dL    Phosphorus 4.2 2.5 - 4.5 mg/dL    Albumin 2.7 (L) 3.5 - 5.0 g/dL    Anion Gap 10 <=19 mmol/L   Haptoglobin   Result Value Ref Range    Haptoglobin 155 37 - 246 mg/dL   POCT GLUCOSE   Result Value Ref Range    POCT Glucose 191 (H) 74 - 99 mg/dL   CBC   Result Value Ref Range    WBC 7.3 4.4 - 11.3 x10*3/uL    nRBC 0.0 0.0 - 0.0 /100 WBCs    RBC 2.89 (L) 4.00 - 5.20 x10*6/uL    Hemoglobin 7.8 (L) 12.0 - 16.0 g/dL    Hematocrit 27.3 (L) 36.0 - 46.0 %    MCV 95 80 - 100 fL    MCH 27.0 26.0 - 34.0 pg    MCHC 28.6 (L) 32.0 - 36.0 g/dL    RDW 22.6 (H) 11.5 - 14.5 %    Platelets 125 (L) 150 - 450 x10*3/uL   Comprehensive Metabolic Panel   Result Value Ref Range    Glucose 130 (H) 65 - 99 mg/dL    Sodium 136 133 -  145 mmol/L    Potassium 5.0 3.4 - 5.1 mmol/L    Chloride 100 97 - 107 mmol/L    Bicarbonate 26 24 - 31 mmol/L    Urea Nitrogen 63 (H) 8 - 25 mg/dL    Creatinine 3.10 (H) 0.40 - 1.60 mg/dL    eGFR 16 (L) >60 mL/min/1.73m*2    Calcium 8.4 (L) 8.5 - 10.4 mg/dL    Albumin 2.6 (L) 3.5 - 5.0 g/dL    Alkaline Phosphatase 141 (H) 35 - 125 U/L    Total Protein 6.2 5.9 - 7.9 g/dL    AST 19 5 - 40 U/L    Bilirubin, Total 0.3 0.1 - 1.2 mg/dL    ALT 9 5 - 40 U/L    Anion Gap 10 <=19 mmol/L   POCT GLUCOSE   Result Value Ref Range    POCT Glucose 119 (H) 74 - 99 mg/dL   Blood Gas Arterial   Result Value Ref Range    POCT pH, Arterial 7.30 (L) 7.38 - 7.42 pH    POCT pCO2, Arterial 58 (H) 38 - 42 mm Hg    POCT pO2, Arterial 58 (L) 85 - 95 mm Hg    POCT SO2, Arterial 90 (L) 94 - 100 %    POCT Oxy Hemoglobin, Arterial 85.7 (L) 94.0 - 98.0 %    POCT Base Excess, Arterial 0.8 -2.0 - 3.0 mmol/L    POCT HCO3 Calculated, Arterial 28.5 (H) 22.0 - 26.0 mmol/L    Patient Temperature 37.0 degrees Celsius    FiO2 32 %    Apparatus CANNULA     Site of Arterial Puncture Radial Right     Tyrone's Test Positive    POCT GLUCOSE   Result Value Ref Range    POCT Glucose 139 (H) 74 - 99 mg/dL   Protein, Urine Random   Result Value Ref Range    Total Protein, Urine Random 47 (H) 5 - 25 mg/dL   POCT GLUCOSE   Result Value Ref Range    POCT Glucose 158 (H) 74 - 99 mg/dL   POCT GLUCOSE   Result Value Ref Range    POCT Glucose 172 (H) 74 - 99 mg/dL   CBC   Result Value Ref Range    WBC 7.3 4.4 - 11.3 x10*3/uL    nRBC 0.0 0.0 - 0.0 /100 WBCs    RBC 3.04 (L) 4.00 - 5.20 x10*6/uL    Hemoglobin 8.4 (L) 12.0 - 16.0 g/dL    Hematocrit 28.0 (L) 36.0 - 46.0 %    MCV 92 80 - 100 fL    MCH 27.6 26.0 - 34.0 pg    MCHC 30.0 (L) 32.0 - 36.0 g/dL    RDW 22.9 (H) 11.5 - 14.5 %    Platelets 116 (L) 150 - 450 x10*3/uL   Basic Metabolic Panel   Result Value Ref Range    Glucose 206 (H) 65 - 99 mg/dL    Sodium 137 133 - 145 mmol/L    Potassium 4.9 3.4 - 5.1 mmol/L    Chloride  100 97 - 107 mmol/L    Bicarbonate 29 24 - 31 mmol/L    Urea Nitrogen 67 (H) 8 - 25 mg/dL    Creatinine 3.10 (H) 0.40 - 1.60 mg/dL    eGFR 16 (L) >60 mL/min/1.73m*2    Calcium 8.6 8.5 - 10.4 mg/dL    Anion Gap 8 <=19 mmol/L   POCT GLUCOSE   Result Value Ref Range    POCT Glucose 176 (H) 74 - 99 mg/dL   POCT GLUCOSE   Result Value Ref Range    POCT Glucose 209 (H) 74 - 99 mg/dL   Blood Gas Arterial Full Panel   Result Value Ref Range    POCT pH, Arterial 7.28 (L) 7.38 - 7.42 pH    POCT pCO2, Arterial 64 (H) 38 - 42 mm Hg    POCT pO2, Arterial 68 (L) 85 - 95 mm Hg    POCT SO2, Arterial 95 94 - 100 %    POCT Oxy Hemoglobin, Arterial 89.8 (L) 94.0 - 98.0 %    POCT Hematocrit Calculated, Arterial 26.0 (L) 36.0 - 46.0 %    POCT Sodium, Arterial 135 (L) 136 - 145 mmol/L    POCT Potassium, Arterial 4.5 3.5 - 5.3 mmol/L    POCT Chloride, Arterial 101 98 - 107 mmol/L    POCT Ionized Calcium, Arterial 1.17 1.10 - 1.33 mmol/L    POCT Glucose, Arterial 177 (H) 74 - 99 mg/dL    POCT Lactate, Arterial 1.0 0.4 - 2.0 mmol/L    POCT Base Excess, Arterial 2.6 -2.0 - 3.0 mmol/L    POCT HCO3 Calculated, Arterial 30.1 (H) 22.0 - 26.0 mmol/L    POCT Hemoglobin, Arterial 8.5 (L) 12.0 - 16.0 g/dL    POCT Anion Gap, Arterial 8 (L) 10 - 25 mmo/L    Patient Temperature 37.0 degrees Celsius    FiO2 36 %    Apparatus CANNULA     Flow 4.0 LPM    Site of Arterial Puncture Radial Right     Tyrone's Test Positive    POCT GLUCOSE   Result Value Ref Range    POCT Glucose 162 (H) 74 - 99 mg/dL   Blood Gas Arterial Full Panel   Result Value Ref Range    POCT pH, Arterial 7.32 (L) 7.38 - 7.42 pH    POCT pCO2, Arterial 58 (H) 38 - 42 mm Hg    POCT pO2, Arterial 103 (H) 85 - 95 mm Hg    POCT SO2, Arterial 100 94 - 100 %    POCT Oxy Hemoglobin, Arterial 94.6 94.0 - 98.0 %    POCT Hematocrit Calculated, Arterial 24.0 (L) 36.0 - 46.0 %    POCT Sodium, Arterial 135 (L) 136 - 145 mmol/L    POCT Potassium, Arterial 4.4 3.5 - 5.3 mmol/L    POCT Chloride, Arterial  102 98 - 107 mmol/L    POCT Ionized Calcium, Arterial 1.20 1.10 - 1.33 mmol/L    POCT Glucose, Arterial 137 (H) 74 - 99 mg/dL    POCT Lactate, Arterial 0.9 0.4 - 2.0 mmol/L    POCT Base Excess, Arterial 3.2 (H) -2.0 - 3.0 mmol/L    POCT HCO3 Calculated, Arterial 29.9 (H) 22.0 - 26.0 mmol/L    POCT Hemoglobin, Arterial 7.9 (L) 12.0 - 16.0 g/dL    POCT Anion Gap, Arterial 8 (L) 10 - 25 mmo/L    Patient Temperature 37.0 degrees Celsius    FiO2 50 %    Ventilator Mode BiPAP     Ventilator Rate 16 bpm    Tidal Volume 464 mL    High Peep CMH2O 18.0 cm H2O    Low Peep CMH2O 8.0 cm H2O    Site of Arterial Puncture Radial Right     Tyrone's Test Positive    POCT GLUCOSE   Result Value Ref Range    POCT Glucose 128 (H) 74 - 99 mg/dL   Renal Function Panel   Result Value Ref Range    Glucose 164 (H) 65 - 99 mg/dL    Sodium 138 133 - 145 mmol/L    Potassium 4.4 3.4 - 5.1 mmol/L    Chloride 101 97 - 107 mmol/L    Bicarbonate 27 24 - 31 mmol/L    Urea Nitrogen 66 (H) 8 - 25 mg/dL    Creatinine 2.90 (H) 0.40 - 1.60 mg/dL    eGFR 17 (L) >60 mL/min/1.73m*2    Calcium 8.6 8.5 - 10.4 mg/dL    Phosphorus 5.2 (H) 2.5 - 4.5 mg/dL    Albumin 2.6 (L) 3.5 - 5.0 g/dL    Anion Gap 10 <=19 mmol/L   Troponin T   Result Value Ref Range    Troponin T, High Sensitivity 79 (HH) <=14 ng/L   NT-PROBNP   Result Value Ref Range    PROBNP 42,132 (H) 0 - 353 pg/mL   Magnesium   Result Value Ref Range    Magnesium 2.00 1.60 - 3.10 mg/dL   POCT GLUCOSE   Result Value Ref Range    POCT Glucose 152 (H) 74 - 99 mg/dL   POCT GLUCOSE   Result Value Ref Range    POCT Glucose 196 (H) 74 - 99 mg/dL   POCT GLUCOSE   Result Value Ref Range    POCT Glucose 196 (H) 74 - 99 mg/dL   POCT GLUCOSE   Result Value Ref Range    POCT Glucose 205 (H) 74 - 99 mg/dL   Basic Metabolic Panel   Result Value Ref Range    Glucose 183 (H) 65 - 99 mg/dL    Sodium 138 133 - 145 mmol/L    Potassium 4.4 3.4 - 5.1 mmol/L    Chloride 100 97 - 107 mmol/L    Bicarbonate 28 24 - 31 mmol/L     Urea Nitrogen 64 (H) 8 - 25 mg/dL    Creatinine 2.90 (H) 0.40 - 1.60 mg/dL    eGFR 17 (L) >60 mL/min/1.73m*2    Calcium 8.3 (L) 8.5 - 10.4 mg/dL    Anion Gap 10 <=19 mmol/L   CBC   Result Value Ref Range    WBC 6.9 4.4 - 11.3 x10*3/uL    nRBC 0.3 (H) 0.0 - 0.0 /100 WBCs    RBC 2.64 (L) 4.00 - 5.20 x10*6/uL    Hemoglobin 7.3 (L) 12.0 - 16.0 g/dL    Hematocrit 24.9 (L) 36.0 - 46.0 %    MCV 94 80 - 100 fL    MCH 27.7 26.0 - 34.0 pg    MCHC 29.3 (L) 32.0 - 36.0 g/dL    RDW 23.2 (H) 11.5 - 14.5 %    Platelets 104 (L) 150 - 450 x10*3/uL   Troponin T   Result Value Ref Range    Troponin T, High Sensitivity 81 (HH) <=14 ng/L   POCT GLUCOSE   Result Value Ref Range    POCT Glucose 169 (H) 74 - 99 mg/dL       Assessment/Plan   Acute kidney injury on chronic kidney disease stage III renal function is stable creatinine 2.9 workup is in progress continue to monitor no indication for dialysis therapy  Chronic kidney disease stage III creatinine baseline 1.3   Diabetes mellitus type 2  Hypertension  Anemia   UTI continue with IV Rocephin  Renal Mass followed by urology refused MRI  Diastolic CHF continue diuresis also she had a CT scan of the chest yesterday results are still pending           Paco Tovar MD

## 2024-06-12 NOTE — CARE PLAN
Problem: Skin  Goal: Decreased wound size/increased tissue granulation at next dressing change  6/12/2024 1409 by Cat Lomeli RN  Outcome: Progressing  Flowsheets (Taken 6/12/2024 1409)  Decreased wound size/increased tissue granulation at next dressing change:   Promote sleep for wound healing   Protective dressings over bony prominences   Utilize specialty bed per algorithm  6/12/2024 1409 by Cat Lomeli RN  Outcome: Progressing  Flowsheets (Taken 6/12/2024 1409)  Decreased wound size/increased tissue granulation at next dressing change:   Promote sleep for wound healing   Protective dressings over bony prominences   Utilize specialty bed per algorithm  Goal: Participates in plan/prevention/treatment measures  6/12/2024 1409 by Cat Lomeli RN  Outcome: Progressing  Flowsheets (Taken 6/12/2024 1409)  Participates in plan/prevention/treatment measures:   Discuss with provider PT/OT consult   Elevate heels  6/12/2024 1409 by Cat Lomeli RN  Outcome: Progressing  Flowsheets (Taken 6/12/2024 1409)  Participates in plan/prevention/treatment measures:   Discuss with provider PT/OT consult   Elevate heels  Goal: Prevent/minimize sheer/friction injuries  6/12/2024 1409 by Cat Lomeli RN  Outcome: Progressing  Flowsheets (Taken 6/12/2024 1409)  Prevent/minimize sheer/friction injuries:   Complete micro-shifts as needed if patient unable. Adjust patient position to relieve pressure points, not a full turn   Increase activity/out of bed for meals   Utilize specialty bed per algorithm   Turn/reposition every 2 hours/use positioning/transfer devices   Use pull sheet  6/12/2024 1409 by Cat Lomeli RN  Outcome: Progressing  Flowsheets (Taken 6/12/2024 1409)  Prevent/minimize sheer/friction injuries:   Complete micro-shifts as needed if patient unable. Adjust patient position to relieve pressure points, not a full turn   Increase activity/out of bed for meals   Utilize specialty bed per algorithm    Turn/reposition every 2 hours/use positioning/transfer devices   Use pull sheet  Goal: Promote/optimize nutrition  6/12/2024 1409 by Cat Lomeli RN  Outcome: Progressing  Flowsheets (Taken 6/12/2024 1409)  Promote/optimize nutrition:   Monitor/record intake including meals   Consume > 50% meals/supplements  6/12/2024 1409 by Cat Lomeli RN  Outcome: Progressing  Flowsheets (Taken 6/12/2024 1409)  Promote/optimize nutrition:   Monitor/record intake including meals   Consume > 50% meals/supplements  Goal: Promote skin healing  6/12/2024 1409 by Cat Lomeli RN  Outcome: Progressing  Flowsheets (Taken 6/12/2024 1409)  Promote skin healing:   Assess skin/pad under line(s)/device(s)   Protective dressings over bony prominences  6/12/2024 1409 by Cat Lomeli RN  Outcome: Progressing  Flowsheets (Taken 6/12/2024 1409)  Promote skin healing:   Assess skin/pad under line(s)/device(s)   Protective dressings over bony prominences

## 2024-06-12 NOTE — PROGRESS NOTES
06/12/24 0905   Discharge Planning   Patient expects to be discharged to: [facility] pending pre-cert and single case agreement; reached out to them at this time to check on status of the same. Bi-pap order sent to them as well.   Does the patient need discharge transport arranged? Yes   RoundTrip coordination needed? Yes   Has discharge transport been arranged? No     9:15 AM [facility] advise that pre-cert was re-submitted and once received they will advise. They are also awaiting the single case agreement (SCA). They will advise when patient can enter their facility.     10:58 AM Updated therapy notes sent to [facility] for pre-cert; they will advise.     3:10 PM Spoke with Lucia from [facility] to check on status of patient's pre-cert. Authorization and SCA remain pending at this time. She also states, per corporate, they cannot accept the patient into their facility without a single case agreement with patient's insurance company. She will advise once pre-cert and SCA are received. Care Transitions will continue to follow.

## 2024-06-12 NOTE — PROGRESS NOTES
"Brittanie Smart is a 63 y.o. female on day 9 of admission presenting with Unable to care for self.    Subjective   Pt seen and examined.  No documented concerns/events overnight from nursing.  Pt sitting up in chair.  Pt says her dyspnea is improved.  Denies any chest pain, palpitations       Objective     Physical Exam  Constitutional:       Appearance: She is obese.   Cardiovascular:      Rate and Rhythm: Normal rate and regular rhythm.      Pulses: Normal pulses.      Heart sounds: Normal heart sounds.   Pulmonary:      Effort: No respiratory distress.      Breath sounds: No wheezing or rales.   Abdominal:      General: Bowel sounds are normal.      Palpations: Abdomen is soft.   Skin:     General: Skin is warm and dry.   Neurological:      General: No focal deficit present.      Mental Status: She is alert and oriented to person, place, and time.   Psychiatric:         Mood and Affect: Mood normal.         Behavior: Behavior normal.         Last Recorded Vitals  Blood pressure 121/71, pulse 98, temperature 36.6 °C (97.9 °F), temperature source Oral, resp. rate 18, height 1.727 m (5' 7.99\"), weight 126 kg (277 lb 3.2 oz), SpO2 96%.  Intake/Output last 3 Shifts:  I/O last 3 completed shifts:  In: 100 (0.8 mL/kg) [IV Piggyback:100]  Out: 1150 (9.1 mL/kg) [Urine:1150 (0.3 mL/kg/hr)]  Weight: 125.7 kg     Relevant Results    Scheduled medications  cefTRIAXone, 1 g, intravenous, q24h  docusate sodium, 100 mg, oral, BID  doxazosin, 1 mg, oral, Daily  enoxaparin, 40 mg, subcutaneous, Daily  ferrous sulfate (325 mg ferrous sulfate), 1 tablet, oral, Daily with breakfast  furosemide, 40 mg, intravenous, q12h  gabapentin, 300 mg, oral, BID  insulin glargine, 35 Units, subcutaneous, Nightly  insulin lispro, 0-20 Units, subcutaneous, TID  insulin lispro, 10 Units, subcutaneous, TID  ipratropium-albuteroL, 3 mL, nebulization, TID  ketotifen, 1 drop, Both Eyes, BID  levothyroxine, 75 mcg, oral, Daily  [Held by provider] losartan, " 50 mg, oral, Daily  [Held by provider] metoprolol succinate XL, 50 mg, oral, Daily  multivitamin with minerals, 1 tablet, oral, Daily  nystatin, 1 Application, Topical, q8h  oxygen, , inhalation, q8h  pantoprazole, 40 mg, oral, Daily before breakfast  perflutren protein A microsphere, 0.5 mL, intravenous, Once in imaging  polyethylene glycol, 17 g, oral, Daily  rosuvastatin, 5 mg, oral, Daily  sodium zirconium cyclosilicate, 10 g, oral, Daily  sulfur hexafluoride microsphr, 2 mL, intravenous, Once in imaging      Continuous medications     PRN medications  PRN medications: acetaminophen **OR** [DISCONTINUED] acetaminophen **OR** [DISCONTINUED] acetaminophen, ammonium lactate, bisacodyl, dextrose, glucagon, hydrOXYzine pamoate, ipratropium-albuteroL, ondansetron ODT **OR** [DISCONTINUED] ondansetron, oxygen     following data reviewed    WBC- 6.9  Hgb-7.3  Hct-24.9  Platelets-104        Na-138  K+-4.4  Cl-100  Bicarb-28  BUN-64  creatinine-2.9                            Assessment/Plan   Principal Problem:    Unable to care for self  Active Problems:    Type 2 diabetes mellitus (Multi)    Morbid obesity (Multi)    Peripheral vascular disease (CMS-HCC)    Hypertension    Pressure sore on sacrum    Behavior problem, adult    Renal failure    Acute respiratory failure with hypoxia and hypercapnia (Multi)    Pressure injury of skin of heel    Acute on Chronic Respiratory Failure with hypoxia & hypercapnia   -ECHO with an EF of 60-65%, severe pulm. HTN  -pulmonology consult  -oxygen PRN, wean as tolerated        Acute on Chronic Kidney Disease  -monitor   -nephrology following  -hold nephrotoxic agents  -renally dose medications  -US with possible mass/malignancy, pt refused MRI  urology recommended     Unable to care for self   -PT,OT recommend moderate intensity rehab  -social service consult        DM II  -monitor blood glucose  -ISS  -diabetic diet  -HgbA1C 7.5  -started on Lantus     Morbid obesity   -encourage  dietary and lifestyle modifications        Hypertension   -continue medications     Pressure injury of sacral region, stage 3 and both ankles   -Wounds are healing well no sign of infection  - wound care gave recommendations  -  Continue MediHoney, Heel protectors         Hypothyroid   - Continue levothyroxine     Anemia   -continue iron supplementation     Plan of Care  Above plan discussed with pt and she is in agreement    ACE Orozco-CNP

## 2024-06-12 NOTE — PROGRESS NOTES
Physical Therapy    Physical Therapy Treatment    Patient Name: Brittanie Smart  MRN: [id number]  Today's Date: 6/12/2024  Time Calculation  Start Time: 1037  Stop Time: 1116  Time Calculation (min): 39 min    Assessment/Plan   PT Assessment  PT Assessment Results: Decreased strength, Decreased range of motion, Decreased endurance, Impaired balance, Decreased mobility, Decreased coordination, Decreased safety awareness, Impaired sensation, Obesity, Decreased skin integrity, Pain  Rehab Prognosis: Fair  Barriers to Discharge: level of assist requried, decreased safety awareness  Evaluation/Treatment Tolerance: Patient limited by fatigue  Medical Staff Made Aware: Yes  Strengths: Support of Caregivers  Barriers to Participation: Comorbidities  End of Session Communication: Bedside nurse  Assessment Comment: improved performance and participation this date, easily fatigued, requires rest breaks.  End of Session Patient Position: Up in chair, Alarm on  PT Plan  Inpatient/Swing Bed or Outpatient: Inpatient  PT Plan  Treatment/Interventions: Bed mobility, Transfer training, Gait training, Balance training, Strengthening, Endurance training, Range of motion, Therapeutic exercise, Therapeutic activity  PT Plan: Ongoing PT  PT Frequency: 4 times per week  PT Discharge Recommendations: Moderate intensity level of continued care  Equipment Recommended upon Discharge: Wheeled walker  PT Recommended Transfer Status: Assist x2, Assistive device  PT - OK to Discharge: Yes      General Visit Information:   PT  Visit  PT Received On: 06/12/24  General  Reason for Referral: impaired mobility  Referred By: Dr Turner  Past Medical History Relevant to Rehab: Behavior problem, adult      Decubitus ulcer     Depression     GERD (gastroesophageal reflux disease)     Hemorrhoids     Hyperlipidemia     Hypertension     Hypothyroid     Morbid obesity (Multi)     Peripheral vascular disease (CMS-HCC)  Family/Caregiver Present: No  Co-Treatment:  OT  Co-Treatment Reason: safety with OOB mobility  Prior to Session Communication: Bedside nurse  Patient Position Received: Bed, 4 rail up, Alarm off, caregiver present  Preferred Learning Style: verbal, visual  General Comment: Pt agreeable to PT session    Subjective   Precautions:  Precautions  Medical Precautions: Fall precautions, Oxygen therapy device and L/min (4L)  Precautions Comment: skin breakdown  Vital Signs:  Vital Signs  SpO2: 94 % (upper 80's to low 90's w/ activity)  Patient Position: Sitting    Objective   Pain:  Pain Assessment  Pain Assessment: 0-10  Pain Score: 4  Pain Type: Acute pain  Pain Location: Breast  Pain Orientation: Right  Cognition:  Cognition  Overall Cognitive Status: Within Functional Limits (cooperative)  Coordination:     Postural Control:  Postural Control  Posture Comment: forward flexed  Dynamic Standing Balance  Dynamic Standing-Balance Support: Bilateral upper extremity supported (HDRW)  Dynamic Standing-Balance:  (steps at bedside, slight turn)  Dynamic Standing-Comments: Fair-    Activity Tolerance:  Activity Tolerance  Endurance: Decreased tolerance for upright activites  Activity Tolerance Comments: Fair, requries rest breaks to increase SpO2 from upper 80's w/ activity  Treatments:  Therapeutic Exercise  Therapeutic Exercise Activity 1: pt performed seated bilat ankle pumps, LAQs, low level hip flexion, active hip add/abd, glute sets x 15    Bed Mobility 1  Bed Mobility 1: Rolling right, Rolling left  Level of Assistance 1: Maximum assistance, +2  Bed Mobility Comments 1: assist for rolling, use of rail; required posterior hygiene and wound care addressed by nursing  Bed Mobility 2  Bed Mobility  2: Supine to sitting  Level of Assistance 2: Maximum assistance, +2  Bed Mobility Comments 2: to Rt EOB, use of rail, assist for trunk up, use of draw sheet to scoot for even sitting, feet to floor. Required rest break following activity    Ambulation/Gait Training 1  Surface  1: Level tile  Device 1: Rolling walker (HS)  Assistance 1: Moderate assistance (+2)  Comments/Distance (ft) 1: Pt able to take ~ 6-7 small shuffled steps to chair, cues to direct activity to line up centered at chair  Transfer 1  Technique 1: Sit to stand, Stand to sit  Transfer Device 1: Walker  Transfer Level of Assistance 1: Maximum assistance, +2  Trials/Comments 1: from EOB to chair w/ arms, pt allowed to pull up on stabilized HDRW, cues for hand placement for return to sit, assist to lower safely into chair as pt attempting to sit before centered at chair.    Outcome Measures:  [facility] Basic Mobility  Turning from your back to your side while in a flat bed without using bedrails: A lot  Moving from lying on your back to sitting on the side of a flat bed without using bedrails: A lot  Moving to and from bed to chair (including a wheelchair): A lot  Standing up from a chair using your arms (e.g. wheelchair or bedside chair): A lot  To walk in hospital room: Total  Climbing 3-5 steps with railing: Total  Basic Mobility - Total Score: 10    Education Documentation  Home Exercise Program, taught by Velvet Bryant, PT at 6/12/2024 12:34 PM.  Learner: Patient  Readiness: Acceptance  Method: Explanation  Response: Verbalizes Understanding, Needs Reinforcement  Comment: safety    Mobility Training, taught by Velvet Bryant PT at 6/12/2024 12:34 PM.  Learner: Patient  Readiness: Acceptance  Method: Explanation  Response: Verbalizes Understanding, Needs Reinforcement  Comment: safety    Education Comments  No comments found.        OP EDUCATION:       Encounter Problems       Encounter Problems (Active)       PT Problem       BED MOBILITY Patient will transfer supine to sit and sit to supine with minimal assist to facilitate mobility.  (Progressing)       Start:  06/03/24    Expected End:  07/01/24            TRANSFER Patient will transfer sit to stand and stand to sit with  minimal 1-2 with RW  assist to facilitate  mobility.  (Progressing)       Start:  06/03/24    Expected End:  07/01/24            AMBULATION Patient will amb 25 feet with rolling walker device including two turns on even surface with minimal assist to facilitate safe mobility.  (Progressing)       Start:  06/03/24    Expected End:  07/01/24            STRENGTH Patient will increase BLE strength to 4/5 to improve functional mobility.    (Progressing)       Start:  06/03/24    Expected End:  07/01/24            ACTIVITY TOLERANCE Pt will tolerate standing activities including reaching for items, standing 3-4 minutes to improve overall activity tolerance.  (Progressing)       Start:  06/03/24    Expected End:  07/01/24               Pain - Adult

## 2024-06-12 NOTE — PROGRESS NOTES
Occupational Therapy    Occupational Therapy Treatment    Name: Brittanie Smart  MRN: [id number]  : 1960  Date: 24  Time Calculation  Start Time: 1035  Stop Time: 1108  Time Calculation (min): 33 min    Assessment:  OT Assessment: Patient will continue to benefit from skilled OT in order to increase patient's safety and independence with daily tasks.  Prognosis: Good  Barriers to Discharge: Decreased caregiver support (2-3 assist for txfers, dependent for ADLs, decreased caregiver support, high fall risk)  Evaluation/Treatment Tolerance: Patient tolerated treatment well  End of Session Communication: Bedside nurse  End of Session Patient Position: Up in chair, Alarm on (PT at bedside)  Plan:  Treatment Interventions: ADL retraining, Functional transfer training, UE strengthening/ROM, Endurance training, Patient/family training, Compensatory technique education  OT Frequency: 3 times per week  OT Discharge Recommendations: Moderate intensity level of continued care  Equipment Recommended upon Discharge: Wheeled walker  OT Recommended Transfer Status: Assist of 2 (2-3)  OT - OK to Discharge: Yes    Subjective   Previous Visit Info:  OT Last Visit  OT Received On: 24  General:  General  Reason for Referral: follow up decline in ADLs  Referred By: Dr Turner  Past Medical History Relevant to Rehab: Behavior problem, adult      Decubitus ulcer     Depression     GERD (gastroesophageal reflux disease)     Hemorrhoids     Hyperlipidemia     Hypertension     Hypothyroid     Morbid obesity (Multi)     Peripheral vascular disease (CMS-HCC)  Family/Caregiver Present: No  Co-Treatment: PT  Co-Treatment Reason: safety with OOB mobility  Prior to Session Communication: Bedside nurse  Patient Position Received: Bed, 4 rail up, Alarm off, caregiver present  Preferred Learning Style: verbal, visual  General Comment: Patient cleared by nursing for therapy. Patient in bed upon arrival with PCA present in room assisting  with ADLs. Patient agreeable to therapy.  Precautions:  Medical Precautions: Fall precautions, Oxygen therapy device and L/min  Vitals:  Vital Signs  SpO2: 91 % (81-91 throughout tx)  Pain Assessment:  Pain Assessment  Pain Assessment: 0-10  Pain Score: 0 - No pain     Objective   Activities of Daily Living:      LE Bathing  LE Bathing Level of Assistance: Dependent  LE Bathing Where Assessed: Bed level    UE Dressing  UE Dressing Level of Assistance: Moderate assistance  UE Dressing Where Assessed: Bed level  UE Dressing Comments: doff/don gown    LE Dressing  Sock Level of Assistance: Dependent  LE Dressing Where Assessed: Bed level  LE Dressing Comments: doff/don socks    Toileting  Toileting Level of Assistance: Dependent  Where Assessed: Bed level  Toileting Comments: vargas hygiene    Functional Standing Tolerance:  Functional Standing Tolerance  Time: ~15 seconds  Activity: during functional transfer  Functional Standing Tolerance Comments: Max Ax2  Bed Mobility/Transfers: Bed Mobility  Bed Mobility: Yes  Bed Mobility 1  Bed Mobility 1: Rolling right, Rolling left  Level of Assistance 1: Maximum assistance, +2  Bed Mobility Comments 1: during hygiene tasks, with use of bed rails  Bed Mobility 2  Bed Mobility  2: Supine to sitting  Level of Assistance 2: Maximum assistance, +2  Bed Mobility Comments 2: assist for trunk up and to move B LE, use of green draw sheet  Bed Mobility 3  Bed Mobility 3: Scooting  Level of Assistance 3: Maximum assistance  Bed Mobility Comments 3: to scoot hips towards EOB with use of green draw sheet    Transfers  Transfer: Yes  Transfer 1  Transfer From 1: Bed to  Transfer to 1: Chair with arms  Technique 1: Stand pivot  Transfer Device 1: Walker  Transfer Level of Assistance 1: Maximum assistance, +2  Trials/Comments 1: patient requires Max Ax2 from EOB to 2WW. Max Ax2 to txfer to the chair    Sitting Balance:  Static Sitting Balance  Static Sitting-Balance Support: Feet supported,  Bilateral upper extremity supported  Static Sitting-Level of Assistance: Close supervision  Standing Balance:  Static Standing Balance  Static Standing-Balance Support: Bilateral upper extremity supported  Static Standing-Level of Assistance: Maximum assistance (x2)  Static Standing-Comment/Number of Minutes: to prepare for pivot component of txfer. 15 seconds    Therapy/Activity: Therapeutic Activity  Therapeutic Activity Performed: Yes  Therapeutic Activity 1: patient sits EOB ~8 minutes with fair balance    RUE   RUE : Exceptions to WFL (generalized weakness) and LUE   LUE: Exceptions to WFL (generalized weakness)    Outcome Measures:  [facility] Daily Activity  Putting on and taking off regular lower body clothing: Total  Bathing (including washing, rinsing, drying): A lot  Putting on and taking off regular upper body clothing: A lot  Toileting, which includes using toilet, bedpan or urinal: Total  Taking care of personal grooming such as brushing teeth: A lot  Eating Meals: A little  Daily Activity - Total Score: 11    Education Documentation  Body Mechanics, taught by Jennifer Oswald OT at 6/12/2024 11:26 AM.  Learner: Patient  Readiness: Acceptance  Method: Demonstration, Explanation  Response: Needs Reinforcement    Precautions, taught by Jennifer Oswald OT at 6/12/2024 11:26 AM.  Learner: Patient  Readiness: Acceptance  Method: Demonstration, Explanation  Response: Needs Reinforcement    Home Exercise Program, taught by Jennifer Oswald OT at 6/12/2024 11:26 AM.  Learner: Patient  Readiness: Acceptance  Method: Demonstration, Explanation  Response: Needs Reinforcement    ADL Training, taught by Jennifer Oswald OT at 6/12/2024 11:26 AM.  Learner: Patient  Readiness: Acceptance  Method: Demonstration, Explanation  Response: Needs Reinforcement    Body Mechanics, taught by Jennifer Oswald OT at 6/11/2024  4:10 PM.  Learner: Patient  Readiness: Acceptance  Method: Explanation, Demonstration  Response: Needs  Reinforcement    Home Exercise Program, taught by Jennifer Oswald OT at 6/11/2024  4:10 PM.  Learner: Patient  Readiness: Acceptance  Method: Explanation, Demonstration  Response: Needs Reinforcement    Education Comments  No comments found.      Goals:  Encounter Problems       Encounter Problems (Active)       OT Goals       Patient will be able to complete UB/LB dressing, bathing, grooming with modified independence using good safety and AE as needed. (Progressing)       Start:  06/03/24    Expected End:  06/17/24            Patient will be able to complete toileting tasks with Close Supervision using good safety and with G balance.  (Progressing)       Start:  06/03/24    Expected End:  06/17/24            Patient will be able to complete functional transfers/mobility with the walker with Close Supervision with G balance using good safety.  (Progressing)       Start:  06/03/24    Expected End:  06/17/24            Patient will be able to tolerate 7-8min of functional standing with G balance in prep for ADL/transfers. (Progressing)       Start:  06/03/24    Expected End:  06/17/24

## 2024-06-13 ENCOUNTER — APPOINTMENT (OUTPATIENT)
Dept: RADIOLOGY | Facility: HOSPITAL | Age: 64
End: 2024-06-13
Payer: MEDICARE

## 2024-06-13 LAB
ALBUMIN MFR UR ELPH: 64.7 %
ALBUMIN SERPL-MCNC: 2.7 G/DL (ref 3.5–5)
ALPHA1 GLOB MFR UR ELPH: 5.1 %
ALPHA2 GLOB MFR UR ELPH: 4.3 %
ANION GAP SERPL CALC-SCNC: 11 MMOL/L
B-GLOBULIN MFR UR ELPH: 10.5 %
BUN SERPL-MCNC: 66 MG/DL (ref 8–25)
CALCIUM SERPL-MCNC: 8.3 MG/DL (ref 8.5–10.4)
CHLORIDE SERPL-SCNC: 100 MMOL/L (ref 97–107)
CO2 SERPL-SCNC: 27 MMOL/L (ref 24–31)
CREAT SERPL-MCNC: 2.8 MG/DL (ref 0.4–1.6)
EGFRCR SERPLBLD CKD-EPI 2021: 18 ML/MIN/1.73M*2
ERYTHROCYTE [DISTWIDTH] IN BLOOD BY AUTOMATED COUNT: 23.1 % (ref 11.5–14.5)
FOLATE SERPL-MCNC: >20 NG/ML (ref 4.2–19.9)
GAMMA GLOB MFR UR ELPH: 15.4 %
GLUCOSE BLD MANUAL STRIP-MCNC: 143 MG/DL (ref 74–99)
GLUCOSE BLD MANUAL STRIP-MCNC: 189 MG/DL (ref 74–99)
GLUCOSE BLD MANUAL STRIP-MCNC: 220 MG/DL (ref 74–99)
GLUCOSE BLD MANUAL STRIP-MCNC: 244 MG/DL (ref 74–99)
GLUCOSE SERPL-MCNC: 159 MG/DL (ref 65–99)
HCT VFR BLD AUTO: 25.4 % (ref 36–46)
HGB BLD-MCNC: 7.4 G/DL (ref 12–16)
IMMUNOFIXATION COMMENT: NORMAL
MCH RBC QN AUTO: 27.9 PG (ref 26–34)
MCHC RBC AUTO-ENTMCNC: 29.1 G/DL (ref 32–36)
MCV RBC AUTO: 96 FL (ref 80–100)
NRBC BLD-RTO: 0 /100 WBCS (ref 0–0)
PATH REVIEW - URINE IMMUNOFIXATION: NORMAL
PATH REVIEW-URINE PROTEIN ELECTROPHORESIS: NORMAL
PHOSPHATE SERPL-MCNC: 4.7 MG/DL (ref 2.5–4.5)
PLATELET # BLD AUTO: 101 X10*3/UL (ref 150–450)
POTASSIUM SERPL-SCNC: 4.4 MMOL/L (ref 3.4–5.1)
RBC # BLD AUTO: 2.65 X10*6/UL (ref 4–5.2)
SODIUM SERPL-SCNC: 138 MMOL/L (ref 133–145)
URINE ELECTROPHORESIS COMMENT: NORMAL
VIT B12 SERPL-MCNC: 735 PG/ML (ref 211–946)
WBC # BLD AUTO: 7.4 X10*3/UL (ref 4.4–11.3)

## 2024-06-13 PROCEDURE — 2500000001 HC RX 250 WO HCPCS SELF ADMINISTERED DRUGS (ALT 637 FOR MEDICARE OP): Performed by: NURSE PRACTITIONER

## 2024-06-13 PROCEDURE — 1100000001 HC PRIVATE ROOM DAILY

## 2024-06-13 PROCEDURE — 2500000001 HC RX 250 WO HCPCS SELF ADMINISTERED DRUGS (ALT 637 FOR MEDICARE OP): Performed by: INTERNAL MEDICINE

## 2024-06-13 PROCEDURE — 2500000004 HC RX 250 GENERAL PHARMACY W/ HCPCS (ALT 636 FOR OP/ED): Performed by: INTERNAL MEDICINE

## 2024-06-13 PROCEDURE — 94640 AIRWAY INHALATION TREATMENT: CPT

## 2024-06-13 PROCEDURE — 2500000001 HC RX 250 WO HCPCS SELF ADMINISTERED DRUGS (ALT 637 FOR MEDICARE OP): Performed by: EMERGENCY MEDICINE

## 2024-06-13 PROCEDURE — 2500000004 HC RX 250 GENERAL PHARMACY W/ HCPCS (ALT 636 FOR OP/ED): Performed by: NURSE PRACTITIONER

## 2024-06-13 PROCEDURE — 80069 RENAL FUNCTION PANEL: CPT | Performed by: INTERNAL MEDICINE

## 2024-06-13 PROCEDURE — 82746 ASSAY OF FOLIC ACID SERUM: CPT | Performed by: NURSE PRACTITIONER

## 2024-06-13 PROCEDURE — 85027 COMPLETE CBC AUTOMATED: CPT | Performed by: NURSE PRACTITIONER

## 2024-06-13 PROCEDURE — 97530 THERAPEUTIC ACTIVITIES: CPT | Mod: GO

## 2024-06-13 PROCEDURE — 97530 THERAPEUTIC ACTIVITIES: CPT | Mod: GP,CQ

## 2024-06-13 PROCEDURE — 2500000002 HC RX 250 W HCPCS SELF ADMINISTERED DRUGS (ALT 637 FOR MEDICARE OP, ALT 636 FOR OP/ED): Performed by: INTERNAL MEDICINE

## 2024-06-13 PROCEDURE — 2500000002 HC RX 250 W HCPCS SELF ADMINISTERED DRUGS (ALT 637 FOR MEDICARE OP, ALT 636 FOR OP/ED): Performed by: NURSE PRACTITIONER

## 2024-06-13 PROCEDURE — 82607 VITAMIN B-12: CPT | Performed by: NURSE PRACTITIONER

## 2024-06-13 PROCEDURE — 94760 N-INVAS EAR/PLS OXIMETRY 1: CPT

## 2024-06-13 PROCEDURE — 71045 X-RAY EXAM CHEST 1 VIEW: CPT

## 2024-06-13 PROCEDURE — 9420000001 HC RT PATIENT EDUCATION 5 MIN

## 2024-06-13 PROCEDURE — 99232 SBSQ HOSP IP/OBS MODERATE 35: CPT | Performed by: NURSE PRACTITIONER

## 2024-06-13 PROCEDURE — 2500000002 HC RX 250 W HCPCS SELF ADMINISTERED DRUGS (ALT 637 FOR MEDICARE OP, ALT 636 FOR OP/ED): Performed by: EMERGENCY MEDICINE

## 2024-06-13 PROCEDURE — 97110 THERAPEUTIC EXERCISES: CPT | Mod: GP,CQ

## 2024-06-13 PROCEDURE — 82947 ASSAY GLUCOSE BLOOD QUANT: CPT

## 2024-06-13 PROCEDURE — 94762 N-INVAS EAR/PLS OXIMTRY CONT: CPT

## 2024-06-13 PROCEDURE — 71045 X-RAY EXAM CHEST 1 VIEW: CPT | Performed by: RADIOLOGY

## 2024-06-13 PROCEDURE — 36415 COLL VENOUS BLD VENIPUNCTURE: CPT | Performed by: NURSE PRACTITIONER

## 2024-06-13 PROCEDURE — 99232 SBSQ HOSP IP/OBS MODERATE 35: CPT | Performed by: INTERNAL MEDICINE

## 2024-06-13 PROCEDURE — 2500000005 HC RX 250 GENERAL PHARMACY W/O HCPCS: Performed by: EMERGENCY MEDICINE

## 2024-06-13 PROCEDURE — 36415 COLL VENOUS BLD VENIPUNCTURE: CPT | Performed by: INTERNAL MEDICINE

## 2024-06-13 RX ORDER — METOPROLOL SUCCINATE 25 MG/1
25 TABLET, EXTENDED RELEASE ORAL DAILY
Status: DISPENSED | OUTPATIENT
Start: 2024-06-14

## 2024-06-13 RX ADMIN — INSULIN LISPRO 10 UNITS: 100 INJECTION, SOLUTION INTRAVENOUS; SUBCUTANEOUS at 13:03

## 2024-06-13 RX ADMIN — IPRATROPIUM BROMIDE AND ALBUTEROL SULFATE 3 ML: .5; 3 SOLUTION RESPIRATORY (INHALATION) at 07:09

## 2024-06-13 RX ADMIN — KETOTIFEN FUMARATE 1 DROP: 0.25 SOLUTION/ DROPS OPHTHALMIC at 21:29

## 2024-06-13 RX ADMIN — GABAPENTIN 300 MG: 300 CAPSULE ORAL at 21:28

## 2024-06-13 RX ADMIN — FUROSEMIDE 40 MG: 10 INJECTION, SOLUTION INTRAMUSCULAR; INTRAVENOUS at 21:29

## 2024-06-13 RX ADMIN — Medication 1 TABLET: at 09:01

## 2024-06-13 RX ADMIN — Medication 3 L/MIN: at 23:00

## 2024-06-13 RX ADMIN — Medication: at 14:39

## 2024-06-13 RX ADMIN — LEVOTHYROXINE SODIUM 75 MCG: 0.07 TABLET ORAL at 06:23

## 2024-06-13 RX ADMIN — INSULIN GLARGINE 35 UNITS: 100 INJECTION, SOLUTION SUBCUTANEOUS at 21:25

## 2024-06-13 RX ADMIN — KETOTIFEN FUMARATE 1 DROP: 0.25 SOLUTION/ DROPS OPHTHALMIC at 09:47

## 2024-06-13 RX ADMIN — Medication 3 L/MIN: at 07:00

## 2024-06-13 RX ADMIN — INSULIN LISPRO 10 UNITS: 100 INJECTION, SOLUTION INTRAVENOUS; SUBCUTANEOUS at 17:09

## 2024-06-13 RX ADMIN — INSULIN LISPRO 4 UNITS: 100 INJECTION, SOLUTION INTRAVENOUS; SUBCUTANEOUS at 13:02

## 2024-06-13 RX ADMIN — DOXAZOSIN 1 MG: 1 TABLET ORAL at 08:59

## 2024-06-13 RX ADMIN — ENOXAPARIN SODIUM 40 MG: 40 INJECTION SUBCUTANEOUS at 09:00

## 2024-06-13 RX ADMIN — FERROUS SULFATE TAB 325 MG (65 MG ELEMENTAL FE) 1 TABLET: 325 (65 FE) TAB at 08:57

## 2024-06-13 RX ADMIN — CEFTRIAXONE SODIUM 1 G: 1 INJECTION, SOLUTION INTRAVENOUS at 16:02

## 2024-06-13 RX ADMIN — INSULIN LISPRO 8 UNITS: 100 INJECTION, SOLUTION INTRAVENOUS; SUBCUTANEOUS at 17:08

## 2024-06-13 RX ADMIN — IPRATROPIUM BROMIDE AND ALBUTEROL SULFATE 3 ML: .5; 3 SOLUTION RESPIRATORY (INHALATION) at 12:34

## 2024-06-13 RX ADMIN — SODIUM ZIRCONIUM CYCLOSILICATE 10 G: 10 POWDER, FOR SUSPENSION ORAL at 09:46

## 2024-06-13 RX ADMIN — PANTOPRAZOLE SODIUM 40 MG: 40 TABLET, DELAYED RELEASE ORAL at 06:23

## 2024-06-13 RX ADMIN — GABAPENTIN 300 MG: 300 CAPSULE ORAL at 09:01

## 2024-06-13 RX ADMIN — NYSTATIN 1 APPLICATION: 100000 POWDER TOPICAL at 21:30

## 2024-06-13 RX ADMIN — IPRATROPIUM BROMIDE AND ALBUTEROL SULFATE 3 ML: .5; 3 SOLUTION RESPIRATORY (INHALATION) at 19:29

## 2024-06-13 RX ADMIN — FUROSEMIDE 40 MG: 10 INJECTION, SOLUTION INTRAMUSCULAR; INTRAVENOUS at 09:00

## 2024-06-13 RX ADMIN — DOCUSATE SODIUM 100 MG: 100 CAPSULE, LIQUID FILLED ORAL at 08:59

## 2024-06-13 RX ADMIN — ROSUVASTATIN CALCIUM 5 MG: 10 TABLET, COATED ORAL at 09:02

## 2024-06-13 RX ADMIN — DOCUSATE SODIUM 100 MG: 100 CAPSULE, LIQUID FILLED ORAL at 21:28

## 2024-06-13 ASSESSMENT — PAIN SCALES - GENERAL
PAINLEVEL_OUTOF10: 9
PAINLEVEL_OUTOF10: 0 - NO PAIN
PAINLEVEL_OUTOF10: 0 - NO PAIN

## 2024-06-13 ASSESSMENT — PAIN - FUNCTIONAL ASSESSMENT
PAIN_FUNCTIONAL_ASSESSMENT: 0-10

## 2024-06-13 ASSESSMENT — COGNITIVE AND FUNCTIONAL STATUS - GENERAL
WALKING IN HOSPITAL ROOM: TOTAL
CLIMB 3 TO 5 STEPS WITH RAILING: TOTAL
STANDING UP FROM CHAIR USING ARMS: TOTAL
CLIMB 3 TO 5 STEPS WITH RAILING: TOTAL
TURNING FROM BACK TO SIDE WHILE IN FLAT BAD: A LOT
HELP NEEDED FOR BATHING: A LOT
DRESSING REGULAR UPPER BODY CLOTHING: A LOT
PERSONAL GROOMING: A LOT
WALKING IN HOSPITAL ROOM: TOTAL
TOILETING: TOTAL
MOVING TO AND FROM BED TO CHAIR: TOTAL
MOVING FROM LYING ON BACK TO SITTING ON SIDE OF FLAT BED WITH BEDRAILS: A LOT
TURNING FROM BACK TO SIDE WHILE IN FLAT BAD: A LOT
PERSONAL GROOMING: A LITTLE
DRESSING REGULAR LOWER BODY CLOTHING: TOTAL
MOBILITY SCORE: 8
MOBILITY SCORE: 8
DAILY ACTIVITIY SCORE: 12
MOVING FROM LYING ON BACK TO SITTING ON SIDE OF FLAT BED WITH BEDRAILS: A LOT
EATING MEALS: A LITTLE
DRESSING REGULAR LOWER BODY CLOTHING: TOTAL
MOVING TO AND FROM BED TO CHAIR: TOTAL
HELP NEEDED FOR BATHING: TOTAL
EATING MEALS: A LITTLE
STANDING UP FROM CHAIR USING ARMS: TOTAL
TOILETING: TOTAL
DAILY ACTIVITIY SCORE: 10
DRESSING REGULAR UPPER BODY CLOTHING: A LOT

## 2024-06-13 NOTE — NURSING NOTE
Attempt to place bipap on patient for night. Patient states she does not want on tonight. She states it gives her a headache and it gives her a suffocating feeling and doesn't like it and does not want to wear it tonight. I told her to notify nurse if she changes her mind and that I would check a little later to see if she needed it.   Spo2 94% on 3Lpm respiratory rate 18 regular and unlabored

## 2024-06-13 NOTE — CARE PLAN
Problem: Fall/Injury  Goal: Verbalize understanding of personal risk factors for fall in the hospital  Outcome: Progressing  Goal: Verbalize understanding of risk factor reduction measures to prevent injury from fall in the home  Outcome: Progressing  Goal: Use assistive devices by end of the shift  Outcome: Progressing  Goal: Pace activities to prevent fatigue by end of the shift  Outcome: Progressing     Problem: Skin  Goal: Decreased wound size/increased tissue granulation at next dressing change  Outcome: Progressing  Flowsheets (Taken 6/12/2024 2200)  Decreased wound size/increased tissue granulation at next dressing change: Promote sleep for wound healing  Goal: Participates in plan/prevention/treatment measures  Outcome: Progressing  Goal: Prevent/minimize sheer/friction injuries  Outcome: Progressing  Goal: Promote/optimize nutrition  Outcome: Progressing  Goal: Promote skin healing  Outcome: Progressing     Problem: Safety  Goal: Patient will be injury free during hospitalization  Outcome: Progressing  Goal: I will remain free of falls  Outcome: Progressing     Problem: Psychosocial Needs  Goal: Demonstrates ability to cope with hospitalization/illness  Outcome: Progressing  Goal: Collaborate with me, my family, and caregiver to identify my specific goals  Outcome: Progressing     Problem: Discharge Barriers  Goal: My discharge needs are met  Outcome: Progressing     Problem: Respiratory  Goal: Wean oxygen to maintain O2 saturation per order/standard this shift  Outcome: Progressing  Goal: Clear secretions with interventions this shift  Outcome: Progressing  Goal: Minimize anxiety/maximize coping throughout shift  Outcome: Progressing  Goal: Minimal/no exertional discomfort or dyspnea this shift  Outcome: Progressing  Goal: No signs of respiratory distress (eg. Use of accessory muscles. Peds grunting)  Outcome: Progressing  Goal: Patent airway maintained this shift  Outcome: Progressing  Goal: Tolerate  mechanical ventilation evidenced by VS/agitation level this shift  Outcome: Progressing  Goal: Tolerate pulmonary toileting this shift  Outcome: Progressing  Goal: Verbalize decreased shortness of breath this shift  Outcome: Progressing  Goal: Increase self care and/or family involvement in next 24 hours  Outcome: Progressing     Problem: Pain - Adult  Goal: Verbalizes/displays adequate comfort level or baseline comfort level  Outcome: Progressing     Problem: Safety - Adult  Goal: Free from fall injury  Outcome: Progressing     Problem: Discharge Planning  Goal: Discharge to home or other facility with appropriate resources  Outcome: Progressing     Problem: Chronic Conditions and Co-morbidities  Goal: Patient's chronic conditions and co-morbidity symptoms are monitored and maintained or improved  Outcome: Progressing     Problem: Diabetes  Goal: Achieve decreasing blood glucose levels by end of shift  Outcome: Progressing  Goal: Increase stability of blood glucose readings by end of shift  Outcome: Progressing  Goal: Decrease in ketones present in urine by end of shift  Outcome: Progressing  Goal: Maintain electrolyte levels within acceptable range throughout shift  Outcome: Progressing  Goal: Maintain glucose levels >70mg/dl to <250mg/dl throughout shift  Outcome: Progressing  Goal: No changes in neurological exam by end of shift  Outcome: Progressing  Goal: Learn about and adhere to nutrition recommendations by end of shift  Outcome: Progressing  Goal: Vital signs within normal range for age by end of shift  Outcome: Progressing  Goal: Increase self care and/or family involovement by end of shift  Outcome: Progressing  Goal: Receive DSME education by end of shift  Outcome: Progressing   The patient's goals for the shift include      The clinical goals for the shift include safety

## 2024-06-13 NOTE — PROGRESS NOTES
"Subjective Data:  Patient feeling better.  More awake.  Denies having chest pain.  Still short of breath requiring oxygen    Overnight Events:    Telemetry overnight reviewed no events.     Objective Data:  Last Recorded Vitals:  Vitals:    06/13/24 0341 06/13/24 0700 06/13/24 0711 06/13/24 0739   BP: 117/53   126/57   BP Location: Right arm   Right arm   Patient Position: Lying   Lying   Pulse: 91   93   Resp: 15   16   Temp: 36.8 °C (98.2 °F)   36.6 °C (97.9 °F)   TempSrc: Oral   Oral   SpO2: 96% 94% 94% 94%   Weight:       Height:           Last Labs:  CBC - 6/13/2024:  4:15 AM  7.4 7.4 101    25.4      CMP - 6/13/2024:  4:15 AM  8.3 6.2 19 --- 0.3   4.7 2.7 9 141      PTT - No results in last year.  1.1   11.7 _     HGBA1C   Date/Time Value Ref Range Status   06/02/2024 10:00 PM 7.5 See below % Final      Last I/O:  I/O last 3 completed shifts:  In: - (0 mL/kg)   Out: 1900 (15.1 mL/kg) [Urine:1900 (0.4 mL/kg/hr)]  Weight: 125.7 kg     Past Cardiology Tests (Last 3 Years):  EKG:  ECG 12 lead 06/02/2024    Echo:  Transthoracic Echo (TTE) Complete 06/04/2024    Ejection Fractions:  No results found for: \"EF\"  Cath:  No results found for this or any previous visit from the past 1095 days.    Stress Test:  No results found for this or any previous visit from the past 1095 days.    Cardiac Imaging:  No results found for this or any previous visit from the past 1095 days.      Inpatient Medications:  Scheduled medications   Medication Dose Route Frequency    cefTRIAXone  1 g intravenous q24h    docusate sodium  100 mg oral BID    doxazosin  1 mg oral Daily    enoxaparin  40 mg subcutaneous Daily    ferrous sulfate (325 mg ferrous sulfate)  1 tablet oral Daily with breakfast    furosemide  40 mg intravenous q12h    gabapentin  300 mg oral BID    insulin glargine  35 Units subcutaneous Nightly    insulin lispro  0-20 Units subcutaneous TID    insulin lispro  10 Units subcutaneous TID    ipratropium-albuteroL  3 mL " nebulization TID    ketotifen  1 drop Both Eyes BID    levothyroxine  75 mcg oral Daily    [Held by provider] losartan  50 mg oral Daily    [Held by provider] metoprolol succinate XL  50 mg oral Daily    multivitamin with minerals  1 tablet oral Daily    nystatin  1 Application Topical q8h    oxygen   inhalation q8h    pantoprazole  40 mg oral Daily before breakfast    perflutren protein A microsphere  0.5 mL intravenous Once in imaging    polyethylene glycol  17 g oral Daily    rosuvastatin  5 mg oral Daily    sodium zirconium cyclosilicate  10 g oral Daily    sulfur hexafluoride microsphr  2 mL intravenous Once in imaging     PRN medications   Medication    acetaminophen    ammonium lactate    bisacodyl    dextrose    glucagon    hydrOXYzine pamoate    ipratropium-albuteroL    ondansetron ODT    oxygen     Continuous Medications   Medication Dose Last Rate       Physical Exam:  General: Patient is in mild respiratory distress  HEENT: atraumatic normocephalic.  Neck: is supple jugular venous pressure difficult to assess patient on BiPAP no thyromegaly.  Cardiovascular regular rate and rhythm normal heart sounds no murmurs rubs or gallops.  Heart sounds  Lungs: Creased breathing sounds at bases abdomen: is soft nontender.  Extremities warm to touch 1+ edema.  Neurologic examination: patient is awake alert oriented to person, place, date/time.  Psychiatric examination: patient has good insight denies feeling suicidal and depressed.  Pulses 2+ intact bilaterally        Assessment/Plan   1 acute on chronic respiratory failure with pulmonary hypertension seen on her echocardiogram and edema on the chest x-ray.  Continue IV diuretics today since her renal function has been stable as well as potassium.  Will repeat another chest x-ray today.  Likely will switch her to oral diuretics tomorrow.      2.  Hypertension will monitor for now.  Blood pressure is well-controlled.     3.  Severe anemia.  Iron studies within normal  limits.     4.  Diabetes mellitus.     5.  Hyperlipidemia will check lipid panel.     Thank you for allowing to participate in her care  Peripheral IV 06/05/24 20 G Left;Anterior Forearm (Active)   Site Assessment Clean;Dry;Intact 06/12/24 2100   Dressing Status Clean;Dry 06/12/24 2100   Number of days: 8       Code Status:  DNR and No Intubation      Maria A Torre MD

## 2024-06-13 NOTE — CARE PLAN
The patient's goals for the shift include      The clinical goals for the shift include safety    Over the shift, the patient did not make progress toward the following goals. Barriers to progression include. Reluctant to participate in therapy some days Recommendations to address these barriers include .Participate daily with therapy

## 2024-06-13 NOTE — PROGRESS NOTES
06/13/24 0854   Discharge Planning   Patient expects to be discharged to: [facility] pending pre-cert and [facility]; requested they advise and made them aware per provider patient is ready for discharge.   Does the patient need discharge transport arranged? Yes   RoundTrip coordination needed? Yes   Has discharge transport been arranged? No     9:25 AM [facility] received authorization and it is good through midnight on 6/18/24. They are awaiting [facility] and will advise when patient can enter their facility. ACE Verdin-CNP updated.

## 2024-06-13 NOTE — PROGRESS NOTES
Pulmonary Progress Note 06/13/24     FOLLOWUP FOR: acute on chronic respiratory failure, MARY/OHS    SUBJECTIVE  Feeling fine.  No overnight events.  Tough to sleep.  She did not utilize the BiPAP much as it suffocates her she tells me.  -2 L she tells me    PHYSICAL EXAM        6/12/2024    11:25 AM 6/12/2024     3:27 PM 6/12/2024     7:14 PM 6/12/2024    11:14 PM 6/13/2024     3:41 AM 6/13/2024     7:39 AM 6/13/2024    11:00 AM   Vitals   Systolic 121 122 114 116 117 126 134   Diastolic 71 60 57 54 53 57 61   Heart Rate 98 98 94 93 91 93 93   Temp 36.6 °C (97.9 °F) 36.7 °C (98.1 °F) 36.8 °C (98.2 °F) 36.6 °C (97.9 °F) 36.8 °C (98.2 °F) 36.6 °C (97.9 °F) 36.5 °C (97.7 °F)   Resp 18 18 17 16 15 16 16       Intake/Output last 3 shifts:  I/O last 3 completed shifts:  In: - (0 mL/kg)   Out: 1900 (15.1 mL/kg) [Urine:1900 (0.4 mL/kg/hr)]  Weight: 125.7 kg   Intake/Output this shift:  I/O this shift:  In: 240 [P.O.:240]  Out: -         Physical Exam  Vitals reviewed.   Constitutional:       Appearance: She is obese. She is ill-appearing.      Interventions: Nasal cannula in place.   Cardiovascular:      Rate and Rhythm: Normal rate and regular rhythm.   Pulmonary:      Effort: Pulmonary effort is normal. No respiratory distress.      Breath sounds: Decreased air movement present. No wheezing.      Comments: Difficult to position patient, attempted to assess pleural effusions with portable bedside ultrasound.  Was only able to visualize right side showing small to moderately small free-flowing effusion.  Musculoskeletal:      Right lower leg: No edema.      Left lower leg: No edema.   Skin:     General: Skin is warm.   Neurological:      Mental Status: She is alert and oriented to person, place, and time.   Psychiatric:         Mood and Affect: Mood normal.         Behavior: Behavior normal.           Scheduled medications  cefTRIAXone, 1 g, intravenous, q24h  docusate sodium, 100 mg, oral, BID  doxazosin, 1  mg, oral, Daily  ferrous sulfate (325 mg ferrous sulfate), 1 tablet, oral, Daily with breakfast  furosemide, 40 mg, intravenous, q12h  gabapentin, 300 mg, oral, BID  insulin glargine, 35 Units, subcutaneous, Nightly  insulin lispro, 0-20 Units, subcutaneous, TID  insulin lispro, 10 Units, subcutaneous, TID  ipratropium-albuteroL, 3 mL, nebulization, TID  ketotifen, 1 drop, Both Eyes, BID  levothyroxine, 75 mcg, oral, Daily  [START ON 6/14/2024] metoprolol succinate XL, 25 mg, oral, Daily  multivitamin with minerals, 1 tablet, oral, Daily  nystatin, 1 Application, Topical, q8h  oxygen, , inhalation, q8h  pantoprazole, 40 mg, oral, Daily before breakfast  perflutren protein A microsphere, 0.5 mL, intravenous, Once in imaging  polyethylene glycol, 17 g, oral, Daily  rosuvastatin, 5 mg, oral, Daily  sodium zirconium cyclosilicate, 10 g, oral, Daily  sulfur hexafluoride microsphr, 2 mL, intravenous, Once in imaging      Continuous medications     PRN medications  PRN medications: acetaminophen **OR** [DISCONTINUED] acetaminophen **OR** [DISCONTINUED] acetaminophen, ammonium lactate, bisacodyl, dextrose, glucagon, hydrOXYzine pamoate, ipratropium-albuteroL, ondansetron ODT **OR** [DISCONTINUED] ondansetron, oxygen     Labs:  Lab Results   Component Value Date     06/13/2024     06/12/2024     06/11/2024    K 4.4 06/13/2024    K 4.4 06/12/2024    K 4.4 06/11/2024    CO2 27 06/13/2024    CO2 28 06/12/2024    CO2 27 06/11/2024    BUN 66 (H) 06/13/2024    BUN 64 (H) 06/12/2024    BUN 66 (H) 06/11/2024    CREATININE 2.80 (H) 06/13/2024    CREATININE 2.90 (H) 06/12/2024    CREATININE 2.90 (H) 06/11/2024    GLUCOSE 159 (H) 06/13/2024    GLUCOSE 183 (H) 06/12/2024    GLUCOSE 164 (H) 06/11/2024    CALCIUM 8.3 (L) 06/13/2024    CALCIUM 8.3 (L) 06/12/2024    CALCIUM 8.6 06/11/2024     Lab Results   Component Value Date    WBC 7.4 06/13/2024    HGB 7.4 (L) 06/13/2024    HCT 25.4 (L) 06/13/2024    MCV 96 06/13/2024      (L) 06/13/2024       Imaging:  XR chest 1 view    Result Date: 6/9/2024  Interpreted By:  Otilio Hernandez, STUDY: XR CHEST 1 VIEW; 6/9/2024 12:03 pm   INDICATION: CLINICAL INFORMATION: Signs/Symptoms:SOB, persistent hypoxia, re-eval pleural effusions.   COMPARISON: 06/07/2024   ACCESSION NUMBER(S): [id number]   ORDERING CLINICIAN: LEMUEL STACY   TECHNIQUE: Portable chest one view.   FINDINGS: Exam is limited by the patient's body habitus. Cardiac size indeterminate in view of the AP projection. Diffuse bilateral infiltrates are present with more focal density at the left base silhouetting left hemidiaphragm with air bronchograms at the left base. The findings suggest left lower lobe consolidation . Small effusions may be obscured on this portable study.       Diffuse bilateral infiltrates in a pattern suggesting pulmonary edema with more focal infiltrate suspected at the left base suggesting consolidation and perhaps pneumonia. Infiltrates appear more marked compared to 06/07/2024.   MACRO: none   Signed by: Otilio Hernandez 6/9/2024 1:16 PM Dictation workstation:   [id number]    US elastography parenchyma EG organ    Result Date: 6/8/2024  Interpreted By:  Otilio Hernandez, STUDY: US ELASTOGRAPHY PARENCHYMA EG ORGAN; 6/7/2024 6:37 pm   INDICATION: Signs/Symptoms:Elastography, r/o WINSTON CIRRHOSIS.   COMPARISON: None   ACCESSION NUMBER(S): [id number]   ORDERING CLINICIAN: SHANNON WESTON   TECHNIQUE: Limited abdominal ultrasound of the right upper quadrant was performed utilizing gray scale imaging.   FINDINGS: COMMENTS: Technologist note indicates the examination is technically suboptimal with measurements not thought to be accurate due to the patient's condition. Patient was unable to hold breath or to roll. Increased body habitus limits assessment as well.   Measurements obtained at this time are as follows:   Elastography Median: 12.5 kPA   Elastography IQR/Median: 26.6 %       Measurements were obtained  as above but are now thought to be accurate due to the patient's body habitus and suboptimal cooperation at this time. Repeat evaluation at a later date may be appropriate.   Liver Fibrosis Staging Metavir Score kPa m/s Normal- Mild  F1 5.48 kPa-8.29 kPa 1.35 m/s- 1.66 m/s Mild-Moderate  F2 8.29 kPa-9.40 kPa 1.66 m/s- 1.77 m/s Moderate- Severe F3 9.40 kPa-11.9 kPa 1.77 m/s- 1.99 m/s Cirrhosis F4 >11.9 kPa >1.99 m/s   CAUTION: The values for the shear wave speed and tissue modulus are relative indices intended only for the purpose of comparison with other measurements performed using the Gradible (formerly gradsavers) E9 and E10 Absolute values for these measurements may vary among different measurement devices.   MACRO: none   Signed by: Otilio Hernandez 6/8/2024 10:02 AM Dictation workstation:   [id number]    XR chest 1 view    Result Date: 6/7/2024  Interpreted By:  Gerri Hsu, STUDY: XR CHEST 1 VIEW 6/7/2024 8:12 am   INDICATION: Signs/Symptoms:Pleural effusions   COMPARISON: 06/03/2024   ACCESSION NUMBER(S): [id number]   ORDERING CLINICIAN: DEBORAH KIM   TECHNIQUE: AP erect view of the chest at bedside   FINDINGS: The heart is enlarged with mild pulmonary vascular congestion seen. No focal infiltrate or focal airspace consolidation is identified.   No obvious pleural effusions are seen on the AP projection.       Mild cardiomegaly and mild pulmonary vascular congestion.   The bilateral pleural effusions seen on 06/03/2024 are not obvious on the AP projection. PA and lateral views of the chest are recommended when the patient's condition permits.   Signed by: Gerri Hsu 6/7/2024 9:43 AM Dictation workstation:   [id number]    US gallbladder    Result Date: 6/6/2024  Interpreted By:  Otilio Hernandez, STUDY: US GALLBLADDER; 9:13 am   INDICATION: Signs/Symptoms:abnormal CT. Abnormal appearance of gallbladder on CT from 06/04/2024.   COMPARISON: CT 06/04/2024   ACCESSION NUMBER(S): [id number]   ORDERING CLINICIAN: KYLIE GARCIA    TECHNIQUE: Limited abdominal ultrasound of the right upper quadrant was performed utilizing gray scale imaging.   FINDINGS: COMMENTS: Technologist note states that the exam was limited due to patient being uncooperative.   Liver: There is a very coarse echotexture of the hepatic parenchyma with a lobulated hepatic contour consistent with underlying cirrhosis. Gallbladder: Gallbladder is filled with stones. There is diffuse gallbladder wall thickening measuring 5 mm with no pericholecystic fluid identified. Sonographic Latham's sign: Negative Pancreas: Pancreas is predominantly obscured by bowel gas. CBD: 0.36 cm Right Kidney:  Minimal amount of fluid is identified adjacent to the kidney. The kidney itself is unremarkable.       1. Abnormal appearance of the liver in a pattern consistent with cirrhosis. The splenomegaly noted on CT may be related to portal venous hypertension considering this appearance. 2. Cholelithiasis with gallbladder wall thickening but negative sonographic Latham's sign and no pericholecystic fluid. Findings may be indicative of chronic cholecystitis. Please correlate clinically. 3. Nonspecific perinephric fluid. 4. Please note exam is technically limited due to suboptimal cooperation from the patient according to technologist note.   MACRO: None.   Signed by: Otilio Hernandez 6/6/2024 9:17 AM Dictation workstation:   [id number]    CT kidney wo IV contrast    Result Date: 6/5/2024  Interpreted By:  Otilio Hernandez, STUDY: CT KIDNEY WO CONTRAST; 6/4/2024 3:50 pm   INDICATION: Signs/Symptoms:mass on left kidney.   COMPARISON: None.   ACCESSION NUMBER(S): [id number]   ORDERING CLINICIAN: KYLIE GARCIA   TECHNIQUE: Without INTRAVENOUS  CONTRAST WithoutORAL CONTRAST  ( Intravenous contrast was not able to utilized due to renal insufficiency. One or more of the following dose reduction techniques were used: Automated exposure control Adjustment of the mA and/or kV according to patient size, and/or  use of iterative reconstruction technique.   FINDINGS: ABDOMEN CT: SOLID ORGAN ASSESSMENT: Moderate bilateral pleural effusions are present. Bibasilar atelectasis is present. Infiltrate may be obscured by the atelectatic changes.   Within limits of this unenhanced study no focal masses are identified within the liver, spleen, pancreas, right kidney or adrenal glands. Spleen is enlarged with a craniocaudal extensive 16.4 cm.   CT examination does confirm a rim calcified mass within the mid to lower pole left kidney measuring on the order of 3.1 cm in diameter. Delineation and characterization of the mass is somewhat limited due to the absence of contrast. There is suspicion of mildly enlarged retroperitoneal lymph nodes on the left at the level of the left kidney, again not well characterized due to the patient's body habitus and absence of contrast.   Multiple stones are identified along the dependent portion the gallbladder. Gallbladder wall is not well defined raising possibility of gallbladder wall thickening and/or pericholecystic fluid. Please correlate with patient's clinical assessment to rule out acute cholecystitis. Right upper quadrant ultrasound could be obtained to further assess this finding.   RETROPERITONEUM: AORTA:  There is no evidence for abdominal aortic aneurysm.   BOWEL ASSESSMENT: No intraperitoneal free air is identified. There is a nonspecific appearance of the stomach. Visualized portions of the large and small bowel are unremarkable.   ABDOMINAL WILKINS: There is no evidence for a hernia. Anasarca is present.   OSSEOUS STRUCTURES: No lytic or blastic lesions are identified. Spurring is noted throughout the spine.       1. Indeterminate rim calcified mass left kidney as was suspected on the ultrasound study. Both benign and malignant etiologies are considered in the differential this time with characterization of the mass limited due to the technical issues on this study and the ultrasound  study. There is suspicion of left-sided periaortic lymphadenopathy, again not well characterized due to the absence of intravenous contrast. 2. Cholelithiasis with poorly defined gallbladder wall margins. Please correlate with clinical history to rule out the possibility of acute cholecystitis. Right upper quadrant ultrasound may be attempted for further characterization the gallbladder wall to rule out gallbladder wall thickening and pericholecystic fluid. 3. Anasarca. 4. Moderate bilateral pleural effusions with bibasilar atelectasis most marked involving the lower lobes. 5. Splenomegaly. Spleen measures 16.4 cm in craniocaudal extent.     MACRO: none   Signed by: Otilio Hernandez 6/5/2024 12:36 PM Dictation workstation:   [id number]    ECG 12 lead    Result Date: 6/4/2024  Normal sinus rhythm Minimal voltage criteria for LVH, may be normal variant Nonspecific T wave abnormality Abnormal ECG No previous ECGs available Confirmed by Duong Worthington ([id number]) on 6/4/2024 11:07:09 PM    Transthoracic Echo (TTE) Complete    Result Date: 6/4/2024            [address], [place] [address]             Phone [phone] TRANSTHORACIC ECHOCARDIOGRAM REPORT  Patient Name:      DESI THAYER           Hugo Physician:    [id number] Duong Worthington DO Study Date:        6/4/2024              Ordering Provider:    [id number] KYLIE GARCIA MRN/PID:           [id number]              Fellow: Accession#:        [id number]          Nurse: Date of Birth/Age: 1960 / 63 years  Sonographer:          Chantelle Carrasquillo                                                                ACS, AMANDA, LUCÍA Gender:            F                     Additional Staff: Height:            170.18 cm             Admit Date: Weight:             125.65 kg             Admission Status:     Inpatient -                                                                Routine BSA / BMI:         2.32 m2 / 43.38 kg/m2 Department Location:  [facility] Blood Pressure: 124 /53 mmHg Study Type:    TRANSTHORACIC ECHO (TTE) COMPLETE Diagnosis/ICD: Dyspnea, unspecified-R06.00 Indication:    dyspnea, Htn, Renal failure CPT Codes:     Echo Complete w Full Doppler-[id number] Patient History: Pertinent History: Dyspnea, DM2, Renal failure, Htn, PVD. Study Detail: The following Echo studies were performed: 2D, M-Mode, Doppler and               color flow. Technically challenging study due to poor acoustic               windows and body habitus. Definity used as a contrast agent for               endocardial border definition. Total contrast used for this               procedure was 2 mL via IV push.  PHYSICIAN INTERPRETATION: Left Ventricle: Left ventricular systolic function is normal, with an estimated ejection fraction of 60-65%. There are no regional wall motion abnormalities. The left ventricular cavity size is normal. There is mild concentric left ventricular hypertrophy. Spectral Doppler shows an impaired relaxation pattern of left ventricular diastolic filling. Left Atrium: The left atrium is normal in size. Right Ventricle: The right ventricle was not well visualized. Unable to determine right ventricular systolic function. RV not well visualized but suggests RV enlargemnt and RV hypokinesis in some views, with severe pulmonary hypertension. Right Atrium: The right atrium was not well visualized. Aortic Valve: The aortic valve appears abnormal. There is mild to moderate aortic valve cusp calcification. There is evidence of mildly elevated transaortic gradients consistent with sclerosis of the aortic valve. There is no evidence of aortic valve regurgitation. The peak instantaneous gradient of the aortic valve is 7.5 mmHg. Mitral Valve: The mitral valve is abnormal.  There is mild mitral valve regurgitation. Tricuspid Valve: The tricuspid valve is structurally normal. There is severe tricuspid regurgitation. The Doppler estimated RVSP is severely elevated at 79.6 mmHg. Pulmonic Valve: The pulmonic valve is not well visualized. The pulmonic valve regurgitation was not well visualized. Pericardium: There is no pericardial effusion noted. Aorta: The aortic root is normal.  CONCLUSIONS:  1. Left ventricular systolic function is normal with a 60-65% estimated ejection fraction.  2. Poorly visualized anatomical structures due to suboptimal image quality.  3. Spectral Doppler shows an impaired relaxation pattern of left ventricular diastolic filling.  4. RV not well visualized but suggests RV enlargemnt and RV hypokinesis in some views, with severe pulmonary hypertension.  5. Severe tricuspid regurgitation.  6. Severely elevated right ventricular systolic pressure.  7. Aortic valve appears abnormal.  8. Aortic valve sclerosis. QUANTITATIVE DATA SUMMARY: 2D MEASUREMENTS:                           Normal Ranges: IVSd:          1.40 cm    (0.6-1.1cm) LVPWd:         1.23 cm    (0.6-1.1cm) LVIDd:         4.76 cm    (3.9-5.9cm) LVIDs:         3.21 cm LV Mass Index: 106.2 g/m2 LV % FS        32.6 % RA VOLUME BY A/L METHOD:                       Normal Ranges: RA Area A4C: 12.7 cm2 AORTA MEASUREMENTS:                    Normal Ranges: Asc Ao, d: 3.10 cm (2.1-3.4cm) LV SYSTOLIC FUNCTION BY 2D PLANIMETRY (MOD):                     Normal Ranges: EF-A4C View: 64.1 % (>=55%) EF-A2C View: 56.5 % EF-Biplane:  60.2 % LV DIASTOLIC FUNCTION:                        Normal Ranges: MV Peak E:    1.14 m/s (0.7-1.2 m/s) MV Peak A:    1.03 m/s (0.42-0.7 m/s) E/A Ratio:    1.11     (1.0-2.2) MV e'         0.07 m/s (>8.0) MV lateral e' 0.09 m/s MV medial e'  0.05 m/s E/e' Ratio:   16.29    (<8.0) MITRAL VALVE:                 Normal Ranges: MV DT: 189 msec (150-240msec) AORTIC VALVE:                          Normal Ranges: AoV Vmax:      1.37 m/s (<=1.7m/s) AoV Peak P.5 mmHg (<20mmHg) LVOT Max Joshua:  0.72 m/s (<=1.1m/s) LVOT VTI:      15.80 cm LVOT Diameter: 1.70 cm  (1.8-2.4cm) AoV Area,Vmax: 1.19 cm2 (2.5-4.5cm2)  RIGHT VENTRICLE: RV Basal 3.89 cm TAPSE:   10.7 mm RV s'    0.08 m/s TRICUSPID VALVE/RVSP:                             Normal Ranges: Peak TR Velocity: 3.86 m/s RV Syst Pressure: 79.6 mmHg (< 30mmHg) IVC Diam:         2.45 cm  [id number] Duong Toddjenaro COMBS Electronically signed on 2024 at 11:34:03 AM  ** Final **     US renal complete    Result Date: 2024  Interpreted By:  Otilio Hernandez, STUDY: US RENAL COMPLETE; 6/3/2024 7:24 pm   INDICATION: Signs/Symptoms:DELISA.   COMPARISON: None   ACCESSION NUMBER(S): [id number]   ORDERING CLINICIAN: DEBORAH KIM   TECHNIQUE: Grayscale and color Doppler imaging of the kidneys   FINDINGS: COMMENTS: Technologist note indicates that the study was quite limited due to the patient's body habitus. Exam was performed portably as well.   The right kidney measures 10.7 cm x 3.9 cm x 4.7 cm . The left kidney measures 11.8 cm x 5.4 cm x 5.4 cm. THERE IS A HETEROGENEOUS COMPLEX MASS LATERALLY MID TO LOWER POLE LEFT KIDNEY MEASURING 4.3 X 3.9 X 4.6 CM.     No renal stones are identified.  The renal cortical thickness and echogenicity is normal.  No hydronephrosis is identified.   Urinary bladder is nonspecific in appearance with no stones or masses identified.       COMPLEX APPEARING MASS LEFT KIDNEY MEASURING UP TO 4.6 CM IN DIAMETER, WITH A DIFFERENTIAL TO INCLUDE MALIGNANCY. CT UROGRAM WITH AND WITHOUT CONTRAST IS SUGGESTED FOR MORE DEFINITIVE ASSESSMENT.   MACRO: Otilio Hernandez discussed the significance and urgency of this critical finding EPIC secure chat with  [facility] medicine team on 2024 at 8:22 am.  (**-RCF-**) Findings:  See findings.   Signed by: Otilio Hernandez 2024 8:23 AM Dictation workstation:   [id number]    XR chest 2 views    Result Date:  6/3/2024  Interpreted By:  Gerri Hsu, STUDY: XR CHEST 2 VIEWS 6/3/2024 8:15 am   INDICATION: Signs/Symptoms:COPD with wheezing   COMPARISON: None available.   ACCESSION NUMBER(S): [id number]   ORDERING CLINICIAN: ZAHRA BATISTA   TECHNIQUE: AP and lateral views of cervical spine are performed.   FINDINGS: The cardiac size is indeterminate due to the AP projection.   Right hemidiaphragm is elevated. On lateral projection, bilateral pleural effusions are observed. No obvious focal infiltrate or airspace consolidation is seen.       Bilateral pleural effusions on lateral projection and elevated right hemidiaphragm.   Signed by: Gerri Hsu 6/3/2024 8:24 AM Dictation workstation:   [id number]            Assessment/Plan   Principal Problem:    Unable to care for self  Active Problems:    Type 2 diabetes mellitus (Multi)    Morbid obesity (Multi)    Peripheral vascular disease (CMS-HCC)    Hypertension    Pressure sore on sacrum    Behavior problem, adult    Renal failure    Acute respiratory failure with hypoxia and hypercapnia (Multi)    Pressure injury of skin of heel    Acute on chronic respiratory failure with hypoxia and hypercarbia  Continue with supplemental oxygen during the daytime  Again encouraged use of NIPPV at bedtime  Suspected obesity hypoventilation and restrictive hemodynamics  Would benefit from NIV in future  Hypercarbic encephalopathy  Stressed importance of BiPAP therapy  Morbid obesity  Failure to thrive  Acute on chronic kidney disease  Nephrology follow-up  Congestive heart failure/pulmonary edema: Acute on chronic diastolic heart failure.  Reviewed chest CT showing moderate effusions with compressive atelectasis.  Recommend diuresis at this point in time.    We will continue to follow     LOS: 10 days       Ronald Warner MD  Pulmonary/Critical Care medicine

## 2024-06-13 NOTE — PROGRESS NOTES
Music Therapy Note    Brittanie Smart     Therapy Session  Referral Type: New referral this admission  Visit Type: New visit  Session Start Time: 1401  Session End Time: 1403  Conflict of Service: Declined treatment               Treatment/Interventions  Areas of Focus: Anxiety reduction, Emotional support, Self-expression, Socialization, Relaxation  Music Therapy Interventions: Assessment    Post-assessment  Total Session Time (min): 2 minutes    Narrative  Assessment Detail: Pt found lying in bed. MT introduced self and services, educating Pt on benefits of music therapy. Pt declined session at this time.  Follow-up: MT will follow-up if Pt remains admitted.    Education Documentation  No documentation found.

## 2024-06-13 NOTE — PROGRESS NOTES
Occupational Therapy    Occupational Therapy Treatment    Name: Brittanie Smart  MRN: [id number]  : 1960  Date: 24  Time Calculation  Start Time: 832  Stop Time: 858  Time Calculation (min): 26 min    Assessment:  OT Assessment: Making slow but gradual progress towards OT POC; remains a very high fall risk at this time  Prognosis: Good  Barriers to Discharge: None (assist x 2-3 for xfers, significant assist for ADLs. high fall risk.)  Evaluation/Treatment Tolerance: Patient tolerated treatment well  Medical Staff Made Aware: Yes  End of Session Communication: Bedside nurse  End of Session Patient Position: Up in chair, Alarm on  Plan:  Treatment Interventions: ADL retraining, Functional transfer training, Endurance training, Cognitive reorientation, UE strengthening/ROM, Patient/family training, Equipment evaluation/education, Neuromuscular reeducation, Compensatory technique education  OT Frequency: 3 times per week  OT Discharge Recommendations: Moderate intensity level of continued care  Equipment Recommended upon Discharge: Wheeled walker, Lift  OT Recommended Transfer Status: Assist of 2  OT - OK to Discharge: Yes    Subjective   Previous Visit Info:  OT Last Visit  OT Received On: 24  General:  General  Reason for Referral: Decreased ADLs, inability to care for herself  Referred By: Dr. Navarro  Past Medical History Relevant to Rehab: Behavior problem, adult      Decubitus ulcer     Depression     GERD (gastroesophageal reflux disease)     Hemorrhoids     Hyperlipidemia     Hypertension     Hypothyroid     Morbid obesity (Multi)     Peripheral vascular disease (CMS-HCC)  Family/Caregiver Present: No  Prior to Session Communication: Bedside nurse  Patient Position Received: Bed, 3 rail up  Preferred Learning Style: verbal, visual  General Comment: Cleared by nsg, pt met in supine, agreeable to OT session  Precautions:  Medical Precautions: Fall precautions, Oxygen therapy device and  L/min    Pain Assessment:  Pain Assessment  Pain Assessment: 0-10  Pain Score: 9  Pain Type: Acute pain  Pain Location: Generalized     Objective   Cognition:  Overall Cognitive Status: Within Functional Limits  Orientation Level: Oriented X4    Activities of Daily Living:   Feeding  Feeding Level of Assistance: Setup  Feeding Where Assessed: Chair  Feeding Comments: items retrieved, placed within reach, assist with utensil mgmt    LE Dressing  LE Dressing: Yes  Sock Level of Assistance: Maximum assistance  LE Dressing Where Assessed: Edge of bed  LE Dressing Comments: assist to adjust b/l socks while sitting EOB    Toileting  Toileting Level of Assistance: Dependent  Where Assessed: Other (Comment)  Toileting Comments: + purewick catheter    Functional Standing Tolerance:  Functional Standing Tolerance  Time: 20-30s  Activity: static stand at bedside chair while linen change is completed  Functional Standing Tolerance Comments: fatigues very quickly; heavy reliance on UE support through FWW    Bed Mobility/Transfers:   Bed Mobility  Bed Mobility: Yes  Bed Mobility 1  Bed Mobility 1: Supine to sitting  Level of Assistance 1: Moderate assistance, +2  Bed Mobility Comments 1: is able to advance LEs to EOB with increased time, effort and cues.  mod A x2 for trunk up with instruction on hand placement/bed rail use    Transfers  Transfer: Yes  Transfer 1  Transfer From 1: Bed to  Transfer to 1: Stand  Technique 1: Sit to stand  Transfer Device 1: Walker  Transfer Level of Assistance 1: Moderate assistance, +2  Trials/Comments 1: cues for hand placement, anterior weight shift, walker mgmt  Transfers 2  Transfer From 2: Bed to  Transfer to 2: Chair with arms  Technique 2: Stand pivot  Transfer Device 2: Walker  Transfer Level of Assistance 2: Moderate assistance, +2  Trials/Comments 2: slowed aleah, highly effortful completion. requires cues for walker mgmt, body positioning and hand placement prior to descent, fair  carryover.  Transfers 3  Transfer From 3: Chair with arms to  Transfer to 3: Stand  Technique 3: Sit to stand  Transfer Device 3: Walker  Transfer Level of Assistance 3: Maximum assistance, +2  Trials/Comments 3: cues for hand placement, anterior weight shift, walker mgmt. highly effortful completion from lower surface heigh but completes with max A x2, tolerates static stand for ~20-30s    Functional Mobility:  Functional Mobility  Functional Mobility Performed: No    Therapy/Activity:    Therapeutic Activity  Therapeutic Activity Performed: Yes  Therapeutic Activity 1: sat EOB for ~8 minutes today with fair+ sitting balance requires cues for positioning and weight shifting to assume proper EOB sitting      Outcome Measures:  [facility] Daily Activity  Putting on and taking off regular lower body clothing: Total  Bathing (including washing, rinsing, drying): A lot  Putting on and taking off regular upper body clothing: A lot  Toileting, which includes using toilet, bedpan or urinal: Total  Taking care of personal grooming such as brushing teeth: A little  Eating Meals: A little  Daily Activity - Total Score: 12      Education Documentation  No documentation found.  Education Comments  No comments found.      Goals:  Encounter Problems       Encounter Problems (Active)       OT Goals       Patient will be able to complete UB/LB dressing, bathing, grooming with modified independence using good safety and AE as needed. (Progressing)       Start:  06/03/24    Expected End:  06/17/24            Patient will be able to complete toileting tasks with Close Supervision using good safety and with G balance.  (Progressing)       Start:  06/03/24    Expected End:  06/17/24            Patient will be able to complete functional transfers/mobility with the walker with Close Supervision with G balance using good safety.  (Progressing)       Start:  06/03/24    Expected End:  06/17/24            Patient will be able to tolerate 7-8min  of functional standing with G balance in prep for ADL/transfers. (Progressing)       Start:  06/03/24    Expected End:  06/17/24

## 2024-06-13 NOTE — PROGRESS NOTES
Physical Therapy    Physical Therapy Treatment    Patient Name: Brittanie Smart  MRN: [id number]  Today's Date: 6/13/2024  Time Calculation  Start Time: 1010  Stop Time: 1033  Time Calculation (min): 23 min    Assessment/Plan   PT Assessment  Rehab Prognosis: Fair  Barriers to Discharge: level of assist requried, decreased safety awareness  Evaluation/Treatment Tolerance: Patient limited by fatigue  Medical Staff Made Aware: Yes  End of Session Patient Position: Up in chair, Alarm on  PT Plan  Inpatient/Swing Bed or Outpatient: Inpatient  PT Plan  Treatment/Interventions: Transfer training, Strengthening, Therapeutic exercise, Therapeutic activity  PT Plan: Ongoing PT  PT Frequency: 4 times per week  PT Discharge Recommendations: Moderate intensity level of continued care  Equipment Recommended upon Discharge: Wheeled walker, Lift  PT Recommended Transfer Status: Assist x2, Assistive device  PT - OK to Discharge: Yes      General Visit Information:   PT  Visit  PT Received On: 06/13/24  General  Prior to Session Communication: Bedside nurse, PCT/NA/CTA  Patient Position Received: Up in chair  Preferred Learning Style: verbal, visual  General Comment: Pt sitting in bedside chair, asking to use BSC, STNA's unable to get pt standing from chair.    Subjective   Precautions:  Precautions  Medical Precautions: Fall precautions, Oxygen therapy device and L/min  Precautions Comment: skin breakdown  Vital Signs:       Objective   Pain:  Pain Assessment  Pain Assessment: 0-10  Pain Score: 0 - No pain  Cognition:  Cognition  Overall Cognitive Status: Within Functional Limits  Orientation Level: Oriented X4  Coordination:     Postural Control:     Extremity/Trunk Assessments:    Activity Tolerance:     Treatments:  Therapeutic Exercise  Therapeutic Exercise Activity 1: Pt performed seated bilat LE ankle pumps, heel raises (very limited range), glute sets, LAQ, hip flexion, pamela hip adduction and resisted hip abduction x15 reps  each.    Transfer 1  Transfer From 1: Chair with arms to  Transfer to 1: Stand  Technique 1: Sit to stand  Transfer Device 1: Walker  Transfer Level of Assistance 1: Maximum assistance, x 3  Trials/Comments 1: Verbal cues to push from chair sit to stand. Bed pan placed in chair due to pt unable to take steps to BSC. x2 sit to stand trials from chair for placement and removal of bedpan.  Transfers 2  Transfer From 2: Stand to  Transfer to 2: Sit, Chair with arms  Technique 2: Stand to sit  Transfer Level of Assistance 2: Maximum assistance, +2  Trials/Comments 2: x2 stand to sit trials.    Outcome Measures:  [facility] Basic Mobility  Turning from your back to your side while in a flat bed without using bedrails: A lot  Moving from lying on your back to sitting on the side of a flat bed without using bedrails: A lot  Moving to and from bed to chair (including a wheelchair): Total  Standing up from a chair using your arms (e.g. wheelchair or bedside chair): Total  To walk in hospital room: Total  Climbing 3-5 steps with railing: Total  Basic Mobility - Total Score: 8    Education Documentation  Precautions, taught by Falguni Townsend PTA at 6/13/2024 11:07 AM.  Learner: Patient  Readiness: Acceptance  Method: Explanation  Response: Verbalizes Understanding, Needs Reinforcement    Body Mechanics, taught by Falguni Townsend PTA at 6/13/2024 11:07 AM.  Learner: Patient  Readiness: Acceptance  Method: Explanation  Response: Verbalizes Understanding, Needs Reinforcement    Home Exercise Program, taught by Falguni Townsend PTA at 6/13/2024 11:07 AM.  Learner: Patient  Readiness: Acceptance  Method: Explanation  Response: Verbalizes Understanding, Needs Reinforcement    Mobility Training, taught by Falguni Townsend PTA at 6/13/2024 11:07 AM.  Learner: Patient  Readiness: Acceptance  Method: Explanation  Response: Verbalizes Understanding, Needs Reinforcement    Education Comments  No comments found.        OP EDUCATION:       Encounter  Problems       Encounter Problems (Active)       PT Problem       BED MOBILITY Patient will transfer supine to sit and sit to supine with minimal assist to facilitate mobility.  (Progressing)       Start:  06/03/24    Expected End:  07/01/24            TRANSFER Patient will transfer sit to stand and stand to sit with  minimal 1-2 with RW  assist to facilitate mobility.  (Progressing)       Start:  06/03/24    Expected End:  07/01/24            AMBULATION Patient will amb 25 feet with rolling walker device including two turns on even surface with minimal assist to facilitate safe mobility.  (Progressing)       Start:  06/03/24    Expected End:  07/01/24            STRENGTH Patient will increase BLE strength to 4/5 to improve functional mobility.    (Progressing)       Start:  06/03/24    Expected End:  07/01/24            ACTIVITY TOLERANCE Pt will tolerate standing activities including reaching for items, standing 3-4 minutes to improve overall activity tolerance.  (Progressing)       Start:  06/03/24    Expected End:  07/01/24               Pain - Adult

## 2024-06-13 NOTE — PROGRESS NOTES
"Brittanie Smart is a 63 y.o. female on day 10 of admission presenting with Unable to care for self.    Subjective   Patient seen and examined.  No documented concerns/events overnight from nursing.  Patient did refuse BiPAP last night.  Encouraged her to wear BiPAP at at bedtime to prevent hypercapnia.  He continues to complain of dyspnea with exertion although she says it is improved.  She denies any chest pain or palpitations.       Objective     Physical Exam  Constitutional:       Appearance: She is obese.   Cardiovascular:      Rate and Rhythm: Normal rate and regular rhythm.      Pulses: Normal pulses.      Heart sounds: Normal heart sounds.   Pulmonary:      Effort: No respiratory distress.      Breath sounds: No wheezing or rales.   Abdominal:      General: Bowel sounds are normal.      Palpations: Abdomen is soft.   Skin:     General: Skin is warm and dry.   Neurological:      General: No focal deficit present.      Mental Status: She is alert and oriented to person, place, and time.   Psychiatric:         Mood and Affect: Mood normal.         Behavior: Behavior normal.     Last Recorded Vitals  Blood pressure 126/57, pulse 93, temperature 36.6 °C (97.9 °F), temperature source Oral, resp. rate 16, height 1.727 m (5' 7.99\"), weight 126 kg (277 lb 3.2 oz), SpO2 94%.  Intake/Output last 3 Shifts:  I/O last 3 completed shifts:  In: - (0 mL/kg)   Out: 1900 (15.1 mL/kg) [Urine:1900 (0.4 mL/kg/hr)]  Weight: 125.7 kg     Relevant Results    Scheduled medications  cefTRIAXone, 1 g, intravenous, q24h  docusate sodium, 100 mg, oral, BID  doxazosin, 1 mg, oral, Daily  enoxaparin, 40 mg, subcutaneous, Daily  ferrous sulfate (325 mg ferrous sulfate), 1 tablet, oral, Daily with breakfast  furosemide, 40 mg, intravenous, q12h  gabapentin, 300 mg, oral, BID  insulin glargine, 35 Units, subcutaneous, Nightly  insulin lispro, 0-20 Units, subcutaneous, TID  insulin lispro, 10 Units, subcutaneous, TID  ipratropium-albuteroL, 3 mL, " nebulization, TID  ketotifen, 1 drop, Both Eyes, BID  levothyroxine, 75 mcg, oral, Daily  [Held by provider] losartan, 50 mg, oral, Daily  [START ON 6/14/2024] metoprolol succinate XL, 25 mg, oral, Daily  multivitamin with minerals, 1 tablet, oral, Daily  nystatin, 1 Application, Topical, q8h  oxygen, , inhalation, q8h  pantoprazole, 40 mg, oral, Daily before breakfast  perflutren protein A microsphere, 0.5 mL, intravenous, Once in imaging  polyethylene glycol, 17 g, oral, Daily  rosuvastatin, 5 mg, oral, Daily  sodium zirconium cyclosilicate, 10 g, oral, Daily  sulfur hexafluoride microsphr, 2 mL, intravenous, Once in imaging      Continuous medications     PRN medications  PRN medications: acetaminophen **OR** [DISCONTINUED] acetaminophen **OR** [DISCONTINUED] acetaminophen, ammonium lactate, bisacodyl, dextrose, glucagon, hydrOXYzine pamoate, ipratropium-albuteroL, ondansetron ODT **OR** [DISCONTINUED] ondansetron, oxygen     following data reviewed    WBC-7.4  Hgb-7.4  Hct-25.4  Platelets-101    Na-138  K+-4.4  Cl- 100  Bicarb-27  BUN-66  Creatinine-2.8                           Assessment/Plan   Principal Problem:    Unable to care for self  Active Problems:    Type 2 diabetes mellitus (Multi)    Morbid obesity (Multi)    Peripheral vascular disease (CMS-HCC)    Hypertension    Pressure sore on sacrum    Behavior problem, adult    Renal failure    Acute respiratory failure with hypoxia and hypercapnia (Multi)    Pressure injury of skin of heel      Acute on Chronic Respiratory Failure with hypoxia & hypercapnia   -ECHO with an EF of 60-65%, severe pulm. HTN  -pulmonology consult  -oxygen PRN, wean as tolerated        Acute on Chronic Kidney Disease  -monitor   -nephrology following  -hold nephrotoxic agents  -renally dose medications  -US with possible mass/malignancy, pt refused MRI  urology recommended  -On [facility]     Unable to care for self   -PT,OT recommend moderate intensity rehab  -social service  consult        DM II  -monitor blood glucose  -ISS  -diabetic diet  -HgbA1C 7.5  -started on Lantus     Morbid obesity   -encourage dietary and lifestyle modifications        Hypertension   -continue medications     Pressure injury of sacral region, stage 3 and both ankles   -mecca per dietary recommendations  - wound care gave recommendations  -  Continue MediHoney, Heel protectors         Hypothyroid   - Continue levothyroxine     Anemia   -anemia in setting of CKD  -Worsening, no anticoagulation  -continue iron supplementation  -b12, folate  -stool for occult blood     Plan of Care  Above plan discussed with pt and she is in agreement    ACE Orozco-CNP

## 2024-06-13 NOTE — PROGRESS NOTES
"Brittanie Smart is a 63 y.o. female on day 10 of admission presenting with Unable to care for self.    Subjective   Patient seen for acute kidney injury admitted with congestive heart failure and urinary tract infection treated with antibiotics patient feels better looks better today she told me that she had kidney issues when she was in [place] however we do not have any records available to us at this time       Objective     Physical Exam  Neck:      Vascular: No carotid bruit.   Cardiovascular:      Rate and Rhythm: Normal rate and regular rhythm.      Heart sounds: No murmur heard.     No friction rub. No gallop.   Pulmonary:      Breath sounds: No wheezing, rhonchi or rales.   Chest:      Chest wall: No tenderness.   Abdominal:      General: There is no distension.      Tenderness: There is no abdominal tenderness. There is no guarding or rebound.   Musculoskeletal:         General: No swelling or tenderness.      Cervical back: Neck supple.      Right lower leg: No edema.      Left lower leg: No edema.   Lymphadenopathy:      Cervical: No cervical adenopathy.         Last Recorded Vitals  Blood pressure 126/57, pulse 93, temperature 36.6 °C (97.9 °F), temperature source Oral, resp. rate 16, height 1.727 m (5' 7.99\"), weight 126 kg (277 lb 3.2 oz), SpO2 94%.    Intake/Output last 3 Shifts:  I/O last 3 completed shifts:  In: - (0 mL/kg)   Out: 1900 (15.1 mL/kg) [Urine:1900 (0.4 mL/kg/hr)]  Weight: 125.7 kg     Current Facility-Administered Medications:     acetaminophen (Tylenol) tablet 650 mg, 650 mg, oral, q4h PRN, 650 mg at 06/04/24 1607 **OR** [DISCONTINUED] acetaminophen (Tylenol) oral liquid 650 mg, 650 mg, oral, q4h PRN **OR** [DISCONTINUED] acetaminophen (Tylenol) suppository 650 mg, 650 mg, rectal, q4h PRN, Noelle Turner,     ammonium lactate (Lac-Hydrin) 12 % lotion, , Topical, q1h PRN, Kaylan Bird, APRN-CNP    bisacodyl (Dulcolax) EC tablet 5 mg, 5 mg, oral, Daily PRN, Kaylan Bird, " APRN-CNP    cefTRIAXone (Rocephin) IVPB 1 g, 1 g, intravenous, q24h, RHONDA Kaiser, Stopped at 06/12/24 1759    dextrose 50 % injection 12.5 g, 12.5 g, intravenous, q15 min PRN, Noelle uTrner DO    docusate sodium (Colace) capsule 100 mg, 100 mg, oral, BID, RHONDA Kaiser, 100 mg at 06/11/24 2154    doxazosin (Cardura) tablet 1 mg, 1 mg, oral, Daily, Siddhartha Tovar MD, 1 mg at 06/12/24 0906    enoxaparin (Lovenox) syringe 40 mg, 40 mg, subcutaneous, Daily, Siddhartha Tovar MD, 40 mg at 06/12/24 0907    ferrous sulfate (325 mg ferrous sulfate) tablet 1 tablet, 1 tablet, oral, Daily with breakfast, Noelle Turner DO, 1 tablet at 06/13/24 0857    furosemide (Lasix) injection 40 mg, 40 mg, intravenous, q12h, RHONDA Fisher, 40 mg at 06/12/24 2039    gabapentin (Neurontin) capsule 300 mg, 300 mg, oral, BID, Siddhartha Tovar MD, 300 mg at 06/12/24 2039    glucagon (Glucagen) injection 1 mg, 1 mg, intramuscular, q15 min PRN, Noelle Turner DO    hydrOXYzine pamoate (Vistaril) capsule 25 mg, 25 mg, oral, q6h PRN, RHONDA Kaiser, 25 mg at 06/10/24 2219    insulin glargine (Lantus) injection 35 Units, 35 Units, subcutaneous, Nightly, RHONDA Kaiser, 35 Units at 06/12/24 2039    insulin lispro (HumaLOG) injection 0-20 Units, 0-20 Units, subcutaneous, TID, RHONDA Kaiser, 8 Units at 06/12/24 1726    insulin lispro (HumaLOG) injection 10 Units, 10 Units, subcutaneous, TID, RHONDA Kaiser, 10 Units at 06/12/24 1726    ipratropium-albuteroL (Duo-Neb) 0.5-2.5 mg/3 mL nebulizer solution 3 mL, 3 mL, nebulization, TID, Noelle Turner DO, 3 mL at 06/13/24 0709    ipratropium-albuteroL (Duo-Neb) 0.5-2.5 mg/3 mL nebulizer solution 3 mL, 3 mL, nebulization, q2h PRN, Noelle Turner DO, 3 mL at 06/10/24 1511    ketotifen (Zaditor) 0.025 % (0.035 %) ophthalmic solution 1 drop, 1 drop, Both Eyes, BID, Noelle Turner DO, 1 drop at 06/12/24 0919     levothyroxine (Synthroid, Levoxyl) tablet 75 mcg, 75 mcg, oral, Daily, Noelle Turner DO, 75 mcg at 06/13/24 0623    [Held by provider] losartan (Cozaar) tablet 50 mg, 50 mg, oral, Daily, Noelle Turner DO, 50 mg at 06/03/24 0839    [Held by provider] metoprolol succinate XL (Toprol-XL) 24 hr tablet 50 mg, 50 mg, oral, Daily, Siddhartha Tovar MD, 50 mg at 06/07/24 0853    multivitamin with minerals 1 tablet, 1 tablet, oral, Daily, Noelle Turner DO, 1 tablet at 06/12/24 0906    nystatin (Mycostatin) 100,000 unit/gram powder 1 Application, 1 Application, Topical, q8h, Noelle Turner DO, 1 Application at 06/12/24 2057    ondansetron ODT (Zofran-ODT) disintegrating tablet 4 mg, 4 mg, oral, q8h PRN **OR** [DISCONTINUED] ondansetron (Zofran) injection 4 mg, 4 mg, intravenous, q8h PRN, Noelle Turner DO    oxygen (O2) therapy, , inhalation, q8h, Noelle Turner DO, 3 L/min at 06/13/24 0700    oxygen (O2) therapy, , inhalation, Continuous PRN - O2/gases, Roland Navarro, DO, 4 L/min at 06/11/24 2003    pantoprazole (ProtoNix) EC tablet 40 mg, 40 mg, oral, Daily before breakfast, Noelle Turner DO, 40 mg at 06/13/24 0623    perflutren protein A microsphere (Optison) injection 0.5 mL, 0.5 mL, intravenous, Once in imaging, ACE Kaiser-CNP    polyethylene glycol (Glycolax, Miralax) packet 17 g, 17 g, oral, Daily, Kaylan Bird APRN-CNP, 17 g at 06/08/24 0826    rosuvastatin (Crestor) tablet 5 mg, 5 mg, oral, Daily, Noelle Turner DO, 5 mg at 06/12/24 0906    sodium zirconium cyclosilicate (Lokelma) packet 10 g, 10 g, oral, Daily, Siddhartha Tovar MD, 10 g at 06/12/24 0904    sulfur hexafluoride microsphr (Lumason) injection 24.28 mg, 2 mL, intravenous, Once in imaging, ACE Kaiser-CNP   Relevant Results    Results for orders placed or performed during the hospital encounter of 06/02/24 (from the past 96 hour(s))   Blood Gas Arterial   Result Value Ref Range    POCT pH,  Arterial 7.30 (L) 7.38 - 7.42 pH    POCT pCO2, Arterial 58 (H) 38 - 42 mm Hg    POCT pO2, Arterial 58 (L) 85 - 95 mm Hg    POCT SO2, Arterial 90 (L) 94 - 100 %    POCT Oxy Hemoglobin, Arterial 85.7 (L) 94.0 - 98.0 %    POCT Base Excess, Arterial 0.8 -2.0 - 3.0 mmol/L    POCT HCO3 Calculated, Arterial 28.5 (H) 22.0 - 26.0 mmol/L    Patient Temperature 37.0 degrees Celsius    FiO2 32 %    Apparatus CANNULA     Site of Arterial Puncture Radial Right     Tyrone's Test Positive    POCT GLUCOSE   Result Value Ref Range    POCT Glucose 139 (H) 74 - 99 mg/dL   Protein, Urine Random   Result Value Ref Range    Total Protein, Urine Random 47 (H) 5 - 25 mg/dL   POCT GLUCOSE   Result Value Ref Range    POCT Glucose 158 (H) 74 - 99 mg/dL   POCT GLUCOSE   Result Value Ref Range    POCT Glucose 172 (H) 74 - 99 mg/dL   CBC   Result Value Ref Range    WBC 7.3 4.4 - 11.3 x10*3/uL    nRBC 0.0 0.0 - 0.0 /100 WBCs    RBC 3.04 (L) 4.00 - 5.20 x10*6/uL    Hemoglobin 8.4 (L) 12.0 - 16.0 g/dL    Hematocrit 28.0 (L) 36.0 - 46.0 %    MCV 92 80 - 100 fL    MCH 27.6 26.0 - 34.0 pg    MCHC 30.0 (L) 32.0 - 36.0 g/dL    RDW 22.9 (H) 11.5 - 14.5 %    Platelets 116 (L) 150 - 450 x10*3/uL   Basic Metabolic Panel   Result Value Ref Range    Glucose 206 (H) 65 - 99 mg/dL    Sodium 137 133 - 145 mmol/L    Potassium 4.9 3.4 - 5.1 mmol/L    Chloride 100 97 - 107 mmol/L    Bicarbonate 29 24 - 31 mmol/L    Urea Nitrogen 67 (H) 8 - 25 mg/dL    Creatinine 3.10 (H) 0.40 - 1.60 mg/dL    eGFR 16 (L) >60 mL/min/1.73m*2    Calcium 8.6 8.5 - 10.4 mg/dL    Anion Gap 8 <=19 mmol/L   POCT GLUCOSE   Result Value Ref Range    POCT Glucose 176 (H) 74 - 99 mg/dL   POCT GLUCOSE   Result Value Ref Range    POCT Glucose 209 (H) 74 - 99 mg/dL   Blood Gas Arterial Full Panel   Result Value Ref Range    POCT pH, Arterial 7.28 (L) 7.38 - 7.42 pH    POCT pCO2, Arterial 64 (H) 38 - 42 mm Hg    POCT pO2, Arterial 68 (L) 85 - 95 mm Hg    POCT SO2, Arterial 95 94 - 100 %    POCT Oxy  Hemoglobin, Arterial 89.8 (L) 94.0 - 98.0 %    POCT Hematocrit Calculated, Arterial 26.0 (L) 36.0 - 46.0 %    POCT Sodium, Arterial 135 (L) 136 - 145 mmol/L    POCT Potassium, Arterial 4.5 3.5 - 5.3 mmol/L    POCT Chloride, Arterial 101 98 - 107 mmol/L    POCT Ionized Calcium, Arterial 1.17 1.10 - 1.33 mmol/L    POCT Glucose, Arterial 177 (H) 74 - 99 mg/dL    POCT Lactate, Arterial 1.0 0.4 - 2.0 mmol/L    POCT Base Excess, Arterial 2.6 -2.0 - 3.0 mmol/L    POCT HCO3 Calculated, Arterial 30.1 (H) 22.0 - 26.0 mmol/L    POCT Hemoglobin, Arterial 8.5 (L) 12.0 - 16.0 g/dL    POCT Anion Gap, Arterial 8 (L) 10 - 25 mmo/L    Patient Temperature 37.0 degrees Celsius    FiO2 36 %    Apparatus CANNULA     Flow 4.0 LPM    Site of Arterial Puncture Radial Right     Tyrone's Test Positive    POCT GLUCOSE   Result Value Ref Range    POCT Glucose 162 (H) 74 - 99 mg/dL   Blood Gas Arterial Full Panel   Result Value Ref Range    POCT pH, Arterial 7.32 (L) 7.38 - 7.42 pH    POCT pCO2, Arterial 58 (H) 38 - 42 mm Hg    POCT pO2, Arterial 103 (H) 85 - 95 mm Hg    POCT SO2, Arterial 100 94 - 100 %    POCT Oxy Hemoglobin, Arterial 94.6 94.0 - 98.0 %    POCT Hematocrit Calculated, Arterial 24.0 (L) 36.0 - 46.0 %    POCT Sodium, Arterial 135 (L) 136 - 145 mmol/L    POCT Potassium, Arterial 4.4 3.5 - 5.3 mmol/L    POCT Chloride, Arterial 102 98 - 107 mmol/L    POCT Ionized Calcium, Arterial 1.20 1.10 - 1.33 mmol/L    POCT Glucose, Arterial 137 (H) 74 - 99 mg/dL    POCT Lactate, Arterial 0.9 0.4 - 2.0 mmol/L    POCT Base Excess, Arterial 3.2 (H) -2.0 - 3.0 mmol/L    POCT HCO3 Calculated, Arterial 29.9 (H) 22.0 - 26.0 mmol/L    POCT Hemoglobin, Arterial 7.9 (L) 12.0 - 16.0 g/dL    POCT Anion Gap, Arterial 8 (L) 10 - 25 mmo/L    Patient Temperature 37.0 degrees Celsius    FiO2 50 %    Ventilator Mode BiPAP     Ventilator Rate 16 bpm    Tidal Volume 464 mL    High Peep CMH2O 18.0 cm H2O    Low Peep CMH2O 8.0 cm H2O    Site of Arterial Puncture  Radial Right     Tyrone's Test Positive    POCT GLUCOSE   Result Value Ref Range    POCT Glucose 128 (H) 74 - 99 mg/dL   Renal Function Panel   Result Value Ref Range    Glucose 164 (H) 65 - 99 mg/dL    Sodium 138 133 - 145 mmol/L    Potassium 4.4 3.4 - 5.1 mmol/L    Chloride 101 97 - 107 mmol/L    Bicarbonate 27 24 - 31 mmol/L    Urea Nitrogen 66 (H) 8 - 25 mg/dL    Creatinine 2.90 (H) 0.40 - 1.60 mg/dL    eGFR 17 (L) >60 mL/min/1.73m*2    Calcium 8.6 8.5 - 10.4 mg/dL    Phosphorus 5.2 (H) 2.5 - 4.5 mg/dL    Albumin 2.6 (L) 3.5 - 5.0 g/dL    Anion Gap 10 <=19 mmol/L   Troponin T   Result Value Ref Range    Troponin T, High Sensitivity 79 (HH) <=14 ng/L   NT-PROBNP   Result Value Ref Range    PROBNP 42,132 (H) 0 - 353 pg/mL   Magnesium   Result Value Ref Range    Magnesium 2.00 1.60 - 3.10 mg/dL   POCT GLUCOSE   Result Value Ref Range    POCT Glucose 152 (H) 74 - 99 mg/dL   POCT GLUCOSE   Result Value Ref Range    POCT Glucose 196 (H) 74 - 99 mg/dL   POCT GLUCOSE   Result Value Ref Range    POCT Glucose 196 (H) 74 - 99 mg/dL   POCT GLUCOSE   Result Value Ref Range    POCT Glucose 205 (H) 74 - 99 mg/dL   Basic Metabolic Panel   Result Value Ref Range    Glucose 183 (H) 65 - 99 mg/dL    Sodium 138 133 - 145 mmol/L    Potassium 4.4 3.4 - 5.1 mmol/L    Chloride 100 97 - 107 mmol/L    Bicarbonate 28 24 - 31 mmol/L    Urea Nitrogen 64 (H) 8 - 25 mg/dL    Creatinine 2.90 (H) 0.40 - 1.60 mg/dL    eGFR 17 (L) >60 mL/min/1.73m*2    Calcium 8.3 (L) 8.5 - 10.4 mg/dL    Anion Gap 10 <=19 mmol/L   CBC   Result Value Ref Range    WBC 6.9 4.4 - 11.3 x10*3/uL    nRBC 0.3 (H) 0.0 - 0.0 /100 WBCs    RBC 2.64 (L) 4.00 - 5.20 x10*6/uL    Hemoglobin 7.3 (L) 12.0 - 16.0 g/dL    Hematocrit 24.9 (L) 36.0 - 46.0 %    MCV 94 80 - 100 fL    MCH 27.7 26.0 - 34.0 pg    MCHC 29.3 (L) 32.0 - 36.0 g/dL    RDW 23.2 (H) 11.5 - 14.5 %    Platelets 104 (L) 150 - 450 x10*3/uL   Troponin T   Result Value Ref Range    Troponin T, High Sensitivity 81 (HH)  <=14 ng/L   POCT GLUCOSE   Result Value Ref Range    POCT Glucose 169 (H) 74 - 99 mg/dL   POCT GLUCOSE   Result Value Ref Range    POCT Glucose 235 (H) 74 - 99 mg/dL   POCT GLUCOSE   Result Value Ref Range    POCT Glucose 230 (H) 74 - 99 mg/dL   POCT GLUCOSE   Result Value Ref Range    POCT Glucose 212 (H) 74 - 99 mg/dL   CBC   Result Value Ref Range    WBC 7.4 4.4 - 11.3 x10*3/uL    nRBC 0.0 0.0 - 0.0 /100 WBCs    RBC 2.65 (L) 4.00 - 5.20 x10*6/uL    Hemoglobin 7.4 (L) 12.0 - 16.0 g/dL    Hematocrit 25.4 (L) 36.0 - 46.0 %    MCV 96 80 - 100 fL    MCH 27.9 26.0 - 34.0 pg    MCHC 29.1 (L) 32.0 - 36.0 g/dL    RDW 23.1 (H) 11.5 - 14.5 %    Platelets 101 (L) 150 - 450 x10*3/uL   Renal Function Panel   Result Value Ref Range    Glucose 159 (H) 65 - 99 mg/dL    Sodium 138 133 - 145 mmol/L    Potassium 4.4 3.4 - 5.1 mmol/L    Chloride 100 97 - 107 mmol/L    Bicarbonate 27 24 - 31 mmol/L    Urea Nitrogen 66 (H) 8 - 25 mg/dL    Creatinine 2.80 (H) 0.40 - 1.60 mg/dL    eGFR 18 (L) >60 mL/min/1.73m*2    Calcium 8.3 (L) 8.5 - 10.4 mg/dL    Phosphorus 4.7 (H) 2.5 - 4.5 mg/dL    Albumin 2.7 (L) 3.5 - 5.0 g/dL    Anion Gap 11 <=19 mmol/L   POCT GLUCOSE   Result Value Ref Range    POCT Glucose 143 (H) 74 - 99 mg/dL       Assessment/Plan   Acute kidney injury on chronic kidney disease stage III renal function it would be nice to know what her baseline creatinine level when she lived in [place] we will try to obtain records from recent hospitalization over there  Chronic kidney disease stage III creatinine baseline 1.3   Diabetes mellitus type 2  Hypertension  Anemia   UTI continue with IV Rocephin  Renal Mass followed by urology refused MRI  Diastolic CHF continue diuresis also she had a CT scan of the chest yesterday results are still pending           Paco Tovar MD

## 2024-06-14 LAB
ANION GAP SERPL CALC-SCNC: 9 MMOL/L
BUN SERPL-MCNC: 66 MG/DL (ref 8–25)
CALCIUM SERPL-MCNC: 8.3 MG/DL (ref 8.5–10.4)
CHLORIDE SERPL-SCNC: 100 MMOL/L (ref 97–107)
CO2 SERPL-SCNC: 29 MMOL/L (ref 24–31)
CREAT SERPL-MCNC: 2.7 MG/DL (ref 0.4–1.6)
EGFRCR SERPLBLD CKD-EPI 2021: 19 ML/MIN/1.73M*2
ERYTHROCYTE [DISTWIDTH] IN BLOOD BY AUTOMATED COUNT: 22.7 % (ref 11.5–14.5)
GLUCOSE BLD MANUAL STRIP-MCNC: 168 MG/DL (ref 74–99)
GLUCOSE BLD MANUAL STRIP-MCNC: 168 MG/DL (ref 74–99)
GLUCOSE BLD MANUAL STRIP-MCNC: 174 MG/DL (ref 74–99)
GLUCOSE BLD MANUAL STRIP-MCNC: 191 MG/DL (ref 74–99)
GLUCOSE SERPL-MCNC: 208 MG/DL (ref 65–99)
HCT VFR BLD AUTO: 25.1 % (ref 36–46)
HEMOCCULT SP1 STL QL: NEGATIVE
HGB BLD-MCNC: 7.3 G/DL (ref 12–16)
MCH RBC QN AUTO: 28 PG (ref 26–34)
MCHC RBC AUTO-ENTMCNC: 29.1 G/DL (ref 32–36)
MCV RBC AUTO: 96 FL (ref 80–100)
NRBC BLD-RTO: 0 /100 WBCS (ref 0–0)
PLATELET # BLD AUTO: 96 X10*3/UL (ref 150–450)
POTASSIUM SERPL-SCNC: 4 MMOL/L (ref 3.4–5.1)
RBC # BLD AUTO: 2.61 X10*6/UL (ref 4–5.2)
SODIUM SERPL-SCNC: 138 MMOL/L (ref 133–145)
WBC # BLD AUTO: 7.2 X10*3/UL (ref 4.4–11.3)

## 2024-06-14 PROCEDURE — 94640 AIRWAY INHALATION TREATMENT: CPT

## 2024-06-14 PROCEDURE — 2500000004 HC RX 250 GENERAL PHARMACY W/ HCPCS (ALT 636 FOR OP/ED): Performed by: NURSE PRACTITIONER

## 2024-06-14 PROCEDURE — 6350000001 HC RX 635 EPOETIN >10,000 UNITS: Mod: JZ | Performed by: INTERNAL MEDICINE

## 2024-06-14 PROCEDURE — 80048 BASIC METABOLIC PNL TOTAL CA: CPT | Performed by: NURSE PRACTITIONER

## 2024-06-14 PROCEDURE — 2500000005 HC RX 250 GENERAL PHARMACY W/O HCPCS: Performed by: EMERGENCY MEDICINE

## 2024-06-14 PROCEDURE — 82947 ASSAY GLUCOSE BLOOD QUANT: CPT

## 2024-06-14 PROCEDURE — 2500000002 HC RX 250 W HCPCS SELF ADMINISTERED DRUGS (ALT 637 FOR MEDICARE OP, ALT 636 FOR OP/ED): Performed by: EMERGENCY MEDICINE

## 2024-06-14 PROCEDURE — 2500000001 HC RX 250 WO HCPCS SELF ADMINISTERED DRUGS (ALT 637 FOR MEDICARE OP): Performed by: EMERGENCY MEDICINE

## 2024-06-14 PROCEDURE — 2500000002 HC RX 250 W HCPCS SELF ADMINISTERED DRUGS (ALT 637 FOR MEDICARE OP, ALT 636 FOR OP/ED): Performed by: INTERNAL MEDICINE

## 2024-06-14 PROCEDURE — 85027 COMPLETE CBC AUTOMATED: CPT | Performed by: NURSE PRACTITIONER

## 2024-06-14 PROCEDURE — 2500000001 HC RX 250 WO HCPCS SELF ADMINISTERED DRUGS (ALT 637 FOR MEDICARE OP): Performed by: INTERNAL MEDICINE

## 2024-06-14 PROCEDURE — 99232 SBSQ HOSP IP/OBS MODERATE 35: CPT | Performed by: NURSE PRACTITIONER

## 2024-06-14 PROCEDURE — 94760 N-INVAS EAR/PLS OXIMETRY 1: CPT

## 2024-06-14 PROCEDURE — 9420000001 HC RT PATIENT EDUCATION 5 MIN

## 2024-06-14 PROCEDURE — 82270 OCCULT BLOOD FECES: CPT | Performed by: NURSE PRACTITIONER

## 2024-06-14 PROCEDURE — 94762 N-INVAS EAR/PLS OXIMTRY CONT: CPT

## 2024-06-14 PROCEDURE — 99232 SBSQ HOSP IP/OBS MODERATE 35: CPT | Performed by: INTERNAL MEDICINE

## 2024-06-14 PROCEDURE — 2500000002 HC RX 250 W HCPCS SELF ADMINISTERED DRUGS (ALT 637 FOR MEDICARE OP, ALT 636 FOR OP/ED): Performed by: NURSE PRACTITIONER

## 2024-06-14 PROCEDURE — 1100000001 HC PRIVATE ROOM DAILY

## 2024-06-14 PROCEDURE — 36415 COLL VENOUS BLD VENIPUNCTURE: CPT | Performed by: NURSE PRACTITIONER

## 2024-06-14 RX ADMIN — INSULIN LISPRO 10 UNITS: 100 INJECTION, SOLUTION INTRAVENOUS; SUBCUTANEOUS at 08:26

## 2024-06-14 RX ADMIN — INSULIN LISPRO 4 UNITS: 100 INJECTION, SOLUTION INTRAVENOUS; SUBCUTANEOUS at 14:19

## 2024-06-14 RX ADMIN — KETOTIFEN FUMARATE 1 DROP: 0.25 SOLUTION/ DROPS OPHTHALMIC at 08:54

## 2024-06-14 RX ADMIN — NYSTATIN 1 APPLICATION: 100000 POWDER TOPICAL at 06:00

## 2024-06-14 RX ADMIN — KETOTIFEN FUMARATE 1 DROP: 0.25 SOLUTION/ DROPS OPHTHALMIC at 21:34

## 2024-06-14 RX ADMIN — LEVOTHYROXINE SODIUM 75 MCG: 0.07 TABLET ORAL at 06:19

## 2024-06-14 RX ADMIN — NYSTATIN 1 APPLICATION: 100000 POWDER TOPICAL at 14:21

## 2024-06-14 RX ADMIN — SODIUM ZIRCONIUM CYCLOSILICATE 10 G: 10 POWDER, FOR SUSPENSION ORAL at 08:40

## 2024-06-14 RX ADMIN — GABAPENTIN 300 MG: 300 CAPSULE ORAL at 08:40

## 2024-06-14 RX ADMIN — CEFTRIAXONE SODIUM 1 G: 1 INJECTION, SOLUTION INTRAVENOUS at 17:13

## 2024-06-14 RX ADMIN — INSULIN LISPRO 10 UNITS: 100 INJECTION, SOLUTION INTRAVENOUS; SUBCUTANEOUS at 14:17

## 2024-06-14 RX ADMIN — IPRATROPIUM BROMIDE AND ALBUTEROL SULFATE 3 ML: .5; 3 SOLUTION RESPIRATORY (INHALATION) at 12:47

## 2024-06-14 RX ADMIN — INSULIN LISPRO 4 UNITS: 100 INJECTION, SOLUTION INTRAVENOUS; SUBCUTANEOUS at 08:26

## 2024-06-14 RX ADMIN — INSULIN LISPRO 10 UNITS: 100 INJECTION, SOLUTION INTRAVENOUS; SUBCUTANEOUS at 17:11

## 2024-06-14 RX ADMIN — FUROSEMIDE 40 MG: 10 INJECTION, SOLUTION INTRAMUSCULAR; INTRAVENOUS at 08:40

## 2024-06-14 RX ADMIN — HYDROXYZINE PAMOATE 25 MG: 25 CAPSULE ORAL at 04:38

## 2024-06-14 RX ADMIN — IPRATROPIUM BROMIDE AND ALBUTEROL SULFATE 3 ML: .5; 3 SOLUTION RESPIRATORY (INHALATION) at 19:34

## 2024-06-14 RX ADMIN — IPRATROPIUM BROMIDE AND ALBUTEROL SULFATE 3 ML: .5; 3 SOLUTION RESPIRATORY (INHALATION) at 07:13

## 2024-06-14 RX ADMIN — INSULIN GLARGINE 35 UNITS: 100 INJECTION, SOLUTION SUBCUTANEOUS at 21:35

## 2024-06-14 RX ADMIN — ACETAMINOPHEN 650 MG: 325 TABLET ORAL at 00:08

## 2024-06-14 RX ADMIN — Medication 1 TABLET: at 08:40

## 2024-06-14 RX ADMIN — ACETAMINOPHEN 650 MG: 325 TABLET ORAL at 04:34

## 2024-06-14 RX ADMIN — ACETAMINOPHEN 650 MG: 325 TABLET ORAL at 21:31

## 2024-06-14 RX ADMIN — PANTOPRAZOLE SODIUM 40 MG: 40 TABLET, DELAYED RELEASE ORAL at 06:19

## 2024-06-14 RX ADMIN — DOXAZOSIN 1 MG: 1 TABLET ORAL at 08:40

## 2024-06-14 RX ADMIN — METOPROLOL SUCCINATE 25 MG: 25 TABLET, EXTENDED RELEASE ORAL at 08:40

## 2024-06-14 RX ADMIN — NYSTATIN 1 APPLICATION: 100000 POWDER TOPICAL at 21:32

## 2024-06-14 RX ADMIN — Medication: at 14:48

## 2024-06-14 RX ADMIN — Medication 3 L/MIN: at 07:00

## 2024-06-14 RX ADMIN — GABAPENTIN 300 MG: 300 CAPSULE ORAL at 21:31

## 2024-06-14 RX ADMIN — Medication 3 L/MIN: at 23:00

## 2024-06-14 RX ADMIN — FUROSEMIDE 40 MG: 10 INJECTION, SOLUTION INTRAMUSCULAR; INTRAVENOUS at 21:31

## 2024-06-14 RX ADMIN — HYDROXYZINE PAMOATE 25 MG: 25 CAPSULE ORAL at 21:31

## 2024-06-14 RX ADMIN — INSULIN LISPRO 4 UNITS: 100 INJECTION, SOLUTION INTRAVENOUS; SUBCUTANEOUS at 17:12

## 2024-06-14 RX ADMIN — FERROUS SULFATE TAB 325 MG (65 MG ELEMENTAL FE) 1 TABLET: 325 (65 FE) TAB at 08:40

## 2024-06-14 RX ADMIN — ROSUVASTATIN CALCIUM 5 MG: 10 TABLET, COATED ORAL at 08:40

## 2024-06-14 RX ADMIN — EPOETIN ALFA-EPBX 10000 UNITS: 10000 INJECTION, SOLUTION INTRAVENOUS; SUBCUTANEOUS at 14:21

## 2024-06-14 ASSESSMENT — COGNITIVE AND FUNCTIONAL STATUS - GENERAL
CLIMB 3 TO 5 STEPS WITH RAILING: TOTAL
EATING MEALS: A LITTLE
WALKING IN HOSPITAL ROOM: TOTAL
PERSONAL GROOMING: A LOT
TOILETING: TOTAL
MOVING TO AND FROM BED TO CHAIR: TOTAL
HELP NEEDED FOR BATHING: TOTAL
MOVING FROM LYING ON BACK TO SITTING ON SIDE OF FLAT BED WITH BEDRAILS: A LOT
DAILY ACTIVITIY SCORE: 10
DRESSING REGULAR LOWER BODY CLOTHING: TOTAL
TURNING FROM BACK TO SIDE WHILE IN FLAT BAD: A LOT
STANDING UP FROM CHAIR USING ARMS: TOTAL
MOBILITY SCORE: 8
DRESSING REGULAR UPPER BODY CLOTHING: A LOT

## 2024-06-14 ASSESSMENT — PAIN SCALES - GENERAL
PAINLEVEL_OUTOF10: 0 - NO PAIN
PAINLEVEL_OUTOF10: 3
PAINLEVEL_OUTOF10: 3
PAINLEVEL_OUTOF10: 1
PAINLEVEL_OUTOF10: 3

## 2024-06-14 ASSESSMENT — PAIN - FUNCTIONAL ASSESSMENT
PAIN_FUNCTIONAL_ASSESSMENT: 0-10

## 2024-06-14 ASSESSMENT — PAIN DESCRIPTION - DESCRIPTORS: DESCRIPTORS: ACHING

## 2024-06-14 ASSESSMENT — PAIN DESCRIPTION - LOCATION: LOCATION: HEAD

## 2024-06-14 NOTE — PROGRESS NOTES
Physical Therapy                 Therapy Communication Note    Patient Name: Brittanie Smart  MRN: [id number]  Today's Date: 6/14/2024     Discipline: Physical Therapy    Missed Visit Reason:      Missed Time: Attempt    Comment: Pt declined therapy at this time, states has not slept in 2 days.     1410: Pt having lunch at this time.

## 2024-06-14 NOTE — CARE PLAN
The patient's goals for the shift include      The clinical goals for the shift include safety      Problem: Skin  Goal: Decreased wound size/increased tissue granulation at next dressing change  Flowsheets (Taken 6/12/2024 2200 by Jenn Ansari RN)  Decreased wound size/increased tissue granulation at next dressing change: Promote sleep for wound healing  Goal: Participates in plan/prevention/treatment measures  Flowsheets (Taken 6/12/2024 1409 by Cat Lomeli RN)  Participates in plan/prevention/treatment measures:   Discuss with provider PT/OT consult   Elevate heels  Goal: Prevent/minimize sheer/friction injuries  Flowsheets (Taken 6/12/2024 1409 by Cat Lomeli RN)  Prevent/minimize sheer/friction injuries:   Complete micro-shifts as needed if patient unable. Adjust patient position to relieve pressure points, not a full turn   Increase activity/out of bed for meals   Utilize specialty bed per algorithm   Turn/reposition every 2 hours/use positioning/transfer devices   Use pull sheet  Goal: Promote/optimize nutrition  Flowsheets (Taken 6/12/2024 1409 by Cat Lomeli RN)  Promote/optimize nutrition:   Monitor/record intake including meals   Consume > 50% meals/supplements  Goal: Promote skin healing  Flowsheets (Taken 6/12/2024 1409 by Cat Lomeli RN)  Promote skin healing:   Assess skin/pad under line(s)/device(s)   Protective dressings over bony prominences     Problem: Safety  Goal: I will remain free of falls  Flowsheets (Taken 6/13/2024 2311)  Resident will remain free of falls:   Utilize fall response kit   Apply bed/chair alarms as appropriate   Assist with toileting as orderd   Maintain bed at position as ordered (chair height, low bed)   Visual checks per facility policy     Problem: Psychosocial Needs  Goal: Demonstrates ability to cope with hospitalization/illness  Flowsheets (Taken 6/13/2024 2311)  Demonstrates ability to cope with hospitalization/illness:   Encourage verbalization of  feelings/concerns/expectations   Assist resident to identify and practice own strengths and abilities   Encourage participation in diversional activities     Problem: Discharge Barriers  Goal: My discharge needs are met  Flowsheets (Taken 6/13/2024 5483)  Resident's discharge needs are met: Identify potential discharge barriers on admission and throughout stay

## 2024-06-14 NOTE — PROGRESS NOTES
Pulmonary Progress Note 06/14/24     FOLLOWUP FOR: acute on chronic respiratory failure, MARY/OHS    SUBJECTIVE  Feels about the same.  No overnight events.  Does not tolerate the BiPAP machine as it suffocates her.  Gets up to the chair 3 times yesterday.    Positive fluid balance noted    PHYSICAL EXAM        6/13/2024     7:39 AM 6/13/2024    11:00 AM 6/13/2024     3:20 PM 6/13/2024     7:05 PM 6/13/2024    11:01 PM 6/14/2024     3:13 AM 6/14/2024     8:02 AM   Vitals   Systolic 126 134 124 105 122 135 119   Diastolic 57 61 69 50 59 64 70   Heart Rate 93 93 95 94 91 84 89   Temp 36.6 °C (97.9 °F) 36.5 °C (97.7 °F) 36.5 °C (97.7 °F) 36.7 °C (98.1 °F) 36.9 °C (98.4 °F) 36.9 °C (98.4 °F) 36.5 °C (97.7 °F)   Resp 16 16 17 20 18 16 18       Intake/Output last 3 shifts:  I/O last 3 completed shifts:  In: 540 (4.3 mL/kg) [P.O.:540]  Out: 1200 (9.5 mL/kg) [Urine:1200 (0.3 mL/kg/hr)]  Weight: 125.7 kg   Intake/Output this shift:  No intake/output data recorded.        Physical Exam  Vitals reviewed.   Constitutional:       Appearance: She is obese. She is ill-appearing.      Interventions: Nasal cannula in place.   Cardiovascular:      Rate and Rhythm: Normal rate and regular rhythm.   Pulmonary:      Effort: Pulmonary effort is normal. No respiratory distress.      Breath sounds: Decreased air movement present. No wheezing.   Musculoskeletal:      Right lower leg: No edema.      Left lower leg: No edema.   Skin:     General: Skin is warm.   Neurological:      Mental Status: She is alert and oriented to person, place, and time.   Psychiatric:         Mood and Affect: Mood normal.         Behavior: Behavior normal.           Scheduled medications  cefTRIAXone, 1 g, intravenous, q24h  docusate sodium, 100 mg, oral, BID  doxazosin, 1 mg, oral, Daily  ferrous sulfate (325 mg ferrous sulfate), 1 tablet, oral, Daily with breakfast  furosemide, 40 mg, intravenous, q12h  gabapentin, 300 mg, oral, BID  insulin  glargine, 35 Units, subcutaneous, Nightly  insulin lispro, 0-20 Units, subcutaneous, TID  insulin lispro, 10 Units, subcutaneous, TID  ipratropium-albuteroL, 3 mL, nebulization, TID  ketotifen, 1 drop, Both Eyes, BID  levothyroxine, 75 mcg, oral, Daily  metoprolol succinate XL, 25 mg, oral, Daily  multivitamin with minerals, 1 tablet, oral, Daily  nystatin, 1 Application, Topical, q8h  oxygen, , inhalation, q8h  pantoprazole, 40 mg, oral, Daily before breakfast  perflutren protein A microsphere, 0.5 mL, intravenous, Once in imaging  polyethylene glycol, 17 g, oral, Daily  rosuvastatin, 5 mg, oral, Daily  sodium zirconium cyclosilicate, 10 g, oral, Daily  sulfur hexafluoride microsphr, 2 mL, intravenous, Once in imaging      Continuous medications     PRN medications  PRN medications: acetaminophen **OR** [DISCONTINUED] acetaminophen **OR** [DISCONTINUED] acetaminophen, ammonium lactate, bisacodyl, dextrose, glucagon, hydrOXYzine pamoate, ipratropium-albuteroL, ondansetron ODT **OR** [DISCONTINUED] ondansetron, oxygen     Labs:  Lab Results   Component Value Date     06/14/2024     06/13/2024     06/12/2024    K 4.0 06/14/2024    K 4.4 06/13/2024    K 4.4 06/12/2024    CO2 29 06/14/2024    CO2 27 06/13/2024    CO2 28 06/12/2024    BUN 66 (H) 06/14/2024    BUN 66 (H) 06/13/2024    BUN 64 (H) 06/12/2024    CREATININE 2.70 (H) 06/14/2024    CREATININE 2.80 (H) 06/13/2024    CREATININE 2.90 (H) 06/12/2024    GLUCOSE 208 (H) 06/14/2024    GLUCOSE 159 (H) 06/13/2024    GLUCOSE 183 (H) 06/12/2024    CALCIUM 8.3 (L) 06/14/2024    CALCIUM 8.3 (L) 06/13/2024    CALCIUM 8.3 (L) 06/12/2024     Lab Results   Component Value Date    WBC 7.2 06/14/2024    HGB 7.3 (L) 06/14/2024    HCT 25.1 (L) 06/14/2024    MCV 96 06/14/2024    PLT 96 (L) 06/14/2024       Imaging:  XR chest 1 view    Result Date: 6/9/2024  Interpreted By:  Otilio Hernandez, STUDY: XR CHEST 1 VIEW; 6/9/2024 12:03 pm   INDICATION: CLINICAL  INFORMATION: Signs/Symptoms:SOB, persistent hypoxia, re-eval pleural effusions.   COMPARISON: 06/07/2024   ACCESSION NUMBER(S): [id number]   ORDERING CLINICIAN: LEMUEL STACY   TECHNIQUE: Portable chest one view.   FINDINGS: Exam is limited by the patient's body habitus. Cardiac size indeterminate in view of the AP projection. Diffuse bilateral infiltrates are present with more focal density at the left base silhouetting left hemidiaphragm with air bronchograms at the left base. The findings suggest left lower lobe consolidation . Small effusions may be obscured on this portable study.       Diffuse bilateral infiltrates in a pattern suggesting pulmonary edema with more focal infiltrate suspected at the left base suggesting consolidation and perhaps pneumonia. Infiltrates appear more marked compared to 06/07/2024.   MACRO: none   Signed by: Otilio Hernandez 6/9/2024 1:16 PM Dictation workstation:   [id number]    US elastography parenchyma EG organ    Result Date: 6/8/2024  Interpreted By:  Otilio Hernandez, STUDY: US ELASTOGRAPHY PARENCHYMA EG ORGAN; 6/7/2024 6:37 pm   INDICATION: Signs/Symptoms:Elastography, r/o WINSTON CIRRHOSIS.   COMPARISON: None   ACCESSION NUMBER(S): [id number]   ORDERING CLINICIAN: SHANNON WESTON   TECHNIQUE: Limited abdominal ultrasound of the right upper quadrant was performed utilizing gray scale imaging.   FINDINGS: COMMENTS: Technologist note indicates the examination is technically suboptimal with measurements not thought to be accurate due to the patient's condition. Patient was unable to hold breath or to roll. Increased body habitus limits assessment as well.   Measurements obtained at this time are as follows:   Elastography Median: 12.5 kPA   Elastography IQR/Median: 26.6 %       Measurements were obtained as above but are now thought to be accurate due to the patient's body habitus and suboptimal cooperation at this time. Repeat evaluation at a later date may be appropriate.   Liver  Fibrosis Staging Metavir Score kPa m/s Normal- Mild  F1 5.48 kPa-8.29 kPa 1.35 m/s- 1.66 m/s Mild-Moderate  F2 8.29 kPa-9.40 kPa 1.66 m/s- 1.77 m/s Moderate- Severe F3 9.40 kPa-11.9 kPa 1.77 m/s- 1.99 m/s Cirrhosis F4 >11.9 kPa >1.99 m/s   CAUTION: The values for the shear wave speed and tissue modulus are relative indices intended only for the purpose of comparison with other measurements performed using the LOGIQ E9 and E10 Absolute values for these measurements may vary among different measurement devices.   MACRO: none   Signed by: Otilio Hernandez 6/8/2024 10:02 AM Dictation workstation:   [id number]    XR chest 1 view    Result Date: 6/7/2024  Interpreted By:  Gerri Hsu, STUDY: XR CHEST 1 VIEW 6/7/2024 8:12 am   INDICATION: Signs/Symptoms:Pleural effusions   COMPARISON: 06/03/2024   ACCESSION NUMBER(S): [id number]   ORDERING CLINICIAN: DEBORAH KIM   TECHNIQUE: AP erect view of the chest at bedside   FINDINGS: The heart is enlarged with mild pulmonary vascular congestion seen. No focal infiltrate or focal airspace consolidation is identified.   No obvious pleural effusions are seen on the AP projection.       Mild cardiomegaly and mild pulmonary vascular congestion.   The bilateral pleural effusions seen on 06/03/2024 are not obvious on the AP projection. PA and lateral views of the chest are recommended when the patient's condition permits.   Signed by: Gerri Hsu 6/7/2024 9:43 AM Dictation workstation:   [id number]    US gallbladder    Result Date: 6/6/2024  Interpreted By:  Otilio Hernandez, STUDY: US GALLBLADDER; 9:13 am   INDICATION: Signs/Symptoms:abnormal CT. Abnormal appearance of gallbladder on CT from 06/04/2024.   COMPARISON: CT 06/04/2024   ACCESSION NUMBER(S): [id number]   ORDERING CLINICIAN: KYLIE GARCIA   TECHNIQUE: Limited abdominal ultrasound of the right upper quadrant was performed utilizing gray scale imaging.   FINDINGS: COMMENTS: Technologist note states that the exam was limited due  to patient being uncooperative.   Liver: There is a very coarse echotexture of the hepatic parenchyma with a lobulated hepatic contour consistent with underlying cirrhosis. Gallbladder: Gallbladder is filled with stones. There is diffuse gallbladder wall thickening measuring 5 mm with no pericholecystic fluid identified. Sonographic Latham's sign: Negative Pancreas: Pancreas is predominantly obscured by bowel gas. CBD: 0.36 cm Right Kidney:  Minimal amount of fluid is identified adjacent to the kidney. The kidney itself is unremarkable.       1. Abnormal appearance of the liver in a pattern consistent with cirrhosis. The splenomegaly noted on CT may be related to portal venous hypertension considering this appearance. 2. Cholelithiasis with gallbladder wall thickening but negative sonographic Latham's sign and no pericholecystic fluid. Findings may be indicative of chronic cholecystitis. Please correlate clinically. 3. Nonspecific perinephric fluid. 4. Please note exam is technically limited due to suboptimal cooperation from the patient according to technologist note.   MACRO: None.   Signed by: Otilio Hernandez 6/6/2024 9:17 AM Dictation workstation:   [id number]    CT kidney wo IV contrast    Result Date: 6/5/2024  Interpreted By:  Otilio Hernandez, STUDY: CT KIDNEY WO CONTRAST; 6/4/2024 3:50 pm   INDICATION: Signs/Symptoms:mass on left kidney.   COMPARISON: None.   ACCESSION NUMBER(S): [id number]   ORDERING CLINICIAN: KYLIE GARCIA   TECHNIQUE: Without INTRAVENOUS  CONTRAST WithoutORAL CONTRAST  ( Intravenous contrast was not able to utilized due to renal insufficiency. One or more of the following dose reduction techniques were used: Automated exposure control Adjustment of the mA and/or kV according to patient size, and/or use of iterative reconstruction technique.   FINDINGS: ABDOMEN CT: SOLID ORGAN ASSESSMENT: Moderate bilateral pleural effusions are present. Bibasilar atelectasis is present. Infiltrate may  be obscured by the atelectatic changes.   Within limits of this unenhanced study no focal masses are identified within the liver, spleen, pancreas, right kidney or adrenal glands. Spleen is enlarged with a craniocaudal extensive 16.4 cm.   CT examination does confirm a rim calcified mass within the mid to lower pole left kidney measuring on the order of 3.1 cm in diameter. Delineation and characterization of the mass is somewhat limited due to the absence of contrast. There is suspicion of mildly enlarged retroperitoneal lymph nodes on the left at the level of the left kidney, again not well characterized due to the patient's body habitus and absence of contrast.   Multiple stones are identified along the dependent portion the gallbladder. Gallbladder wall is not well defined raising possibility of gallbladder wall thickening and/or pericholecystic fluid. Please correlate with patient's clinical assessment to rule out acute cholecystitis. Right upper quadrant ultrasound could be obtained to further assess this finding.   RETROPERITONEUM: AORTA:  There is no evidence for abdominal aortic aneurysm.   BOWEL ASSESSMENT: No intraperitoneal free air is identified. There is a nonspecific appearance of the stomach. Visualized portions of the large and small bowel are unremarkable.   ABDOMINAL WILKINS: There is no evidence for a hernia. Anasarca is present.   OSSEOUS STRUCTURES: No lytic or blastic lesions are identified. Spurring is noted throughout the spine.       1. Indeterminate rim calcified mass left kidney as was suspected on the ultrasound study. Both benign and malignant etiologies are considered in the differential this time with characterization of the mass limited due to the technical issues on this study and the ultrasound study. There is suspicion of left-sided periaortic lymphadenopathy, again not well characterized due to the absence of intravenous contrast. 2. Cholelithiasis with poorly defined gallbladder  wall margins. Please correlate with clinical history to rule out the possibility of acute cholecystitis. Right upper quadrant ultrasound may be attempted for further characterization the gallbladder wall to rule out gallbladder wall thickening and pericholecystic fluid. 3. Anasarca. 4. Moderate bilateral pleural effusions with bibasilar atelectasis most marked involving the lower lobes. 5. Splenomegaly. Spleen measures 16.4 cm in craniocaudal extent.     MACRO: none   Signed by: Otilio Hernandez 6/5/2024 12:36 PM Dictation workstation:   [id number]    ECG 12 lead    Result Date: 6/4/2024  Normal sinus rhythm Minimal voltage criteria for LVH, may be normal variant Nonspecific T wave abnormality Abnormal ECG No previous ECGs available Confirmed by Duong Worthington ([id number]) on 6/4/2024 11:07:09 PM    Transthoracic Echo (TTE) Complete    Result Date: 6/4/2024            [facility] [address], [place] [id number]             Phone [phone] TRANSTHORACIC ECHOCARDIOGRAM REPORT  Patient Name:      DESI THAYER           Reading Physician:    [id number] Duong Worthington DO Study Date:        6/4/2024              Ordering Provider:    [id number] KYLIE GARCIA MRN/PID:           [id number]              Fellow: Accession#:        [id number]          Nurse: Date of Birth/Age: 1960 / 63 years  Sonographer:          Chantelle Carrasquillo                                                                ACS, LUCÍA PATEL Gender:            F                     Additional Staff: Height:            170.18 cm             Admit Date: Weight:            125.65 kg             Admission Status:     Inpatient -                                                                Routine BSA / BMI:         2.32 m2 / 43.38 kg/m2 Department Location:   [facility] Blood Pressure: 124 /53 mmHg Study Type:    TRANSTHORACIC ECHO (TTE) COMPLETE Diagnosis/ICD: Dyspnea, unspecified-R06.00 Indication:    dyspnea, Htn, Renal failure CPT Codes:     Echo Complete w Full Doppler-[id number] Patient History: Pertinent History: Dyspnea, DM2, Renal failure, Htn, PVD. Study Detail: The following Echo studies were performed: 2D, M-Mode, Doppler and               color flow. Technically challenging study due to poor acoustic               windows and body habitus. Definity used as a contrast agent for               endocardial border definition. Total contrast used for this               procedure was 2 mL via IV push.  PHYSICIAN INTERPRETATION: Left Ventricle: Left ventricular systolic function is normal, with an estimated ejection fraction of 60-65%. There are no regional wall motion abnormalities. The left ventricular cavity size is normal. There is mild concentric left ventricular hypertrophy. Spectral Doppler shows an impaired relaxation pattern of left ventricular diastolic filling. Left Atrium: The left atrium is normal in size. Right Ventricle: The right ventricle was not well visualized. Unable to determine right ventricular systolic function. RV not well visualized but suggests RV enlargemnt and RV hypokinesis in some views, with severe pulmonary hypertension. Right Atrium: The right atrium was not well visualized. Aortic Valve: The aortic valve appears abnormal. There is mild to moderate aortic valve cusp calcification. There is evidence of mildly elevated transaortic gradients consistent with sclerosis of the aortic valve. There is no evidence of aortic valve regurgitation. The peak instantaneous gradient of the aortic valve is 7.5 mmHg. Mitral Valve: The mitral valve is abnormal. There is mild mitral valve regurgitation. Tricuspid Valve: The tricuspid valve is structurally normal. There is severe tricuspid regurgitation. The Doppler estimated RVSP is severely elevated at  79.6 mmHg. Pulmonic Valve: The pulmonic valve is not well visualized. The pulmonic valve regurgitation was not well visualized. Pericardium: There is no pericardial effusion noted. Aorta: The aortic root is normal.  CONCLUSIONS:  1. Left ventricular systolic function is normal with a 60-65% estimated ejection fraction.  2. Poorly visualized anatomical structures due to suboptimal image quality.  3. Spectral Doppler shows an impaired relaxation pattern of left ventricular diastolic filling.  4. RV not well visualized but suggests RV enlargemnt and RV hypokinesis in some views, with severe pulmonary hypertension.  5. Severe tricuspid regurgitation.  6. Severely elevated right ventricular systolic pressure.  7. Aortic valve appears abnormal.  8. Aortic valve sclerosis. QUANTITATIVE DATA SUMMARY: 2D MEASUREMENTS:                           Normal Ranges: IVSd:          1.40 cm    (0.6-1.1cm) LVPWd:         1.23 cm    (0.6-1.1cm) LVIDd:         4.76 cm    (3.9-5.9cm) LVIDs:         3.21 cm LV Mass Index: 106.2 g/m2 LV % FS        32.6 % RA VOLUME BY A/L METHOD:                       Normal Ranges: RA Area A4C: 12.7 cm2 AORTA MEASUREMENTS:                    Normal Ranges: Asc Ao, d: 3.10 cm (2.1-3.4cm) LV SYSTOLIC FUNCTION BY 2D PLANIMETRY (MOD):                     Normal Ranges: EF-A4C View: 64.1 % (>=55%) EF-A2C View: 56.5 % EF-Biplane:  60.2 % LV DIASTOLIC FUNCTION:                        Normal Ranges: MV Peak E:    1.14 m/s (0.7-1.2 m/s) MV Peak A:    1.03 m/s (0.42-0.7 m/s) E/A Ratio:    1.11     (1.0-2.2) MV e'         0.07 m/s (>8.0) MV lateral e' 0.09 m/s MV medial e'  0.05 m/s E/e' Ratio:   16.29    (<8.0) MITRAL VALVE:                 Normal Ranges: MV DT: 189 msec (150-240msec) AORTIC VALVE:                         Normal Ranges: AoV Vmax:      1.37 m/s (<=1.7m/s) AoV Peak P.5 mmHg (<20mmHg) LVOT Max Joshua:  0.72 m/s (<=1.1m/s) LVOT VTI:      15.80 cm LVOT Diameter: 1.70 cm  (1.8-2.4cm) AoV Area,Vmax:  1.19 cm2 (2.5-4.5cm2)  RIGHT VENTRICLE: RV Basal 3.89 cm TAPSE:   10.7 mm RV s'    0.08 m/s TRICUSPID VALVE/RVSP:                             Normal Ranges: Peak TR Velocity: 3.86 m/s RV Syst Pressure: 79.6 mmHg (< 30mmHg) IVC Diam:         2.45 cm  [id number] Duong Worthington DO Electronically signed on 6/4/2024 at 11:34:03 AM  ** Final **     US renal complete    Result Date: 6/4/2024  Interpreted By:  Otilio Hernandez, STUDY: US RENAL COMPLETE; 6/3/2024 7:24 pm   INDICATION: Signs/Symptoms:DELISA.   COMPARISON: None   ACCESSION NUMBER(S): [id number]   ORDERING CLINICIAN: DEBORAH KIM   TECHNIQUE: Grayscale and color Doppler imaging of the kidneys   FINDINGS: COMMENTS: Technologist note indicates that the study was quite limited due to the patient's body habitus. Exam was performed portably as well.   The right kidney measures 10.7 cm x 3.9 cm x 4.7 cm . The left kidney measures 11.8 cm x 5.4 cm x 5.4 cm. THERE IS A HETEROGENEOUS COMPLEX MASS LATERALLY MID TO LOWER POLE LEFT KIDNEY MEASURING 4.3 X 3.9 X 4.6 CM.     No renal stones are identified.  The renal cortical thickness and echogenicity is normal.  No hydronephrosis is identified.   Urinary bladder is nonspecific in appearance with no stones or masses identified.       COMPLEX APPEARING MASS LEFT KIDNEY MEASURING UP TO 4.6 CM IN DIAMETER, WITH A DIFFERENTIAL TO INCLUDE MALIGNANCY. CT UROGRAM WITH AND WITHOUT CONTRAST IS SUGGESTED FOR MORE DEFINITIVE ASSESSMENT.   MACRO: Otilio Hernandez discussed the significance and urgency of this critical finding EPIC secure chat with  [facility] [place] [facility] [place] on 6/4/2024 at 8:22 am.  (**-RCF-**) Findings:  See findings.   Signed by: Otilio Hernandez 6/4/2024 8:23 AM Dictation workstation:   [id number]    XR chest 2 views    Result Date: 6/3/2024  Interpreted By:  Gerri Hsu, STUDY: XR CHEST 2 VIEWS 6/3/2024 8:15 am   INDICATION: Signs/Symptoms:COPD with wheezing   COMPARISON: None available.   ACCESSION NUMBER(S): [id number]   ORDERING  CLINICIAN: ZAHRA BATISTA   TECHNIQUE: AP and lateral views of cervical spine are performed.   FINDINGS: The cardiac size is indeterminate due to the AP projection.   Right hemidiaphragm is elevated. On lateral projection, bilateral pleural effusions are observed. No obvious focal infiltrate or airspace consolidation is seen.       Bilateral pleural effusions on lateral projection and elevated right hemidiaphragm.   Signed by: Gerri Hsu 6/3/2024 8:24 AM Dictation workstation:   [id number]            Assessment/Plan   Principal Problem:    Unable to care for self  Active Problems:    Type 2 diabetes mellitus (Multi)    Morbid obesity (Multi)    Peripheral vascular disease (CMS-HCC)    Hypertension    Pressure sore on sacrum    Behavior problem, adult    Renal failure    Acute respiratory failure with hypoxia and hypercapnia (Multi)    Pressure injury of skin of heel    Acute on chronic respiratory failure with hypoxia and hypercarbia  Continue with supplemental oxygen during the daytime  Again encouraged use of NIPPV at bedtime  Suspected obesity hypoventilation and restrictive hemodynamics  Would benefit from NIV in future  Abnormal chest imaging: Left lower lung opacification.  Unfortunately was not able to position to assess whether consolidation versus effusion.  Based on preceding chest CT, suspect combination of both with associated atelectasis.  No significant coughing or leukocytosis to suggest  Will continue to track and trend clinical response to diuresis.  If able, will ultrasound her left lung to assess why this pleural disease versus parenchymal  Hypercarbic encephalopathy  Stressed importance of BiPAP therapy  Morbid obesity  Failure to thrive  Acute on chronic kidney disease  Nephrology follow-up  Congestive heart failure/pulmonary edema:   Diuresis per cardiology and nephrology    We will continue to follow     LOS: 11 days       Ronald Warner MD  Pulmonary/Critical Care medicine

## 2024-06-14 NOTE — CARE PLAN
Plan is for pt to go to [facility]. Per Lucia at [facility], this pt has out of state insurance and they are waiting for a one time contract from the insurance co. [facility] is hoping for the approval today.    DISCHARGE PLAN: AT THIS TIME THE PLAN IS [facility]; THEY ARE WAITING FOR APPROVAL FROM THE INSURANCE CO. DO NOT DISCHARGE PATIENT BEFORE SPEAKING WITH CARE COORDINATION; MUST HAVE INSURANCE APPROVAL.

## 2024-06-14 NOTE — CARE PLAN
Problem: Fall/Injury  Goal: Verbalize understanding of personal risk factors for fall in the hospital  Outcome: Progressing  Goal: Verbalize understanding of risk factor reduction measures to prevent injury from fall in the home  Outcome: Progressing  Goal: Use assistive devices by end of the shift  Outcome: Progressing  Goal: Pace activities to prevent fatigue by end of the shift  Outcome: Progressing     Problem: Skin  Goal: Decreased wound size/increased tissue granulation at next dressing change  Outcome: Progressing  Flowsheets (Taken 6/14/2024 1225)  Decreased wound size/increased tissue granulation at next dressing change:   Promote sleep for wound healing   Protective dressings over bony prominences   Utilize specialty bed per algorithm  Goal: Participates in plan/prevention/treatment measures  Outcome: Progressing  Flowsheets (Taken 6/14/2024 1225)  Participates in plan/prevention/treatment measures:   Elevate heels   Discuss with provider PT/OT consult  Goal: Prevent/minimize sheer/friction injuries  Outcome: Progressing  Flowsheets (Taken 6/14/2024 1225)  Prevent/minimize sheer/friction injuries:   Use pull sheet   Turn/reposition every 2 hours/use positioning/transfer devices   Utilize specialty bed per algorithm  Goal: Promote/optimize nutrition  Outcome: Progressing  Flowsheets (Taken 6/14/2024 1225)  Promote/optimize nutrition: Monitor/record intake including meals  Goal: Promote skin healing  Outcome: Progressing  Flowsheets (Taken 6/14/2024 1225)  Promote skin healing:   Assess skin/pad under line(s)/device(s)   Protective dressings over bony prominences   Turn/reposition every 2 hours/use positioning/transfer devices     Problem: Skin  Goal: Decreased wound size/increased tissue granulation at next dressing change  Outcome: Progressing  Flowsheets (Taken 6/14/2024 1225)  Decreased wound size/increased tissue granulation at next dressing change:   Promote sleep for wound healing   Protective  dressings over bony prominences   Utilize specialty bed per algorithm  Goal: Participates in plan/prevention/treatment measures  Outcome: Progressing  Flowsheets (Taken 6/14/2024 1225)  Participates in plan/prevention/treatment measures:   Elevate heels   Discuss with provider PT/OT consult  Goal: Prevent/minimize sheer/friction injuries  Outcome: Progressing  Flowsheets (Taken 6/14/2024 1225)  Prevent/minimize sheer/friction injuries:   Use pull sheet   Turn/reposition every 2 hours/use positioning/transfer devices   Utilize specialty bed per algorithm  Goal: Promote/optimize nutrition  Outcome: Progressing  Flowsheets (Taken 6/14/2024 1225)  Promote/optimize nutrition: Monitor/record intake including meals  Goal: Promote skin healing  Outcome: Progressing  Flowsheets (Taken 6/14/2024 1225)  Promote skin healing:   Assess skin/pad under line(s)/device(s)   Protective dressings over bony prominences   Turn/reposition every 2 hours/use positioning/transfer devices     Problem: Respiratory  Goal: Wean oxygen to maintain O2 saturation per order/standard this shift  Outcome: Progressing  Goal: Clear secretions with interventions this shift  Outcome: Progressing  Goal: Minimize anxiety/maximize coping throughout shift  Outcome: Progressing  Goal: Minimal/no exertional discomfort or dyspnea this shift  Outcome: Progressing  Goal: No signs of respiratory distress (eg. Use of accessory muscles. Peds grunting)  Outcome: Progressing  Goal: Patent airway maintained this shift  Outcome: Progressing  Goal: Tolerate mechanical ventilation evidenced by VS/agitation level this shift  Outcome: Progressing  Goal: Tolerate pulmonary toileting this shift  Outcome: Progressing  Goal: Verbalize decreased shortness of breath this shift  Outcome: Progressing  Goal: Increase self care and/or family involvement in next 24 hours  Outcome: Progressing

## 2024-06-14 NOTE — PROGRESS NOTES
"Brittanie Smart is a 63 y.o. female on day 11 of admission presenting with Unable to care for self.    Subjective   Patient seen and examined.  No documented concerns/events overnight from nursing.  Continues on IV diuresis and had a negative fluid balance of 1.9 L yesterday.       Objective     Physical Exam  Constitutional:       Appearance: She is obese.   Cardiovascular:      Rate and Rhythm: Normal rate and regular rhythm.      Pulses: Normal pulses.      Heart sounds: Normal heart sounds.   Pulmonary:      Effort: No respiratory distress.      Breath sounds: No wheezing or rales.   Abdominal:      General: Bowel sounds are normal.      Palpations: Abdomen is soft.   Skin:     General: Skin is warm and dry.   Neurological:      General: No focal deficit present.      Mental Status: She is alert and oriented to person, place, and time.   Psychiatric:         Mood and Affect: Mood normal.         Behavior: Behavior normal.   Last Recorded Vitals  Blood pressure 129/62, pulse 88, temperature 36.4 °C (97.5 °F), temperature source Oral, resp. rate 18, height 1.727 m (5' 7.99\"), weight 126 kg (277 lb 3.2 oz), SpO2 93%.  Intake/Output last 3 Shifts:  I/O last 3 completed shifts:  In: 540 (4.3 mL/kg) [P.O.:540]  Out: 1200 (9.5 mL/kg) [Urine:1200 (0.3 mL/kg/hr)]  Weight: 125.7 kg     Relevant Results    Scheduled medications  cefTRIAXone, 1 g, intravenous, q24h  docusate sodium, 100 mg, oral, BID  doxazosin, 1 mg, oral, Daily  epoetin misha or biosimilar, 10,000 Units, subcutaneous, Once per day on Monday Wednesday Friday  ferrous sulfate (325 mg ferrous sulfate), 1 tablet, oral, Daily with breakfast  furosemide, 40 mg, intravenous, q12h  gabapentin, 300 mg, oral, BID  insulin glargine, 35 Units, subcutaneous, Nightly  insulin lispro, 0-20 Units, subcutaneous, TID  insulin lispro, 10 Units, subcutaneous, TID  ipratropium-albuteroL, 3 mL, nebulization, TID  ketotifen, 1 drop, Both Eyes, BID  levothyroxine, 75 mcg, oral, " Daily  metoprolol succinate XL, 25 mg, oral, Daily  multivitamin with minerals, 1 tablet, oral, Daily  nystatin, 1 Application, Topical, q8h  oxygen, , inhalation, q8h  pantoprazole, 40 mg, oral, Daily before breakfast  perflutren protein A microsphere, 0.5 mL, intravenous, Once in imaging  polyethylene glycol, 17 g, oral, Daily  rosuvastatin, 5 mg, oral, Daily  sodium zirconium cyclosilicate, 10 g, oral, Daily  sulfur hexafluoride microsphr, 2 mL, intravenous, Once in imaging      Continuous medications     PRN medications  PRN medications: acetaminophen **OR** [DISCONTINUED] acetaminophen **OR** [DISCONTINUED] acetaminophen, ammonium lactate, bisacodyl, dextrose, glucagon, hydrOXYzine pamoate, ipratropium-albuteroL, ondansetron ODT **OR** [DISCONTINUED] ondansetron, oxygen     following data reviewed    WBC- 7.2  Hgb-7.3  Hct-25.1  Platelets-96        Na-138  K+-4.0  Cl-100  Bicarb-29  BUN-66  creatinine-2.7                                  Assessment/Plan   Principal Problem:    Unable to care for self  Active Problems:    Type 2 diabetes mellitus (Multi)    Morbid obesity (Multi)    Peripheral vascular disease (CMS-HCC)    Hypertension    Pressure sore on sacrum    Behavior problem, adult    Renal failure    Acute respiratory failure with hypoxia and hypercapnia (Multi)    Pressure injury of skin of heel    Acute on Chronic Respiratory Failure with hypoxia & hypercapnia   -ECHO with an EF of 60-65%, severe pulm. HTN  -pulmonology consult  -oxygen PRN, wean as tolerated        Acute on Chronic Kidney Disease  -monitor   -nephrology following  -hold nephrotoxic agents  -renally dose medications  -US with possible mass/malignancy, pt refused MRI  urology recommended  -On [facility]     Unable to care for self   -PT,OT recommend moderate intensity rehab  -Case management following, will go to skilled nursing facility upon discharge        DM II  -monitor blood glucose  -ISS  -diabetic diet  -HgbA1C 7.5  -started on  Jon     Morbid obesity   -encourage dietary and lifestyle modifications        Hypertension   -continue medications     Pressure injury of sacral region, stage 3 and both ankles   -mecca per dietary recommendations  - wound care gave recommendations  -  Continue MediHoney, Heel protectors         Hypothyroid   - Continue levothyroxine     Anemia   -anemia in setting of CKD  -Worsening, no anticoagulation  -continue iron supplementation  -b12, folate  -stool for occult blood  -started on epogen per nephrology     Plan of Care  Above plan discussed with pt and she is in agreement    Velvet Mortensen, ACE-CNP

## 2024-06-14 NOTE — CARE PLAN
Problem: Respiratory  Goal: Wean oxygen to maintain O2 saturation per order/standard this shift  Outcome: Progressing  Goal: Minimal/no exertional discomfort or dyspnea this shift  Outcome: Progressing  Goal: No signs of respiratory distress (eg. Use of accessory muscles. Peds grunting)  Outcome: Progressing

## 2024-06-14 NOTE — PROGRESS NOTES
"Subjective Data:  Patient is doing better.  Denies having chest pain.  Shortness of breath has improved.    Overnight Events:    Telemetry overnight reviewed no events     Objective Data:  Last Recorded Vitals:  Vitals:    06/14/24 0313 06/14/24 0500 06/14/24 0700 06/14/24 0802   BP: 135/64   119/70   BP Location: Left arm   Left arm   Patient Position: Lying   Lying   Pulse: 84   89   Resp: 16   18   Temp: 36.9 °C (98.4 °F)   36.5 °C (97.7 °F)   TempSrc: Oral   Oral   SpO2: 92% 91% 92% 91%   Weight:       Height:           Last Labs:  CBC - 6/14/2024:  4:27 AM  7.2 7.3 96    25.1      CMP - 6/14/2024:  4:27 AM  8.3 6.2 19 --- 0.3   4.7 2.7 9 141      PTT - No results in last year.  1.1   11.7 _     HGBA1C   Date/Time Value Ref Range Status   06/02/2024 10:00 PM 7.5 See below % Final      Last I/O:  I/O last 3 completed shifts:  In: 540 (4.3 mL/kg) [P.O.:540]  Out: 1200 (9.5 mL/kg) [Urine:1200 (0.3 mL/kg/hr)]  Weight: 125.7 kg     Past Cardiology Tests (Last 3 Years):  EKG:  ECG 12 lead 06/02/2024    Echo:  Transthoracic Echo (TTE) Complete 06/04/2024    Ejection Fractions:  No results found for: \"EF\"  Cath:  No results found for this or any previous visit from the past 1095 days.    Stress Test:  No results found for this or any previous visit from the past 1095 days.    Cardiac Imaging:  No results found for this or any previous visit from the past 1095 days.      Inpatient Medications:  Scheduled medications   Medication Dose Route Frequency    cefTRIAXone  1 g intravenous q24h    docusate sodium  100 mg oral BID    doxazosin  1 mg oral Daily    ferrous sulfate (325 mg ferrous sulfate)  1 tablet oral Daily with breakfast    furosemide  40 mg intravenous q12h    gabapentin  300 mg oral BID    insulin glargine  35 Units subcutaneous Nightly    insulin lispro  0-20 Units subcutaneous TID    insulin lispro  10 Units subcutaneous TID    ipratropium-albuteroL  3 mL nebulization TID    ketotifen  1 drop Both Eyes BID    " levothyroxine  75 mcg oral Daily    metoprolol succinate XL  25 mg oral Daily    multivitamin with minerals  1 tablet oral Daily    nystatin  1 Application Topical q8h    oxygen   inhalation q8h    pantoprazole  40 mg oral Daily before breakfast    perflutren protein A microsphere  0.5 mL intravenous Once in imaging    polyethylene glycol  17 g oral Daily    rosuvastatin  5 mg oral Daily    sodium zirconium cyclosilicate  10 g oral Daily    sulfur hexafluoride microsphr  2 mL intravenous Once in imaging     PRN medications   Medication    acetaminophen    ammonium lactate    bisacodyl    dextrose    glucagon    hydrOXYzine pamoate    ipratropium-albuteroL    ondansetron ODT    oxygen     Continuous Medications   Medication Dose Last Rate       Physical Exam:  General: Patient is in no acute distress.  HEENT: atraumatic normocephalic.  Neck: is supple jugular venous pressure difficult to assess no thyromegaly.  Cardiovascular regular rate and rhythm normal heart sounds no murmurs rubs or gallops.  Lungs: Decreased breathing sounds at bases  Abdomen: is soft nontender.  Extremities warm to touch no edema.       Assessment/Plan   acute on chronic respiratory failure with pulmonary hypertension seen on her echocardiogram and edema on the chest x-ray.  Repeated chest x-ray still showing pulmonary edema.  Switch to oral diuretics and follow-up with established disease     2.  Hypertension will monitor for now.  Blood pressure is well-controlled.     3.  Severe anemia.  Iron studies within normal limits.     4.  Diabetes mellitus.     5.  Hyperlipidemia will check lipid panel.     Thank you for allowing to participate in her care  Peripheral IV 06/05/24 20 G Left;Anterior Forearm (Active)   Site Assessment Clean;Dry;Intact 06/14/24 0900   Dressing Status Clean;Dry 06/14/24 0900   Number of days: 9       Code Status:  DNR and No Intubation      Maria A Torre MD

## 2024-06-14 NOTE — PROGRESS NOTES
"Brittanie Smart is a 63 y.o. female on day 11 of admission presenting with Unable to care for self.    Subjective    patient seen for acute kidney injury on top chronic kidney disease stage IIIb review old records from  [facility] in [place] does have underlying CKD with a baseline creatinine about 1.8 mg/dL from diabetic nephropathy she was seen by urology nephrology in [place] patient is admitted with pneumonia congestive heart failure altered mental status she is feeling much better no more complaints       Objective     Physical Exam  Neck:      Vascular: No carotid bruit.   Cardiovascular:      Rate and Rhythm: Normal rate and regular rhythm.      Heart sounds: No murmur heard.     No friction rub. No gallop.   Pulmonary:      Breath sounds: No wheezing, rhonchi or rales.   Chest:      Chest wall: No tenderness.   Abdominal:      General: There is no distension.      Tenderness: There is no abdominal tenderness. There is no guarding or rebound.   Musculoskeletal:         General: No swelling or tenderness.      Cervical back: Neck supple.      Right lower leg: No edema.      Left lower leg: No edema.   Lymphadenopathy:      Cervical: No cervical adenopathy.         Last Recorded Vitals  Blood pressure 129/62, pulse 88, temperature 36.4 °C (97.5 °F), temperature source Oral, resp. rate 18, height 1.727 m (5' 7.99\"), weight 126 kg (277 lb 3.2 oz), SpO2 93%.    Intake/Output last 3 Shifts:  I/O last 3 completed shifts:  In: 540 (4.3 mL/kg) [P.O.:540]  Out: 1200 (9.5 mL/kg) [Urine:1200 (0.3 mL/kg/hr)]  Weight: 125.7 kg     Current Facility-Administered Medications:     acetaminophen (Tylenol) tablet 650 mg, 650 mg, oral, q4h PRN, 650 mg at 06/14/24 0434 **OR** [DISCONTINUED] acetaminophen (Tylenol) oral liquid 650 mg, 650 mg, oral, q4h PRN **OR** [DISCONTINUED] acetaminophen (Tylenol) suppository 650 mg, 650 mg, rectal, q4h PRN, Noelle Turner, DO    ammonium lactate (Lac-Hydrin) 12 % lotion, , Topical, " q1h PRN, ACE Kaiser-CNP, Given at 06/13/24 1439    bisacodyl (Dulcolax) EC tablet 5 mg, 5 mg, oral, Daily PRN, CAE Kaiser-CNP    cefTRIAXone (Rocephin) IVPB 1 g, 1 g, intravenous, q24h, ACE Kaiser-CNP, Stopped at 06/13/24 1632    dextrose 50 % injection 12.5 g, 12.5 g, intravenous, q15 min PRN, Noelle Turner DO    docusate sodium (Colace) capsule 100 mg, 100 mg, oral, BID, ACE Kaiser-CNP, 100 mg at 06/13/24 2128    doxazosin (Cardura) tablet 1 mg, 1 mg, oral, Daily, Siddhartha Tovar MD, 1 mg at 06/14/24 0840    ferrous sulfate (325 mg ferrous sulfate) tablet 1 tablet, 1 tablet, oral, Daily with breakfast, Noelle Turner DO, 1 tablet at 06/14/24 0840    furosemide (Lasix) injection 40 mg, 40 mg, intravenous, q12h, ACE Fisher-CNP, 40 mg at 06/14/24 0840    gabapentin (Neurontin) capsule 300 mg, 300 mg, oral, BID, Siddhartha Tovar MD, 300 mg at 06/14/24 0840    glucagon (Glucagen) injection 1 mg, 1 mg, intramuscular, q15 min PRN, Noelle Turner DO    hydrOXYzine pamoate (Vistaril) capsule 25 mg, 25 mg, oral, q6h PRN, ACE Kaiser-CNP, 25 mg at 06/14/24 0438    insulin glargine (Lantus) injection 35 Units, 35 Units, subcutaneous, Nightly, ACE Kaiser-CNP, 35 Units at 06/13/24 2125    insulin lispro (HumaLOG) injection 0-20 Units, 0-20 Units, subcutaneous, TID, ACE Kaiser-CNP, 4 Units at 06/14/24 0826    insulin lispro (HumaLOG) injection 10 Units, 10 Units, subcutaneous, TID, Kaylan Bird, APRN-CNP, 10 Units at 06/14/24 0826    ipratropium-albuteroL (Duo-Neb) 0.5-2.5 mg/3 mL nebulizer solution 3 mL, 3 mL, nebulization, TID, Noelle Turner DO, 3 mL at 06/14/24 0713    ipratropium-albuteroL (Duo-Neb) 0.5-2.5 mg/3 mL nebulizer solution 3 mL, 3 mL, nebulization, q2h PRN, Noelle Turner DO, 3 mL at 06/10/24 1511    ketotifen (Zaditor) 0.025 % (0.035 %) ophthalmic solution 1 drop, 1 drop, Both Eyes, BID, Noelle BARRAGAN  DO Yue, 1 drop at 06/14/24 0854    levothyroxine (Synthroid, Levoxyl) tablet 75 mcg, 75 mcg, oral, Daily, Noelle Turner DO, 75 mcg at 06/14/24 0619    metoprolol succinate XL (Toprol-XL) 24 hr tablet 25 mg, 25 mg, oral, Daily, Maria A Torre MD, 25 mg at 06/14/24 0840    multivitamin with minerals 1 tablet, 1 tablet, oral, Daily, Noelle Turner DO, 1 tablet at 06/14/24 0840    nystatin (Mycostatin) 100,000 unit/gram powder 1 Application, 1 Application, Topical, q8h, Noelle Turner DO, 1 Application at 06/14/24 0600    ondansetron ODT (Zofran-ODT) disintegrating tablet 4 mg, 4 mg, oral, q8h PRN **OR** [DISCONTINUED] ondansetron (Zofran) injection 4 mg, 4 mg, intravenous, q8h PRN, Noelle Turner DO    oxygen (O2) therapy, , inhalation, q8h, Noelle Turner DO, 3 L/min at 06/14/24 0700    oxygen (O2) therapy, , inhalation, Continuous PRN - O2/gases, Roland Navarro, DO, 4 L/min at 06/11/24 2003    pantoprazole (ProtoNix) EC tablet 40 mg, 40 mg, oral, Daily before breakfast, Noelle Turner DO, 40 mg at 06/14/24 0619    perflutren protein A microsphere (Optison) injection 0.5 mL, 0.5 mL, intravenous, Once in imaging, ACE Kaiser-CNP    polyethylene glycol (Glycolax, Miralax) packet 17 g, 17 g, oral, Daily, ACE Kaiser-CNP, 17 g at 06/08/24 0826    rosuvastatin (Crestor) tablet 5 mg, 5 mg, oral, Daily, Noelle Turner DO, 5 mg at 06/14/24 0840    sodium zirconium cyclosilicate (Lokelma) packet 10 g, 10 g, oral, Daily, Siddhartha Tovar MD, 10 g at 06/14/24 0840    sulfur hexafluoride microsphr (Lumason) injection 24.28 mg, 2 mL, intravenous, Once in imaging, ACE Kaiser-CNP   Relevant Results    Results for orders placed or performed during the hospital encounter of 06/02/24 (from the past 96 hour(s))   POCT GLUCOSE   Result Value Ref Range    POCT Glucose 209 (H) 74 - 99 mg/dL   Blood Gas Arterial Full Panel   Result Value Ref Range    POCT pH, Arterial  7.28 (L) 7.38 - 7.42 pH    POCT pCO2, Arterial 64 (H) 38 - 42 mm Hg    POCT pO2, Arterial 68 (L) 85 - 95 mm Hg    POCT SO2, Arterial 95 94 - 100 %    POCT Oxy Hemoglobin, Arterial 89.8 (L) 94.0 - 98.0 %    POCT Hematocrit Calculated, Arterial 26.0 (L) 36.0 - 46.0 %    POCT Sodium, Arterial 135 (L) 136 - 145 mmol/L    POCT Potassium, Arterial 4.5 3.5 - 5.3 mmol/L    POCT Chloride, Arterial 101 98 - 107 mmol/L    POCT Ionized Calcium, Arterial 1.17 1.10 - 1.33 mmol/L    POCT Glucose, Arterial 177 (H) 74 - 99 mg/dL    POCT Lactate, Arterial 1.0 0.4 - 2.0 mmol/L    POCT Base Excess, Arterial 2.6 -2.0 - 3.0 mmol/L    POCT HCO3 Calculated, Arterial 30.1 (H) 22.0 - 26.0 mmol/L    POCT Hemoglobin, Arterial 8.5 (L) 12.0 - 16.0 g/dL    POCT Anion Gap, Arterial 8 (L) 10 - 25 mmo/L    Patient Temperature 37.0 degrees Celsius    FiO2 36 %    Apparatus CANNULA     Flow 4.0 LPM    Site of Arterial Puncture Radial Right     Tyrone's Test Positive    POCT GLUCOSE   Result Value Ref Range    POCT Glucose 162 (H) 74 - 99 mg/dL   Blood Gas Arterial Full Panel   Result Value Ref Range    POCT pH, Arterial 7.32 (L) 7.38 - 7.42 pH    POCT pCO2, Arterial 58 (H) 38 - 42 mm Hg    POCT pO2, Arterial 103 (H) 85 - 95 mm Hg    POCT SO2, Arterial 100 94 - 100 %    POCT Oxy Hemoglobin, Arterial 94.6 94.0 - 98.0 %    POCT Hematocrit Calculated, Arterial 24.0 (L) 36.0 - 46.0 %    POCT Sodium, Arterial 135 (L) 136 - 145 mmol/L    POCT Potassium, Arterial 4.4 3.5 - 5.3 mmol/L    POCT Chloride, Arterial 102 98 - 107 mmol/L    POCT Ionized Calcium, Arterial 1.20 1.10 - 1.33 mmol/L    POCT Glucose, Arterial 137 (H) 74 - 99 mg/dL    POCT Lactate, Arterial 0.9 0.4 - 2.0 mmol/L    POCT Base Excess, Arterial 3.2 (H) -2.0 - 3.0 mmol/L    POCT HCO3 Calculated, Arterial 29.9 (H) 22.0 - 26.0 mmol/L    POCT Hemoglobin, Arterial 7.9 (L) 12.0 - 16.0 g/dL    POCT Anion Gap, Arterial 8 (L) 10 - 25 mmo/L    Patient Temperature 37.0 degrees Celsius    FiO2 50 %     Ventilator Mode BiPAP     Ventilator Rate 16 bpm    Tidal Volume 464 mL    High Peep CMH2O 18.0 cm H2O    Low Peep CMH2O 8.0 cm H2O    Site of Arterial Puncture Radial Right     Tyrone's Test Positive    POCT GLUCOSE   Result Value Ref Range    POCT Glucose 128 (H) 74 - 99 mg/dL   Renal Function Panel   Result Value Ref Range    Glucose 164 (H) 65 - 99 mg/dL    Sodium 138 133 - 145 mmol/L    Potassium 4.4 3.4 - 5.1 mmol/L    Chloride 101 97 - 107 mmol/L    Bicarbonate 27 24 - 31 mmol/L    Urea Nitrogen 66 (H) 8 - 25 mg/dL    Creatinine 2.90 (H) 0.40 - 1.60 mg/dL    eGFR 17 (L) >60 mL/min/1.73m*2    Calcium 8.6 8.5 - 10.4 mg/dL    Phosphorus 5.2 (H) 2.5 - 4.5 mg/dL    Albumin 2.6 (L) 3.5 - 5.0 g/dL    Anion Gap 10 <=19 mmol/L   Troponin T   Result Value Ref Range    Troponin T, High Sensitivity 79 (HH) <=14 ng/L   NT-PROBNP   Result Value Ref Range    PROBNP 42,132 (H) 0 - 353 pg/mL   Magnesium   Result Value Ref Range    Magnesium 2.00 1.60 - 3.10 mg/dL   POCT GLUCOSE   Result Value Ref Range    POCT Glucose 152 (H) 74 - 99 mg/dL   POCT GLUCOSE   Result Value Ref Range    POCT Glucose 196 (H) 74 - 99 mg/dL   POCT GLUCOSE   Result Value Ref Range    POCT Glucose 196 (H) 74 - 99 mg/dL   POCT GLUCOSE   Result Value Ref Range    POCT Glucose 205 (H) 74 - 99 mg/dL   Basic Metabolic Panel   Result Value Ref Range    Glucose 183 (H) 65 - 99 mg/dL    Sodium 138 133 - 145 mmol/L    Potassium 4.4 3.4 - 5.1 mmol/L    Chloride 100 97 - 107 mmol/L    Bicarbonate 28 24 - 31 mmol/L    Urea Nitrogen 64 (H) 8 - 25 mg/dL    Creatinine 2.90 (H) 0.40 - 1.60 mg/dL    eGFR 17 (L) >60 mL/min/1.73m*2    Calcium 8.3 (L) 8.5 - 10.4 mg/dL    Anion Gap 10 <=19 mmol/L   CBC   Result Value Ref Range    WBC 6.9 4.4 - 11.3 x10*3/uL    nRBC 0.3 (H) 0.0 - 0.0 /100 WBCs    RBC 2.64 (L) 4.00 - 5.20 x10*6/uL    Hemoglobin 7.3 (L) 12.0 - 16.0 g/dL    Hematocrit 24.9 (L) 36.0 - 46.0 %    MCV 94 80 - 100 fL    MCH 27.7 26.0 - 34.0 pg    MCHC 29.3 (L) 32.0  - 36.0 g/dL    RDW 23.2 (H) 11.5 - 14.5 %    Platelets 104 (L) 150 - 450 x10*3/uL   Troponin T   Result Value Ref Range    Troponin T, High Sensitivity 81 (HH) <=14 ng/L   POCT GLUCOSE   Result Value Ref Range    POCT Glucose 169 (H) 74 - 99 mg/dL   POCT GLUCOSE   Result Value Ref Range    POCT Glucose 235 (H) 74 - 99 mg/dL   POCT GLUCOSE   Result Value Ref Range    POCT Glucose 230 (H) 74 - 99 mg/dL   POCT GLUCOSE   Result Value Ref Range    POCT Glucose 212 (H) 74 - 99 mg/dL   CBC   Result Value Ref Range    WBC 7.4 4.4 - 11.3 x10*3/uL    nRBC 0.0 0.0 - 0.0 /100 WBCs    RBC 2.65 (L) 4.00 - 5.20 x10*6/uL    Hemoglobin 7.4 (L) 12.0 - 16.0 g/dL    Hematocrit 25.4 (L) 36.0 - 46.0 %    MCV 96 80 - 100 fL    MCH 27.9 26.0 - 34.0 pg    MCHC 29.1 (L) 32.0 - 36.0 g/dL    RDW 23.1 (H) 11.5 - 14.5 %    Platelets 101 (L) 150 - 450 x10*3/uL   Renal Function Panel   Result Value Ref Range    Glucose 159 (H) 65 - 99 mg/dL    Sodium 138 133 - 145 mmol/L    Potassium 4.4 3.4 - 5.1 mmol/L    Chloride 100 97 - 107 mmol/L    Bicarbonate 27 24 - 31 mmol/L    Urea Nitrogen 66 (H) 8 - 25 mg/dL    Creatinine 2.80 (H) 0.40 - 1.60 mg/dL    eGFR 18 (L) >60 mL/min/1.73m*2    Calcium 8.3 (L) 8.5 - 10.4 mg/dL    Phosphorus 4.7 (H) 2.5 - 4.5 mg/dL    Albumin 2.7 (L) 3.5 - 5.0 g/dL    Anion Gap 11 <=19 mmol/L   POCT GLUCOSE   Result Value Ref Range    POCT Glucose 143 (H) 74 - 99 mg/dL   POCT GLUCOSE   Result Value Ref Range    POCT Glucose 189 (H) 74 - 99 mg/dL   Vitamin B12   Result Value Ref Range    Vitamin B12 735 211 - 946 pg/mL   Folate   Result Value Ref Range    Folate, Serum >20.0 (H) 4.2 - 19.9 ng/mL   POCT GLUCOSE   Result Value Ref Range    POCT Glucose 244 (H) 74 - 99 mg/dL   POCT GLUCOSE   Result Value Ref Range    POCT Glucose 220 (H) 74 - 99 mg/dL   Basic Metabolic Panel   Result Value Ref Range    Glucose 208 (H) 65 - 99 mg/dL    Sodium 138 133 - 145 mmol/L    Potassium 4.0 3.4 - 5.1 mmol/L    Chloride 100 97 - 107 mmol/L     Bicarbonate 29 24 - 31 mmol/L    Urea Nitrogen 66 (H) 8 - 25 mg/dL    Creatinine 2.70 (H) 0.40 - 1.60 mg/dL    eGFR 19 (L) >60 mL/min/1.73m*2    Calcium 8.3 (L) 8.5 - 10.4 mg/dL    Anion Gap 9 <=19 mmol/L   CBC   Result Value Ref Range    WBC 7.2 4.4 - 11.3 x10*3/uL    nRBC 0.0 0.0 - 0.0 /100 WBCs    RBC 2.61 (L) 4.00 - 5.20 x10*6/uL    Hemoglobin 7.3 (L) 12.0 - 16.0 g/dL    Hematocrit 25.1 (L) 36.0 - 46.0 %    MCV 96 80 - 100 fL    MCH 28.0 26.0 - 34.0 pg    MCHC 29.1 (L) 32.0 - 36.0 g/dL    RDW 22.7 (H) 11.5 - 14.5 %    Platelets 96 (L) 150 - 450 x10*3/uL   Occult Blood, Stool    Specimen: Stool   Result Value Ref Range    Occult Blood, Stool X1 Negative Negative   POCT GLUCOSE   Result Value Ref Range    POCT Glucose 168 (H) 74 - 99 mg/dL       Assessment/Plan   Acute kidney injury on chronic kidney disease stage III renal creatinine down to 2.7 mg/dL no indication for dialysis therapy my plan to continue to monitor very closely  Chronic kidney disease stage III creatinine baseline 1.3   Diabetes mellitus type 2  Hypertension  Anemia  we will start her on erythropoietin therapy  UTI continue with IV Rocephin  Renal Mass followed by urology refused MRI  Diastolic CHF continue diuresis also she had a CT scan of the chest yesterday results are still pending           Paco Tovar MD

## 2024-06-15 ENCOUNTER — APPOINTMENT (OUTPATIENT)
Dept: RADIOLOGY | Facility: HOSPITAL | Age: 64
End: 2024-06-15
Payer: MEDICARE

## 2024-06-15 LAB
ANION GAP SERPL CALC-SCNC: 9 MMOL/L
BUN SERPL-MCNC: 69 MG/DL (ref 8–25)
CALCIUM SERPL-MCNC: 8.4 MG/DL (ref 8.5–10.4)
CHLORIDE SERPL-SCNC: 100 MMOL/L (ref 97–107)
CO2 SERPL-SCNC: 30 MMOL/L (ref 24–31)
CREAT SERPL-MCNC: 2.6 MG/DL (ref 0.4–1.6)
EGFRCR SERPLBLD CKD-EPI 2021: 20 ML/MIN/1.73M*2
ERYTHROCYTE [DISTWIDTH] IN BLOOD BY AUTOMATED COUNT: 22.4 % (ref 11.5–14.5)
GLUCOSE BLD MANUAL STRIP-MCNC: 115 MG/DL (ref 74–99)
GLUCOSE BLD MANUAL STRIP-MCNC: 119 MG/DL (ref 74–99)
GLUCOSE BLD MANUAL STRIP-MCNC: 163 MG/DL (ref 74–99)
GLUCOSE BLD MANUAL STRIP-MCNC: 195 MG/DL (ref 74–99)
GLUCOSE SERPL-MCNC: 161 MG/DL (ref 65–99)
HCT VFR BLD AUTO: 26.3 % (ref 36–46)
HGB BLD-MCNC: 7.7 G/DL (ref 12–16)
MCH RBC QN AUTO: 28.1 PG (ref 26–34)
MCHC RBC AUTO-ENTMCNC: 29.3 G/DL (ref 32–36)
MCV RBC AUTO: 96 FL (ref 80–100)
NRBC BLD-RTO: 0 /100 WBCS (ref 0–0)
PLATELET # BLD AUTO: 93 X10*3/UL (ref 150–450)
POTASSIUM SERPL-SCNC: 4.2 MMOL/L (ref 3.4–5.1)
RBC # BLD AUTO: 2.74 X10*6/UL (ref 4–5.2)
SODIUM SERPL-SCNC: 139 MMOL/L (ref 133–145)
WBC # BLD AUTO: 8 X10*3/UL (ref 4.4–11.3)

## 2024-06-15 PROCEDURE — 82947 ASSAY GLUCOSE BLOOD QUANT: CPT

## 2024-06-15 PROCEDURE — 2500000001 HC RX 250 WO HCPCS SELF ADMINISTERED DRUGS (ALT 637 FOR MEDICARE OP): Performed by: NURSE PRACTITIONER

## 2024-06-15 PROCEDURE — 82374 ASSAY BLOOD CARBON DIOXIDE: CPT | Performed by: NURSE PRACTITIONER

## 2024-06-15 PROCEDURE — 80048 BASIC METABOLIC PNL TOTAL CA: CPT | Performed by: NURSE PRACTITIONER

## 2024-06-15 PROCEDURE — 71045 X-RAY EXAM CHEST 1 VIEW: CPT | Performed by: RADIOLOGY

## 2024-06-15 PROCEDURE — 36415 COLL VENOUS BLD VENIPUNCTURE: CPT | Performed by: NURSE PRACTITIONER

## 2024-06-15 PROCEDURE — 9420000001 HC RT PATIENT EDUCATION 5 MIN

## 2024-06-15 PROCEDURE — 2500000001 HC RX 250 WO HCPCS SELF ADMINISTERED DRUGS (ALT 637 FOR MEDICARE OP): Performed by: EMERGENCY MEDICINE

## 2024-06-15 PROCEDURE — 94660 CPAP INITIATION&MGMT: CPT

## 2024-06-15 PROCEDURE — 2500000004 HC RX 250 GENERAL PHARMACY W/ HCPCS (ALT 636 FOR OP/ED): Performed by: NURSE PRACTITIONER

## 2024-06-15 PROCEDURE — 94760 N-INVAS EAR/PLS OXIMETRY 1: CPT

## 2024-06-15 PROCEDURE — 94762 N-INVAS EAR/PLS OXIMTRY CONT: CPT

## 2024-06-15 PROCEDURE — 2500000005 HC RX 250 GENERAL PHARMACY W/O HCPCS: Performed by: EMERGENCY MEDICINE

## 2024-06-15 PROCEDURE — 2500000001 HC RX 250 WO HCPCS SELF ADMINISTERED DRUGS (ALT 637 FOR MEDICARE OP): Performed by: INTERNAL MEDICINE

## 2024-06-15 PROCEDURE — 1100000001 HC PRIVATE ROOM DAILY

## 2024-06-15 PROCEDURE — 2500000002 HC RX 250 W HCPCS SELF ADMINISTERED DRUGS (ALT 637 FOR MEDICARE OP, ALT 636 FOR OP/ED): Performed by: INTERNAL MEDICINE

## 2024-06-15 PROCEDURE — 99232 SBSQ HOSP IP/OBS MODERATE 35: CPT | Performed by: NURSE PRACTITIONER

## 2024-06-15 PROCEDURE — 71045 X-RAY EXAM CHEST 1 VIEW: CPT

## 2024-06-15 PROCEDURE — 85027 COMPLETE CBC AUTOMATED: CPT | Performed by: NURSE PRACTITIONER

## 2024-06-15 PROCEDURE — 2500000002 HC RX 250 W HCPCS SELF ADMINISTERED DRUGS (ALT 637 FOR MEDICARE OP, ALT 636 FOR OP/ED): Performed by: NURSE PRACTITIONER

## 2024-06-15 PROCEDURE — 2500000002 HC RX 250 W HCPCS SELF ADMINISTERED DRUGS (ALT 637 FOR MEDICARE OP, ALT 636 FOR OP/ED): Performed by: EMERGENCY MEDICINE

## 2024-06-15 PROCEDURE — 94640 AIRWAY INHALATION TREATMENT: CPT

## 2024-06-15 RX ORDER — FUROSEMIDE 40 MG/1
40 TABLET ORAL
Status: DISPENSED | OUTPATIENT
Start: 2024-06-15

## 2024-06-15 RX ADMIN — Medication 3 L/MIN: at 07:00

## 2024-06-15 RX ADMIN — IPRATROPIUM BROMIDE AND ALBUTEROL SULFATE 3 ML: .5; 3 SOLUTION RESPIRATORY (INHALATION) at 19:42

## 2024-06-15 RX ADMIN — GABAPENTIN 300 MG: 300 CAPSULE ORAL at 20:44

## 2024-06-15 RX ADMIN — FUROSEMIDE 40 MG: 40 TABLET ORAL at 17:33

## 2024-06-15 RX ADMIN — Medication 1 TABLET: at 13:37

## 2024-06-15 RX ADMIN — KETOTIFEN FUMARATE 1 DROP: 0.25 SOLUTION/ DROPS OPHTHALMIC at 20:51

## 2024-06-15 RX ADMIN — DOCUSATE SODIUM 100 MG: 100 CAPSULE, LIQUID FILLED ORAL at 13:34

## 2024-06-15 RX ADMIN — GABAPENTIN 300 MG: 300 CAPSULE ORAL at 13:36

## 2024-06-15 RX ADMIN — NYSTATIN 1 APPLICATION: 100000 POWDER TOPICAL at 06:39

## 2024-06-15 RX ADMIN — INSULIN LISPRO 4 UNITS: 100 INJECTION, SOLUTION INTRAVENOUS; SUBCUTANEOUS at 18:54

## 2024-06-15 RX ADMIN — IPRATROPIUM BROMIDE AND ALBUTEROL SULFATE 3 ML: .5; 3 SOLUTION RESPIRATORY (INHALATION) at 07:45

## 2024-06-15 RX ADMIN — SODIUM ZIRCONIUM CYCLOSILICATE 10 G: 10 POWDER, FOR SUSPENSION ORAL at 13:40

## 2024-06-15 RX ADMIN — NYSTATIN 1 APPLICATION: 100000 POWDER TOPICAL at 14:00

## 2024-06-15 RX ADMIN — PANTOPRAZOLE SODIUM 40 MG: 40 TABLET, DELAYED RELEASE ORAL at 06:38

## 2024-06-15 RX ADMIN — INSULIN LISPRO 10 UNITS: 100 INJECTION, SOLUTION INTRAVENOUS; SUBCUTANEOUS at 18:55

## 2024-06-15 RX ADMIN — INSULIN GLARGINE 35 UNITS: 100 INJECTION, SOLUTION SUBCUTANEOUS at 20:44

## 2024-06-15 RX ADMIN — ROSUVASTATIN CALCIUM 5 MG: 10 TABLET, COATED ORAL at 13:37

## 2024-06-15 RX ADMIN — CEFTRIAXONE SODIUM 1 G: 1 INJECTION, SOLUTION INTRAVENOUS at 16:19

## 2024-06-15 RX ADMIN — NYSTATIN 1 APPLICATION: 100000 POWDER TOPICAL at 20:44

## 2024-06-15 RX ADMIN — FUROSEMIDE 40 MG: 10 INJECTION, SOLUTION INTRAMUSCULAR; INTRAVENOUS at 12:02

## 2024-06-15 RX ADMIN — METOPROLOL SUCCINATE 25 MG: 25 TABLET, EXTENDED RELEASE ORAL at 13:36

## 2024-06-15 RX ADMIN — DOXAZOSIN 1 MG: 1 TABLET ORAL at 13:39

## 2024-06-15 RX ADMIN — LEVOTHYROXINE SODIUM 75 MCG: 0.07 TABLET ORAL at 06:38

## 2024-06-15 RX ADMIN — FERROUS SULFATE TAB 325 MG (65 MG ELEMENTAL FE) 1 TABLET: 325 (65 FE) TAB at 13:35

## 2024-06-15 RX ADMIN — IPRATROPIUM BROMIDE AND ALBUTEROL SULFATE 3 ML: .5; 3 SOLUTION RESPIRATORY (INHALATION) at 12:17

## 2024-06-15 ASSESSMENT — COGNITIVE AND FUNCTIONAL STATUS - GENERAL
MOBILITY SCORE: 8
MOVING FROM LYING ON BACK TO SITTING ON SIDE OF FLAT BED WITH BEDRAILS: A LOT
WALKING IN HOSPITAL ROOM: TOTAL
EATING MEALS: A LITTLE
CLIMB 3 TO 5 STEPS WITH RAILING: TOTAL
HELP NEEDED FOR BATHING: TOTAL
DRESSING REGULAR LOWER BODY CLOTHING: TOTAL
MOBILITY SCORE: 8
DRESSING REGULAR UPPER BODY CLOTHING: A LOT
STANDING UP FROM CHAIR USING ARMS: TOTAL
DRESSING REGULAR LOWER BODY CLOTHING: A LOT
STANDING UP FROM CHAIR USING ARMS: TOTAL
TOILETING: TOTAL
HELP NEEDED FOR BATHING: TOTAL
MOVING TO AND FROM BED TO CHAIR: TOTAL
EATING MEALS: TOTAL
TURNING FROM BACK TO SIDE WHILE IN FLAT BAD: A LOT
DAILY ACTIVITIY SCORE: 8
CLIMB 3 TO 5 STEPS WITH RAILING: TOTAL
PERSONAL GROOMING: TOTAL
WALKING IN HOSPITAL ROOM: TOTAL
TOILETING: TOTAL
PERSONAL GROOMING: A LOT
DRESSING REGULAR UPPER BODY CLOTHING: A LOT
TURNING FROM BACK TO SIDE WHILE IN FLAT BAD: A LOT
MOVING FROM LYING ON BACK TO SITTING ON SIDE OF FLAT BED WITH BEDRAILS: A LOT
MOVING TO AND FROM BED TO CHAIR: TOTAL
DAILY ACTIVITIY SCORE: 10

## 2024-06-15 ASSESSMENT — PAIN SCALES - GENERAL
PAINLEVEL_OUTOF10: 0 - NO PAIN

## 2024-06-15 NOTE — CARE PLAN
Problem: Respiratory  Goal: Wean oxygen to maintain O2 saturation per order/standard this shift  Outcome: Progressing  Goal: Clear secretions with interventions this shift  Outcome: Progressing

## 2024-06-15 NOTE — PROGRESS NOTES
"Brittanie Smart is a 63 y.o. female on day 12 of admission presenting with Unable to care for self.    Subjective   Pt is obtunded and on the bipap at the present time.  Apparently she has been this way for the past several hours and was placed on bipap for that reason.       Objective     Physical Exam  Constitutional:       General: She is not in acute distress.     Appearance: She is ill-appearing. She is not toxic-appearing.   Cardiovascular:      Rate and Rhythm: Normal rate and regular rhythm.      Pulses: Normal pulses.      Heart sounds: Normal heart sounds.   Pulmonary:      Effort: No respiratory distress.      Breath sounds: Rales present. No wheezing.   Abdominal:      General: Bowel sounds are normal.      Palpations: Abdomen is soft.   Musculoskeletal:      Right lower leg: Edema present.      Left lower leg: Edema present.   Neurological:      Mental Status: She is lethargic.         Last Recorded Vitals  Blood pressure 128/70, pulse 80, temperature 36.8 °C (98.2 °F), temperature source Oral, resp. rate 20, height 1.727 m (5' 7.99\"), weight 126 kg (277 lb 3.2 oz), SpO2 97%.  Intake/Output last 3 Shifts:  I/O last 3 completed shifts:  In: 660 (5.2 mL/kg) [P.O.:660]  Out: 900 (7.2 mL/kg) [Urine:900 (0.2 mL/kg/hr)]  Weight: 125.7 kg     Relevant Results    Scheduled medications  cefTRIAXone, 1 g, intravenous, q24h  docusate sodium, 100 mg, oral, BID  doxazosin, 1 mg, oral, Daily  epoetin misha or biosimilar, 10,000 Units, subcutaneous, Once per day on Monday Wednesday Friday  ferrous sulfate (325 mg ferrous sulfate), 1 tablet, oral, Daily with breakfast  furosemide, 40 mg, intravenous, q12h  gabapentin, 300 mg, oral, BID  insulin glargine, 35 Units, subcutaneous, Nightly  insulin lispro, 0-20 Units, subcutaneous, TID  insulin lispro, 10 Units, subcutaneous, TID  ipratropium-albuteroL, 3 mL, nebulization, TID  ketotifen, 1 drop, Both Eyes, BID  levothyroxine, 75 mcg, oral, Daily  metoprolol succinate XL, 25 " mg, oral, Daily  multivitamin with minerals, 1 tablet, oral, Daily  nystatin, 1 Application, Topical, q8h  oxygen, , inhalation, q8h  pantoprazole, 40 mg, oral, Daily before breakfast  perflutren protein A microsphere, 0.5 mL, intravenous, Once in imaging  polyethylene glycol, 17 g, oral, Daily  rosuvastatin, 5 mg, oral, Daily  sodium zirconium cyclosilicate, 10 g, oral, Daily  sulfur hexafluoride microsphr, 2 mL, intravenous, Once in imaging      Continuous medications     PRN medications  PRN medications: acetaminophen **OR** [DISCONTINUED] acetaminophen **OR** [DISCONTINUED] acetaminophen, ammonium lactate, bisacodyl, dextrose, glucagon, hydrOXYzine pamoate, ipratropium-albuteroL, ondansetron ODT **OR** [DISCONTINUED] ondansetron, oxygen     following data reviewed    WBC- 8.0  Hgb-7.7  Hct-26.3  Platelets-93      Na-139  K+-4.2  Cl-100  Bicarb-30  BUN-69  creatinine-2.6                            Assessment/Plan   Principal Problem:    Unable to care for self  Active Problems:    Type 2 diabetes mellitus (Multi)    Morbid obesity (Multi)    Peripheral vascular disease (CMS-HCC)    Hypertension    Pressure sore on sacrum    Behavior problem, adult    Renal failure    Acute respiratory failure with hypoxia and hypercapnia (Multi)    Pressure injury of skin of heel      Acute on Chronic Respiratory Failure with hypoxia & hypercapnia   -ECHO with an EF of 60-65%, severe pulm. HTN  -pulmonology following  -oxygen PRN, wean as tolerated  -encourage Bi-pap  @HS, pt refusing  -switched to oral diuretics per cardiology recommendations for plural effusions/pulm. edema        Acute on Chronic Kidney Disease  -monitor   -nephrology following  -hold nephrotoxic agents  -renally dose medications  -US with possible mass/malignancy, pt refused MRI  urology recommended  -On [facility]     Unable to care for self   -PT,OT recommend moderate intensity rehab  -Case management following, will go to skilled nursing facility upon  discharge        DM II  -monitor blood glucose  -ISS  -diabetic diet  -HgbA1C 7.5  -started on Lantus     Morbid obesity   -encourage dietary and lifestyle modifications        Hypertension   -continue medications     Pressure injury of sacral region, stage 3 and both ankles   -mecca per dietary recommendations  - wound care gave recommendations  -  Continue MediHoney, Heel protectors         Hypothyroid   - Continue levothyroxine     Anemia   -anemia in setting of CKD  -Worsening, no anticoagulation  -continue iron supplementation  -b12, folate  -stool for occult blood  -started on epogen per nephrology     Plan of Care  Above plan discussed with pt and she is in agreement     Update 1400- pt now awake, she is alert and oriented x4.      Velvet Mortensen, APRN-CNP

## 2024-06-15 NOTE — PROGRESS NOTES
"Brittanie Smart is a 63 y.o. female on day 12 of admission presenting with Unable to care for self.    Subjective    patient seen for acute kidney injury on top chronic kidney disease stage IIIb is currently on continuous BiPAP she is doing better less short of breath no nausea vomiting diarrhea       Objective     Physical Exam  Neck:      Vascular: No carotid bruit.   Cardiovascular:      Rate and Rhythm: Normal rate and regular rhythm.      Heart sounds: No murmur heard.     No friction rub. No gallop.   Pulmonary:      Breath sounds: No wheezing, rhonchi or rales.   Chest:      Chest wall: No tenderness.   Abdominal:      General: There is no distension.      Tenderness: There is no abdominal tenderness. There is no guarding or rebound.   Musculoskeletal:         General: No swelling or tenderness.      Cervical back: Neck supple.      Right lower leg: No edema.      Left lower leg: No edema.   Lymphadenopathy:      Cervical: No cervical adenopathy.         Last Recorded Vitals  Blood pressure 128/66, pulse 77, temperature 36.8 °C (98.2 °F), temperature source Oral, resp. rate 19, height 1.727 m (5' 7.99\"), weight 126 kg (277 lb 3.2 oz), SpO2 95%.    Intake/Output last 3 Shifts:  I/O last 3 completed shifts:  In: 660 (5.2 mL/kg) [P.O.:660]  Out: 900 (7.2 mL/kg) [Urine:900 (0.2 mL/kg/hr)]  Weight: 125.7 kg     Current Facility-Administered Medications:     acetaminophen (Tylenol) tablet 650 mg, 650 mg, oral, q4h PRN, 650 mg at 06/14/24 2131 **OR** [DISCONTINUED] acetaminophen (Tylenol) oral liquid 650 mg, 650 mg, oral, q4h PRN **OR** [DISCONTINUED] acetaminophen (Tylenol) suppository 650 mg, 650 mg, rectal, q4h PRN, Noelle Turner DO    ammonium lactate (Lac-Hydrin) 12 % lotion, , Topical, q1h PRN, ACE Kaiser-CNP, Given at 06/14/24 1448    bisacodyl (Dulcolax) EC tablet 5 mg, 5 mg, oral, Daily PRN, Kaylan Bird APRN-CNP    cefTRIAXone (Rocephin) IVPB 1 g, 1 g, intravenous, q24h, Kaylan Bird, " ACE-CNP, Stopped at 06/14/24 1743    dextrose 50 % injection 12.5 g, 12.5 g, intravenous, q15 min PRN, Noelle Turner DO    docusate sodium (Colace) capsule 100 mg, 100 mg, oral, BID, ACE Kaiser-CNP, 100 mg at 06/13/24 2128    doxazosin (Cardura) tablet 1 mg, 1 mg, oral, Daily, Siddhartha Tovar MD, 1 mg at 06/14/24 0840    epoetin misha-epbx (Retacrit) injection 10,000 Units, 10,000 Units, subcutaneous, Once per day on Monday Wednesday Friday, Paco Tovar MD, 10,000 Units at 06/14/24 1421    ferrous sulfate (325 mg ferrous sulfate) tablet 1 tablet, 1 tablet, oral, Daily with breakfast, Noelle Turner DO, 1 tablet at 06/14/24 0840    furosemide (Lasix) injection 40 mg, 40 mg, intravenous, q12h, ACE Fisher-CNP, 40 mg at 06/14/24 2131    gabapentin (Neurontin) capsule 300 mg, 300 mg, oral, BID, Siddhartha Tovar MD, 300 mg at 06/14/24 2131    glucagon (Glucagen) injection 1 mg, 1 mg, intramuscular, q15 min PRN, Noelle Turner DO    hydrOXYzine pamoate (Vistaril) capsule 25 mg, 25 mg, oral, q6h PRN, ACE Kaiser-CNP, 25 mg at 06/14/24 2131    insulin glargine (Lantus) injection 35 Units, 35 Units, subcutaneous, Nightly, ACE Kaiser-CNP, 35 Units at 06/14/24 2135    insulin lispro (HumaLOG) injection 0-20 Units, 0-20 Units, subcutaneous, TID, ACE Kaiser-CNP, 4 Units at 06/14/24 1712    insulin lispro (HumaLOG) injection 10 Units, 10 Units, subcutaneous, TID, ACE Kaiser-CNP, 10 Units at 06/14/24 1711    ipratropium-albuteroL (Duo-Neb) 0.5-2.5 mg/3 mL nebulizer solution 3 mL, 3 mL, nebulization, TID, Noelle Turner DO, 3 mL at 06/15/24 0745    ipratropium-albuteroL (Duo-Neb) 0.5-2.5 mg/3 mL nebulizer solution 3 mL, 3 mL, nebulization, q2h PRN, Noelle Turner DO, 3 mL at 06/10/24 1511    ketotifen (Zaditor) 0.025 % (0.035 %) ophthalmic solution 1 drop, 1 drop, Both Eyes, BID, Noelle Turner DO, 1 drop at 06/14/24 2134    levothyroxine  (Synthroid, Levoxyl) tablet 75 mcg, 75 mcg, oral, Daily, Noelle Turner DO, 75 mcg at 06/15/24 0638    metoprolol succinate XL (Toprol-XL) 24 hr tablet 25 mg, 25 mg, oral, Daily, Maria A Torre MD, 25 mg at 06/14/24 0840    multivitamin with minerals 1 tablet, 1 tablet, oral, Daily, Noelle Turner DO, 1 tablet at 06/14/24 0840    nystatin (Mycostatin) 100,000 unit/gram powder 1 Application, 1 Application, Topical, q8h, Noelle Turner DO, 1 Application at 06/15/24 0639    ondansetron ODT (Zofran-ODT) disintegrating tablet 4 mg, 4 mg, oral, q8h PRN **OR** [DISCONTINUED] ondansetron (Zofran) injection 4 mg, 4 mg, intravenous, q8h PRN, Noelle Turner DO    oxygen (O2) therapy, , inhalation, q8h, Noelle Turner DO, 3 L/min at 06/15/24 0700    oxygen (O2) therapy, , inhalation, Continuous PRN - O2/gases, Roland Navarro, DO, 4 L/min at 06/11/24 2003    pantoprazole (ProtoNix) EC tablet 40 mg, 40 mg, oral, Daily before breakfast, Noelle Turner DO, 40 mg at 06/15/24 0638    perflutren protein A microsphere (Optison) injection 0.5 mL, 0.5 mL, intravenous, Once in imaging, RHONDA Kaiser    polyethylene glycol (Glycolax, Miralax) packet 17 g, 17 g, oral, Daily, ACE Kaiser-CNP, 17 g at 06/08/24 0826    rosuvastatin (Crestor) tablet 5 mg, 5 mg, oral, Daily, Noelle Turner DO, 5 mg at 06/14/24 0840    sodium zirconium cyclosilicate (Lokelma) packet 10 g, 10 g, oral, Daily, Siddhartha Tovar MD, 10 g at 06/14/24 0840    sulfur hexafluoride microsphr (Lumason) injection 24.28 mg, 2 mL, intravenous, Once in imaging, Kaylan Bird, ACE-CNP   Relevant Results    Results for orders placed or performed during the hospital encounter of 06/02/24 (from the past 96 hour(s))   POCT GLUCOSE   Result Value Ref Range    POCT Glucose 196 (H) 74 - 99 mg/dL   POCT GLUCOSE   Result Value Ref Range    POCT Glucose 196 (H) 74 - 99 mg/dL   POCT GLUCOSE   Result Value Ref Range    POCT Glucose  205 (H) 74 - 99 mg/dL   Basic Metabolic Panel   Result Value Ref Range    Glucose 183 (H) 65 - 99 mg/dL    Sodium 138 133 - 145 mmol/L    Potassium 4.4 3.4 - 5.1 mmol/L    Chloride 100 97 - 107 mmol/L    Bicarbonate 28 24 - 31 mmol/L    Urea Nitrogen 64 (H) 8 - 25 mg/dL    Creatinine 2.90 (H) 0.40 - 1.60 mg/dL    eGFR 17 (L) >60 mL/min/1.73m*2    Calcium 8.3 (L) 8.5 - 10.4 mg/dL    Anion Gap 10 <=19 mmol/L   CBC   Result Value Ref Range    WBC 6.9 4.4 - 11.3 x10*3/uL    nRBC 0.3 (H) 0.0 - 0.0 /100 WBCs    RBC 2.64 (L) 4.00 - 5.20 x10*6/uL    Hemoglobin 7.3 (L) 12.0 - 16.0 g/dL    Hematocrit 24.9 (L) 36.0 - 46.0 %    MCV 94 80 - 100 fL    MCH 27.7 26.0 - 34.0 pg    MCHC 29.3 (L) 32.0 - 36.0 g/dL    RDW 23.2 (H) 11.5 - 14.5 %    Platelets 104 (L) 150 - 450 x10*3/uL   Troponin T   Result Value Ref Range    Troponin T, High Sensitivity 81 (HH) <=14 ng/L   POCT GLUCOSE   Result Value Ref Range    POCT Glucose 169 (H) 74 - 99 mg/dL   POCT GLUCOSE   Result Value Ref Range    POCT Glucose 235 (H) 74 - 99 mg/dL   POCT GLUCOSE   Result Value Ref Range    POCT Glucose 230 (H) 74 - 99 mg/dL   POCT GLUCOSE   Result Value Ref Range    POCT Glucose 212 (H) 74 - 99 mg/dL   CBC   Result Value Ref Range    WBC 7.4 4.4 - 11.3 x10*3/uL    nRBC 0.0 0.0 - 0.0 /100 WBCs    RBC 2.65 (L) 4.00 - 5.20 x10*6/uL    Hemoglobin 7.4 (L) 12.0 - 16.0 g/dL    Hematocrit 25.4 (L) 36.0 - 46.0 %    MCV 96 80 - 100 fL    MCH 27.9 26.0 - 34.0 pg    MCHC 29.1 (L) 32.0 - 36.0 g/dL    RDW 23.1 (H) 11.5 - 14.5 %    Platelets 101 (L) 150 - 450 x10*3/uL   Renal Function Panel   Result Value Ref Range    Glucose 159 (H) 65 - 99 mg/dL    Sodium 138 133 - 145 mmol/L    Potassium 4.4 3.4 - 5.1 mmol/L    Chloride 100 97 - 107 mmol/L    Bicarbonate 27 24 - 31 mmol/L    Urea Nitrogen 66 (H) 8 - 25 mg/dL    Creatinine 2.80 (H) 0.40 - 1.60 mg/dL    eGFR 18 (L) >60 mL/min/1.73m*2    Calcium 8.3 (L) 8.5 - 10.4 mg/dL    Phosphorus 4.7 (H) 2.5 - 4.5 mg/dL    Albumin 2.7 (L)  3.5 - 5.0 g/dL    Anion Gap 11 <=19 mmol/L   POCT GLUCOSE   Result Value Ref Range    POCT Glucose 143 (H) 74 - 99 mg/dL   POCT GLUCOSE   Result Value Ref Range    POCT Glucose 189 (H) 74 - 99 mg/dL   Vitamin B12   Result Value Ref Range    Vitamin B12 735 211 - 946 pg/mL   Folate   Result Value Ref Range    Folate, Serum >20.0 (H) 4.2 - 19.9 ng/mL   POCT GLUCOSE   Result Value Ref Range    POCT Glucose 244 (H) 74 - 99 mg/dL   POCT GLUCOSE   Result Value Ref Range    POCT Glucose 220 (H) 74 - 99 mg/dL   Basic Metabolic Panel   Result Value Ref Range    Glucose 208 (H) 65 - 99 mg/dL    Sodium 138 133 - 145 mmol/L    Potassium 4.0 3.4 - 5.1 mmol/L    Chloride 100 97 - 107 mmol/L    Bicarbonate 29 24 - 31 mmol/L    Urea Nitrogen 66 (H) 8 - 25 mg/dL    Creatinine 2.70 (H) 0.40 - 1.60 mg/dL    eGFR 19 (L) >60 mL/min/1.73m*2    Calcium 8.3 (L) 8.5 - 10.4 mg/dL    Anion Gap 9 <=19 mmol/L   CBC   Result Value Ref Range    WBC 7.2 4.4 - 11.3 x10*3/uL    nRBC 0.0 0.0 - 0.0 /100 WBCs    RBC 2.61 (L) 4.00 - 5.20 x10*6/uL    Hemoglobin 7.3 (L) 12.0 - 16.0 g/dL    Hematocrit 25.1 (L) 36.0 - 46.0 %    MCV 96 80 - 100 fL    MCH 28.0 26.0 - 34.0 pg    MCHC 29.1 (L) 32.0 - 36.0 g/dL    RDW 22.7 (H) 11.5 - 14.5 %    Platelets 96 (L) 150 - 450 x10*3/uL   Occult Blood, Stool    Specimen: Stool   Result Value Ref Range    Occult Blood, Stool X1 Negative Negative   POCT GLUCOSE   Result Value Ref Range    POCT Glucose 168 (H) 74 - 99 mg/dL   POCT GLUCOSE   Result Value Ref Range    POCT Glucose 174 (H) 74 - 99 mg/dL   POCT GLUCOSE   Result Value Ref Range    POCT Glucose 191 (H) 74 - 99 mg/dL   POCT GLUCOSE   Result Value Ref Range    POCT Glucose 168 (H) 74 - 99 mg/dL   Basic Metabolic Panel   Result Value Ref Range    Glucose 161 (H) 65 - 99 mg/dL    Sodium 139 133 - 145 mmol/L    Potassium 4.2 3.4 - 5.1 mmol/L    Chloride 100 97 - 107 mmol/L    Bicarbonate 30 24 - 31 mmol/L    Urea Nitrogen 69 (H) 8 - 25 mg/dL    Creatinine 2.60 (H)  0.40 - 1.60 mg/dL    eGFR 20 (L) >60 mL/min/1.73m*2    Calcium 8.4 (L) 8.5 - 10.4 mg/dL    Anion Gap 9 <=19 mmol/L   CBC   Result Value Ref Range    WBC 8.0 4.4 - 11.3 x10*3/uL    nRBC 0.0 0.0 - 0.0 /100 WBCs    RBC 2.74 (L) 4.00 - 5.20 x10*6/uL    Hemoglobin 7.7 (L) 12.0 - 16.0 g/dL    Hematocrit 26.3 (L) 36.0 - 46.0 %    MCV 96 80 - 100 fL    MCH 28.1 26.0 - 34.0 pg    MCHC 29.3 (L) 32.0 - 36.0 g/dL    RDW 22.4 (H) 11.5 - 14.5 %    Platelets 93 (L) 150 - 450 x10*3/uL   POCT GLUCOSE   Result Value Ref Range    POCT Glucose 119 (H) 74 - 99 mg/dL       Assessment/Plan   Acute kidney injury on chronic kidney disease stage III renal creatinine down to 2.6 mg/dL no indication for dialysis therapy my plan to continue to monitor very closely  Chronic kidney disease stage III creatinine about 1.8 mg/dL  Diabetes mellitus type 2  Hypertension  Anemia  we will start her on erythropoietin therapy  UTI continue with IV Rocephin  Renal Mass followed by urology refused MRI  Diastolic CHF continue diuresis also she had a CT scan of the chest yesterday results are still pending           Paco Tovar MD

## 2024-06-15 NOTE — CARE PLAN
The patient's goals for the shift include      The clinical goals for the shift include safety       Problem: Skin  Goal: Participates in plan/prevention/treatment measures  Flowsheets (Taken 6/14/2024 1225 by Cat Lomeli RN)  Participates in plan/prevention/treatment measures:   Elevate heels   Discuss with provider PT/OT consult  Goal: Prevent/minimize sheer/friction injuries  Flowsheets (Taken 6/15/2024 0018)  Prevent/minimize sheer/friction injuries:   Turn/reposition every 2 hours/use positioning/transfer devices   Use pull sheet   Increase activity/out of bed for meals  Goal: Promote/optimize nutrition  Flowsheets (Taken 6/14/2024 1225 by Cat Lomeli RN)  Promote/optimize nutrition: Monitor/record intake including meals     Problem: Safety  Goal: I will remain free of falls  Flowsheets (Taken 6/15/2024 0018)  Resident will remain free of falls:   Apply bed/chair alarms as appropriate   Assist with toileting as orderd

## 2024-06-15 NOTE — PROGRESS NOTES
Pulmonary Progress Note 06/15/24     FOLLOWUP FOR: acute on chronic respiratory failure, MARY/OHS    SUBJECTIVE  Patient sleepy this morning.  Discussed with bedside nurse who stated that patient did not use her BiPAP but was also up most of yesterday so quite exhausted this morning.      PHYSICAL EXAM        6/14/2024    11:15 AM 6/14/2024     3:00 PM 6/14/2024     7:07 PM 6/14/2024    11:12 PM 6/15/2024     3:48 AM 6/15/2024     7:37 AM 6/15/2024     8:46 AM   Vitals   Systolic 129 131 126 119 121 128    Diastolic 62 65 62 53 60 66    Heart Rate 88 78 80 77 73 77    Temp 36.4 °C (97.5 °F) 36.5 °C (97.7 °F) 36.5 °C (97.7 °F) 36.5 °C (97.7 °F) 36.5 °C (97.7 °F) 36.8 °C (98.2 °F)    Resp 18 18 18 16 18 18 19       Intake/Output last 3 shifts:  I/O last 3 completed shifts:  In: 660 (5.2 mL/kg) [P.O.:660]  Out: 900 (7.2 mL/kg) [Urine:900 (0.2 mL/kg/hr)]  Weight: 125.7 kg   Intake/Output this shift:  No intake/output data recorded.        Physical Exam  Vitals reviewed.   Constitutional:       Appearance: She is obese.      Interventions: Nasal cannula in place.   Cardiovascular:      Rate and Rhythm: Normal rate and regular rhythm.   Pulmonary:      Effort: Pulmonary effort is normal. No respiratory distress.      Breath sounds: Decreased air movement present. No wheezing.      Comments: Afforded only anterior auscultation  Musculoskeletal:      Right lower leg: No edema.      Left lower leg: No edema.   Skin:     General: Skin is warm.   Neurological:      Mental Status: She is lethargic.   Psychiatric:         Mood and Affect: Mood normal.         Behavior: Behavior normal.           Scheduled medications  cefTRIAXone, 1 g, intravenous, q24h  docusate sodium, 100 mg, oral, BID  doxazosin, 1 mg, oral, Daily  epoetin misha or biosimilar, 10,000 Units, subcutaneous, Once per day on Monday Wednesday Friday  ferrous sulfate (325 mg ferrous sulfate), 1 tablet, oral, Daily with breakfast  furosemide, 40 mg,  intravenous, q12h  gabapentin, 300 mg, oral, BID  insulin glargine, 35 Units, subcutaneous, Nightly  insulin lispro, 0-20 Units, subcutaneous, TID  insulin lispro, 10 Units, subcutaneous, TID  ipratropium-albuteroL, 3 mL, nebulization, TID  ketotifen, 1 drop, Both Eyes, BID  levothyroxine, 75 mcg, oral, Daily  metoprolol succinate XL, 25 mg, oral, Daily  multivitamin with minerals, 1 tablet, oral, Daily  nystatin, 1 Application, Topical, q8h  oxygen, , inhalation, q8h  pantoprazole, 40 mg, oral, Daily before breakfast  perflutren protein A microsphere, 0.5 mL, intravenous, Once in imaging  polyethylene glycol, 17 g, oral, Daily  rosuvastatin, 5 mg, oral, Daily  sodium zirconium cyclosilicate, 10 g, oral, Daily  sulfur hexafluoride microsphr, 2 mL, intravenous, Once in imaging      Continuous medications     PRN medications  PRN medications: acetaminophen **OR** [DISCONTINUED] acetaminophen **OR** [DISCONTINUED] acetaminophen, ammonium lactate, bisacodyl, dextrose, glucagon, hydrOXYzine pamoate, ipratropium-albuteroL, ondansetron ODT **OR** [DISCONTINUED] ondansetron, oxygen     Labs:  Lab Results   Component Value Date     06/15/2024     06/14/2024     06/13/2024    K 4.2 06/15/2024    K 4.0 06/14/2024    K 4.4 06/13/2024    CO2 30 06/15/2024    CO2 29 06/14/2024    CO2 27 06/13/2024    BUN 69 (H) 06/15/2024    BUN 66 (H) 06/14/2024    BUN 66 (H) 06/13/2024    CREATININE 2.60 (H) 06/15/2024    CREATININE 2.70 (H) 06/14/2024    CREATININE 2.80 (H) 06/13/2024    GLUCOSE 161 (H) 06/15/2024    GLUCOSE 208 (H) 06/14/2024    GLUCOSE 159 (H) 06/13/2024    CALCIUM 8.4 (L) 06/15/2024    CALCIUM 8.3 (L) 06/14/2024    CALCIUM 8.3 (L) 06/13/2024     Lab Results   Component Value Date    WBC 8.0 06/15/2024    HGB 7.7 (L) 06/15/2024    HCT 26.3 (L) 06/15/2024    MCV 96 06/15/2024    PLT 93 (L) 06/15/2024       Imaging:  XR chest 1 view    Result Date: 6/9/2024  Interpreted By:  Otilio Hernandez, STUDY: XR CHEST 1  VIEW; 6/9/2024 12:03 pm   INDICATION: CLINICAL INFORMATION: Signs/Symptoms:SOB, persistent hypoxia, re-eval pleural effusions.   COMPARISON: 06/07/2024   ACCESSION NUMBER(S): [id number]   ORDERING CLINICIAN: LEMUEL STACY   TECHNIQUE: Portable chest one view.   FINDINGS: Exam is limited by the patient's body habitus. Cardiac size indeterminate in view of the AP projection. Diffuse bilateral infiltrates are present with more focal density at the left base silhouetting left hemidiaphragm with air bronchograms at the left base. The findings suggest left lower lobe consolidation . Small effusions may be obscured on this portable study.       Diffuse bilateral infiltrates in a pattern suggesting pulmonary edema with more focal infiltrate suspected at the left base suggesting consolidation and perhaps pneumonia. Infiltrates appear more marked compared to 06/07/2024.   MACRO: none   Signed by: Otilio Hernandez 6/9/2024 1:16 PM Dictation workstation:   [id number]    US elastography parenchyma EG organ    Result Date: 6/8/2024  Interpreted By:  Otilio Hernandez, STUDY: US ELASTOGRAPHY PARENCHYMA EG ORGAN; 6/7/2024 6:37 pm   INDICATION: Signs/Symptoms:Elastography, r/o WINSTON CIRRHOSIS.   COMPARISON: None   ACCESSION NUMBER(S): [id number]   ORDERING CLINICIAN: SHANNON WESTON   TECHNIQUE: Limited abdominal ultrasound of the right upper quadrant was performed utilizing gray scale imaging.   FINDINGS: COMMENTS: Technologist note indicates the examination is technically suboptimal with measurements not thought to be accurate due to the patient's condition. Patient was unable to hold breath or to roll. Increased body habitus limits assessment as well.   Measurements obtained at this time are as follows:   Elastography Median: 12.5 kPA   Elastography IQR/Median: 26.6 %       Measurements were obtained as above but are now thought to be accurate due to the patient's body habitus and suboptimal cooperation at this time. Repeat evaluation  at a later date may be appropriate.   Liver Fibrosis Staging Metavir Score kPa m/s Normal- Mild  F1 5.48 kPa-8.29 kPa 1.35 m/s- 1.66 m/s Mild-Moderate  F2 8.29 kPa-9.40 kPa 1.66 m/s- 1.77 m/s Moderate- Severe F3 9.40 kPa-11.9 kPa 1.77 m/s- 1.99 m/s Cirrhosis F4 >11.9 kPa >1.99 m/s   CAUTION: The values for the shear wave speed and tissue modulus are relative indices intended only for the purpose of comparison with other measurements performed using the LOGIQ E9 and E10 Absolute values for these measurements may vary among different measurement devices.   MACRO: none   Signed by: Otilio Hernandez 6/8/2024 10:02 AM Dictation workstation:   [id number]    XR chest 1 view    Result Date: 6/7/2024  Interpreted By:  Gerri Hsu, STUDY: XR CHEST 1 VIEW 6/7/2024 8:12 am   INDICATION: Signs/Symptoms:Pleural effusions   COMPARISON: 06/03/2024   ACCESSION NUMBER(S): [id number]   ORDERING CLINICIAN: DEBORAH KIM   TECHNIQUE: AP erect view of the chest at bedside   FINDINGS: The heart is enlarged with mild pulmonary vascular congestion seen. No focal infiltrate or focal airspace consolidation is identified.   No obvious pleural effusions are seen on the AP projection.       Mild cardiomegaly and mild pulmonary vascular congestion.   The bilateral pleural effusions seen on 06/03/2024 are not obvious on the AP projection. PA and lateral views of the chest are recommended when the patient's condition permits.   Signed by: Gerri Hsu 6/7/2024 9:43 AM Dictation workstation:   [id number]    US gallbladder    Result Date: 6/6/2024  Interpreted By:  Otilio Hernandez, STUDY: US GALLBLADDER; 9:13 am   INDICATION: Signs/Symptoms:abnormal CT. Abnormal appearance of gallbladder on CT from 06/04/2024.   COMPARISON: CT 06/04/2024   ACCESSION NUMBER(S): [id number]   ORDERING CLINICIAN: KYLIE GARCIA   TECHNIQUE: Limited abdominal ultrasound of the right upper quadrant was performed utilizing gray scale imaging.   FINDINGS: COMMENTS:  Technologist note states that the exam was limited due to patient being uncooperative.   Liver: There is a very coarse echotexture of the hepatic parenchyma with a lobulated hepatic contour consistent with underlying cirrhosis. Gallbladder: Gallbladder is filled with stones. There is diffuse gallbladder wall thickening measuring 5 mm with no pericholecystic fluid identified. Sonographic Latham's sign: Negative Pancreas: Pancreas is predominantly obscured by bowel gas. CBD: 0.36 cm Right Kidney:  Minimal amount of fluid is identified adjacent to the kidney. The kidney itself is unremarkable.       1. Abnormal appearance of the liver in a pattern consistent with cirrhosis. The splenomegaly noted on CT may be related to portal venous hypertension considering this appearance. 2. Cholelithiasis with gallbladder wall thickening but negative sonographic Latham's sign and no pericholecystic fluid. Findings may be indicative of chronic cholecystitis. Please correlate clinically. 3. Nonspecific perinephric fluid. 4. Please note exam is technically limited due to suboptimal cooperation from the patient according to technologist note.   MACRO: None.   Signed by: Otilio Hernandez 6/6/2024 9:17 AM Dictation workstation:   [id number]    CT kidney wo IV contrast    Result Date: 6/5/2024  Interpreted By:  Otilio Hernandez, STUDY: CT KIDNEY WO CONTRAST; 6/4/2024 3:50 pm   INDICATION: Signs/Symptoms:mass on left kidney.   COMPARISON: None.   ACCESSION NUMBER(S): [id number]   ORDERING CLINICIAN: KYLIE GARCIA   TECHNIQUE: Without INTRAVENOUS  CONTRAST WithoutORAL CONTRAST  ( Intravenous contrast was not able to utilized due to renal insufficiency. One or more of the following dose reduction techniques were used: Automated exposure control Adjustment of the mA and/or kV according to patient size, and/or use of iterative reconstruction technique.   FINDINGS: ABDOMEN CT: SOLID ORGAN ASSESSMENT: Moderate bilateral pleural effusions are  present. Bibasilar atelectasis is present. Infiltrate may be obscured by the atelectatic changes.   Within limits of this unenhanced study no focal masses are identified within the liver, spleen, pancreas, right kidney or adrenal glands. Spleen is enlarged with a craniocaudal extensive 16.4 cm.   CT examination does confirm a rim calcified mass within the mid to lower pole left kidney measuring on the order of 3.1 cm in diameter. Delineation and characterization of the mass is somewhat limited due to the absence of contrast. There is suspicion of mildly enlarged retroperitoneal lymph nodes on the left at the level of the left kidney, again not well characterized due to the patient's body habitus and absence of contrast.   Multiple stones are identified along the dependent portion the gallbladder. Gallbladder wall is not well defined raising possibility of gallbladder wall thickening and/or pericholecystic fluid. Please correlate with patient's clinical assessment to rule out acute cholecystitis. Right upper quadrant ultrasound could be obtained to further assess this finding.   RETROPERITONEUM: AORTA:  There is no evidence for abdominal aortic aneurysm.   BOWEL ASSESSMENT: No intraperitoneal free air is identified. There is a nonspecific appearance of the stomach. Visualized portions of the large and small bowel are unremarkable.   ABDOMINAL WILKINS: There is no evidence for a hernia. Anasarca is present.   OSSEOUS STRUCTURES: No lytic or blastic lesions are identified. Spurring is noted throughout the spine.       1. Indeterminate rim calcified mass left kidney as was suspected on the ultrasound study. Both benign and malignant etiologies are considered in the differential this time with characterization of the mass limited due to the technical issues on this study and the ultrasound study. There is suspicion of left-sided periaortic lymphadenopathy, again not well characterized due to the absence of intravenous  contrast. 2. Cholelithiasis with poorly defined gallbladder wall margins. Please correlate with clinical history to rule out the possibility of acute cholecystitis. Right upper quadrant ultrasound may be attempted for further characterization the gallbladder wall to rule out gallbladder wall thickening and pericholecystic fluid. 3. Anasarca. 4. Moderate bilateral pleural effusions with bibasilar atelectasis most marked involving the lower lobes. 5. Splenomegaly. Spleen measures 16.4 cm in craniocaudal extent.     MACRO: none   Signed by: Otilio Hernandez 6/5/2024 12:36 PM Dictation workstation:   [id number]    ECG 12 lead    Result Date: 6/4/2024  Normal sinus rhythm Minimal voltage criteria for LVH, may be normal variant Nonspecific T wave abnormality Abnormal ECG No previous ECGs available Confirmed by Duong Worthington ([id number]) on 6/4/2024 11:07:09 PM    Transthoracic Echo (TTE) Complete    Result Date: 6/4/2024            [address], [place] [address]             Phone [phone] TRANSTHORACIC ECHOCARDIOGRAM REPORT  Patient Name:      DESI THAYER           Hugo Physician:    [id number] Duong Worthington DO Study Date:        6/4/2024              Ordering Provider:    [id number] GARCIA MRN/PID:           [id number]              Fellow: Accession#:        [id number]          Nurse: Date of Birth/Age: 1960 / 63 years  Sonographer:          Chantelle Carrasquillo                                                                ACS, RDDEVAUGHN, LUCÍA Gender:            F                     Additional Staff: Height:            170.18 cm             Admit Date: Weight:            125.65 kg             Admission Status:     Inpatient -                                                                Routine  BSA / BMI:         2.32 m2 / 43.38 kg/m2 Department Location:  [facility] Blood Pressure: 124 /53 mmHg Study Type:    TRANSTHORACIC ECHO (TTE) COMPLETE Diagnosis/ICD: Dyspnea, unspecified-R06.00 Indication:    dyspnea, Htn, Renal failure CPT Codes:     Echo Complete w Full [id number] Patient History: Pertinent History: Dyspnea, DM2, Renal failure, Htn, PVD. Study Detail: The following Echo studies were performed: 2D, M-Mode, Doppler and               color flow. Technically challenging study due to poor acoustic               windows and body habitus. Definity used as a contrast agent for               endocardial border definition. Total contrast used for this               procedure was 2 mL via IV push.  PHYSICIAN INTERPRETATION: Left Ventricle: Left ventricular systolic function is normal, with an estimated ejection fraction of 60-65%. There are no regional wall motion abnormalities. The left ventricular cavity size is normal. There is mild concentric left ventricular hypertrophy. Spectral Doppler shows an impaired relaxation pattern of left ventricular diastolic filling. Left Atrium: The left atrium is normal in size. Right Ventricle: The right ventricle was not well visualized. Unable to determine right ventricular systolic function. RV not well visualized but suggests RV enlargemnt and RV hypokinesis in some views, with severe pulmonary hypertension. Right Atrium: The right atrium was not well visualized. Aortic Valve: The aortic valve appears abnormal. There is mild to moderate aortic valve cusp calcification. There is evidence of mildly elevated transaortic gradients consistent with sclerosis of the aortic valve. There is no evidence of aortic valve regurgitation. The peak instantaneous gradient of the aortic valve is 7.5 mmHg. Mitral Valve: The mitral valve is abnormal. There is mild mitral valve regurgitation. Tricuspid Valve: The tricuspid valve is structurally normal. There is severe tricuspid  regurgitation. The Doppler estimated RVSP is severely elevated at 79.6 mmHg. Pulmonic Valve: The pulmonic valve is not well visualized. The pulmonic valve regurgitation was not well visualized. Pericardium: There is no pericardial effusion noted. Aorta: The aortic root is normal.  CONCLUSIONS:  1. Left ventricular systolic function is normal with a 60-65% estimated ejection fraction.  2. Poorly visualized anatomical structures due to suboptimal image quality.  3. Spectral Doppler shows an impaired relaxation pattern of left ventricular diastolic filling.  4. RV not well visualized but suggests RV enlargemnt and RV hypokinesis in some views, with severe pulmonary hypertension.  5. Severe tricuspid regurgitation.  6. Severely elevated right ventricular systolic pressure.  7. Aortic valve appears abnormal.  8. Aortic valve sclerosis. QUANTITATIVE DATA SUMMARY: 2D MEASUREMENTS:                           Normal Ranges: IVSd:          1.40 cm    (0.6-1.1cm) LVPWd:         1.23 cm    (0.6-1.1cm) LVIDd:         4.76 cm    (3.9-5.9cm) LVIDs:         3.21 cm LV Mass Index: 106.2 g/m2 LV % FS        32.6 % RA VOLUME BY A/L METHOD:                       Normal Ranges: RA Area A4C: 12.7 cm2 AORTA MEASUREMENTS:                    Normal Ranges: Asc Ao, d: 3.10 cm (2.1-3.4cm) LV SYSTOLIC FUNCTION BY 2D PLANIMETRY (MOD):                     Normal Ranges: EF-A4C View: 64.1 % (>=55%) EF-A2C View: 56.5 % EF-Biplane:  60.2 % LV DIASTOLIC FUNCTION:                        Normal Ranges: MV Peak E:    1.14 m/s (0.7-1.2 m/s) MV Peak A:    1.03 m/s (0.42-0.7 m/s) E/A Ratio:    1.11     (1.0-2.2) MV e'         0.07 m/s (>8.0) MV lateral e' 0.09 m/s MV medial e'  0.05 m/s E/e' Ratio:   16.29    (<8.0) MITRAL VALVE:                 Normal Ranges: MV DT: 189 msec (150-240msec) AORTIC VALVE:                         Normal Ranges: AoV Vmax:      1.37 m/s (<=1.7m/s) AoV Peak P.5 mmHg (<20mmHg) LVOT Max Joshua:  0.72 m/s (<=1.1m/s) LVOT VTI:       15.80 cm LVOT Diameter: 1.70 cm  (1.8-2.4cm) AoV Area,Vmax: 1.19 cm2 (2.5-4.5cm2)  RIGHT VENTRICLE: RV Basal 3.89 cm TAPSE:   10.7 mm RV s'    0.08 m/s TRICUSPID VALVE/RVSP:                             Normal Ranges: Peak TR Velocity: 3.86 m/s RV Syst Pressure: 79.6 mmHg (< 30mmHg) IVC Diam:         2.45 cm  [id number] Duong Worthington  Electronically signed on 6/4/2024 at 11:34:03 AM  ** Final **     US renal complete    Result Date: 6/4/2024  Interpreted By:  Otilio Hernandez, STUDY: US RENAL COMPLETE; 6/3/2024 7:24 pm   INDICATION: Signs/Symptoms:DELISA.   COMPARISON: None   ACCESSION NUMBER(S): [id number]   ORDERING CLINICIAN: DEBORAH KIM   TECHNIQUE: Grayscale and color Doppler imaging of the kidneys   FINDINGS: COMMENTS: Technologist note indicates that the study was quite limited due to the patient's body habitus. Exam was performed portably as well.   The right kidney measures 10.7 cm x 3.9 cm x 4.7 cm . The left kidney measures 11.8 cm x 5.4 cm x 5.4 cm. THERE IS A HETEROGENEOUS COMPLEX MASS LATERALLY MID TO LOWER POLE LEFT KIDNEY MEASURING 4.3 X 3.9 X 4.6 CM.     No renal stones are identified.  The renal cortical thickness and echogenicity is normal.  No hydronephrosis is identified.   Urinary bladder is nonspecific in appearance with no stones or masses identified.       COMPLEX APPEARING MASS LEFT KIDNEY MEASURING UP TO 4.6 CM IN DIAMETER, WITH A DIFFERENTIAL TO INCLUDE MALIGNANCY. CT UROGRAM WITH AND WITHOUT CONTRAST IS SUGGESTED FOR MORE DEFINITIVE ASSESSMENT.   MACRO: Otilio Hernandez discussed the significance and urgency of this critical finding EPIC secure chat with  [facility] medicine team on 6/4/2024 at 8:22 am.  (**-RCF-**) Findings:  See findings.   Signed by: Otilio Hernandez 6/4/2024 8:23 AM Dictation workstation:   [id number]    XR chest 2 views    Result Date: 6/3/2024  Interpreted By:  Gerri Hsu, STUDY: XR CHEST 2 VIEWS 6/3/2024 8:15 am   INDICATION: Signs/Symptoms:COPD with wheezing   COMPARISON:  None available.   ACCESSION NUMBER(S): [id number]   ORDERING CLINICIAN: ZAHRA BATISTA   TECHNIQUE: AP and lateral views of cervical spine are performed.   FINDINGS: The cardiac size is indeterminate due to the AP projection.   Right hemidiaphragm is elevated. On lateral projection, bilateral pleural effusions are observed. No obvious focal infiltrate or airspace consolidation is seen.       Bilateral pleural effusions on lateral projection and elevated right hemidiaphragm.   Signed by: Gerri Hsu 6/3/2024 8:24 AM Dictation workstation:   [id number]            Assessment/Plan   Principal Problem:    Unable to care for self  Active Problems:    Type 2 diabetes mellitus (Multi)    Morbid obesity (Multi)    Peripheral vascular disease (CMS-HCC)    Hypertension    Pressure sore on sacrum    Behavior problem, adult    Renal failure    Acute respiratory failure with hypoxia and hypercapnia (Multi)    Pressure injury of skin of heel    Acute on chronic respiratory failure with hypoxia and hypercarbia  Continue with supplemental oxygen during the daytime  Again encouraged use of NIPPV at bedtime  Suspected obesity hypoventilation and restrictive hemodynamics  Would benefit from NIV in future  Abnormal chest imaging: Unable to assess left side this morning given sleepiness.  Will pursue once more alert and awake  Hypercarbic encephalopathy: Again I suspect reason for her lethargy this morning.  Placed empirically on BiPAP and will allow her to wake up more later.  If does not, will get arterial blood gas  Morbid obesity  Failure to thrive  Acute on chronic kidney disease  Nephrology follow-up  Congestive heart failure/pulmonary edema:   Diuresis per cardiology and nephrology    We will continue to follow     LOS: 12 days       Ronald Warner MD  Pulmonary/Critical Care medicine

## 2024-06-15 NOTE — PROGRESS NOTES
"Physical Therapy                 Therapy Communication Note    Patient Name: Brittanie Smart  MRN: [id number]  Today's Date: 6/15/2024     Discipline: Physical Therapy    Missed Visit Reason: Missed Visit Reason: Cancel (Pt very sleepy, was placed on bipap. Okay to come off bipap and participate with therapy per nurse, \"if she'll wake up\". Pt minimally arousing to verbal/tactile stim, not opening eyes. Pt not appropriate for therapy at this time due to lethargy.)    Missed Time: Attempt    Comment:  "

## 2024-06-15 NOTE — NURSING NOTE
Pt drowsy arouses for a feew minutes when spoken to then falls asleep placed on Bi Pap Dr Warner was into see pt earlier .

## 2024-06-15 NOTE — NURSING NOTE
Pt remains on BiPap  moaned when sternal rub done pulls her arms back when straightened  blood gases ordered

## 2024-06-16 VITALS
DIASTOLIC BLOOD PRESSURE: 50 MMHG | OXYGEN SATURATION: 93 % | WEIGHT: 277.2 LBS | HEIGHT: 68 IN | RESPIRATION RATE: 22 BRPM | HEART RATE: 76 BPM | TEMPERATURE: 98.1 F | SYSTOLIC BLOOD PRESSURE: 117 MMHG | BODY MASS INDEX: 42.01 KG/M2

## 2024-06-16 LAB
ANION GAP SERPL CALC-SCNC: 8 MMOL/L
BUN SERPL-MCNC: 68 MG/DL (ref 8–25)
CALCIUM SERPL-MCNC: 8.6 MG/DL (ref 8.5–10.4)
CHLORIDE SERPL-SCNC: 99 MMOL/L (ref 97–107)
CO2 SERPL-SCNC: 29 MMOL/L (ref 24–31)
CREAT SERPL-MCNC: 2.7 MG/DL (ref 0.4–1.6)
EGFRCR SERPLBLD CKD-EPI 2021: 19 ML/MIN/1.73M*2
ERYTHROCYTE [DISTWIDTH] IN BLOOD BY AUTOMATED COUNT: 22.1 % (ref 11.5–14.5)
GLUCOSE BLD MANUAL STRIP-MCNC: 144 MG/DL (ref 74–99)
GLUCOSE BLD MANUAL STRIP-MCNC: 171 MG/DL (ref 74–99)
GLUCOSE BLD MANUAL STRIP-MCNC: 176 MG/DL (ref 74–99)
GLUCOSE BLD MANUAL STRIP-MCNC: 179 MG/DL (ref 74–99)
GLUCOSE SERPL-MCNC: 134 MG/DL (ref 65–99)
HCT VFR BLD AUTO: 27.8 % (ref 36–46)
HGB BLD-MCNC: 8.1 G/DL (ref 12–16)
MCH RBC QN AUTO: 28.2 PG (ref 26–34)
MCHC RBC AUTO-ENTMCNC: 29.1 G/DL (ref 32–36)
MCV RBC AUTO: 97 FL (ref 80–100)
NRBC BLD-RTO: 0 /100 WBCS (ref 0–0)
PLATELET # BLD AUTO: 109 X10*3/UL (ref 150–450)
POTASSIUM SERPL-SCNC: 4.2 MMOL/L (ref 3.4–5.1)
RBC # BLD AUTO: 2.87 X10*6/UL (ref 4–5.2)
SODIUM SERPL-SCNC: 136 MMOL/L (ref 133–145)
WBC # BLD AUTO: 8.1 X10*3/UL (ref 4.4–11.3)

## 2024-06-16 PROCEDURE — 2500000002 HC RX 250 W HCPCS SELF ADMINISTERED DRUGS (ALT 637 FOR MEDICARE OP, ALT 636 FOR OP/ED): Performed by: INTERNAL MEDICINE

## 2024-06-16 PROCEDURE — 2500000004 HC RX 250 GENERAL PHARMACY W/ HCPCS (ALT 636 FOR OP/ED): Performed by: NURSE PRACTITIONER

## 2024-06-16 PROCEDURE — 9420000001 HC RT PATIENT EDUCATION 5 MIN

## 2024-06-16 PROCEDURE — 2500000001 HC RX 250 WO HCPCS SELF ADMINISTERED DRUGS (ALT 637 FOR MEDICARE OP): Performed by: INTERNAL MEDICINE

## 2024-06-16 PROCEDURE — 2500000001 HC RX 250 WO HCPCS SELF ADMINISTERED DRUGS (ALT 637 FOR MEDICARE OP): Performed by: NURSE PRACTITIONER

## 2024-06-16 PROCEDURE — 97110 THERAPEUTIC EXERCISES: CPT | Mod: GP | Performed by: PHYSICAL THERAPIST

## 2024-06-16 PROCEDURE — 36415 COLL VENOUS BLD VENIPUNCTURE: CPT | Performed by: NURSE PRACTITIONER

## 2024-06-16 PROCEDURE — 2500000002 HC RX 250 W HCPCS SELF ADMINISTERED DRUGS (ALT 637 FOR MEDICARE OP, ALT 636 FOR OP/ED): Performed by: EMERGENCY MEDICINE

## 2024-06-16 PROCEDURE — 2500000001 HC RX 250 WO HCPCS SELF ADMINISTERED DRUGS (ALT 637 FOR MEDICARE OP): Performed by: EMERGENCY MEDICINE

## 2024-06-16 PROCEDURE — 99232 SBSQ HOSP IP/OBS MODERATE 35: CPT | Performed by: NURSE PRACTITIONER

## 2024-06-16 PROCEDURE — 94640 AIRWAY INHALATION TREATMENT: CPT

## 2024-06-16 PROCEDURE — 1100000001 HC PRIVATE ROOM DAILY

## 2024-06-16 PROCEDURE — 80048 BASIC METABOLIC PNL TOTAL CA: CPT | Performed by: NURSE PRACTITIONER

## 2024-06-16 PROCEDURE — 2500000005 HC RX 250 GENERAL PHARMACY W/O HCPCS: Performed by: EMERGENCY MEDICINE

## 2024-06-16 PROCEDURE — 2500000002 HC RX 250 W HCPCS SELF ADMINISTERED DRUGS (ALT 637 FOR MEDICARE OP, ALT 636 FOR OP/ED): Performed by: NURSE PRACTITIONER

## 2024-06-16 PROCEDURE — 94762 N-INVAS EAR/PLS OXIMTRY CONT: CPT

## 2024-06-16 PROCEDURE — 82947 ASSAY GLUCOSE BLOOD QUANT: CPT

## 2024-06-16 PROCEDURE — 94660 CPAP INITIATION&MGMT: CPT

## 2024-06-16 PROCEDURE — 85027 COMPLETE CBC AUTOMATED: CPT | Performed by: NURSE PRACTITIONER

## 2024-06-16 RX ORDER — DOXAZOSIN 1 MG/1
1 TABLET ORAL DAILY
Start: 2024-06-17

## 2024-06-16 RX ORDER — INSULIN GLARGINE 100 [IU]/ML
35 INJECTION, SOLUTION SUBCUTANEOUS NIGHTLY
Start: 2024-06-16

## 2024-06-16 RX ORDER — GABAPENTIN 300 MG/1
300 CAPSULE ORAL 2 TIMES DAILY
Qty: 6 CAPSULE | Refills: 0 | Status: SHIPPED | OUTPATIENT
Start: 2024-06-16 | End: 2024-06-16

## 2024-06-16 RX ORDER — ACETAMINOPHEN 325 MG/1
650 TABLET ORAL EVERY 4 HOURS PRN
Start: 2024-06-16

## 2024-06-16 RX ORDER — INSULIN LISPRO 100 [IU]/ML
10 INJECTION, SOLUTION INTRAVENOUS; SUBCUTANEOUS
Start: 2024-06-16

## 2024-06-16 RX ORDER — METOPROLOL SUCCINATE 25 MG/1
25 TABLET, EXTENDED RELEASE ORAL DAILY
Start: 2024-06-17

## 2024-06-16 RX ORDER — IPRATROPIUM BROMIDE AND ALBUTEROL SULFATE 2.5; .5 MG/3ML; MG/3ML
3 SOLUTION RESPIRATORY (INHALATION)
Start: 2024-06-16

## 2024-06-16 RX ORDER — AMMONIUM LACTATE 12 G/100G
LOTION TOPICAL
Start: 2024-06-16

## 2024-06-16 RX ORDER — NYSTATIN 100000 [USP'U]/G
1 POWDER TOPICAL EVERY 8 HOURS
Start: 2024-06-16

## 2024-06-16 RX ORDER — IPRATROPIUM BROMIDE AND ALBUTEROL SULFATE 2.5; .5 MG/3ML; MG/3ML
3 SOLUTION RESPIRATORY (INHALATION) EVERY 2 HOUR PRN
Qty: 180 ML | Refills: 11 | Status: SHIPPED | OUTPATIENT
Start: 2024-06-16

## 2024-06-16 RX ORDER — INSULIN LISPRO 100 [IU]/ML
0-20 INJECTION, SOLUTION INTRAVENOUS; SUBCUTANEOUS
Start: 2024-06-16

## 2024-06-16 RX ORDER — KETOTIFEN FUMARATE 0.35 MG/ML
1 SOLUTION/ DROPS OPHTHALMIC 2 TIMES DAILY
Start: 2024-06-16

## 2024-06-16 RX ORDER — DOCUSATE SODIUM 100 MG/1
100 CAPSULE, LIQUID FILLED ORAL 2 TIMES DAILY
Start: 2024-06-16

## 2024-06-16 RX ORDER — POLYETHYLENE GLYCOL 3350 17 G/17G
17 POWDER, FOR SOLUTION ORAL DAILY
Start: 2024-06-17

## 2024-06-16 RX ORDER — GABAPENTIN 300 MG/1
300 CAPSULE ORAL 2 TIMES DAILY
Qty: 6 CAPSULE | Refills: 0 | Status: SHIPPED | OUTPATIENT
Start: 2024-06-16 | End: 2024-06-19

## 2024-06-16 RX ADMIN — SODIUM ZIRCONIUM CYCLOSILICATE 10 G: 10 POWDER, FOR SUSPENSION ORAL at 09:17

## 2024-06-16 RX ADMIN — HYDROXYZINE PAMOATE 25 MG: 25 CAPSULE ORAL at 22:03

## 2024-06-16 RX ADMIN — METOPROLOL SUCCINATE 25 MG: 25 TABLET, EXTENDED RELEASE ORAL at 09:21

## 2024-06-16 RX ADMIN — FUROSEMIDE 40 MG: 40 TABLET ORAL at 09:16

## 2024-06-16 RX ADMIN — NYSTATIN 1 APPLICATION: 100000 POWDER TOPICAL at 15:11

## 2024-06-16 RX ADMIN — DOXAZOSIN 1 MG: 1 TABLET ORAL at 10:14

## 2024-06-16 RX ADMIN — FERROUS SULFATE TAB 325 MG (65 MG ELEMENTAL FE) 1 TABLET: 325 (65 FE) TAB at 09:16

## 2024-06-16 RX ADMIN — INSULIN GLARGINE 35 UNITS: 100 INJECTION, SOLUTION SUBCUTANEOUS at 22:01

## 2024-06-16 RX ADMIN — ROSUVASTATIN CALCIUM 5 MG: 10 TABLET, COATED ORAL at 09:21

## 2024-06-16 RX ADMIN — Medication: at 22:03

## 2024-06-16 RX ADMIN — Medication 1 TABLET: at 09:22

## 2024-06-16 RX ADMIN — INSULIN LISPRO 4 UNITS: 100 INJECTION, SOLUTION INTRAVENOUS; SUBCUTANEOUS at 09:14

## 2024-06-16 RX ADMIN — INSULIN LISPRO 10 UNITS: 100 INJECTION, SOLUTION INTRAVENOUS; SUBCUTANEOUS at 09:12

## 2024-06-16 RX ADMIN — Medication 3 L/MIN: at 15:00

## 2024-06-16 RX ADMIN — Medication 3 L/MIN: at 07:24

## 2024-06-16 RX ADMIN — IPRATROPIUM BROMIDE AND ALBUTEROL SULFATE 3 ML: .5; 3 SOLUTION RESPIRATORY (INHALATION) at 19:44

## 2024-06-16 RX ADMIN — LEVOTHYROXINE SODIUM 75 MCG: 0.07 TABLET ORAL at 06:14

## 2024-06-16 RX ADMIN — INSULIN LISPRO 10 UNITS: 100 INJECTION, SOLUTION INTRAVENOUS; SUBCUTANEOUS at 12:00

## 2024-06-16 RX ADMIN — DOCUSATE SODIUM 100 MG: 100 CAPSULE, LIQUID FILLED ORAL at 22:00

## 2024-06-16 RX ADMIN — NYSTATIN 1 APPLICATION: 100000 POWDER TOPICAL at 22:03

## 2024-06-16 RX ADMIN — GABAPENTIN 300 MG: 300 CAPSULE ORAL at 09:21

## 2024-06-16 RX ADMIN — INSULIN LISPRO 4 UNITS: 100 INJECTION, SOLUTION INTRAVENOUS; SUBCUTANEOUS at 12:03

## 2024-06-16 RX ADMIN — IPRATROPIUM BROMIDE AND ALBUTEROL SULFATE 3 ML: .5; 3 SOLUTION RESPIRATORY (INHALATION) at 13:00

## 2024-06-16 RX ADMIN — GABAPENTIN 300 MG: 300 CAPSULE ORAL at 22:00

## 2024-06-16 RX ADMIN — FUROSEMIDE 40 MG: 40 TABLET ORAL at 16:38

## 2024-06-16 RX ADMIN — PANTOPRAZOLE SODIUM 40 MG: 40 TABLET, DELAYED RELEASE ORAL at 06:14

## 2024-06-16 RX ADMIN — KETOTIFEN FUMARATE 1 DROP: 0.25 SOLUTION/ DROPS OPHTHALMIC at 09:51

## 2024-06-16 RX ADMIN — IPRATROPIUM BROMIDE AND ALBUTEROL SULFATE 3 ML: .5; 3 SOLUTION RESPIRATORY (INHALATION) at 07:24

## 2024-06-16 RX ADMIN — KETOTIFEN FUMARATE 1 DROP: 0.25 SOLUTION/ DROPS OPHTHALMIC at 22:03

## 2024-06-16 ASSESSMENT — COGNITIVE AND FUNCTIONAL STATUS - GENERAL
HELP NEEDED FOR BATHING: TOTAL
DRESSING REGULAR UPPER BODY CLOTHING: A LOT
MOBILITY SCORE: 8
DAILY ACTIVITIY SCORE: 8
TURNING FROM BACK TO SIDE WHILE IN FLAT BAD: A LOT
STANDING UP FROM CHAIR USING ARMS: TOTAL
MOVING FROM LYING ON BACK TO SITTING ON SIDE OF FLAT BED WITH BEDRAILS: A LOT
WALKING IN HOSPITAL ROOM: TOTAL
PERSONAL GROOMING: TOTAL
DRESSING REGULAR LOWER BODY CLOTHING: A LOT
CLIMB 3 TO 5 STEPS WITH RAILING: TOTAL
EATING MEALS: TOTAL
MOVING TO AND FROM BED TO CHAIR: TOTAL
TOILETING: TOTAL

## 2024-06-16 ASSESSMENT — PAIN - FUNCTIONAL ASSESSMENT: PAIN_FUNCTIONAL_ASSESSMENT: 0-10

## 2024-06-16 ASSESSMENT — PAIN SCALES - GENERAL
PAINLEVEL_OUTOF10: 0 - NO PAIN
PAINLEVEL_OUTOF10: 0 - NO PAIN

## 2024-06-16 NOTE — PROGRESS NOTES
Physical Therapy    Physical Therapy Treatment    Patient Name: Brittanie Smart  MRN: [id number]  Today's Date: 6/16/2024  Time Calculation  Start Time: 1031  Stop Time: 1058  Time Calculation (min): 27 min    Assessment/Plan      PT Plan  Inpatient/Swing Bed or Outpatient: Inpatient  PT Plan  Treatment/Interventions: Transfer training, Strengthening, Therapeutic exercise, Therapeutic activity  PT Plan: Ongoing PT  PT Frequency: 4 times per week  PT Discharge Recommendations: Moderate intensity level of continued care  Equipment Recommended upon Discharge: Wheeled walker, Lift  PT Recommended Transfer Status: Assist x2, Assistive device  PT - OK to Discharge: Yes      General Visit Information:   PT  Visit  PT Received On: 06/16/24  General  Reason for Referral: Non-ambulatory  General Comment: Pt was resting in bed, agreeable to bed exercises, but not to get up.    Subjective   Precautions:     Vital Signs:       Objective   Pain:  Pain Assessment  Pain Assessment: 0-10  Pain Score: 0 - No pain  Response to Interventions: same  Cognition:     Coordination:     Postural Control:   NOT ASSESSED IN BED  Extremity/Trunk Assessments:  Strength RLE  RLE Overall Strength: Deficits  Strength LLE  LLE Overall Strength: Deficits  Activity Tolerance:     Treatments:  Therapeutic Exercise  Therapeutic Exercise Performed: Yes  Therapeutic Exercise Activity 1: Ankle pumps x30  Therapeutic Exercise Activity 2: heel slides x15 ea  Therapeutic Exercise Activity 3: quad and glute sets x15 with 5 sec hold ea  Therapeutic Exercise Activity 4: QS into SLR (MINIMAL LIFT DURING slr) x10 ea  Therapeutic Exercise Activity 5: hip add with pillow between knees x30  Therapeutic Exercise Activity 6: arm circles x30, albow flexion x30  Therapeutic Exercise Activity 7: chest press 1# db x20  Therapeutic Exercise Activity 8: chest flies x15      Education Documentation  No documentation found.  Education Comments  No comments found.        OP  EDUCATION:       Encounter Problems       Encounter Problems (Active)       PT Problem       BED MOBILITY Patient will transfer supine to sit and sit to supine with minimal assist to facilitate mobility.  (Progressing)       Start:  06/03/24    Expected End:  07/01/24            TRANSFER Patient will transfer sit to stand and stand to sit with  minimal 1-2 with RW  assist to facilitate mobility.  (Progressing)       Start:  06/03/24    Expected End:  07/01/24            AMBULATION Patient will amb 25 feet with rolling walker device including two turns on even surface with minimal assist to facilitate safe mobility.  (Progressing)       Start:  06/03/24    Expected End:  07/01/24            STRENGTH Patient will increase BLE strength to 4/5 to improve functional mobility.    (Progressing)       Start:  06/03/24    Expected End:  07/01/24            ACTIVITY TOLERANCE Pt will tolerate standing activities including reaching for items, standing 3-4 minutes to improve overall activity tolerance.  (Progressing)       Start:  06/03/24    Expected End:  07/01/24               Pain - Adult

## 2024-06-16 NOTE — CARE PLAN
Problem: Respiratory  Goal: Wean oxygen to maintain O2 saturation per order/standard this shift  Outcome: Progressing  Goal: Minimal/no exertional discomfort or dyspnea this shift  Outcome: Progressing  Goal: No signs of respiratory distress (eg. Use of accessory muscles. Peds grunting)  Outcome: Progressing      denies pain/discomfort

## 2024-06-16 NOTE — PROGRESS NOTES
Patient seen for acute kidney on top chronic kidney disease stage III creatinine stable at 2.7 mg deciliter no uremia okay to discharge from renal point of view follow-up with us in 2 weeks also follow-up with urology for the renal mass

## 2024-06-16 NOTE — DISCHARGE SUMMARY
Discharge Diagnosis  Unable to care for self    Issues Requiring Follow-Up      Discharge Meds     Your medication list        START taking these medications        Instructions Last Dose Given Next Dose Due   acetaminophen 325 mg tablet  Commonly known as: Tylenol      Take 2 tablets (650 mg) by mouth every 4 hours if needed for mild pain (1 - 3) or fever (temp greater than 38.0 C).       ammonium lactate 12 % lotion  Commonly known as: Lac-Hydrin      Apply topically every 1 hour if needed for dry skin.       docusate sodium 100 mg capsule  Commonly known as: Colace      Take 1 capsule (100 mg) by mouth 2 times a day.       doxazosin 1 mg tablet  Commonly known as: Cardura  Start taking on: June 17, 2024      Take 1 tablet (1 mg) by mouth once daily.       epoetin misha-epbx 10,000 unit/mL injection  Commonly known as: Retacrit  Start taking on: June 17, 2024      Inject 1 mL (10,000 Units) under the skin 3 times a week for 2 doses.       glucagon 1 mg injection  Commonly known as: Glucagen      Inject 1 mg into the muscle every 15 minutes if needed for low blood sugar - see comments (For blood glucose less than or equal to 70 mg/dL and no IV access).       insulin glargine 100 unit/mL injection  Commonly known as: Lantus      Inject 35 Units under the skin once daily at bedtime. Take as directed per insulin instructions.       ipratropium-albuteroL 0.5-2.5 mg/3 mL nebulizer solution  Commonly known as: Duo-Neb      Take 3 mL by nebulization 3 times a day.       ipratropium-albuteroL 0.5-2.5 mg/3 mL nebulizer solution  Commonly known as: Duo-Neb      Take 3 mL by nebulization every 2 hours if needed for wheezing or shortness of breath.       ketotifen 0.025 % (0.035 %) ophthalmic solution  Commonly known as: Zaditor      Administer 1 drop into both eyes 2 times a day.       nystatin 100,000 unit/gram powder  Commonly known as: Mycostatin      Apply 1 Application topically every 8 hours.       oxygen gas  therapy  Commonly known as: O2      Inhale 3 L/min every 8 hours.       oxygen gas therapy  Commonly known as: O2      Inhale 4 L/min once every 24 hours.       polyethylene glycol 17 gram packet  Commonly known as: Glycolax, Miralax  Start taking on: June 17, 2024      Take 17 g by mouth once daily.       sodium zirconium cyclosilicate 10 gram packet  Commonly known as: Lokelma  Start taking on: June 17, 2024      Take 10 g by mouth once daily.              CHANGE how you take these medications        Instructions Last Dose Given Next Dose Due   gabapentin 300 mg capsule  Commonly known as: Neurontin  What changed: when to take this      Take 1 capsule (300 mg) by mouth 2 times a day for 3 days.       insulin lispro 100 unit/mL injection  Commonly known as: HumaLOG  What changed: how much to take      Inject 0.1 mL (10 Units) under the skin 3 times daily (morning, midday, late afternoon). Take as directed per insulin instructions.       insulin lispro 100 unit/mL injection  Commonly known as: HumaLOG  What changed: how much to take      Inject 0-0.2 mL (0-20 Units) under the skin 3 times daily (morning, midday, late afternoon). Take as directed per insulin instructions.       metoprolol succinate XL 25 mg 24 hr tablet  Commonly known as: Toprol-XL  Start taking on: June 17, 2024  What changed:   medication strength  how much to take      Take 1 tablet (25 mg) by mouth once daily. Do not crush or chew.              CONTINUE taking these medications        Instructions Last Dose Given Next Dose Due   ferrous sulfate (325 mg ferrous sulfate) tablet           furosemide 40 mg tablet  Commonly known as: Lasix           levothyroxine 75 mcg tablet  Commonly known as: Synthroid, Levoxyl           multivitamin with minerals iron-free  Commonly known as: Centrum Silver           pantoprazole 40 mg EC tablet  Commonly known as: ProtoNix           rosuvastatin 5 mg tablet  Commonly known as: Crestor                  STOP  taking these medications      amLODIPine 10 mg tablet  Commonly known as: Norvasc        ascorbic acid 500 mg tablet  Commonly known as: Vitamin C        B complex-vitamin C-folic acid 1- mg-mg-mcg tablet  Commonly known as: Nephro-Chavez Rx        hydrocortisone 2.5 % rectal cream  Commonly known as: Anusol-HC        losartan 50 mg tablet  Commonly known as: Cozaar        nitrofurantoin (macrocrystal-monohydrate) 100 mg capsule  Commonly known as: Macrobid                  Where to Get Your Medications        These medications were sent to [facility]  [address], [place] [address], [facility] [address]      Hours: 9 AM to 6 PM Mon-Fri, 9 AM to 1 PM Sat Phone: [phone]   epoetin misha-epbx 10,000 unit/mL injection  glucagon 1 mg injection  ipratropium-albuteroL 0.5-2.5 mg/3 mL nebulizer solution       You can get these medications from any pharmacy    Bring a paper prescription for each of these medications  gabapentin 300 mg capsule       Information about where to get these medications is not yet available    Ask your nurse or doctor about these medications  acetaminophen 325 mg tablet  ammonium lactate 12 % lotion  docusate sodium 100 mg capsule  doxazosin 1 mg tablet  insulin glargine 100 unit/mL injection  insulin lispro 100 unit/mL injection  insulin lispro 100 unit/mL injection  ipratropium-albuteroL 0.5-2.5 mg/3 mL nebulizer solution  ketotifen 0.025 % (0.035 %) ophthalmic solution  metoprolol succinate XL 25 mg 24 hr tablet  nystatin 100,000 unit/gram powder  oxygen gas therapy  oxygen gas therapy  polyethylene glycol 17 gram packet  sodium zirconium cyclosilicate 10 gram packet         Test Results Pending At Discharge  Pending Labs       No current pending labs.            Hospital Course  HPI from admission  63 year old female had been in a care facility in [place] until earlier yesterday for unknown reason. Reportedly insurance rand out and she was discharged yesterday. Somehow  patient and/or family thought that she could leave [place] and come here to [place] to live by herself in an apartment. Patient is non ambulatory and could not even get out of the wheelchair by herself. Initially refused to come to the ED per ED physician, but family and/or staff somehow got her into the ED and then family left her here.  Hospitalist consulted for admission.     Patient is extremely uncooperative and does not give additional information.      During hospital course pt was seen by multiple consultants.  She will follow up with consultants and PCP on out pt basis     Acute on Chronic Respiratory Failure with hypoxia & hypercapnia   -ECHO with an EF of 60-65%, severe pulm. HTN  -pulmonology following  -oxygen PRN, wean as tolerated  -encourage Bi-pap  @HS, pt refusing  -switched to oral diuretics per cardiology recommendations for plural effusions/pulm. edema        Acute on Chronic Kidney Disease  -monitor   -nephrology following  -hold nephrotoxic agents  -renally dose medications  -US with possible mass/malignancy, pt refused MRI  urology recommended  -On Lokelma     Unable to care for self   -PT,OT recommend moderate intensity rehab  -Case management following, will go to skilled nursing facility upon discharge        DM II  -monitor blood glucose  -ISS  -diabetic diet  -HgbA1C 7.5  -started on Lantus     Morbid obesity   -encourage dietary and lifestyle modifications        Hypertension   -continue medications     Pressure injury of sacral region, stage 3 and both ankles   -mecca per dietary recommendations  - wound care gave recommendations  -  Continue MediHoney, Heel protectors         Hypothyroid   - Continue levothyroxine     Anemia   -anemia in setting of CKD  -Worsening, no anticoagulation  -continue iron supplementation  -b12, folate  -stool for occult blood  -started on epogen per nephrology  Pertinent Physical Exam At Time of Discharge  Physical Exam  Constitutional:       Appearance: She is  obese.   Cardiovascular:      Rate and Rhythm: Normal rate and regular rhythm.      Pulses: Normal pulses.      Heart sounds: Normal heart sounds.   Pulmonary:      Effort: No respiratory distress.      Breath sounds: No wheezing or rales.   Abdominal:      General: Bowel sounds are normal.      Palpations: Abdomen is soft.   Skin:     General: Skin is warm and dry.   Neurological:      General: No focal deficit present.      Mental Status: She is alert and oriented to person, place, and time.   Psychiatric:         Mood and Affect: Mood normal.         Behavior: Behavior normal.     Outpatient Follow-Up  Future Appointments   Date Time Provider Department Center   6/27/2024  1:30 PM Jayesh Durbin MD [id number]         Velvet Mortensen, APRN-CNP

## 2024-06-16 NOTE — NURSING NOTE
Patient requesting removal of BiPap mask. Explained the importance of wearing mask. Patient continues to refuse. Replaced 3L NC. RRT notified.

## 2024-06-16 NOTE — CARE PLAN
The patient's goals for the shift include      The clinical goals for the shift include safety      Problem: Skin  Goal: Decreased wound size/increased tissue granulation at next dressing change  Flowsheets (Taken 6/16/2024 1820)  Decreased wound size/increased tissue granulation at next dressing change: Promote sleep for wound healing  Goal: Participates in plan/prevention/treatment measures  Flowsheets (Taken 6/14/2024 1225 by Cat Lomeli RN)  Participates in plan/prevention/treatment measures:   Elevate heels   Discuss with provider PT/OT consult  Goal: Prevent/minimize sheer/friction injuries  Flowsheets (Taken 6/16/2024 1820)  Prevent/minimize sheer/friction injuries: HOB 30 degrees or less  Goal: Promote/optimize nutrition  Flowsheets (Taken 6/16/2024 1820)  Promote/optimize nutrition: Monitor/record intake including meals  Goal: Promote skin healing  Flowsheets (Taken 6/16/2024 1820)  Promote skin healing: Assess skin/pad under line(s)/device(s)

## 2024-06-16 NOTE — CARE PLAN
The patient's goals for the shift include      The clinical goals for the shift include safety       Problem: Fall/Injury  Goal: Verbalize understanding of personal risk factors for fall in the hospital  Outcome: Progressing  Goal: Verbalize understanding of risk factor reduction measures to prevent injury from fall in the home  Outcome: Progressing  Goal: Use assistive devices by end of the shift  Outcome: Progressing  Goal: Pace activities to prevent fatigue by end of the shift  Outcome: Progressing     Problem: Skin  Goal: Decreased wound size/increased tissue granulation at next dressing change  Outcome: Progressing  Goal: Participates in plan/prevention/treatment measures  Outcome: Progressing  Goal: Prevent/minimize sheer/friction injuries  Outcome: Progressing  Goal: Promote/optimize nutrition  Outcome: Progressing  Goal: Promote skin healing  Outcome: Progressing     Problem: Safety  Goal: Patient will be injury free during hospitalization  Outcome: Progressing  Goal: I will remain free of falls  Outcome: Progressing     Problem: Psychosocial Needs  Goal: Demonstrates ability to cope with hospitalization/illness  Outcome: Progressing  Goal: Collaborate with me, my family, and caregiver to identify my specific goals  Outcome: Progressing     Problem: Discharge Barriers  Goal: My discharge needs are met  Outcome: Progressing     Problem: Respiratory  Goal: Wean oxygen to maintain O2 saturation per order/standard this shift  Outcome: Progressing  Goal: Clear secretions with interventions this shift  Outcome: Progressing  Goal: Minimize anxiety/maximize coping throughout shift  Outcome: Progressing  Goal: Minimal/no exertional discomfort or dyspnea this shift  Outcome: Progressing  Goal: No signs of respiratory distress (eg. Use of accessory muscles. Peds grunting)  Outcome: Progressing  Goal: Patent airway maintained this shift  Outcome: Progressing  Goal: Tolerate mechanical ventilation evidenced by  VS/agitation level this shift  Outcome: Progressing  Goal: Tolerate pulmonary toileting this shift  Outcome: Progressing  Goal: Increase self care and/or family involvement in next 24 hours  Outcome: Progressing     Problem: Pain - Adult  Goal: Verbalizes/displays adequate comfort level or baseline comfort level  Outcome: Progressing     Problem: Safety - Adult  Goal: Free from fall injury  Outcome: Progressing     Problem: Discharge Planning  Goal: Discharge to home or other facility with appropriate resources  Outcome: Progressing     Problem: Chronic Conditions and Co-morbidities  Goal: Patient's chronic conditions and co-morbidity symptoms are monitored and maintained or improved  Outcome: Progressing     Problem: Diabetes  Goal: Achieve decreasing blood glucose levels by end of shift  Outcome: Progressing  Goal: Increase stability of blood glucose readings by end of shift  Outcome: Progressing  Goal: Decrease in ketones present in urine by end of shift  Outcome: Progressing  Goal: Maintain electrolyte levels within acceptable range throughout shift  Outcome: Progressing  Goal: Maintain glucose levels >70mg/dl to <250mg/dl throughout shift  Outcome: Progressing  Goal: No changes in neurological exam by end of shift  Outcome: Progressing  Goal: Learn about and adhere to nutrition recommendations by end of shift  Outcome: Progressing  Goal: Vital signs within normal range for age by end of shift  Outcome: Progressing  Goal: Increase self care and/or family involovement by end of shift  Outcome: Progressing  Goal: Receive DSME education by end of shift  Outcome: Progressing

## 2024-06-17 VITALS
RESPIRATION RATE: 18 BRPM | DIASTOLIC BLOOD PRESSURE: 54 MMHG | HEART RATE: 74 BPM | TEMPERATURE: 97.5 F | BODY MASS INDEX: 42.01 KG/M2 | HEIGHT: 68 IN | OXYGEN SATURATION: 99 % | WEIGHT: 277.2 LBS | SYSTOLIC BLOOD PRESSURE: 123 MMHG

## 2024-06-17 LAB
ANION GAP SERPL CALC-SCNC: 8 MMOL/L
BUN SERPL-MCNC: 73 MG/DL (ref 8–25)
CALCIUM SERPL-MCNC: 8.3 MG/DL (ref 8.5–10.4)
CHLORIDE SERPL-SCNC: 99 MMOL/L (ref 97–107)
CO2 SERPL-SCNC: 30 MMOL/L (ref 24–31)
CREAT SERPL-MCNC: 2.7 MG/DL (ref 0.4–1.6)
EGFRCR SERPLBLD CKD-EPI 2021: 19 ML/MIN/1.73M*2
ERYTHROCYTE [DISTWIDTH] IN BLOOD BY AUTOMATED COUNT: 22 % (ref 11.5–14.5)
GLUCOSE BLD MANUAL STRIP-MCNC: 155 MG/DL (ref 74–99)
GLUCOSE BLD MANUAL STRIP-MCNC: 203 MG/DL (ref 74–99)
GLUCOSE SERPL-MCNC: 178 MG/DL (ref 65–99)
HCT VFR BLD AUTO: 26.3 % (ref 36–46)
HGB BLD-MCNC: 7.7 G/DL (ref 12–16)
MCH RBC QN AUTO: 28.1 PG (ref 26–34)
MCHC RBC AUTO-ENTMCNC: 29.3 G/DL (ref 32–36)
MCV RBC AUTO: 96 FL (ref 80–100)
NRBC BLD-RTO: 0 /100 WBCS (ref 0–0)
PLATELET # BLD AUTO: 109 X10*3/UL (ref 150–450)
POTASSIUM SERPL-SCNC: 3.7 MMOL/L (ref 3.4–5.1)
RBC # BLD AUTO: 2.74 X10*6/UL (ref 4–5.2)
SODIUM SERPL-SCNC: 137 MMOL/L (ref 133–145)
WBC # BLD AUTO: 7.2 X10*3/UL (ref 4.4–11.3)

## 2024-06-17 PROCEDURE — 2500000001 HC RX 250 WO HCPCS SELF ADMINISTERED DRUGS (ALT 637 FOR MEDICARE OP): Performed by: NURSE PRACTITIONER

## 2024-06-17 PROCEDURE — 80048 BASIC METABOLIC PNL TOTAL CA: CPT | Performed by: NURSE PRACTITIONER

## 2024-06-17 PROCEDURE — 82947 ASSAY GLUCOSE BLOOD QUANT: CPT

## 2024-06-17 PROCEDURE — 36415 COLL VENOUS BLD VENIPUNCTURE: CPT | Performed by: NURSE PRACTITIONER

## 2024-06-17 PROCEDURE — 2500000001 HC RX 250 WO HCPCS SELF ADMINISTERED DRUGS (ALT 637 FOR MEDICARE OP): Performed by: EMERGENCY MEDICINE

## 2024-06-17 PROCEDURE — 2500000001 HC RX 250 WO HCPCS SELF ADMINISTERED DRUGS (ALT 637 FOR MEDICARE OP): Performed by: INTERNAL MEDICINE

## 2024-06-17 PROCEDURE — 2500000004 HC RX 250 GENERAL PHARMACY W/ HCPCS (ALT 636 FOR OP/ED): Performed by: NURSE PRACTITIONER

## 2024-06-17 PROCEDURE — 85027 COMPLETE CBC AUTOMATED: CPT | Performed by: NURSE PRACTITIONER

## 2024-06-17 PROCEDURE — 2500000002 HC RX 250 W HCPCS SELF ADMINISTERED DRUGS (ALT 637 FOR MEDICARE OP, ALT 636 FOR OP/ED): Performed by: INTERNAL MEDICINE

## 2024-06-17 PROCEDURE — 94640 AIRWAY INHALATION TREATMENT: CPT

## 2024-06-17 PROCEDURE — 6350000001 HC RX 635 EPOETIN >10,000 UNITS: Mod: JZ | Performed by: INTERNAL MEDICINE

## 2024-06-17 PROCEDURE — 9420000001 HC RT PATIENT EDUCATION 5 MIN

## 2024-06-17 PROCEDURE — 2500000002 HC RX 250 W HCPCS SELF ADMINISTERED DRUGS (ALT 637 FOR MEDICARE OP, ALT 636 FOR OP/ED): Performed by: EMERGENCY MEDICINE

## 2024-06-17 PROCEDURE — 2500000005 HC RX 250 GENERAL PHARMACY W/O HCPCS: Performed by: EMERGENCY MEDICINE

## 2024-06-17 PROCEDURE — 94660 CPAP INITIATION&MGMT: CPT

## 2024-06-17 PROCEDURE — 94762 N-INVAS EAR/PLS OXIMTRY CONT: CPT

## 2024-06-17 ASSESSMENT — PAIN SCALES - GENERAL
PAINLEVEL_OUTOF10: 0 - NO PAIN
PAINLEVEL_OUTOF10: 0 - NO PAIN

## 2024-06-17 ASSESSMENT — PAIN SCALES - WONG BAKER: WONGBAKER_NUMERICALRESPONSE: NO HURT

## 2024-06-17 NOTE — PROGRESS NOTES
Pulmonary Progress Note 06/17/24     FOLLOWUP FOR: acute on chronic respiratory failure, MARY/OHS    SUBJECTIVE  More awake and alert than when I last saw on Saturday.  She tells me overall she feels better.  She explains that she did not sleep much the preceding couple days.  Breathing is fair.  Remains on oxygen.  Does not like the BiPAP but utilized yesterday.      PHYSICAL EXAM        6/16/2024    11:43 AM 6/16/2024     4:16 PM 6/16/2024     7:13 PM 6/16/2024    11:05 PM 6/16/2024    11:29 PM 6/17/2024     3:06 AM 6/17/2024     7:30 AM   Vitals   Systolic 115 130 111 117  123 128   Diastolic 62 66 43 50  59 58   Heart Rate 83 70 72 76  68 70   Temp 36.8 °C (98.2 °F) 36.4 °C (97.5 °F) 36.6 °C (97.9 °F) 36.7 °C (98.1 °F)   36.3 °C (97.3 °F)   Resp 18 20 17 16 22 19 18       Intake/Output last 3 shifts:  I/O last 3 completed shifts:  In: 240 (1.9 mL/kg) [P.O.:240]  Out: 1150 (9.1 mL/kg) [Urine:1150 (0.3 mL/kg/hr)]  Weight: 125.7 kg   Intake/Output this shift:  No intake/output data recorded.        Physical Exam  Vitals reviewed.   Constitutional:       Appearance: She is obese. She is ill-appearing.      Interventions: Nasal cannula in place.   Cardiovascular:      Rate and Rhythm: Normal rate and regular rhythm.   Pulmonary:      Effort: Pulmonary effort is normal. No respiratory distress.      Breath sounds: Decreased air movement present. No wheezing.      Comments: Attempted with portable butterfly ultrasound probe to get better look at left lung.  Complicated by positioning and patient body habitus.  He is very limited.  Musculoskeletal:      Right lower leg: No edema.      Left lower leg: No edema.   Skin:     General: Skin is warm.   Neurological:      Mental Status: She is alert and oriented to person, place, and time.   Psychiatric:         Mood and Affect: Mood normal.         Behavior: Behavior normal.           Scheduled medications  docusate sodium, 100 mg, oral, BID  doxazosin, 1 mg,  oral, Daily  epoetin misha or biosimilar, 10,000 Units, subcutaneous, Once per day on Monday Wednesday Friday  ferrous sulfate (325 mg ferrous sulfate), 1 tablet, oral, Daily with breakfast  furosemide, 40 mg, oral, BID  gabapentin, 300 mg, oral, BID  insulin glargine, 35 Units, subcutaneous, Nightly  insulin lispro, 0-20 Units, subcutaneous, TID  insulin lispro, 10 Units, subcutaneous, TID  ipratropium-albuteroL, 3 mL, nebulization, TID  ketotifen, 1 drop, Both Eyes, BID  levothyroxine, 75 mcg, oral, Daily  metoprolol succinate XL, 25 mg, oral, Daily  multivitamin with minerals, 1 tablet, oral, Daily  nystatin, 1 Application, Topical, q8h  oxygen, , inhalation, q8h  pantoprazole, 40 mg, oral, Daily before breakfast  perflutren protein A microsphere, 0.5 mL, intravenous, Once in imaging  polyethylene glycol, 17 g, oral, Daily  rosuvastatin, 5 mg, oral, Daily  sodium zirconium cyclosilicate, 10 g, oral, Daily  sulfur hexafluoride microsphr, 2 mL, intravenous, Once in imaging      Continuous medications     PRN medications  PRN medications: acetaminophen **OR** [DISCONTINUED] acetaminophen **OR** [DISCONTINUED] acetaminophen, ammonium lactate, bisacodyl, dextrose, glucagon, hydrOXYzine pamoate, ipratropium-albuteroL, ondansetron ODT **OR** [DISCONTINUED] ondansetron, oxygen     Labs:  Lab Results   Component Value Date     06/17/2024     06/16/2024     06/15/2024    K 3.7 06/17/2024    K 4.2 06/16/2024    K 4.2 06/15/2024    CO2 30 06/17/2024    CO2 29 06/16/2024    CO2 30 06/15/2024    BUN 73 (H) 06/17/2024    BUN 68 (H) 06/16/2024    BUN 69 (H) 06/15/2024    CREATININE 2.70 (H) 06/17/2024    CREATININE 2.70 (H) 06/16/2024    CREATININE 2.60 (H) 06/15/2024    GLUCOSE 178 (H) 06/17/2024    GLUCOSE 134 (H) 06/16/2024    GLUCOSE 161 (H) 06/15/2024    CALCIUM 8.3 (L) 06/17/2024    CALCIUM 8.6 06/16/2024    CALCIUM 8.4 (L) 06/15/2024     Lab Results   Component Value Date    WBC 7.2 06/17/2024    HGB 7.7  (L) 06/17/2024    HCT 26.3 (L) 06/17/2024    MCV 96 06/17/2024     (L) 06/17/2024       Imaging:  XR chest 1 view    Result Date: 6/9/2024  Interpreted By:  Otilio Hernandez, STUDY: XR CHEST 1 VIEW; 6/9/2024 12:03 pm   INDICATION: CLINICAL INFORMATION: Signs/Symptoms:SOB, persistent hypoxia, re-eval pleural effusions.   COMPARISON: 06/07/2024   ACCESSION NUMBER(S): [id number]   ORDERING CLINICIAN: LEMUEL STACY   TECHNIQUE: Portable chest one view.   FINDINGS: Exam is limited by the patient's body habitus. Cardiac size indeterminate in view of the AP projection. Diffuse bilateral infiltrates are present with more focal density at the left base silhouetting left hemidiaphragm with air bronchograms at the left base. The findings suggest left lower lobe consolidation . Small effusions may be obscured on this portable study.       Diffuse bilateral infiltrates in a pattern suggesting pulmonary edema with more focal infiltrate suspected at the left base suggesting consolidation and perhaps pneumonia. Infiltrates appear more marked compared to 06/07/2024.   MACRO: none   Signed by: Otilio Hernandez 6/9/2024 1:16 PM Dictation workstation:   [id number]    US elastography parenchyma EG organ    Result Date: 6/8/2024  Interpreted By:  Otilio Hernandez, STUDY: US ELASTOGRAPHY PARENCHYMA EG ORGAN; 6/7/2024 6:37 pm   INDICATION: Signs/Symptoms:Elastography, r/o WINSTON CIRRHOSIS.   COMPARISON: None   ACCESSION NUMBER(S): [id number]   ORDERING CLINICIAN: SHANNON WESTON   TECHNIQUE: Limited abdominal ultrasound of the right upper quadrant was performed utilizing gray scale imaging.   FINDINGS: COMMENTS: Technologist note indicates the examination is technically suboptimal with measurements not thought to be accurate due to the patient's condition. Patient was unable to hold breath or to roll. Increased body habitus limits assessment as well.   Measurements obtained at this time are as follows:   Elastography Median: 12.5 kPA    Elastography IQR/Median: 26.6 %       Measurements were obtained as above but are now thought to be accurate due to the patient's body habitus and suboptimal cooperation at this time. Repeat evaluation at a later date may be appropriate.   Liver Fibrosis Staging Metavir Score kPa m/s Normal- Mild  F1 5.48 kPa-8.29 kPa 1.35 m/s- 1.66 m/s Mild-Moderate  F2 8.29 kPa-9.40 kPa 1.66 m/s- 1.77 m/s Moderate- Severe F3 9.40 kPa-11.9 kPa 1.77 m/s- 1.99 m/s Cirrhosis F4 >11.9 kPa >1.99 m/s   CAUTION: The values for the shear wave speed and tissue modulus are relative indices intended only for the purpose of comparison with other measurements performed using the LOGIQ E9 and E10 Absolute values for these measurements may vary among different measurement devices.   MACRO: none   Signed by: Otilio Hernandez 6/8/2024 10:02 AM Dictation workstation:   [id number]    XR chest 1 view    Result Date: 6/7/2024  Interpreted By:  Gerri Hsu, STUDY: XR CHEST 1 VIEW 6/7/2024 8:12 am   INDICATION: Signs/Symptoms:Pleural effusions   COMPARISON: 06/03/2024   ACCESSION NUMBER(S): [id number]   ORDERING CLINICIAN: DEBORAH KIM   TECHNIQUE: AP erect view of the chest at bedside   FINDINGS: The heart is enlarged with mild pulmonary vascular congestion seen. No focal infiltrate or focal airspace consolidation is identified.   No obvious pleural effusions are seen on the AP projection.       Mild cardiomegaly and mild pulmonary vascular congestion.   The bilateral pleural effusions seen on 06/03/2024 are not obvious on the AP projection. PA and lateral views of the chest are recommended when the patient's condition permits.   Signed by: Gerri Hsu 6/7/2024 9:43 AM Dictation workstation:   [id number]    US gallbladder    Result Date: 6/6/2024  Interpreted By:  Otilio Hernandez, STUDY: US GALLBLADDER; 9:13 am   INDICATION: Signs/Symptoms:abnormal CT. Abnormal appearance of gallbladder on CT from 06/04/2024.   COMPARISON: CT 06/04/2024   ACCESSION  NUMBER(S): [id number]   ORDERING CLINICIAN: KYLIE GARCIA   TECHNIQUE: Limited abdominal ultrasound of the right upper quadrant was performed utilizing gray scale imaging.   FINDINGS: COMMENTS: Technologist note states that the exam was limited due to patient being uncooperative.   Liver: There is a very coarse echotexture of the hepatic parenchyma with a lobulated hepatic contour consistent with underlying cirrhosis. Gallbladder: Gallbladder is filled with stones. There is diffuse gallbladder wall thickening measuring 5 mm with no pericholecystic fluid identified. Sonographic Latham's sign: Negative Pancreas: Pancreas is predominantly obscured by bowel gas. CBD: 0.36 cm Right Kidney:  Minimal amount of fluid is identified adjacent to the kidney. The kidney itself is unremarkable.       1. Abnormal appearance of the liver in a pattern consistent with cirrhosis. The splenomegaly noted on CT may be related to portal venous hypertension considering this appearance. 2. Cholelithiasis with gallbladder wall thickening but negative sonographic Latham's sign and no pericholecystic fluid. Findings may be indicative of chronic cholecystitis. Please correlate clinically. 3. Nonspecific perinephric fluid. 4. Please note exam is technically limited due to suboptimal cooperation from the patient according to technologist note.   MACRO: None.   Signed by: Otilio Hernandez 6/6/2024 9:17 AM Dictation workstation:   [id number]    CT kidney wo IV contrast    Result Date: 6/5/2024  Interpreted By:  Otilio Hernandez, STUDY: CT KIDNEY WO CONTRAST; 6/4/2024 3:50 pm   INDICATION: Signs/Symptoms:mass on left kidney.   COMPARISON: None.   ACCESSION NUMBER(S): [id number]   ORDERING CLINICIAN: KYLIE GARCIA   TECHNIQUE: Without INTRAVENOUS  CONTRAST WithoutORAL CONTRAST  ( Intravenous contrast was not able to utilized due to renal insufficiency. One or more of the following dose reduction techniques were used: Automated exposure control  Adjustment of the mA and/or kV according to patient size, and/or use of iterative reconstruction technique.   FINDINGS: ABDOMEN CT: SOLID ORGAN ASSESSMENT: Moderate bilateral pleural effusions are present. Bibasilar atelectasis is present. Infiltrate may be obscured by the atelectatic changes.   Within limits of this unenhanced study no focal masses are identified within the liver, spleen, pancreas, right kidney or adrenal glands. Spleen is enlarged with a craniocaudal extensive 16.4 cm.   CT examination does confirm a rim calcified mass within the mid to lower pole left kidney measuring on the order of 3.1 cm in diameter. Delineation and characterization of the mass is somewhat limited due to the absence of contrast. There is suspicion of mildly enlarged retroperitoneal lymph nodes on the left at the level of the left kidney, again not well characterized due to the patient's body habitus and absence of contrast.   Multiple stones are identified along the dependent portion the gallbladder. Gallbladder wall is not well defined raising possibility of gallbladder wall thickening and/or pericholecystic fluid. Please correlate with patient's clinical assessment to rule out acute cholecystitis. Right upper quadrant ultrasound could be obtained to further assess this finding.   RETROPERITONEUM: AORTA:  There is no evidence for abdominal aortic aneurysm.   BOWEL ASSESSMENT: No intraperitoneal free air is identified. There is a nonspecific appearance of the stomach. Visualized portions of the large and small bowel are unremarkable.   ABDOMINAL WILKINS: There is no evidence for a hernia. Anasarca is present.   OSSEOUS STRUCTURES: No lytic or blastic lesions are identified. Spurring is noted throughout the spine.       1. Indeterminate rim calcified mass left kidney as was suspected on the ultrasound study. Both benign and malignant etiologies are considered in the differential this time with characterization of the mass limited  due to the technical issues on this study and the ultrasound study. There is suspicion of left-sided periaortic lymphadenopathy, again not well characterized due to the absence of intravenous contrast. 2. Cholelithiasis with poorly defined gallbladder wall margins. Please correlate with clinical history to rule out the possibility of acute cholecystitis. Right upper quadrant ultrasound may be attempted for further characterization the gallbladder wall to rule out gallbladder wall thickening and pericholecystic fluid. 3. Anasarca. 4. Moderate bilateral pleural effusions with bibasilar atelectasis most marked involving the lower lobes. 5. Splenomegaly. Spleen measures 16.4 cm in craniocaudal extent.     MACRO: none   Signed by: Otilio Hernandez 6/5/2024 12:36 PM Dictation workstation:   [id number]    ECG 12 lead    Result Date: 6/4/2024  Normal sinus rhythm Minimal voltage criteria for LVH, may be normal variant Nonspecific T wave abnormality Abnormal ECG No previous ECGs available Confirmed by uDong Worthington ([id number]) on 6/4/2024 11:07:09 PM    Transthoracic Echo (TTE) Complete    Result Date: 6/4/2024            [facility] [address] [place], [place] [address]             Phone [phone] TRANSTHORACIC ECHOCARDIOGRAM REPORT  Patient Name:      DESI THAYER           Reading Physician:    [id number] Duong Worthington DO Study Date:        6/4/2024              Ordering Provider:    [id number] KYLIE GARCIA MRN/PID:           [id number]              Fellow: Accession#:        [id number]          Nurse: Date of Birth/Age: 1960 / 63 years  Sonographer:          Chantelle Carrasquillo                                                                ACS, RDCS, LUCÍA Gender:            F                     Additional Staff: Height:             170.18 cm             Admit Date: Weight:            125.65 kg             Admission Status:     Inpatient -                                                                Routine BSA / BMI:         2.32 m2 / 43.38 kg/m2 Department Location:  [facility] Blood Pressure: 124 /53 mmHg Study Type:    TRANSTHORACIC ECHO (TTE) COMPLETE Diagnosis/ICD: Dyspnea, unspecified-R06.00 Indication:    dyspnea, Htn, Renal failure CPT Codes:     Echo Complete w Full Doppler-[id number] Patient History: Pertinent History: Dyspnea, DM2, Renal failure, Htn, PVD. Study Detail: The following Echo studies were performed: 2D, M-Mode, Doppler and               color flow. Technically challenging study due to poor acoustic               windows and body habitus. Definity used as a contrast agent for               endocardial border definition. Total contrast used for this               procedure was 2 mL via IV push.  PHYSICIAN INTERPRETATION: Left Ventricle: Left ventricular systolic function is normal, with an estimated ejection fraction of 60-65%. There are no regional wall motion abnormalities. The left ventricular cavity size is normal. There is mild concentric left ventricular hypertrophy. Spectral Doppler shows an impaired relaxation pattern of left ventricular diastolic filling. Left Atrium: The left atrium is normal in size. Right Ventricle: The right ventricle was not well visualized. Unable to determine right ventricular systolic function. RV not well visualized but suggests RV enlargemnt and RV hypokinesis in some views, with severe pulmonary hypertension. Right Atrium: The right atrium was not well visualized. Aortic Valve: The aortic valve appears abnormal. There is mild to moderate aortic valve cusp calcification. There is evidence of mildly elevated transaortic gradients consistent with sclerosis of the aortic valve. There is no evidence of aortic valve regurgitation. The peak instantaneous gradient of the aortic valve  is 7.5 mmHg. Mitral Valve: The mitral valve is abnormal. There is mild mitral valve regurgitation. Tricuspid Valve: The tricuspid valve is structurally normal. There is severe tricuspid regurgitation. The Doppler estimated RVSP is severely elevated at 79.6 mmHg. Pulmonic Valve: The pulmonic valve is not well visualized. The pulmonic valve regurgitation was not well visualized. Pericardium: There is no pericardial effusion noted. Aorta: The aortic root is normal.  CONCLUSIONS:  1. Left ventricular systolic function is normal with a 60-65% estimated ejection fraction.  2. Poorly visualized anatomical structures due to suboptimal image quality.  3. Spectral Doppler shows an impaired relaxation pattern of left ventricular diastolic filling.  4. RV not well visualized but suggests RV enlargemnt and RV hypokinesis in some views, with severe pulmonary hypertension.  5. Severe tricuspid regurgitation.  6. Severely elevated right ventricular systolic pressure.  7. Aortic valve appears abnormal.  8. Aortic valve sclerosis. QUANTITATIVE DATA SUMMARY: 2D MEASUREMENTS:                           Normal Ranges: IVSd:          1.40 cm    (0.6-1.1cm) LVPWd:         1.23 cm    (0.6-1.1cm) LVIDd:         4.76 cm    (3.9-5.9cm) LVIDs:         3.21 cm LV Mass Index: 106.2 g/m2 LV % FS        32.6 % RA VOLUME BY A/L METHOD:                       Normal Ranges: RA Area A4C: 12.7 cm2 AORTA MEASUREMENTS:                    Normal Ranges: Asc Ao, d: 3.10 cm (2.1-3.4cm) LV SYSTOLIC FUNCTION BY 2D PLANIMETRY (MOD):                     Normal Ranges: EF-A4C View: 64.1 % (>=55%) EF-A2C View: 56.5 % EF-Biplane:  60.2 % LV DIASTOLIC FUNCTION:                        Normal Ranges: MV Peak E:    1.14 m/s (0.7-1.2 m/s) MV Peak A:    1.03 m/s (0.42-0.7 m/s) E/A Ratio:    1.11     (1.0-2.2) MV e'         0.07 m/s (>8.0) MV lateral e' 0.09 m/s MV medial e'  0.05 m/s E/e' Ratio:   16.29    (<8.0) MITRAL VALVE:                 Normal Ranges: MV DT: 189  msec (150-240msec) AORTIC VALVE:                         Normal Ranges: AoV Vmax:      1.37 m/s (<=1.7m/s) AoV Peak P.5 mmHg (<20mmHg) LVOT Max Joshua:  0.72 m/s (<=1.1m/s) LVOT VTI:      15.80 cm LVOT Diameter: 1.70 cm  (1.8-2.4cm) AoV Area,Vmax: 1.19 cm2 (2.5-4.5cm2)  RIGHT VENTRICLE: RV Basal 3.89 cm TAPSE:   10.7 mm RV s'    0.08 m/s TRICUSPID VALVE/RVSP:                             Normal Ranges: Peak TR Velocity: 3.86 m/s RV Syst Pressure: 79.6 mmHg (< 30mmHg) IVC Diam:         2.45 cm  [id number] Duong Worthington  Electronically signed on 2024 at 11:34:03 AM  ** Final **     US renal complete    Result Date: 2024  Interpreted By:  Otilio Hernandez, STUDY: US RENAL COMPLETE; 6/3/2024 7:24 pm   INDICATION: Signs/Symptoms:DELISA.   COMPARISON: None   ACCESSION NUMBER(S): [id number]   ORDERING CLINICIAN: DEBORAH KIM   TECHNIQUE: Grayscale and color Doppler imaging of the kidneys   FINDINGS: COMMENTS: Technologist note indicates that the study was quite limited due to the patient's body habitus. Exam was performed portably as well.   The right kidney measures 10.7 cm x 3.9 cm x 4.7 cm . The left kidney measures 11.8 cm x 5.4 cm x 5.4 cm. THERE IS A HETEROGENEOUS COMPLEX MASS LATERALLY MID TO LOWER POLE LEFT KIDNEY MEASURING 4.3 X 3.9 X 4.6 CM.     No renal stones are identified.  The renal cortical thickness and echogenicity is normal.  No hydronephrosis is identified.   Urinary bladder is nonspecific in appearance with no stones or masses identified.       COMPLEX APPEARING MASS LEFT KIDNEY MEASURING UP TO 4.6 CM IN DIAMETER, WITH A DIFFERENTIAL TO INCLUDE MALIGNANCY. CT UROGRAM WITH AND WITHOUT CONTRAST IS SUGGESTED FOR MORE DEFINITIVE ASSESSMENT.   MACRO: Otilio Hernandez discussed the significance and urgency of this critical finding EPIC secure chat with  [facility] medicine team on 2024 at 8:22 am.  (**-RCF-**) Findings:  See findings.   Signed by: Otilio Hernandez 2024 8:23 AM Dictation workstation:    [id number]    XR chest 2 views    Result Date: 6/3/2024  Interpreted By:  Gerri Hsu, STUDY: XR CHEST 2 VIEWS 6/3/2024 8:15 am   INDICATION: Signs/Symptoms:COPD with wheezing   COMPARISON: None available.   ACCESSION NUMBER(S): [id number]   ORDERING CLINICIAN: ZAHRA BATISTA   TECHNIQUE: AP and lateral views of cervical spine are performed.   FINDINGS: The cardiac size is indeterminate due to the AP projection.   Right hemidiaphragm is elevated. On lateral projection, bilateral pleural effusions are observed. No obvious focal infiltrate or airspace consolidation is seen.       Bilateral pleural effusions on lateral projection and elevated right hemidiaphragm.   Signed by: Gerri Hsu 6/3/2024 8:24 AM Dictation workstation:   [id number]            Assessment/Plan   Principal Problem:    Unable to care for self  Active Problems:    Type 2 diabetes mellitus (Multi)    Morbid obesity (Multi)    Peripheral vascular disease (CMS-HCC)    Hypertension    Pressure sore on sacrum    Behavior problem, adult    Renal failure    Acute respiratory failure with hypoxia and hypercapnia (Multi)    Pressure injury of skin of heel    Acute on chronic respiratory failure with hypoxia and hypercarbia  Continue with supplemental oxygen during the daytime  Again encouraged use of NIPPV at bedtime and at what ever facility she is going to.  Abnormal chest imaging: Difficult to tell even on my bedside ultrasound.  Not causing any clinical escalation or embarrassment.  Continue with supportive care including lung expansion maneuvers with PT OT and NIPPV at bedtime, pulmonary hygiene, diuresis  Hypercarbic encephalopathy  Stressed importance of BiPAP therapy  Morbid obesity  Failure to thrive  Acute on chronic kidney disease  Nephrology follow-up  Congestive heart failure/pulmonary edema:   Diuresis per cardiology and nephrology  No absolute contraindication for discharge planning    We will continue to follow     LOS: 14 days        Ronald Warner MD  Pulmonary/Critical Care medicine

## 2024-06-17 NOTE — PROGRESS NOTES
06/17/24 0750   Discharge Planning   Patient expects to be discharged to: [facility] has authorization for the patient, but must enter their facility before midnight on 6/18/24. Reached out to them at this time to check on status of SCA as they need it for patient to enter their facility; as of 6/16 SCA still pending; requested they advise. Care Transitions will follow.   Does the patient need discharge transport arranged? Yes   RoundTrip coordination needed? Yes   Has discharge transport been arranged? No     8:44 AM [facility] is checking on status of SCA and will advise.     11:45 AM [facility] has received SCA for the patient and confirmed she can admit today. MARGI Lao and discharge summary sent to them for their records. [id number] completed/submitted via [employer]. [id number] and certification sent to [facility] for their records. Transportation arranged via [employer] for pick-up at 1:30 PM via ambulance. RHONDA Hester Raven, RN and patient updated to the above.

## 2024-06-17 NOTE — PROGRESS NOTES
Brittanie Smart is a 63 y.o. female on day 14 of admission presenting with Unable to care for self.      Subjective   Patient has no complaints, denies pain or sob.        Objective     Last Recorded Vitals  /54 (BP Location: Right arm, Patient Position: Lying)   Pulse 74   Temp 36.4 °C (97.5 °F) (Oral)   Resp 18   Wt 126 kg (277 lb 3.2 oz)   SpO2 99%   Intake/Output last 3 Shifts:    Intake/Output Summary (Last 24 hours) at 6/17/2024 1357  Last data filed at 6/16/2024 1700  Gross per 24 hour   Intake --   Output 550 ml   Net -550 ml       Admission Weight  Weight: 119 kg (263 lb 4.8 oz) (06/02/24 2217)    Daily Weight  06/03/24 : 126 kg (277 lb 3.2 oz)    Image Results  XR chest 1 view  Narrative: Interpreted By:  Bob Tapia,   STUDY:  XR CHEST 1 VIEW;  6/15/2024 2:01 pm      INDICATION:  Signs/Symptoms:hypoxia.      COMPARISON:  06/13/2024      ACCESSION NUMBER(S):  [id number]      ORDERING CLINICIAN:  YOLANDA CHAUDHARI      FINDINGS:  CHEST/LUNGS:  The cardiac and mediastinal silhouettes are unchanged in size and  configuration. Prominence of the vasculature and interstitium  suggesting mild degree of congestion and edema.  Atelectasis/infiltrate in the left base. Blunting of the costophrenic  angles compatible with effusions, slightly larger on the left.      UPPER ABDOMEN:  No remarkable upper abdominal findings.      OSSEOUS STRUCTURES:  No acute changes.      Impression: Mild congestion and edema. Atelectasis/infiltrate in the left base.  Small effusions, larger on the left.      MACRO:  None.      Signed by: Bob Tapia 6/15/2024 3:44 PM  Dictation workstation:   [id number]      Physical Exam  Constitutional:       Appearance: Normal appearance.   Cardiovascular:      Rate and Rhythm: Normal rate and regular rhythm.      Pulses: Normal pulses.      Heart sounds: Normal heart sounds.   Pulmonary:      Effort: Pulmonary effort is normal.      Breath sounds: Normal breath sounds.   Abdominal:       General: Bowel sounds are normal.      Palpations: Abdomen is soft.   Musculoskeletal:         General: Normal range of motion.   Skin:     General: Skin is warm and dry.   Neurological:      Mental Status: She is alert and oriented to person, place, and time.         Relevant Results             Results for orders placed or performed during the hospital encounter of 06/02/24 (from the past 24 hour(s))   POCT GLUCOSE   Result Value Ref Range    POCT Glucose 144 (H) 74 - 99 mg/dL   POCT GLUCOSE   Result Value Ref Range    POCT Glucose 176 (H) 74 - 99 mg/dL   Basic Metabolic Panel   Result Value Ref Range    Glucose 178 (H) 65 - 99 mg/dL    Sodium 137 133 - 145 mmol/L    Potassium 3.7 3.4 - 5.1 mmol/L    Chloride 99 97 - 107 mmol/L    Bicarbonate 30 24 - 31 mmol/L    Urea Nitrogen 73 (H) 8 - 25 mg/dL    Creatinine 2.70 (H) 0.40 - 1.60 mg/dL    eGFR 19 (L) >60 mL/min/1.73m*2    Calcium 8.3 (L) 8.5 - 10.4 mg/dL    Anion Gap 8 <=19 mmol/L   CBC   Result Value Ref Range    WBC 7.2 4.4 - 11.3 x10*3/uL    nRBC 0.0 0.0 - 0.0 /100 WBCs    RBC 2.74 (L) 4.00 - 5.20 x10*6/uL    Hemoglobin 7.7 (L) 12.0 - 16.0 g/dL    Hematocrit 26.3 (L) 36.0 - 46.0 %    MCV 96 80 - 100 fL    MCH 28.1 26.0 - 34.0 pg    MCHC 29.3 (L) 32.0 - 36.0 g/dL    RDW 22.0 (H) 11.5 - 14.5 %    Platelets 109 (L) 150 - 450 x10*3/uL   POCT GLUCOSE   Result Value Ref Range    POCT Glucose 155 (H) 74 - 99 mg/dL   POCT GLUCOSE   Result Value Ref Range    POCT Glucose 203 (H) 74 - 99 mg/dL       Assessment/Plan                  Principal Problem:    Unable to care for self  Active Problems:    Type 2 diabetes mellitus (Multi)    Morbid obesity (Multi)    Peripheral vascular disease (CMS-HCC)    Hypertension    Pressure sore on sacrum    Behavior problem, adult    Renal failure    Acute respiratory failure with hypoxia and hypercapnia (Multi)    Pressure injury of skin of heel    Acute on Chronic Respiratory Failure with hypoxia & hypercapnia   ECHO with an EF of  60-65%, severe pulm. HTN  pulmonology following  oxygen PRN, wean as tolerated  encourage Bi-pap  @[facility], pt refusing  switched to oral diuretics per cardiology recommendations for plural effusions/pulm. edema        Acute on Chronic Kidney Disease  monitor   nephrology following  hold nephrotoxic agents  renally dose medications  US with possible mass/malignancy, pt refused MRI  urology recommended  Lokelma started and stopped today     Unable to care for self   -PT,OT recommend moderate intensity rehab  -Case management following, will go to skilled nursing facility upon discharge        DM II  -monitor blood glucose  -ISS  -diabetic diet  -HgbA1C 7.5  -started on Lantus     Morbid obesity   -encourage dietary and lifestyle modifications        Hypertension   -continue medications     Pressure injury of sacral region, stage 3 and both ankles   -mecca per dietary recommendations  - wound care gave recommendations  -  Continue MediHoney, Heel protectors         Hypothyroid   - Continue levothyroxine     Anemia   -anemia in setting of CKD  -Worsening, no anticoagulation  -continue iron supplementation  -b12, folate  -stool for occult blood  -started on epogen per nephrology     Plan of Care  Patient was discharged yesterday and today will go to SNF.  Plan of care dicussed with patient.               Kaylan Bird, APRN-CNP

## 2024-06-17 NOTE — CARE PLAN
The patient's goals for the shift include      The clinical goals for the shift include BIPAP    Problem: Fall/Injury  Goal: Verbalize understanding of personal risk factors for fall in the hospital  6/17/2024 0025 by Olivia Crooks RN  Outcome: Progressing  6/16/2024 1957 by Olivia Crooks RN  Outcome: Progressing  Goal: Verbalize understanding of risk factor reduction measures to prevent injury from fall in the home  6/17/2024 0025 by Olivia Crooks RN  Outcome: Progressing  6/16/2024 1957 by Olivia Crooks RN  Outcome: Progressing  Goal: Use assistive devices by end of the shift  6/17/2024 0025 by Olivia Crooks RN  Outcome: Progressing  6/16/2024 1957 by Olivia Crooks RN  Outcome: Progressing  Goal: Pace activities to prevent fatigue by end of the shift  6/17/2024 0025 by Olivia Crooks RN  Outcome: Progressing  6/16/2024 1957 by Olivia Crooks RN  Outcome: Progressing     Problem: Skin  Goal: Decreased wound size/increased tissue granulation at next dressing change  6/17/2024 0025 by Olivia Crooks RN  Outcome: Progressing  6/16/2024 1957 by Olivia Crooks RN  Outcome: Progressing  Goal: Participates in plan/prevention/treatment measures  6/17/2024 0025 by Olivia Crooks RN  Outcome: Progressing  6/16/2024 1957 by Olivia Crooks RN  Outcome: Progressing  Goal: Prevent/minimize sheer/friction injuries  6/17/2024 0025 by Olivia Crooks RN  Outcome: Progressing  6/16/2024 1957 by Olivia Crooks RN  Outcome: Progressing  Goal: Promote/optimize nutrition  6/17/2024 0025 by Olivia Crooks RN  Outcome: Progressing  6/16/2024 1957 by Olivia Crooks RN  Outcome: Progressing  Goal: Promote skin healing  6/17/2024 0025 by Olivia Crooks RN  Outcome: Progressing  6/16/2024 1957 by Olivia Crooks RN  Outcome: Progressing     Problem: Safety  Goal: Patient will be injury free during hospitalization  6/17/2024 0025 by Olivia Crooks, RN  Outcome: Progressing  6/16/2024 1957 by Olivia ALLEN  JEAN CLAUDE Crooks  Outcome: Progressing  Goal: I will remain free of falls  6/17/2024 0025 by Olivia Crooks RN  Outcome: Progressing  6/16/2024 1957 by Olivia Crooks RN  Outcome: Progressing     Problem: Psychosocial Needs  Goal: Demonstrates ability to cope with hospitalization/illness  6/17/2024 0025 by Olivia Crooks RN  Outcome: Progressing  6/16/2024 1957 by Olivia Crooks RN  Outcome: Progressing  Goal: Collaborate with me, my family, and caregiver to identify my specific goals  6/17/2024 0025 by Olivia Crooks RN  Outcome: Progressing  6/16/2024 1957 by Olivia Crooks RN  Outcome: Progressing     Problem: Discharge Barriers  Goal: My discharge needs are met  6/17/2024 0025 by Olivia Crooks RN  Outcome: Progressing  6/16/2024 1957 by Olivia Crooks RN  Outcome: Progressing     Problem: Respiratory  Goal: Wean oxygen to maintain O2 saturation per order/standard this shift  6/17/2024 0025 by Olivia Crooks RN  Outcome: Progressing  6/16/2024 1957 by Olivia Crooks RN  Outcome: Progressing  Goal: Clear secretions with interventions this shift  6/17/2024 0025 by Olivia Crooks RN  Outcome: Progressing  6/16/2024 1957 by Olivia Crooks RN  Outcome: Progressing  Goal: Minimize anxiety/maximize coping throughout shift  6/17/2024 0025 by Olivia Crooks RN  Outcome: Progressing  6/16/2024 1957 by Olivia Crooks RN  Outcome: Progressing  Goal: Minimal/no exertional discomfort or dyspnea this shift  6/17/2024 0025 by Olivia Crooks RN  Outcome: Progressing  6/16/2024 1957 by Olivia Crooks RN  Outcome: Progressing  Goal: No signs of respiratory distress (eg. Use of accessory muscles. Peds grunting)  6/17/2024 0025 by Olivia Crooks RN  Outcome: Progressing  6/16/2024 1957 by Olivia Crooks RN  Outcome: Progressing  Goal: Patent airway maintained this shift  6/17/2024 0025 by Olivia M Crooks, RN  Outcome: Progressing  6/16/2024 1957 by Olivia Crooks, RN  Outcome: Progressing  Goal:  Tolerate mechanical ventilation evidenced by VS/agitation level this shift  6/17/2024 0025 by Olivia Crooks RN  Outcome: Progressing  6/16/2024 1957 by Olivia Crooks RN  Outcome: Progressing  Goal: Tolerate pulmonary toileting this shift  6/17/2024 0025 by Olivia Crooks RN  Outcome: Progressing  6/16/2024 1957 by Olivia Crooks RN  Outcome: Progressing  Goal: Increase self care and/or family involvement in next 24 hours  6/17/2024 0025 by Olivia Crooks RN  Outcome: Progressing  6/16/2024 1957 by Olivia Crooks RN  Outcome: Progressing     Problem: Pain - Adult  Goal: Verbalizes/displays adequate comfort level or baseline comfort level  6/17/2024 0025 by Olivia Crooks RN  Outcome: Progressing  6/16/2024 1957 by Olivia Crooks RN  Outcome: Progressing     Problem: Safety - Adult  Goal: Free from fall injury  6/17/2024 0025 by Olivia Crooks RN  Outcome: Progressing  6/16/2024 1957 by Olivia Crooks RN  Outcome: Progressing     Problem: Discharge Planning  Goal: Discharge to home or other facility with appropriate resources  6/17/2024 0025 by Olivia Crooks RN  Outcome: Progressing  6/16/2024 1957 by Olivia Crooks RN  Outcome: Progressing     Problem: Chronic Conditions and Co-morbidities  Goal: Patient's chronic conditions and co-morbidity symptoms are monitored and maintained or improved  6/17/2024 0025 by Olivia Crooks RN  Outcome: Progressing  6/16/2024 1957 by Olivia Crooks RN  Outcome: Progressing     Problem: Diabetes  Goal: Achieve decreasing blood glucose levels by end of shift  6/17/2024 0025 by Olivia Crooks RN  Outcome: Progressing  6/16/2024 1957 by Olivia Crooks RN  Outcome: Progressing  Goal: Increase stability of blood glucose readings by end of shift  6/17/2024 0025 by Olivia Crooks RN  Outcome: Progressing  6/16/2024 1957 by Olivia Crooks RN  Outcome: Progressing  Goal: Decrease in ketones present in urine by end of shift  6/17/2024 0025 by Olivia ALLEN  JEAN CLAUDE Crooks  Outcome: Progressing  6/16/2024 1957 by Olivia Crooks RN  Outcome: Progressing  Goal: Maintain electrolyte levels within acceptable range throughout shift  6/17/2024 0025 by Olivia Crooks RN  Outcome: Progressing  6/16/2024 1957 by Olivia Crooks RN  Outcome: Progressing  Goal: Maintain glucose levels >70mg/dl to <250mg/dl throughout shift  6/17/2024 0025 by Olivia Crooks RN  Outcome: Progressing  6/16/2024 1957 by Olivia Crooks RN  Outcome: Progressing  Goal: No changes in neurological exam by end of shift  6/17/2024 0025 by Olivia Crooks RN  Outcome: Progressing  6/16/2024 1957 by Olivia Crooks RN  Outcome: Progressing  Goal: Learn about and adhere to nutrition recommendations by end of shift  6/17/2024 0025 by Olivia Crooks RN  Outcome: Progressing  6/16/2024 1957 by Olivia Crooks RN  Outcome: Progressing  Goal: Vital signs within normal range for age by end of shift  6/17/2024 0025 by Olivia Crooks RN  Outcome: Progressing  6/16/2024 1957 by Olivia Crooks RN  Outcome: Progressing  Goal: Increase self care and/or family involovement by end of shift  6/17/2024 0025 by Olivia Crooks RN  Outcome: Progressing  6/16/2024 1957 by Olivia Crooks RN  Outcome: Progressing  Goal: Receive DSME education by end of shift  6/17/2024 0025 by Olivia Crooks RN  Outcome: Progressing  6/16/2024 1957 by Olivia Crooks RN  Outcome: Progressing

## 2024-06-17 NOTE — PROGRESS NOTES
Brittanie Smart is a 63 y.o. female on day 14 of admission presenting with Unable to care for self.      Subjective   Patient with no complaints, creatinine stable, nonoliguric.       Objective          Vitals 24HR  Heart Rate:  [68-83]   Temp:  [36.3 °C (97.3 °F)-36.8 °C (98.2 °F)]   Resp:  [16-22]   BP: (111-130)/(43-66)   SpO2:  [93 %-100 %]       Intake/Output last 3 Shifts:    Intake/Output Summary (Last 24 hours) at 6/17/2024 1135  Last data filed at 6/16/2024 1700  Gross per 24 hour   Intake --   Output 950 ml   Net -950 ml       Physical Exam  Constitutional:       General: She is awake. She is not in acute distress.  Cardiovascular:      Heart sounds:      No friction rub.   Pulmonary:      Effort: Pulmonary effort is normal.      Breath sounds: Normal breath sounds.   Abdominal:      General: Bowel sounds are normal.      Palpations: Abdomen is soft.      Tenderness: There is no guarding or rebound.   Musculoskeletal:      Comments: Mild peripheral edema   Neurological:      Mental Status: She is alert.         Relevant Results  Results for orders placed or performed during the hospital encounter of 06/02/24 (from the past 24 hour(s))   POCT GLUCOSE   Result Value Ref Range    POCT Glucose 171 (H) 74 - 99 mg/dL   POCT GLUCOSE   Result Value Ref Range    POCT Glucose 144 (H) 74 - 99 mg/dL   POCT GLUCOSE   Result Value Ref Range    POCT Glucose 176 (H) 74 - 99 mg/dL   Basic Metabolic Panel   Result Value Ref Range    Glucose 178 (H) 65 - 99 mg/dL    Sodium 137 133 - 145 mmol/L    Potassium 3.7 3.4 - 5.1 mmol/L    Chloride 99 97 - 107 mmol/L    Bicarbonate 30 24 - 31 mmol/L    Urea Nitrogen 73 (H) 8 - 25 mg/dL    Creatinine 2.70 (H) 0.40 - 1.60 mg/dL    eGFR 19 (L) >60 mL/min/1.73m*2    Calcium 8.3 (L) 8.5 - 10.4 mg/dL    Anion Gap 8 <=19 mmol/L   CBC   Result Value Ref Range    WBC 7.2 4.4 - 11.3 x10*3/uL    nRBC 0.0 0.0 - 0.0 /100 WBCs    RBC 2.74 (L) 4.00 - 5.20 x10*6/uL    Hemoglobin 7.7 (L) 12.0 - 16.0 g/dL     Hematocrit 26.3 (L) 36.0 - 46.0 %    MCV 96 80 - 100 fL    MCH 28.1 26.0 - 34.0 pg    MCHC 29.3 (L) 32.0 - 36.0 g/dL    RDW 22.0 (H) 11.5 - 14.5 %    Platelets 109 (L) 150 - 450 x10*3/uL   POCT GLUCOSE   Result Value Ref Range    POCT Glucose 155 (H) 74 - 99 mg/dL            Assessment/Plan   Acute kidney injury on chronic kidney disease stage III -creatinine stable, no indication for dialysis therapy, awaiting discharge to facility. Monitor off of lokelma. After discharge will need a renal function panel in 1 week and follow-up with us in 2-3 weeks  Chronic kidney disease stage III- Baseline Creatinine about 1.8 mg/dL  Diabetes mellitus type 2  Hypertension  Anemia  continue erythropoietin therapy  UTI treated with antibiotics  Renal Mass followed by urology refused MRI  Diastolic CHF continue diuresis     Siddhartha Tovar MD

## 2024-06-18 ENCOUNTER — NURSING HOME VISIT (OUTPATIENT)
Dept: POST ACUTE CARE | Facility: EXTERNAL LOCATION | Age: 64
End: 2024-06-18
Payer: MEDICARE

## 2024-06-18 DIAGNOSIS — J96.01 ACUTE RESPIRATORY FAILURE WITH HYPOXIA AND HYPERCAPNIA (MULTI): ICD-10-CM

## 2024-06-18 DIAGNOSIS — L89.616 PRESSURE INJURY OF DEEP TISSUE OF RIGHT HEEL: ICD-10-CM

## 2024-06-18 DIAGNOSIS — L89.156 PRESSURE INJURY OF DEEP TISSUE OF SACRAL REGION: ICD-10-CM

## 2024-06-18 DIAGNOSIS — J96.02 ACUTE RESPIRATORY FAILURE WITH HYPOXIA AND HYPERCAPNIA (MULTI): ICD-10-CM

## 2024-06-18 DIAGNOSIS — Z78.9 UNABLE TO CARE FOR SELF: Primary | ICD-10-CM

## 2024-06-18 DIAGNOSIS — D50.8 IRON DEFICIENCY ANEMIA SECONDARY TO INADEQUATE DIETARY IRON INTAKE: ICD-10-CM

## 2024-06-18 PROCEDURE — 99310 SBSQ NF CARE HIGH MDM 45: CPT | Performed by: NURSE PRACTITIONER

## 2024-06-18 ASSESSMENT — ENCOUNTER SYMPTOMS
PSYCHIATRIC NEGATIVE: 1
WOUND: 1
APPETITE CHANGE: 1
ACTIVITY CHANGE: 1
CONSTIPATION: 1
ALLERGIC/IMMUNOLOGIC NEGATIVE: 1
BACK PAIN: 1
MYALGIAS: 1
WEAKNESS: 1
RESPIRATORY NEGATIVE: 1
ARTHRALGIAS: 1
ENDOCRINE NEGATIVE: 1
FATIGUE: 1
EYES NEGATIVE: 1
HEMATOLOGIC/LYMPHATIC NEGATIVE: 1

## 2024-06-18 NOTE — LETTER
Patient: Brittanie Smart  : 1960    Encounter Date: 2024    Subjective  Patient ID: Brittanie Smart is a 63 y.o. female who is being seen in the skilled acute unit of [facility] for multiple medical problems.     HPI Pt seen lying in bed watching TV, pt alert and oriented and cooperative with examination. PMHx: PVD, HTN, DM, GERD, DELISA, Pressure ulcers, Obesity, Anemia, Acute respiratory failure. Pt came from [place] where she was in a nursing home for being unable to care for self, believe her insurance ran out and was discharged.  Somehow she got to [place] and he family dropped her off at the [facility] ER and left her there for care. She was treated for Acute renal failure and acute respiratory failure. Pt presents to [place] for rehabilitation. Pts family got her an apartment to live in but pt unsafe to live alone. Pt with multiple pressure ulcers to sacrum and heels being followed by wound NP who saw pt today and did dressing changes. Pt with c/o pain in groin area she thinks from how she slept. Pt wearing oxygen at 4 liters per nasal cannula. Pt denies feer, chills, night sweats, headaches, or dizziness. Pt denies trouble chewing or swallowing. Denies SOB, cough, CP or palpitations. Denies N/V/D or constipation, reports bowel movement every couple days, which is normal for her. Denies urinary symptoms. Pt reports appetite as fair. Sleep as okay and energy low. Pt is a wilda lift out of bed. Pt has multiple follow ups with specialists scheduled she needs to see. Dr. Paco Tovar nephrology in 2 weeks, Dr. Torre Cardiology in 2 weeks and Dr. Baron urology and Dr. Warner pulmonology. Pt anemic dn receiving Retacrit injections weekly. Pt with no other complaints or concerns.    Review of Systems   Constitutional:  Positive for activity change, appetite change and fatigue.   HENT:  Positive for congestion.    Eyes: Negative.    Respiratory: Negative.     Cardiovascular:  Positive for leg swelling.    Gastrointestinal:  Positive for constipation.   Endocrine: Negative.    Genitourinary: Negative.    Musculoskeletal:  Positive for arthralgias, back pain, gait problem and myalgias.   Skin:  Positive for pallor and wound.   Allergic/Immunologic: Negative.    Neurological:  Positive for weakness.   Hematological: Negative.    Psychiatric/Behavioral: Negative.         Current Outpatient Medications:   •  acetaminophen (Tylenol) 325 mg tablet, Take 2 tablets (650 mg) by mouth every 4 hours if needed for mild pain (1 - 3) or fever (temp greater than 38.0 C)., Disp: , Rfl:   •  ammonium lactate (Lac-Hydrin) 12 % lotion, Apply topically every 1 hour if needed for dry skin., Disp: , Rfl:   •  docusate sodium (Colace) 100 mg capsule, Take 1 capsule (100 mg) by mouth 2 times a day., Disp: , Rfl:   •  doxazosin (Cardura) 1 mg tablet, Take 1 tablet (1 mg) by mouth once daily., Disp: , Rfl:   •  epoetin misha-epbx (Retacrit) 10,000 unit/mL injection, Inject 1 mL (10,000 Units) under the skin 3 times a week for 2 doses., Disp: 2 mL, Rfl: 0  •  ferrous sulfate, 325 mg ferrous sulfate, tablet, Take 1 tablet by mouth once daily with breakfast., Disp: , Rfl:   •  furosemide (Lasix) 40 mg tablet, Take 1 tablet (40 mg) by mouth once daily., Disp: , Rfl:   •  gabapentin (Neurontin) 300 mg capsule, Take 1 capsule (300 mg) by mouth 2 times a day for 3 days., Disp: 6 capsule, Rfl: 0  •  glucagon (Glucagen) 1 mg injection, Inject 1 mg into the muscle every 15 minutes if needed for low blood sugar - see comments (For blood glucose less than or equal to 70 mg/dL and no IV access)., Disp: 1 each, Rfl: 12  •  insulin glargine (Lantus) 100 unit/mL injection, Inject 35 Units under the skin once daily at bedtime. Take as directed per insulin instructions., Disp: , Rfl:   •  insulin lispro (HumaLOG) 100 unit/mL injection, Inject 0.1 mL (10 Units) under the skin 3 times daily (morning, midday, late afternoon). Take as directed per insulin  instructions., Disp: , Rfl:   •  insulin lispro (HumaLOG) 100 unit/mL injection, Inject 0-0.2 mL (0-20 Units) under the skin 3 times daily (morning, midday, late afternoon). Take as directed per insulin instructions., Disp: , Rfl:   •  ipratropium-albuteroL (Duo-Neb) 0.5-2.5 mg/3 mL nebulizer solution, Take 3 mL by nebulization 3 times a day., Disp: , Rfl:   •  ipratropium-albuteroL (Duo-Neb) 0.5-2.5 mg/3 mL nebulizer solution, Take 3 mL by nebulization every 2 hours if needed for wheezing or shortness of breath., Disp: 180 mL, Rfl: 11  •  ketotifen (Zaditor) 0.025 % (0.035 %) ophthalmic solution, Administer 1 drop into both eyes 2 times a day., Disp: , Rfl:   •  levothyroxine (Synthroid, Levoxyl) 75 mcg tablet, Take 1 tablet (75 mcg) by mouth early in the morning.. Take on an empty stomach at the same time each day, either 30 to 60 minutes prior to breakfast, Disp: , Rfl:   •  metoprolol succinate XL (Toprol-XL) 25 mg 24 hr tablet, Take 1 tablet (25 mg) by mouth once daily. Do not crush or chew., Disp: , Rfl:   •  multivitamin with minerals iron-free (Centrum Silver), Take 1 tablet by mouth once daily., Disp: , Rfl:   •  nystatin (Mycostatin) 100,000 unit/gram powder, Apply 1 Application topically every 8 hours., Disp: , Rfl:   •  oxygen (O2) gas therapy, Inhale 3 L/min every 8 hours., Disp: , Rfl:   •  oxygen (O2) gas therapy, Inhale 4 L/min once every 24 hours., Disp: , Rfl:   •  pantoprazole (ProtoNix) 40 mg EC tablet, Take 1 tablet (40 mg) by mouth once daily in the morning. Take before meals. Do not crush, chew, or split., Disp: , Rfl:   •  polyethylene glycol (Glycolax, Miralax) 17 gram packet, Take 17 g by mouth once daily., Disp: , Rfl:   •  rosuvastatin (Crestor) 5 mg tablet, Take 1 tablet (5 mg) by mouth once daily., Disp: , Rfl:   No current facility-administered medications for this visit.      Physical Exam  Constitutional:       Appearance: She is obese. She is ill-appearing (chronically).   HENT:       Head: Normocephalic and atraumatic.      Nose: Nose normal.      Mouth/Throat:      Mouth: Mucous membranes are dry.      Pharynx: Oropharynx is clear.   Eyes:      Conjunctiva/sclera: Conjunctivae normal.      Pupils: Pupils are equal, round, and reactive to light.   Cardiovascular:      Rate and Rhythm: Normal rate and regular rhythm.      Pulses: Normal pulses.      Heart sounds: Normal heart sounds.   Pulmonary:      Effort: Pulmonary effort is normal.      Breath sounds: Decreased air movement present. Examination of the right-upper field reveals decreased breath sounds. Examination of the left-upper field reveals decreased breath sounds. Examination of the right-middle field reveals decreased breath sounds. Examination of the left-middle field reveals decreased breath sounds. Examination of the right-lower field reveals decreased breath sounds. Examination of the left-lower field reveals decreased breath sounds. Decreased breath sounds present.   Musculoskeletal:         General: Tenderness (lower legs) and signs of injury present.      Cervical back: Normal range of motion and neck supple.      Right lower leg: Edema present.      Left lower leg: Edema present.   Skin:     Coloration: Skin is pale.      Findings: Erythema and wound present.      Comments: Vascular skin color changes to bilat lower extremities.  Right heel wrapped. Left heel with stage one pressure sore, elevated on pillow. Sacral wound with dressing Dry and intact.   Neurological:      Mental Status: She is alert.       Patient Active Problem List   Diagnosis   • Unable to care for self   • Type 2 diabetes mellitus (Multi)   • Morbid obesity (Multi)   • Peripheral vascular disease (CMS-[facility])   • Hypertension   • Pressure sore on sacrum   • Behavior problem, adult   • Renal failure   • Acute respiratory failure with hypoxia and hypercapnia (Multi)   • Pressure injury of skin of heel         Assessment/Plan  Diagnoses and all orders for this  visit:  Unable to care for self  Comments:  PT/OT  Long term placement  Pressure injury of deep tissue of sacral region  Comments:  Follow with wound care with dressings as ordered.   Turn every 2 hours as pt able  Air mattress  Pressure injury of deep tissue of right heel  Comments:  Dressing changes per wound NP  Elevate heel off bed  Acute respiratory failure with hypoxia and hypercapnia (Multi)  Comments:  Conitnue Oxygen at 4 liters per nasal cannula  Follow up with Dr. Warner  Iron deficiency anemia secondary to inadequate dietary iron intake  Comments:  Continue Retacrit 10,000 units weekly  Montitor CBC weekly  Follow up iwith Dr. Paco Tovar for anemia secondary to CKD stage 4  RHONDA Appiah           Electronically Signed By: RHONDA Appiah   6/18/24  3:41 PM

## 2024-06-18 NOTE — PROGRESS NOTES
Subjective   Patient ID: Brittanie Smart is a 63 y.o. female who is being seen in the skilled acute unit of [facility] for multiple medical problems.     HPI Pt seen lying in bed watching TV, pt alert and oriented and cooperative with examination. PMHx: PVD, HTN, DM, GERD, DELISA, Pressure ulcers, Obesity, Anemia, Acute respiratory failure. Pt came from [place] where she was in a nursing home for being unable to care for self, believe her insurance ran out and was discharged.  Somehow she got to [place] and he family dropped her off at the [facility] ER and left her there for care. She was treated for Acute renal failure and acute respiratory failure. Pt presents to [facility] for rehabilitation. Pts family got her an apartment to live in but pt unsafe to live alone. Pt with multiple pressure ulcers to sacrum and heels being followed by wound NP who saw pt today and did dressing changes. Pt with c/o pain in groin area she thinks from how she slept. Pt wearing oxygen at 4 liters per nasal cannula. Pt denies feer, chills, night sweats, headaches, or dizziness. Pt denies trouble chewing or swallowing. Denies SOB, cough, CP or palpitations. Denies N/V/D or constipation, reports bowel movement every couple days, which is normal for her. Denies urinary symptoms. Pt reports appetite as fair. Sleep as okay and energy low. Pt is a wilda lift out of bed. Pt has multiple follow ups with specialists scheduled she needs to see. Dr. Paco Tovar nephrology in 2 weeks, Dr. Torre Cardiology in 2 weeks and Dr. Baron urology and Dr. Warner pulmonology. Pt anemic dn receiving Retacrit injections weekly. Pt with no other complaints or concerns.    Review of Systems   Constitutional:  Positive for activity change, appetite change and fatigue.   HENT:  Positive for congestion.    Eyes: Negative.    Respiratory: Negative.     Cardiovascular:  Positive for leg swelling.   Gastrointestinal:  Positive for constipation.   Endocrine: Negative.     Genitourinary: Negative.    Musculoskeletal:  Positive for arthralgias, back pain, gait problem and myalgias.   Skin:  Positive for pallor and wound.   Allergic/Immunologic: Negative.    Neurological:  Positive for weakness.   Hematological: Negative.    Psychiatric/Behavioral: Negative.         Current Outpatient Medications:     acetaminophen (Tylenol) 325 mg tablet, Take 2 tablets (650 mg) by mouth every 4 hours if needed for mild pain (1 - 3) or fever (temp greater than 38.0 C)., Disp: , Rfl:     ammonium lactate (Lac-Hydrin) 12 % lotion, Apply topically every 1 hour if needed for dry skin., Disp: , Rfl:     docusate sodium (Colace) 100 mg capsule, Take 1 capsule (100 mg) by mouth 2 times a day., Disp: , Rfl:     doxazosin (Cardura) 1 mg tablet, Take 1 tablet (1 mg) by mouth once daily., Disp: , Rfl:     epoetin misha-epbx (Retacrit) 10,000 unit/mL injection, Inject 1 mL (10,000 Units) under the skin 3 times a week for 2 doses., Disp: 2 mL, Rfl: 0    ferrous sulfate, 325 mg ferrous sulfate, tablet, Take 1 tablet by mouth once daily with breakfast., Disp: , Rfl:     furosemide (Lasix) 40 mg tablet, Take 1 tablet (40 mg) by mouth once daily., Disp: , Rfl:     gabapentin (Neurontin) 300 mg capsule, Take 1 capsule (300 mg) by mouth 2 times a day for 3 days., Disp: 6 capsule, Rfl: 0    glucagon (Glucagen) 1 mg injection, Inject 1 mg into the muscle every 15 minutes if needed for low blood sugar - see comments (For blood glucose less than or equal to 70 mg/dL and no IV access)., Disp: 1 each, Rfl: 12    insulin glargine (Lantus) 100 unit/mL injection, Inject 35 Units under the skin once daily at bedtime. Take as directed per insulin instructions., Disp: , Rfl:     insulin lispro (HumaLOG) 100 unit/mL injection, Inject 0.1 mL (10 Units) under the skin 3 times daily (morning, midday, late afternoon). Take as directed per insulin instructions., Disp: , Rfl:     insulin lispro (HumaLOG) 100 unit/mL injection, Inject  0-0.2 mL (0-20 Units) under the skin 3 times daily (morning, midday, late afternoon). Take as directed per insulin instructions., Disp: , Rfl:     ipratropium-albuteroL (Duo-Neb) 0.5-2.5 mg/3 mL nebulizer solution, Take 3 mL by nebulization 3 times a day., Disp: , Rfl:     ipratropium-albuteroL (Duo-Neb) 0.5-2.5 mg/3 mL nebulizer solution, Take 3 mL by nebulization every 2 hours if needed for wheezing or shortness of breath., Disp: 180 mL, Rfl: 11    ketotifen (Zaditor) 0.025 % (0.035 %) ophthalmic solution, Administer 1 drop into both eyes 2 times a day., Disp: , Rfl:     levothyroxine (Synthroid, Levoxyl) 75 mcg tablet, Take 1 tablet (75 mcg) by mouth early in the morning.. Take on an empty stomach at the same time each day, either 30 to 60 minutes prior to breakfast, Disp: , Rfl:     metoprolol succinate XL (Toprol-XL) 25 mg 24 hr tablet, Take 1 tablet (25 mg) by mouth once daily. Do not crush or chew., Disp: , Rfl:     multivitamin with minerals iron-free (Centrum Silver), Take 1 tablet by mouth once daily., Disp: , Rfl:     nystatin (Mycostatin) 100,000 unit/gram powder, Apply 1 Application topically every 8 hours., Disp: , Rfl:     oxygen (O2) gas therapy, Inhale 3 L/min every 8 hours., Disp: , Rfl:     oxygen (O2) gas therapy, Inhale 4 L/min once every 24 hours., Disp: , Rfl:     pantoprazole (ProtoNix) 40 mg EC tablet, Take 1 tablet (40 mg) by mouth once daily in the morning. Take before meals. Do not crush, chew, or split., Disp: , Rfl:     polyethylene glycol (Glycolax, Miralax) 17 gram packet, Take 17 g by mouth once daily., Disp: , Rfl:     rosuvastatin (Crestor) 5 mg tablet, Take 1 tablet (5 mg) by mouth once daily., Disp: , Rfl:   No current facility-administered medications for this visit.      Physical Exam  Constitutional:       Appearance: She is obese. She is ill-appearing (chronically).   HENT:      Head: Normocephalic and atraumatic.      Nose: Nose normal.      Mouth/Throat:      Mouth: Mucous  membranes are dry.      Pharynx: Oropharynx is clear.   Eyes:      Conjunctiva/sclera: Conjunctivae normal.      Pupils: Pupils are equal, round, and reactive to light.   Cardiovascular:      Rate and Rhythm: Normal rate and regular rhythm.      Pulses: Normal pulses.      Heart sounds: Normal heart sounds.   Pulmonary:      Effort: Pulmonary effort is normal.      Breath sounds: Decreased air movement present. Examination of the right-upper field reveals decreased breath sounds. Examination of the left-upper field reveals decreased breath sounds. Examination of the right-middle field reveals decreased breath sounds. Examination of the left-middle field reveals decreased breath sounds. Examination of the right-lower field reveals decreased breath sounds. Examination of the left-lower field reveals decreased breath sounds. Decreased breath sounds present.   Musculoskeletal:         General: Tenderness (lower legs) and signs of injury present.      Cervical back: Normal range of motion and neck supple.      Right lower leg: Edema present.      Left lower leg: Edema present.   Skin:     Coloration: Skin is pale.      Findings: Erythema and wound present.      Comments: Vascular skin color changes to bilat lower extremities.  Right heel wrapped. Left heel with stage one pressure sore, elevated on pillow. Sacral wound with dressing Dry and intact.   Neurological:      Mental Status: She is alert.       Patient Active Problem List   Diagnosis    Unable to care for self    Type 2 diabetes mellitus (Multi)    Morbid obesity (Multi)    Peripheral vascular disease (CMS-HCC)    Hypertension    Pressure sore on sacrum    Behavior problem, adult    Renal failure    Acute respiratory failure with hypoxia and hypercapnia (Multi)    Pressure injury of skin of heel         Assessment/Plan   Diagnoses and all orders for this visit:  Unable to care for self  Comments:  PT/OT  Long term placement  Pressure injury of deep tissue of  sacral region  Comments:  Follow with wound care with dressings as ordered.   Turn every 2 hours as pt able  Air mattress  Pressure injury of deep tissue of right heel  Comments:  Dressing changes per wound NP  Elevate heel off bed  Acute respiratory failure with hypoxia and hypercapnia (Multi)  Comments:  Conitnue Oxygen at 4 liters per nasal cannula  Follow up with Dr. Warner  Iron deficiency anemia secondary to inadequate dietary iron intake  Comments:  Continue Retacrit 10,000 units weekly  Montitor CBC weekly  Follow up iwith Dr. Paco Tovar for anemia secondary to CKD stage 4  Kacie Shah, APRN-CNP

## 2024-06-21 ENCOUNTER — NURSING HOME VISIT (OUTPATIENT)
Dept: POST ACUTE CARE | Facility: EXTERNAL LOCATION | Age: 64
End: 2024-06-21
Payer: MEDICARE

## 2024-06-21 DIAGNOSIS — E66.01 MORBID OBESITY (MULTI): ICD-10-CM

## 2024-06-21 DIAGNOSIS — N28.89 LEFT RENAL MASS: ICD-10-CM

## 2024-06-21 DIAGNOSIS — Z79.4 TYPE 2 DIABETES MELLITUS WITH STAGE 4 CHRONIC KIDNEY DISEASE, WITH LONG-TERM CURRENT USE OF INSULIN (MULTI): ICD-10-CM

## 2024-06-21 DIAGNOSIS — I27.20 PULMONARY HTN (MULTI): ICD-10-CM

## 2024-06-21 DIAGNOSIS — R16.1 SPLENOMEGALY: ICD-10-CM

## 2024-06-21 DIAGNOSIS — E11.22 TYPE 2 DIABETES MELLITUS WITH STAGE 4 CHRONIC KIDNEY DISEASE, WITH LONG-TERM CURRENT USE OF INSULIN (MULTI): ICD-10-CM

## 2024-06-21 DIAGNOSIS — I10 PRIMARY HYPERTENSION: ICD-10-CM

## 2024-06-21 DIAGNOSIS — K74.60 CIRRHOSIS OF LIVER WITHOUT ASCITES, UNSPECIFIED HEPATIC CIRRHOSIS TYPE (MULTI): ICD-10-CM

## 2024-06-21 DIAGNOSIS — D50.8 IRON DEFICIENCY ANEMIA SECONDARY TO INADEQUATE DIETARY IRON INTAKE: Primary | ICD-10-CM

## 2024-06-21 DIAGNOSIS — N18.4 TYPE 2 DIABETES MELLITUS WITH STAGE 4 CHRONIC KIDNEY DISEASE, WITH LONG-TERM CURRENT USE OF INSULIN (MULTI): ICD-10-CM

## 2024-06-21 DIAGNOSIS — N18.4 CKD (CHRONIC KIDNEY DISEASE) STAGE 4, GFR 15-29 ML/MIN (MULTI): ICD-10-CM

## 2024-06-21 DIAGNOSIS — J90 PLEURAL EFFUSION: ICD-10-CM

## 2024-06-21 PROCEDURE — 99306 1ST NF CARE HIGH MDM 50: CPT | Performed by: FAMILY MEDICINE

## 2024-06-21 NOTE — LETTER
Patient: Brittanie Smart  : 1960    Encounter Date: 2024    Subjective  Patient ID: Brittanie Smart is a 63 y.o. female who is acute skilled care being seen and evaluated for multiple medical problems.    HPI 63 year old female had been in a care facility in [place] until day of admission to hospital. Reportedly insurance ran out and she was discharged from facility in [place]? She got apt in [place] but is nonambulatory. She initially refused er visit but then family convinced her to go. She was admittedand seen by nephro for ckd 4, started on epogen for anemia and noted to she had us kidneys which showed possible renal mass seen on ct as well, needsurology follow up. but refused mri recommended by urology. She also has dm2 with A1c 7.5 and is on insulin. She had stage 3 sacral wound at hospital and wound team following here. Labs here showed ongoing renal disease with anemia. Rechecks ordered for next week. During visit pt had multiple versions and conflicting details about how she got to [place] and why she is here.       Review of Systems   Constitutional:  Positive for fatigue. Negative for chills and fever.   HENT:  Positive for hearing loss. Negative for congestion and sore throat.    Eyes:  Negative for visual disturbance.   Respiratory:  Positive for shortness of breath. Negative for cough and wheezing.    Cardiovascular:  Positive for leg swelling. Negative for chest pain and palpitations.   Gastrointestinal:  Negative for abdominal pain, constipation, diarrhea, nausea and vomiting.   Genitourinary:  Positive for frequency. Negative for dysuria, hematuria and urgency.   Musculoskeletal:  Positive for arthralgias and gait problem.   Skin:  Negative for rash.   Neurological:  Positive for weakness. Negative for dizziness, syncope and headaches.   Psychiatric/Behavioral:  Positive for confusion. Negative for dysphoric mood. The patient is not nervous/anxious.        Objective  There were no vitals taken for  this visit.    Physical Exam  Constitutional:       General: She is not in acute distress.     Appearance: She is obese. She is not ill-appearing.   HENT:      Head: Normocephalic.      Nose: No congestion.      Mouth/Throat:      Mouth: Mucous membranes are dry.      Pharynx: Oropharynx is clear.   Eyes:      General: No scleral icterus.     Conjunctiva/sclera: Conjunctivae normal.   Cardiovascular:      Rate and Rhythm: Normal rate and regular rhythm.      Heart sounds:      No gallop.   Pulmonary:      Effort: Pulmonary effort is normal.      Breath sounds: Wheezing present. No rhonchi.   Abdominal:      General: Bowel sounds are normal.      Tenderness: There is no abdominal tenderness. There is no rebound.   Musculoskeletal:      Cervical back: Neck supple.      Right lower leg: No edema.      Left lower leg: No edema.   Lymphadenopathy:      Cervical: No cervical adenopathy.   Skin:     General: Skin is warm and dry.   Neurological:      Mental Status: Mental status is at baseline.      Motor: Weakness present.      Gait: Gait abnormal.         Assessment/Plan  Problem List Items Addressed This Visit             ICD-10-CM    Type 2 diabetes mellitus (Multi) E11.9    Morbid obesity (Multi) E66.01    Hypertension I10    Iron deficiency anemia secondary to inadequate dietary iron intake - Primary D50.8     Other Visit Diagnoses         Codes    CKD (chronic kidney disease) stage 4, GFR 15-29 ml/min (Multi)     N18.4    Cirrhosis of liver without ascites, unspecified hepatic cirrhosis type (Multi)     K74.60    Pulmonary HTN (Multi)     I27.20    Splenomegaly     R16.1    Left renal mass     N28.89    Pleural effusion     J90        Discuss referrral for bx with pt, recheck pleural effusion with cxr, monitor gfr and cbc, possible gi referral for cirrhosis management.recommend bipap for pulm htn. May need to change code status from full code if eval of issues still refused.      Goals    None            Electronically Signed By: Yumi Howe MD   6/24/24  4:59 PM

## 2024-06-24 ENCOUNTER — NURSING HOME VISIT (OUTPATIENT)
Dept: POST ACUTE CARE | Facility: EXTERNAL LOCATION | Age: 64
End: 2024-06-24
Payer: MEDICARE

## 2024-06-24 DIAGNOSIS — J90 PLEURAL EFFUSION: ICD-10-CM

## 2024-06-24 DIAGNOSIS — N18.4 TYPE 2 DIABETES MELLITUS WITH STAGE 4 CHRONIC KIDNEY DISEASE, WITH LONG-TERM CURRENT USE OF INSULIN (MULTI): Primary | ICD-10-CM

## 2024-06-24 DIAGNOSIS — N18.4 CKD (CHRONIC KIDNEY DISEASE) STAGE 4, GFR 15-29 ML/MIN (MULTI): ICD-10-CM

## 2024-06-24 DIAGNOSIS — E11.22 TYPE 2 DIABETES MELLITUS WITH STAGE 4 CHRONIC KIDNEY DISEASE, WITH LONG-TERM CURRENT USE OF INSULIN (MULTI): Primary | ICD-10-CM

## 2024-06-24 DIAGNOSIS — Z79.4 TYPE 2 DIABETES MELLITUS WITH STAGE 4 CHRONIC KIDNEY DISEASE, WITH LONG-TERM CURRENT USE OF INSULIN (MULTI): Primary | ICD-10-CM

## 2024-06-24 DIAGNOSIS — D50.8 IRON DEFICIENCY ANEMIA SECONDARY TO INADEQUATE DIETARY IRON INTAKE: ICD-10-CM

## 2024-06-24 DIAGNOSIS — K74.60 CIRRHOSIS OF LIVER WITHOUT ASCITES, UNSPECIFIED HEPATIC CIRRHOSIS TYPE (MULTI): ICD-10-CM

## 2024-06-24 DIAGNOSIS — I27.20 PULMONARY HTN (MULTI): ICD-10-CM

## 2024-06-24 DIAGNOSIS — N28.89 LEFT RENAL MASS: ICD-10-CM

## 2024-06-24 DIAGNOSIS — I10 PRIMARY HYPERTENSION: ICD-10-CM

## 2024-06-24 PROCEDURE — 99309 SBSQ NF CARE MODERATE MDM 30: CPT | Performed by: FAMILY MEDICINE

## 2024-06-24 ASSESSMENT — ENCOUNTER SYMPTOMS
WEAKNESS: 1
VOMITING: 0
DIZZINESS: 0
VOMITING: 0
DYSPHORIC MOOD: 0
NAUSEA: 0
ABDOMINAL PAIN: 0
DYSPHORIC MOOD: 0
COUGH: 0
DYSURIA: 0
NAUSEA: 0
CONFUSION: 1
FREQUENCY: 1
DYSURIA: 0
CONSTIPATION: 0
SORE THROAT: 0
FEVER: 0
CONSTIPATION: 0
SHORTNESS OF BREATH: 1
DIARRHEA: 0
WHEEZING: 0
FATIGUE: 1
CHILLS: 0
FATIGUE: 1
HEMATURIA: 0
ARTHRALGIAS: 1
NERVOUS/ANXIOUS: 0
FEVER: 0
ABDOMINAL PAIN: 0
FREQUENCY: 1
CONFUSION: 1
PALPITATIONS: 0
CHILLS: 0
COUGH: 0
NERVOUS/ANXIOUS: 0
HEMATURIA: 0
ARTHRALGIAS: 1
PALPITATIONS: 0
SHORTNESS OF BREATH: 1
HEADACHES: 0
SORE THROAT: 0
DIZZINESS: 0
WEAKNESS: 1
HEADACHES: 0
WHEEZING: 0
DIARRHEA: 0

## 2024-06-24 NOTE — PROGRESS NOTES
Subjective   Patient ID: Brittanie Smart is a 63 y.o. female who is acute skilled care being seen and evaluated for multiple medical problems.    HPI 63 year old female had been in a care facility in [place] until day of admission to hospital. Reportedly insurance ran out and she was discharged from facility in [place]? She got apt [place] but is nonambulatory. She initially refused er visit but then family convinced her to go. She was admitted and seen by nephro for ckd 4, started on epogen for anemia and noted she had us kidneys which showed possible renal mass seen on ct as well, needs urology follow up.  She also has dm2 with A1c 7.5 and is on insulin. She has right ankle wound, sacrum healed. Labs here showed ongoing renal disease with anemia. Rechecks today with gfr 25 from 22. Hgb 9, she is on epogen three times a week.        Review of Systems   Constitutional:  Positive for fatigue. Negative for chills and fever.   HENT:  Positive for hearing loss. Negative for congestion and sore throat.    Eyes:  Negative for visual disturbance.   Respiratory:  Positive for shortness of breath. Negative for cough and wheezing.    Cardiovascular:  Positive for leg swelling. Negative for chest pain and palpitations.   Gastrointestinal:  Negative for abdominal pain, constipation, diarrhea, nausea and vomiting.   Genitourinary:  Positive for frequency. Negative for dysuria, hematuria and urgency.   Musculoskeletal:  Positive for arthralgias and gait problem.   Skin:  Negative for rash.   Neurological:  Positive for weakness. Negative for dizziness, syncope and headaches.   Psychiatric/Behavioral:  Positive for confusion. Negative for dysphoric mood. The patient is not nervous/anxious.        Objective   There were no vitals taken for this visit.    Physical Exam  Vitals reviewed: 120/77 97.9 77 18 wt 296.   Constitutional:       General: She is not in acute distress.     Appearance: She is obese. She is not ill-appearing.   HENT:       Head: Normocephalic.      Nose: No congestion.      Mouth/Throat:      Mouth: Mucous membranes are dry.      Pharynx: Oropharynx is clear.   Eyes:      General: No scleral icterus.     Conjunctiva/sclera: Conjunctivae normal.   Cardiovascular:      Rate and Rhythm: Normal rate and regular rhythm.      Heart sounds:      No gallop.   Pulmonary:      Effort: Pulmonary effort is normal.      Breath sounds: Wheezing present. No rhonchi.   Abdominal:      General: Bowel sounds are normal.      Tenderness: There is no abdominal tenderness. There is no rebound.   Musculoskeletal:      Cervical back: Neck supple.      Right lower leg: No edema.      Left lower leg: No edema.   Lymphadenopathy:      Cervical: No cervical adenopathy.   Skin:     General: Skin is warm and dry.   Neurological:      Mental Status: Mental status is at baseline.      Motor: Weakness present.      Gait: Gait abnormal.         Assessment/Plan   Problem List Items Addressed This Visit             ICD-10-CM    Type 2 diabetes mellitus (Multi) - Primary E11.9    Hypertension I10    Iron deficiency anemia secondary to inadequate dietary iron intake D50.8     Other Visit Diagnoses         Codes    Pleural effusion     J90    Cirrhosis of liver without ascites, unspecified hepatic cirrhosis type (Multi)     K74.60    CKD (chronic kidney disease) stage 4, GFR 15-29 ml/min (Multi)     N18.4    Pulmonary HTN (Multi)     I27.20    Left renal mass     N28.89        Cxr pending as well as labs this week, pleural effusion may be cirrhosis related? As is splenomegaly? Code status discussions today tomorrow as she has been refusing aggressive interventions, biopsy       Goals    None

## 2024-06-24 NOTE — PROGRESS NOTES
Subjective   Patient ID: Brittanie Smart is a 63 y.o. female who is acute skilled care being seen and evaluated for multiple medical problems.    HPI 63 year old female had been in a care facility in [place] until day of admission to hospital. Reportedly insurance ran out and she was discharged from facility in [place]? She got apt in [place] but is nonambulatory. She initially refused er visit but then family convinced her to go. She was admittedand seen by nephro for ckd 4, started on epogen for anemia and noted to she had us kidneys which showed possible renal mass seen on ct as well, needsurology follow up. but refused mri recommended by urology. She also has dm2 with A1c 7.5 and is on insulin. She had stage 3 sacral wound at hospital and wound team following here. Labs here showed ongoing renal disease with anemia. Rechecks ordered for next week. During visit pt had multiple versions and conflicting details about how she got to [place] and why she is here.       Review of Systems   Constitutional:  Positive for fatigue. Negative for chills and fever.   HENT:  Positive for hearing loss. Negative for congestion and sore throat.    Eyes:  Negative for visual disturbance.   Respiratory:  Positive for shortness of breath. Negative for cough and wheezing.    Cardiovascular:  Positive for leg swelling. Negative for chest pain and palpitations.   Gastrointestinal:  Negative for abdominal pain, constipation, diarrhea, nausea and vomiting.   Genitourinary:  Positive for frequency. Negative for dysuria, hematuria and urgency.   Musculoskeletal:  Positive for arthralgias and gait problem.   Skin:  Negative for rash.   Neurological:  Positive for weakness. Negative for dizziness, syncope and headaches.   Psychiatric/Behavioral:  Positive for confusion. Negative for dysphoric mood. The patient is not nervous/anxious.        Objective   There were no vitals taken for this visit.    Physical Exam  Constitutional:       General: She is not  in acute distress.     Appearance: She is obese. She is not ill-appearing.   HENT:      Head: Normocephalic.      Nose: No congestion.      Mouth/Throat:      Mouth: Mucous membranes are dry.      Pharynx: Oropharynx is clear.   Eyes:      General: No scleral icterus.     Conjunctiva/sclera: Conjunctivae normal.   Cardiovascular:      Rate and Rhythm: Normal rate and regular rhythm.      Heart sounds:      No gallop.   Pulmonary:      Effort: Pulmonary effort is normal.      Breath sounds: Wheezing present. No rhonchi.   Abdominal:      General: Bowel sounds are normal.      Tenderness: There is no abdominal tenderness. There is no rebound.   Musculoskeletal:      Cervical back: Neck supple.      Right lower leg: No edema.      Left lower leg: No edema.   Lymphadenopathy:      Cervical: No cervical adenopathy.   Skin:     General: Skin is warm and dry.   Neurological:      Mental Status: Mental status is at baseline.      Motor: Weakness present.      Gait: Gait abnormal.         Assessment/Plan   Problem List Items Addressed This Visit             ICD-10-CM    Type 2 diabetes mellitus (Multi) E11.9    Morbid obesity (Multi) E66.01    Hypertension I10    Iron deficiency anemia secondary to inadequate dietary iron intake - Primary D50.8     Other Visit Diagnoses         Codes    CKD (chronic kidney disease) stage 4, GFR 15-29 ml/min (Multi)     N18.4    Cirrhosis of liver without ascites, unspecified hepatic cirrhosis type (Multi)     K74.60    Pulmonary HTN (Multi)     I27.20    Splenomegaly     R16.1    Left renal mass     N28.89    Pleural effusion     J90        Discuss referrral for bx with pt, recheck pleural effusion with cxr, monitor gfr and cbc, possible gi referral for cirrhosis management.recommend bipap for pulm htn. May need to change code status from full code if eval of issues still refused.      Goals    None

## 2024-06-24 NOTE — LETTER
Patient: Brittanie Smart  : 1960    Encounter Date: 2024    Subjective  Patient ID: Brittanie Smart is a 63 y.o. female who is acute skilled care being seen and evaluated for multiple medical problems.    HPI 63 year old female had been in a care facility in [place] until day of admission to hospital. Reportedly insurance ran out and she was discharged from facility in [place]? She got apt [place] but is nonambulatory. She initially refused er visit but then family convinced her to go. She was admitted and seen by nephro for ckd 4, started on epogen for anemia and noted she had us kidneys which showed possible renal mass seen on ct as well, needs urology follow up.  She also has dm2 with A1c 7.5 and is on insulin. She has right ankle wound, sacrum healed. Labs here showed ongoing renal disease with anemia. Rechecks today with gfr 25 from 22. Hgb 9, she is on epogen three times a week.        Review of Systems   Constitutional:  Positive for fatigue. Negative for chills and fever.   HENT:  Positive for hearing loss. Negative for congestion and sore throat.    Eyes:  Negative for visual disturbance.   Respiratory:  Positive for shortness of breath. Negative for cough and wheezing.    Cardiovascular:  Positive for leg swelling. Negative for chest pain and palpitations.   Gastrointestinal:  Negative for abdominal pain, constipation, diarrhea, nausea and vomiting.   Genitourinary:  Positive for frequency. Negative for dysuria, hematuria and urgency.   Musculoskeletal:  Positive for arthralgias and gait problem.   Skin:  Negative for rash.   Neurological:  Positive for weakness. Negative for dizziness, syncope and headaches.   Psychiatric/Behavioral:  Positive for confusion. Negative for dysphoric mood. The patient is not nervous/anxious.        Objective  There were no vitals taken for this visit.    Physical Exam  Vitals reviewed: 120/77 97.9 77 18 wt 296.   Constitutional:       General: She is not in acute  distress.     Appearance: She is obese. She is not ill-appearing.   HENT:      Head: Normocephalic.      Nose: No congestion.      Mouth/Throat:      Mouth: Mucous membranes are dry.      Pharynx: Oropharynx is clear.   Eyes:      General: No scleral icterus.     Conjunctiva/sclera: Conjunctivae normal.   Cardiovascular:      Rate and Rhythm: Normal rate and regular rhythm.      Heart sounds:      No gallop.   Pulmonary:      Effort: Pulmonary effort is normal.      Breath sounds: Wheezing present. No rhonchi.   Abdominal:      General: Bowel sounds are normal.      Tenderness: There is no abdominal tenderness. There is no rebound.   Musculoskeletal:      Cervical back: Neck supple.      Right lower leg: No edema.      Left lower leg: No edema.   Lymphadenopathy:      Cervical: No cervical adenopathy.   Skin:     General: Skin is warm and dry.   Neurological:      Mental Status: Mental status is at baseline.      Motor: Weakness present.      Gait: Gait abnormal.         Assessment/Plan  Problem List Items Addressed This Visit             ICD-10-CM    Type 2 diabetes mellitus (Multi) - Primary E11.9    Hypertension I10    Iron deficiency anemia secondary to inadequate dietary iron intake D50.8     Other Visit Diagnoses         Codes    Pleural effusion     J90    Cirrhosis of liver without ascites, unspecified hepatic cirrhosis type (Multi)     K74.60    CKD (chronic kidney disease) stage 4, GFR 15-29 ml/min (Multi)     N18.4    Pulmonary HTN (Multi)     I27.20    Left renal mass     N28.89        Cxr pending as well as labs this week, pleural effusion may be cirrhosis related? As is splenomegaly? Code status discussions today tomorrow as she has been refusing aggressive interventions, biopsy       Goals    None           Electronically Signed By: Yumi Howe MD   6/24/24  6:22 PM

## 2024-06-25 ENCOUNTER — NURSING HOME VISIT (OUTPATIENT)
Dept: POST ACUTE CARE | Facility: EXTERNAL LOCATION | Age: 64
End: 2024-06-25
Payer: MEDICARE

## 2024-06-25 DIAGNOSIS — J18.9 PNEUMONIA OF LEFT LOWER LOBE DUE TO INFECTIOUS ORGANISM: Primary | ICD-10-CM

## 2024-06-25 PROCEDURE — 99309 SBSQ NF CARE MODERATE MDM 30: CPT | Performed by: NURSE PRACTITIONER

## 2024-06-25 RX ORDER — DOXYCYCLINE 100 MG/1
100 TABLET ORAL 2 TIMES DAILY
COMMUNITY
Start: 2024-06-25 | End: 2024-07-05

## 2024-06-25 ASSESSMENT — ENCOUNTER SYMPTOMS
APPETITE CHANGE: 1
BACK PAIN: 1
WOUND: 1
SHORTNESS OF BREATH: 1
FATIGUE: 1
COUGH: 1
ACTIVITY CHANGE: 1
GASTROINTESTINAL NEGATIVE: 1
ARTHRALGIAS: 1
ENDOCRINE NEGATIVE: 1
PSYCHIATRIC NEGATIVE: 1
EYES NEGATIVE: 1
WEAKNESS: 1
WHEEZING: 1

## 2024-06-25 NOTE — LETTER
Patient: Brittanie Smart  : 1960    Encounter Date: 2024    Subjective   Patient ID: Brittanie Smart is a 63 y.o. female who is being seen in the acute skilled unit of [facility]. .    HPI  Pt seen lying sitting up in wheelchair watching TV, pt alert and oriented and cooperative with examination. PMHx: PVD, HTN, DM, GERD, DELISA, Pressure ulcers, Obesity, Anemia, Acute respiratory failure. Visit made for postive chest Xray revealing infirltrate in left lower lobe. Pt on 02 at 4 liters per nasal cannula. Pt denies fever, chills, night sweats, or headaches. Pt with c/o occasionl moist non productive cough. Pt admits to wheezing. Pt admits to ANDERSON that resolves with rest. Denies CP or palpitations. Denies N/V/D or constipation, deneis urinary symtoms. Pt reports appetite is okay. Reports sleep as good and energy is low. Pt with no other concerns or complaints at present.     Review of Systems   Constitutional:  Positive for activity change, appetite change and fatigue.   HENT:  Positive for congestion.    Eyes: Negative.    Respiratory:  Positive for cough (moist non productive), shortness of breath and wheezing.    Cardiovascular:  Positive for leg swelling.   Gastrointestinal: Negative.    Endocrine: Negative.    Genitourinary: Negative.    Musculoskeletal:  Positive for arthralgias, back pain and gait problem.   Skin:  Positive for pallor and wound.   Neurological:  Positive for weakness.   Psychiatric/Behavioral: Negative.         Objective   There were no vitals taken for this visit.    Current Outpatient Medications:   •  acetaminophen (Tylenol) 325 mg tablet, Take 2 tablets (650 mg) by mouth every 4 hours if needed for mild pain (1 - 3) or fever (temp greater than 38.0 C)., Disp: , Rfl:   •  ammonium lactate (Lac-Hydrin) 12 % lotion, Apply topically every 1 hour if needed for dry skin., Disp: , Rfl:   •  docusate sodium (Colace) 100 mg capsule, Take 1 capsule (100 mg) by mouth 2 times a day., Disp: ,  Rfl:   •  doxazosin (Cardura) 1 mg tablet, Take 1 tablet (1 mg) by mouth once daily., Disp: , Rfl:   •  doxycycline (Adoxa) 100 mg tablet, Take 1 tablet (100 mg) by mouth 2 times a day. Take with a full glass of water and do not lie down for at least 30 minutes after, Disp: , Rfl:   •  ferrous sulfate, 325 mg ferrous sulfate, tablet, Take 1 tablet by mouth once daily with breakfast., Disp: , Rfl:   •  furosemide (Lasix) 40 mg tablet, Take 1 tablet (40 mg) by mouth once daily., Disp: , Rfl:   •  gabapentin (Neurontin) 300 mg capsule, Take 1 capsule (300 mg) by mouth 2 times a day for 3 days., Disp: 6 capsule, Rfl: 0  •  glucagon (Glucagen) 1 mg injection, Inject 1 mg into the muscle every 15 minutes if needed for low blood sugar - see comments (For blood glucose less than or equal to 70 mg/dL and no IV access)., Disp: 1 each, Rfl: 12  •  insulin glargine (Lantus) 100 unit/mL injection, Inject 35 Units under the skin once daily at bedtime. Take as directed per insulin instructions., Disp: , Rfl:   •  insulin lispro (HumaLOG) 100 unit/mL injection, Inject 0.1 mL (10 Units) under the skin 3 times daily (morning, midday, late afternoon). Take as directed per insulin instructions., Disp: , Rfl:   •  insulin lispro (HumaLOG) 100 unit/mL injection, Inject 0-0.2 mL (0-20 Units) under the skin 3 times daily (morning, midday, late afternoon). Take as directed per insulin instructions., Disp: , Rfl:   •  ipratropium-albuteroL (Duo-Neb) 0.5-2.5 mg/3 mL nebulizer solution, Take 3 mL by nebulization 3 times a day., Disp: , Rfl:   •  ipratropium-albuteroL (Duo-Neb) 0.5-2.5 mg/3 mL nebulizer solution, Take 3 mL by nebulization every 2 hours if needed for wheezing or shortness of breath., Disp: 180 mL, Rfl: 11  •  ketotifen (Zaditor) 0.025 % (0.035 %) ophthalmic solution, Administer 1 drop into both eyes 2 times a day., Disp: , Rfl:   •  levothyroxine (Synthroid, Levoxyl) 75 mcg tablet, Take 1 tablet (75 mcg) by mouth early in the  morning.. Take on an empty stomach at the same time each day, either 30 to 60 minutes prior to breakfast, Disp: , Rfl:   •  metoprolol succinate XL (Toprol-XL) 25 mg 24 hr tablet, Take 1 tablet (25 mg) by mouth once daily. Do not crush or chew., Disp: , Rfl:   •  multivitamin with minerals iron-free (Centrum Silver), Take 1 tablet by mouth once daily., Disp: , Rfl:   •  nystatin (Mycostatin) 100,000 unit/gram powder, Apply 1 Application topically every 8 hours., Disp: , Rfl:   •  oxygen (O2) gas therapy, Inhale 3 L/min every 8 hours., Disp: , Rfl:   •  oxygen (O2) gas therapy, Inhale 4 L/min once every 24 hours., Disp: , Rfl:   •  pantoprazole (ProtoNix) 40 mg EC tablet, Take 1 tablet (40 mg) by mouth once daily in the morning. Take before meals. Do not crush, chew, or split., Disp: , Rfl:   •  polyethylene glycol (Glycolax, Miralax) 17 gram packet, Take 17 g by mouth once daily., Disp: , Rfl:   •  rosuvastatin (Crestor) 5 mg tablet, Take 1 tablet (5 mg) by mouth once daily., Disp: , Rfl:     Physical Exam  Constitutional:       Appearance: She is obese. She is ill-appearing.   HENT:      Head: Normocephalic and atraumatic.      Nose: Nose normal.      Mouth/Throat:      Mouth: Mucous membranes are moist.      Pharynx: Oropharynx is clear.   Eyes:      Conjunctiva/sclera: Conjunctivae normal.      Pupils: Pupils are equal, round, and reactive to light.   Cardiovascular:      Rate and Rhythm: Normal rate and regular rhythm.      Pulses: Normal pulses.      Heart sounds: Normal heart sounds.   Pulmonary:      Breath sounds: Decreased air movement present. Examination of the right-upper field reveals decreased breath sounds. Examination of the left-upper field reveals decreased breath sounds. Examination of the right-middle field reveals decreased breath sounds. Examination of the left-middle field reveals decreased breath sounds. Examination of the right-lower field reveals decreased breath sounds and rhonchi.  Examination of the left-lower field reveals decreased breath sounds and rhonchi. Decreased breath sounds and rhonchi present.   Abdominal:      General: Bowel sounds are normal.      Palpations: Abdomen is soft.   Musculoskeletal:      Cervical back: Normal range of motion and neck supple.      Right lower le+ Edema present.      Left lower le+ Edema present.   Skin:     General: Skin is warm and dry.      Capillary Refill: Capillary refill takes less than 2 seconds.      Coloration: Skin is pale.      Comments: Bilat heels wrapped.   Large blister to outer right lower leg   Neurological:      Mental Status: She is alert and oriented to person, place, and time.      Motor: Weakness present.      Gait: Gait abnormal.   Psychiatric:         Mood and Affect: Mood normal.         Behavior: Behavior normal.         Thought Content: Thought content normal.         Judgment: Judgment normal.       Patient Active Problem List   Diagnosis   • Unable to care for self   • Type 2 diabetes mellitus (Multi)   • Morbid obesity (Multi)   • Peripheral vascular disease (CMS-HCC)   • Hypertension   • Pressure sore on sacrum   • Behavior problem, adult   • Renal failure   • Acute respiratory failure with hypoxia and hypercapnia (Multi)   • Pressure injury of skin of heel   • Iron deficiency anemia secondary to inadequate dietary iron intake   • Pneumonia of left lung due to infectious organism         Assessment/Plan   Diagnoses and all orders for this visit:  Pneumonia of left lower lobe due to infectious organism  Comments:  Begin Doxycycline 100 mg po bid for 7 days.   Continue Oxygen at 4 liter per nasal cannula.           Electronically Signed By: RHONDA Appiah   24  6:26 PM

## 2024-06-25 NOTE — PROGRESS NOTES
Subjective   Patient ID: Brittanie Smart is a 63 y.o. female who is being seen in the acute skilled unit of [facility]. .    HPI  Pt seen lying sitting up in wheelchair watching TV, pt alert and oriented and cooperative with examination. PMHx: PVD, HTN, DM, GERD, DELISA, Pressure ulcers, Obesity, Anemia, Acute respiratory failure. Visit made for postive chest Xray revealing infirltrate in left lower lobe. Pt on 02 at 4 liters per nasal cannula. Pt denies fever, chills, night sweats, or headaches. Pt with c/o occasionl moist non productive cough. Pt admits to wheezing. Pt admits to ANDERSON that resolves with rest. Denies CP or palpitations. Denies N/V/D or constipation, deneis urinary symtoms. Pt reports appetite is okay. Reports sleep as good and energy is low. Pt with no other concerns or complaints at present.     Review of Systems   Constitutional:  Positive for activity change, appetite change and fatigue.   HENT:  Positive for congestion.    Eyes: Negative.    Respiratory:  Positive for cough (moist non productive), shortness of breath and wheezing.    Cardiovascular:  Positive for leg swelling.   Gastrointestinal: Negative.    Endocrine: Negative.    Genitourinary: Negative.    Musculoskeletal:  Positive for arthralgias, back pain and gait problem.   Skin:  Positive for pallor and wound.   Neurological:  Positive for weakness.   Psychiatric/Behavioral: Negative.         Objective   There were no vitals taken for this visit.    Current Outpatient Medications:     acetaminophen (Tylenol) 325 mg tablet, Take 2 tablets (650 mg) by mouth every 4 hours if needed for mild pain (1 - 3) or fever (temp greater than 38.0 C)., Disp: , Rfl:     ammonium lactate (Lac-Hydrin) 12 % lotion, Apply topically every 1 hour if needed for dry skin., Disp: , Rfl:     docusate sodium (Colace) 100 mg capsule, Take 1 capsule (100 mg) by mouth 2 times a day., Disp: , Rfl:     doxazosin (Cardura) 1 mg tablet, Take 1 tablet (1 mg) by mouth  once daily., Disp: , Rfl:     doxycycline (Adoxa) 100 mg tablet, Take 1 tablet (100 mg) by mouth 2 times a day. Take with a full glass of water and do not lie down for at least 30 minutes after, Disp: , Rfl:     ferrous sulfate, 325 mg ferrous sulfate, tablet, Take 1 tablet by mouth once daily with breakfast., Disp: , Rfl:     furosemide (Lasix) 40 mg tablet, Take 1 tablet (40 mg) by mouth once daily., Disp: , Rfl:     gabapentin (Neurontin) 300 mg capsule, Take 1 capsule (300 mg) by mouth 2 times a day for 3 days., Disp: 6 capsule, Rfl: 0    glucagon (Glucagen) 1 mg injection, Inject 1 mg into the muscle every 15 minutes if needed for low blood sugar - see comments (For blood glucose less than or equal to 70 mg/dL and no IV access)., Disp: 1 each, Rfl: 12    insulin glargine (Lantus) 100 unit/mL injection, Inject 35 Units under the skin once daily at bedtime. Take as directed per insulin instructions., Disp: , Rfl:     insulin lispro (HumaLOG) 100 unit/mL injection, Inject 0.1 mL (10 Units) under the skin 3 times daily (morning, midday, late afternoon). Take as directed per insulin instructions., Disp: , Rfl:     insulin lispro (HumaLOG) 100 unit/mL injection, Inject 0-0.2 mL (0-20 Units) under the skin 3 times daily (morning, midday, late afternoon). Take as directed per insulin instructions., Disp: , Rfl:     ipratropium-albuteroL (Duo-Neb) 0.5-2.5 mg/3 mL nebulizer solution, Take 3 mL by nebulization 3 times a day., Disp: , Rfl:     ipratropium-albuteroL (Duo-Neb) 0.5-2.5 mg/3 mL nebulizer solution, Take 3 mL by nebulization every 2 hours if needed for wheezing or shortness of breath., Disp: 180 mL, Rfl: 11    ketotifen (Zaditor) 0.025 % (0.035 %) ophthalmic solution, Administer 1 drop into both eyes 2 times a day., Disp: , Rfl:     levothyroxine (Synthroid, Levoxyl) 75 mcg tablet, Take 1 tablet (75 mcg) by mouth early in the morning.. Take on an empty stomach at the same time each day, either 30 to 60 minutes  prior to breakfast, Disp: , Rfl:     metoprolol succinate XL (Toprol-XL) 25 mg 24 hr tablet, Take 1 tablet (25 mg) by mouth once daily. Do not crush or chew., Disp: , Rfl:     multivitamin with minerals iron-free (Centrum Silver), Take 1 tablet by mouth once daily., Disp: , Rfl:     nystatin (Mycostatin) 100,000 unit/gram powder, Apply 1 Application topically every 8 hours., Disp: , Rfl:     oxygen (O2) gas therapy, Inhale 3 L/min every 8 hours., Disp: , Rfl:     oxygen (O2) gas therapy, Inhale 4 L/min once every 24 hours., Disp: , Rfl:     pantoprazole (ProtoNix) 40 mg EC tablet, Take 1 tablet (40 mg) by mouth once daily in the morning. Take before meals. Do not crush, chew, or split., Disp: , Rfl:     polyethylene glycol (Glycolax, Miralax) 17 gram packet, Take 17 g by mouth once daily., Disp: , Rfl:     rosuvastatin (Crestor) 5 mg tablet, Take 1 tablet (5 mg) by mouth once daily., Disp: , Rfl:     Physical Exam  Constitutional:       Appearance: She is obese. She is ill-appearing.   HENT:      Head: Normocephalic and atraumatic.      Nose: Nose normal.      Mouth/Throat:      Mouth: Mucous membranes are moist.      Pharynx: Oropharynx is clear.   Eyes:      Conjunctiva/sclera: Conjunctivae normal.      Pupils: Pupils are equal, round, and reactive to light.   Cardiovascular:      Rate and Rhythm: Normal rate and regular rhythm.      Pulses: Normal pulses.      Heart sounds: Normal heart sounds.   Pulmonary:      Breath sounds: Decreased air movement present. Examination of the right-upper field reveals decreased breath sounds. Examination of the left-upper field reveals decreased breath sounds. Examination of the right-middle field reveals decreased breath sounds. Examination of the left-middle field reveals decreased breath sounds. Examination of the right-lower field reveals decreased breath sounds and rhonchi. Examination of the left-lower field reveals decreased breath sounds and rhonchi. Decreased breath  sounds and rhonchi present.   Abdominal:      General: Bowel sounds are normal.      Palpations: Abdomen is soft.   Musculoskeletal:      Cervical back: Normal range of motion and neck supple.      Right lower le+ Edema present.      Left lower le+ Edema present.   Skin:     General: Skin is warm and dry.      Capillary Refill: Capillary refill takes less than 2 seconds.      Coloration: Skin is pale.      Comments: Bilat heels wrapped.   Large blister to outer right lower leg   Neurological:      Mental Status: She is alert and oriented to person, place, and time.      Motor: Weakness present.      Gait: Gait abnormal.   Psychiatric:         Mood and Affect: Mood normal.         Behavior: Behavior normal.         Thought Content: Thought content normal.         Judgment: Judgment normal.       Patient Active Problem List   Diagnosis    Unable to care for self    Type 2 diabetes mellitus (Multi)    Morbid obesity (Multi)    Peripheral vascular disease (CMS-HCC)    Hypertension    Pressure sore on sacrum    Behavior problem, adult    Renal failure    Acute respiratory failure with hypoxia and hypercapnia (Multi)    Pressure injury of skin of heel    Iron deficiency anemia secondary to inadequate dietary iron intake    Pneumonia of left lung due to infectious organism         Assessment/Plan   Diagnoses and all orders for this visit:  Pneumonia of left lower lobe due to infectious organism  Comments:  Begin Doxycycline 100 mg po bid for 7 days.   Continue Oxygen at 4 liter per nasal cannula.

## 2024-06-27 ENCOUNTER — APPOINTMENT (OUTPATIENT)
Dept: PRIMARY CARE | Facility: CLINIC | Age: 64
End: 2024-06-27
Payer: MEDICARE

## 2024-06-28 ENCOUNTER — NURSING HOME VISIT (OUTPATIENT)
Dept: POST ACUTE CARE | Facility: EXTERNAL LOCATION | Age: 64
End: 2024-06-28
Payer: MEDICARE

## 2024-06-28 DIAGNOSIS — J18.9 PNEUMONIA OF LEFT LOWER LOBE DUE TO INFECTIOUS ORGANISM: Primary | ICD-10-CM

## 2024-06-28 DIAGNOSIS — N18.4 TYPE 2 DIABETES MELLITUS WITH STAGE 4 CHRONIC KIDNEY DISEASE, WITH LONG-TERM CURRENT USE OF INSULIN (MULTI): ICD-10-CM

## 2024-06-28 DIAGNOSIS — I10 PRIMARY HYPERTENSION: ICD-10-CM

## 2024-06-28 DIAGNOSIS — G47.9 SLEEP DISORDER: ICD-10-CM

## 2024-06-28 DIAGNOSIS — E11.22 TYPE 2 DIABETES MELLITUS WITH STAGE 4 CHRONIC KIDNEY DISEASE, WITH LONG-TERM CURRENT USE OF INSULIN (MULTI): ICD-10-CM

## 2024-06-28 DIAGNOSIS — N18.4 CKD (CHRONIC KIDNEY DISEASE) STAGE 4, GFR 15-29 ML/MIN (MULTI): ICD-10-CM

## 2024-06-28 DIAGNOSIS — Z79.4 TYPE 2 DIABETES MELLITUS WITH STAGE 4 CHRONIC KIDNEY DISEASE, WITH LONG-TERM CURRENT USE OF INSULIN (MULTI): ICD-10-CM

## 2024-06-28 DIAGNOSIS — D50.8 IRON DEFICIENCY ANEMIA SECONDARY TO INADEQUATE DIETARY IRON INTAKE: ICD-10-CM

## 2024-06-28 PROCEDURE — 99309 SBSQ NF CARE MODERATE MDM 30: CPT | Performed by: FAMILY MEDICINE

## 2024-06-28 NOTE — LETTER
Patient: Brittanie Smart  : 1960    Encounter Date: 2024    Subjective  Patient ID: Brittanie Smart is a 63 y.o. female who is acute skilled care being seen and evaluated for multiple medical problems.    HPI 63 year old female had been in a care facility in [place] until day of admission to hospital. Reportedly insurance ran out and she was discharged from facility in [place]? She got apt [place] but is nonambulatory. She initially refused er visit but then family convinced her to go. She was admitted and seen by nephro for ckd 4, started on epogen for anemia and noted she had us kidneys which showed possible renal mass seen on ct as well, needs urology follow up.  She also has dm2 with A1c 7.5 and is on insulin. She has right ankle wound, sacrum healed. Labs here showed ongoing renal disease with anemia. Rechecks yesterday showed GFR 30 from 25 with creatinine improved to 1.7 from 2.0.  Globin 9.3 from 9.0.  Is currently on doxycycline due to increased cough noted earlier this week and diagnosis of pneumonia made based on x-ray.  In addition she is having trouble sleeping therefore will initiate trazodone.  Continues on Epogen 3 times a week.         Review of Systems   Constitutional:  Positive for fatigue. Negative for chills and fever.   HENT:  Negative for congestion and sore throat.    Eyes:  Negative for visual disturbance.   Respiratory:  Positive for shortness of breath. Negative for cough and wheezing.    Cardiovascular:  Positive for leg swelling. Negative for chest pain and palpitations.   Gastrointestinal:  Negative for abdominal pain, constipation, diarrhea, nausea and vomiting.   Genitourinary:  Positive for frequency. Negative for dysuria, hematuria and urgency.   Musculoskeletal:  Positive for arthralgias and gait problem.   Skin:  Negative for rash.   Neurological:  Positive for weakness. Negative for dizziness, syncope and headaches.   Psychiatric/Behavioral:  Positive for confusion. Negative  for dysphoric mood. The patient is not nervous/anxious.        Objective  There were no vitals taken for this visit.    Physical Exam  Vitals reviewed: 138/80 temp 98 pulse 78 respirations 18 weight 296.   Constitutional:       General: She is not in acute distress.     Appearance: She is obese. She is not ill-appearing.   HENT:      Head: Normocephalic.      Nose: No congestion.      Mouth/Throat:      Mouth: Mucous membranes are dry.      Pharynx: Oropharynx is clear.   Eyes:      General: No scleral icterus.     Conjunctiva/sclera: Conjunctivae normal.   Cardiovascular:      Rate and Rhythm: Normal rate and regular rhythm.      Heart sounds:      No gallop.   Pulmonary:      Effort: Pulmonary effort is normal.      Breath sounds: Wheezing present. No rhonchi.   Abdominal:      General: Bowel sounds are normal.      Tenderness: There is no abdominal tenderness. There is no rebound.   Musculoskeletal:      Cervical back: Neck supple.      Right lower leg: No edema.      Left lower leg: No edema.   Lymphadenopathy:      Cervical: No cervical adenopathy.   Skin:     General: Skin is warm and dry.   Neurological:      Mental Status: Mental status is at baseline.      Motor: Weakness present.      Gait: Gait abnormal.         Assessment/Plan  Problem List Items Addressed This Visit             ICD-10-CM    Type 2 diabetes mellitus (Multi) E11.9    Hypertension I10    Iron deficiency anemia secondary to inadequate dietary iron intake D50.8    Pneumonia of left lung due to infectious organism - Primary J18.9     Other Visit Diagnoses         Codes    Sleep disorder     G47.9    CKD (chronic kidney disease) stage 4, GFR 15-29 ml/min (Multi)     N18.4        Complete antibiotics, recheck CBC and BMP next week, add trazodone 50 mg at at bedtime, consider increase to 100 mg if 50 mg is not helpful  Again need to discuss CODE STATUS regarding pending testing that would need to take place      Goals    None            Electronically Signed By: Yumi Howe MD   7/1/24 10:27 AM

## 2024-07-01 ASSESSMENT — ENCOUNTER SYMPTOMS
CHILLS: 0
ARTHRALGIAS: 1
COUGH: 0
WEAKNESS: 1
HEADACHES: 0
FREQUENCY: 1
CONFUSION: 1
VOMITING: 0
FEVER: 0
NAUSEA: 0
CONSTIPATION: 0
HEMATURIA: 0
FATIGUE: 1
NERVOUS/ANXIOUS: 0
SHORTNESS OF BREATH: 1
DIARRHEA: 0
DIZZINESS: 0
DYSURIA: 0
WHEEZING: 0
PALPITATIONS: 0
DYSPHORIC MOOD: 0
ABDOMINAL PAIN: 0
SORE THROAT: 0

## 2024-07-01 NOTE — PROGRESS NOTES
Subjective   Patient ID: Brittanie Smart is a 63 y.o. female who is acute skilled care being seen and evaluated for multiple medical problems.    HPI 63 year old female had been in a care facility in [place] until day of admission to hospital. Reportedly insurance ran out and she was discharged from facility in [place]? She got [place] but is nonambulatory. She initially refused er visit but then family convinced her to go. She was admitted and seen by nephro for ckd 4, started on epogen for anemia and noted she had us kidneys which showed possible renal mass seen on ct as well, needs urology follow up.  She also has dm2 with A1c 7.5 and is on insulin. She has right ankle wound, sacrum healed. Labs here showed ongoing renal disease with anemia. Rechecks yesterday showed GFR 30 from 25 with creatinine improved to 1.7 from 2.0.  Globin 9.3 from 9.0.  Is currently on doxycycline due to increased cough noted earlier this week and diagnosis of pneumonia made based on x-ray.  In addition she is having trouble sleeping therefore will initiate trazodone.  Continues on Epogen 3 times a week.         Review of Systems   Constitutional:  Positive for fatigue. Negative for chills and fever.   HENT:  Negative for congestion and sore throat.    Eyes:  Negative for visual disturbance.   Respiratory:  Positive for shortness of breath. Negative for cough and wheezing.    Cardiovascular:  Positive for leg swelling. Negative for chest pain and palpitations.   Gastrointestinal:  Negative for abdominal pain, constipation, diarrhea, nausea and vomiting.   Genitourinary:  Positive for frequency. Negative for dysuria, hematuria and urgency.   Musculoskeletal:  Positive for arthralgias and gait problem.   Skin:  Negative for rash.   Neurological:  Positive for weakness. Negative for dizziness, syncope and headaches.   Psychiatric/Behavioral:  Positive for confusion. Negative for dysphoric mood. The patient is not nervous/anxious.         Objective   There were no vitals taken for this visit.    Physical Exam  Vitals reviewed: 138/80 temp 98 pulse 78 respirations 18 weight 296.   Constitutional:       General: She is not in acute distress.     Appearance: She is obese. She is not ill-appearing.   HENT:      Head: Normocephalic.      Nose: No congestion.      Mouth/Throat:      Mouth: Mucous membranes are dry.      Pharynx: Oropharynx is clear.   Eyes:      General: No scleral icterus.     Conjunctiva/sclera: Conjunctivae normal.   Cardiovascular:      Rate and Rhythm: Normal rate and regular rhythm.      Heart sounds:      No gallop.   Pulmonary:      Effort: Pulmonary effort is normal.      Breath sounds: Wheezing present. No rhonchi.   Abdominal:      General: Bowel sounds are normal.      Tenderness: There is no abdominal tenderness. There is no rebound.   Musculoskeletal:      Cervical back: Neck supple.      Right lower leg: No edema.      Left lower leg: No edema.   Lymphadenopathy:      Cervical: No cervical adenopathy.   Skin:     General: Skin is warm and dry.   Neurological:      Mental Status: Mental status is at baseline.      Motor: Weakness present.      Gait: Gait abnormal.         Assessment/Plan   Problem List Items Addressed This Visit             ICD-10-CM    Type 2 diabetes mellitus (Multi) E11.9    Hypertension I10    Iron deficiency anemia secondary to inadequate dietary iron intake D50.8    Pneumonia of left lung due to infectious organism - Primary J18.9     Other Visit Diagnoses         Codes    Sleep disorder     G47.9    CKD (chronic kidney disease) stage 4, GFR 15-29 ml/min (Multi)     N18.4        Complete antibiotics, recheck CBC and BMP next week, add trazodone 50 mg at at bedtime, consider increase to 100 mg if 50 mg is not helpful  Again need to discuss CODE STATUS regarding pending testing that would need to take place      Goals    None

## 2024-07-02 ENCOUNTER — NURSING HOME VISIT (OUTPATIENT)
Dept: POST ACUTE CARE | Facility: EXTERNAL LOCATION | Age: 64
End: 2024-07-02
Payer: MEDICARE

## 2024-07-02 DIAGNOSIS — N18.4 TYPE 2 DIABETES MELLITUS WITH STAGE 4 CHRONIC KIDNEY DISEASE, WITH LONG-TERM CURRENT USE OF INSULIN (MULTI): ICD-10-CM

## 2024-07-02 DIAGNOSIS — I10 PRIMARY HYPERTENSION: Primary | ICD-10-CM

## 2024-07-02 DIAGNOSIS — E78.2 MIXED HYPERLIPIDEMIA: ICD-10-CM

## 2024-07-02 DIAGNOSIS — J96.01 ACUTE RESPIRATORY FAILURE WITH HYPOXIA AND HYPERCAPNIA (MULTI): ICD-10-CM

## 2024-07-02 DIAGNOSIS — Z78.9 UNABLE TO CARE FOR SELF: ICD-10-CM

## 2024-07-02 DIAGNOSIS — E11.22 TYPE 2 DIABETES MELLITUS WITH STAGE 4 CHRONIC KIDNEY DISEASE, WITH LONG-TERM CURRENT USE OF INSULIN (MULTI): ICD-10-CM

## 2024-07-02 DIAGNOSIS — I73.9 PERIPHERAL VASCULAR DISEASE (CMS-HCC): ICD-10-CM

## 2024-07-02 DIAGNOSIS — Z79.4 TYPE 2 DIABETES MELLITUS WITH STAGE 4 CHRONIC KIDNEY DISEASE, WITH LONG-TERM CURRENT USE OF INSULIN (MULTI): ICD-10-CM

## 2024-07-02 DIAGNOSIS — J96.02 ACUTE RESPIRATORY FAILURE WITH HYPOXIA AND HYPERCAPNIA (MULTI): ICD-10-CM

## 2024-07-02 PROBLEM — E78.5 HYPERLIPIDEMIA: Status: ACTIVE | Noted: 2024-07-02

## 2024-07-02 PROCEDURE — 99309 SBSQ NF CARE MODERATE MDM 30: CPT | Performed by: NURSE PRACTITIONER

## 2024-07-02 ASSESSMENT — ENCOUNTER SYMPTOMS
COUGH: 1
ALLERGIC/IMMUNOLOGIC NEGATIVE: 1
ENDOCRINE NEGATIVE: 1
ACTIVITY CHANGE: 1
APPETITE CHANGE: 1
ARTHRALGIAS: 1
DIARRHEA: 1
BACK PAIN: 1
RHINORRHEA: 1
PSYCHIATRIC NEGATIVE: 1
FATIGUE: 1
EYES NEGATIVE: 1
HEMATOLOGIC/LYMPHATIC NEGATIVE: 1
ABDOMINAL PAIN: 1
WEAKNESS: 1
SHORTNESS OF BREATH: 1

## 2024-07-02 NOTE — LETTER
Patient: Brittanie Smart  : 1960    Encounter Date: 2024    Subjective   Patient ID: Brittanie Smart is a 63 y.o. female who is being seen in the skilled acute unit of [facility] for multiple medical problems.    HPI Pt seen lying in bed awake. Pt with c/o diarrhea and states the food is killing her stomach. Pt being treated with imodium which she states is helping the diarrhea. Pt with bilat legs ace wrapped from feet to knees. Pt states her breathing is feeling okay from her pneumonia. Pt with occasional moist non productive cough, Pt wearing oxygen at 4 liters per nasal cannula. Pt utilizing aerosal treatments. Pt states she is walking minimally with PT. Pt remains a wilda lift out of bed. Pt denies CP or palpitations. Denies urinary symptoms. Pt with no other complaints or concerns at present.     Review of Systems   Constitutional:  Positive for activity change, appetite change and fatigue.   HENT:  Positive for congestion and rhinorrhea.    Eyes: Negative.    Respiratory:  Positive for cough and shortness of breath.    Cardiovascular:  Positive for leg swelling.   Gastrointestinal:  Positive for abdominal pain and diarrhea.   Endocrine: Negative.    Genitourinary: Negative.    Musculoskeletal:  Positive for arthralgias, back pain and gait problem.   Allergic/Immunologic: Negative.    Neurological:  Positive for weakness.   Hematological: Negative.    Psychiatric/Behavioral: Negative.         Objective   There were no vitals taken for this visit.    Current Outpatient Medications:   •  acetaminophen (Tylenol) 325 mg tablet, Take 2 tablets (650 mg) by mouth every 4 hours if needed for mild pain (1 - 3) or fever (temp greater than 38.0 C)., Disp: , Rfl:   •  ammonium lactate (Lac-Hydrin) 12 % lotion, Apply topically every 1 hour if needed for dry skin., Disp: , Rfl:   •  docusate sodium (Colace) 100 mg capsule, Take 1 capsule (100 mg) by mouth 2 times a day., Disp: , Rfl:   •  doxazosin (Cardura) 1  mg tablet, Take 1 tablet (1 mg) by mouth once daily., Disp: , Rfl:   •  doxycycline (Adoxa) 100 mg tablet, Take 1 tablet (100 mg) by mouth 2 times a day. Take with a full glass of water and do not lie down for at least 30 minutes after, Disp: , Rfl:   •  ferrous sulfate, 325 mg ferrous sulfate, tablet, Take 1 tablet by mouth once daily with breakfast., Disp: , Rfl:   •  furosemide (Lasix) 40 mg tablet, Take 1 tablet (40 mg) by mouth once daily., Disp: , Rfl:   •  gabapentin (Neurontin) 300 mg capsule, Take 1 capsule (300 mg) by mouth 2 times a day for 3 days., Disp: 6 capsule, Rfl: 0  •  glucagon (Glucagen) 1 mg injection, Inject 1 mg into the muscle every 15 minutes if needed for low blood sugar - see comments (For blood glucose less than or equal to 70 mg/dL and no IV access)., Disp: 1 each, Rfl: 12  •  insulin glargine (Lantus) 100 unit/mL injection, Inject 35 Units under the skin once daily at bedtime. Take as directed per insulin instructions., Disp: , Rfl:   •  insulin lispro (HumaLOG) 100 unit/mL injection, Inject 0.1 mL (10 Units) under the skin 3 times daily (morning, midday, late afternoon). Take as directed per insulin instructions., Disp: , Rfl:   •  insulin lispro (HumaLOG) 100 unit/mL injection, Inject 0-0.2 mL (0-20 Units) under the skin 3 times daily (morning, midday, late afternoon). Take as directed per insulin instructions., Disp: , Rfl:   •  ipratropium-albuteroL (Duo-Neb) 0.5-2.5 mg/3 mL nebulizer solution, Take 3 mL by nebulization 3 times a day., Disp: , Rfl:   •  ipratropium-albuteroL (Duo-Neb) 0.5-2.5 mg/3 mL nebulizer solution, Take 3 mL by nebulization every 2 hours if needed for wheezing or shortness of breath., Disp: 180 mL, Rfl: 11  •  ketotifen (Zaditor) 0.025 % (0.035 %) ophthalmic solution, Administer 1 drop into both eyes 2 times a day., Disp: , Rfl:   •  levothyroxine (Synthroid, Levoxyl) 75 mcg tablet, Take 1 tablet (75 mcg) by mouth early in the morning.. Take on an empty stomach  at the same time each day, either 30 to 60 minutes prior to breakfast, Disp: , Rfl:   •  metoprolol succinate XL (Toprol-XL) 25 mg 24 hr tablet, Take 1 tablet (25 mg) by mouth once daily. Do not crush or chew., Disp: , Rfl:   •  multivitamin with minerals iron-free (Centrum Silver), Take 1 tablet by mouth once daily., Disp: , Rfl:   •  nystatin (Mycostatin) 100,000 unit/gram powder, Apply 1 Application topically every 8 hours., Disp: , Rfl:   •  oxygen (O2) gas therapy, Inhale 4 L/min once every 24 hours., Disp: , Rfl:   •  pantoprazole (ProtoNix) 40 mg EC tablet, Take 1 tablet (40 mg) by mouth once daily in the morning. Take before meals. Do not crush, chew, or split., Disp: , Rfl:   •  polyethylene glycol (Glycolax, Miralax) 17 gram packet, Take 17 g by mouth once daily., Disp: , Rfl:   •  rosuvastatin (Crestor) 5 mg tablet, Take 1 tablet (5 mg) by mouth once daily., Disp: , Rfl:   •  oxygen (O2) gas therapy, Inhale 3 L/min every 8 hours., Disp: , Rfl:     Physical Exam  Constitutional:       Appearance: Normal appearance. She is morbidly obese.   HENT:      Head: Normocephalic and atraumatic.      Nose: Nose normal.      Mouth/Throat:      Mouth: Mucous membranes are dry.   Eyes:      Conjunctiva/sclera: Conjunctivae normal.      Pupils: Pupils are equal, round, and reactive to light.   Cardiovascular:      Rate and Rhythm: Normal rate and regular rhythm.   Pulmonary:      Effort: Pulmonary effort is normal.      Breath sounds: Examination of the right-upper field reveals decreased breath sounds. Examination of the left-upper field reveals decreased breath sounds. Examination of the right-middle field reveals decreased breath sounds. Examination of the left-middle field reveals decreased breath sounds. Examination of the right-lower field reveals decreased breath sounds. Examination of the left-lower field reveals decreased breath sounds. Decreased breath sounds present.   Abdominal:      General: Bowel sounds  are normal. There is distension.      Palpations: Abdomen is soft.   Musculoskeletal:      Cervical back: Normal range of motion and neck supple.      Right lower le+ Edema present.      Left lower le+ Edema present.   Skin:     General: Skin is warm and dry.      Capillary Refill: Capillary refill takes less than 2 seconds.   Neurological:      Mental Status: She is alert and oriented to person, place, and time.      Motor: Weakness present.      Gait: Gait abnormal.   Psychiatric:         Mood and Affect: Mood normal.         Behavior: Behavior normal.         Thought Content: Thought content normal.         Judgment: Judgment normal.       Patient Active Problem List   Diagnosis   • Unable to care for self   • Type 2 diabetes mellitus (Multi)   • Morbid obesity (Multi)   • Peripheral vascular disease (CMS-[facility])   • Hypertension   • Pressure sore on sacrum   • Behavior problem, adult   • Renal failure   • Acute respiratory failure with hypoxia and hypercapnia (Multi)   • Pressure injury of skin of heel   • Iron deficiency anemia secondary to inadequate dietary iron intake   • Pneumonia of left lung due to infectious organism   • Hyperlipidemia         Assessment/Plan   Diagnoses and all orders for this visit:  Primary hypertension  Comments:  Continue Metorpolol 25 mg po daily.  Continue Doxazosin 1 mg po daily.  Peripheral vascular disease (CMS-HCC)  Comments:  Continue ace wraps to legs  Type 2 diabetes mellitus with stage 4 chronic kidney disease, with long-term current use of insulin (Multi)  Comments:  Continue Glargine insulin 25 units sq daily.  COntinue Lispro sliding scale with meals.   Conitnue Lispro 10 units with meals.  Acute respiratory failure with hypoxia and hypercapnia (Multi)  Comments:  Continue oxygen 4 ltiers per nasal cannula.   Continue duonebs three times daily.  Unable to care for self  Mixed hyperlipidemia  Comments:  Continue Crestor 5 mg po daily.     Kacie Shah,  ACE-CNP      Electronically Signed By: RHONDA Appiah   7/2/24  3:47 PM

## 2024-07-02 NOTE — PROGRESS NOTES
Subjective   Patient ID: Brittanie Smart is a 63 y.o. female who is being seen in the skilled acute unit of [facility] for multiple medical problems.    HPI Pt seen lying in bed awake. Pt with c/o diarrhea and states the food is killing her stomach. Pt being treated with imodium which she states is helping the diarrhea. Pt with bilat legs ace wrapped from feet to knees. Pt states her breathing is feeling okay from her pneumonia. Pt with occasional moist non productive cough, Pt wearing oxygen at 4 liters per nasal cannula. Pt utilizing aerosal treatments. Pt states she is walking minimally with PT. Pt remains a wilda lift out of bed. Pt denies CP or palpitations. Denies urinary symptoms. Pt with no other complaints or concerns at present.     Review of Systems   Constitutional:  Positive for activity change, appetite change and fatigue.   HENT:  Positive for congestion and rhinorrhea.    Eyes: Negative.    Respiratory:  Positive for cough and shortness of breath.    Cardiovascular:  Positive for leg swelling.   Gastrointestinal:  Positive for abdominal pain and diarrhea.   Endocrine: Negative.    Genitourinary: Negative.    Musculoskeletal:  Positive for arthralgias, back pain and gait problem.   Allergic/Immunologic: Negative.    Neurological:  Positive for weakness.   Hematological: Negative.    Psychiatric/Behavioral: Negative.         Objective   There were no vitals taken for this visit.    Current Outpatient Medications:     acetaminophen (Tylenol) 325 mg tablet, Take 2 tablets (650 mg) by mouth every 4 hours if needed for mild pain (1 - 3) or fever (temp greater than 38.0 C)., Disp: , Rfl:     ammonium lactate (Lac-Hydrin) 12 % lotion, Apply topically every 1 hour if needed for dry skin., Disp: , Rfl:     docusate sodium (Colace) 100 mg capsule, Take 1 capsule (100 mg) by mouth 2 times a day., Disp: , Rfl:     doxazosin (Cardura) 1 mg tablet, Take 1 tablet (1 mg) by mouth once daily., Disp: , Rfl:      doxycycline (Adoxa) 100 mg tablet, Take 1 tablet (100 mg) by mouth 2 times a day. Take with a full glass of water and do not lie down for at least 30 minutes after, Disp: , Rfl:     ferrous sulfate, 325 mg ferrous sulfate, tablet, Take 1 tablet by mouth once daily with breakfast., Disp: , Rfl:     furosemide (Lasix) 40 mg tablet, Take 1 tablet (40 mg) by mouth once daily., Disp: , Rfl:     gabapentin (Neurontin) 300 mg capsule, Take 1 capsule (300 mg) by mouth 2 times a day for 3 days., Disp: 6 capsule, Rfl: 0    glucagon (Glucagen) 1 mg injection, Inject 1 mg into the muscle every 15 minutes if needed for low blood sugar - see comments (For blood glucose less than or equal to 70 mg/dL and no IV access)., Disp: 1 each, Rfl: 12    insulin glargine (Lantus) 100 unit/mL injection, Inject 35 Units under the skin once daily at bedtime. Take as directed per insulin instructions., Disp: , Rfl:     insulin lispro (HumaLOG) 100 unit/mL injection, Inject 0.1 mL (10 Units) under the skin 3 times daily (morning, midday, late afternoon). Take as directed per insulin instructions., Disp: , Rfl:     insulin lispro (HumaLOG) 100 unit/mL injection, Inject 0-0.2 mL (0-20 Units) under the skin 3 times daily (morning, midday, late afternoon). Take as directed per insulin instructions., Disp: , Rfl:     ipratropium-albuteroL (Duo-Neb) 0.5-2.5 mg/3 mL nebulizer solution, Take 3 mL by nebulization 3 times a day., Disp: , Rfl:     ipratropium-albuteroL (Duo-Neb) 0.5-2.5 mg/3 mL nebulizer solution, Take 3 mL by nebulization every 2 hours if needed for wheezing or shortness of breath., Disp: 180 mL, Rfl: 11    ketotifen (Zaditor) 0.025 % (0.035 %) ophthalmic solution, Administer 1 drop into both eyes 2 times a day., Disp: , Rfl:     levothyroxine (Synthroid, Levoxyl) 75 mcg tablet, Take 1 tablet (75 mcg) by mouth early in the morning.. Take on an empty stomach at the same time each day, either 30 to 60 minutes prior to breakfast, Disp: ,  Rfl:     metoprolol succinate XL (Toprol-XL) 25 mg 24 hr tablet, Take 1 tablet (25 mg) by mouth once daily. Do not crush or chew., Disp: , Rfl:     multivitamin with minerals iron-free (Centrum Silver), Take 1 tablet by mouth once daily., Disp: , Rfl:     nystatin (Mycostatin) 100,000 unit/gram powder, Apply 1 Application topically every 8 hours., Disp: , Rfl:     oxygen (O2) gas therapy, Inhale 4 L/min once every 24 hours., Disp: , Rfl:     pantoprazole (ProtoNix) 40 mg EC tablet, Take 1 tablet (40 mg) by mouth once daily in the morning. Take before meals. Do not crush, chew, or split., Disp: , Rfl:     polyethylene glycol (Glycolax, Miralax) 17 gram packet, Take 17 g by mouth once daily., Disp: , Rfl:     rosuvastatin (Crestor) 5 mg tablet, Take 1 tablet (5 mg) by mouth once daily., Disp: , Rfl:     oxygen (O2) gas therapy, Inhale 3 L/min every 8 hours., Disp: , Rfl:     Physical Exam  Constitutional:       Appearance: Normal appearance. She is morbidly obese.   HENT:      Head: Normocephalic and atraumatic.      Nose: Nose normal.      Mouth/Throat:      Mouth: Mucous membranes are dry.   Eyes:      Conjunctiva/sclera: Conjunctivae normal.      Pupils: Pupils are equal, round, and reactive to light.   Cardiovascular:      Rate and Rhythm: Normal rate and regular rhythm.   Pulmonary:      Effort: Pulmonary effort is normal.      Breath sounds: Examination of the right-upper field reveals decreased breath sounds. Examination of the left-upper field reveals decreased breath sounds. Examination of the right-middle field reveals decreased breath sounds. Examination of the left-middle field reveals decreased breath sounds. Examination of the right-lower field reveals decreased breath sounds. Examination of the left-lower field reveals decreased breath sounds. Decreased breath sounds present.   Abdominal:      General: Bowel sounds are normal. There is distension.      Palpations: Abdomen is soft.   Musculoskeletal:       Cervical back: Normal range of motion and neck supple.      Right lower le+ Edema present.      Left lower le+ Edema present.   Skin:     General: Skin is warm and dry.      Capillary Refill: Capillary refill takes less than 2 seconds.   Neurological:      Mental Status: She is alert and oriented to person, place, and time.      Motor: Weakness present.      Gait: Gait abnormal.   Psychiatric:         Mood and Affect: Mood normal.         Behavior: Behavior normal.         Thought Content: Thought content normal.         Judgment: Judgment normal.       Patient Active Problem List   Diagnosis    Unable to care for self    Type 2 diabetes mellitus (Multi)    Morbid obesity (Multi)    Peripheral vascular disease (CMS-[facility])    Hypertension    Pressure sore on sacrum    Behavior problem, adult    Renal failure    Acute respiratory failure with hypoxia and hypercapnia (Multi)    Pressure injury of skin of heel    Iron deficiency anemia secondary to inadequate dietary iron intake    Pneumonia of left lung due to infectious organism    Hyperlipidemia         Assessment/Plan   Diagnoses and all orders for this visit:  Primary hypertension  Comments:  Continue Metorpolol 25 mg po daily.  Continue Doxazosin 1 mg po daily.  Peripheral vascular disease (CMS-HCC)  Comments:  Continue ace wraps to legs  Type 2 diabetes mellitus with stage 4 chronic kidney disease, with long-term current use of insulin (Multi)  Comments:  Continue Glargine insulin 25 units sq daily.  COntinue Lispro sliding scale with meals.   Conitnue Lispro 10 units with meals.  Acute respiratory failure with hypoxia and hypercapnia (Multi)  Comments:  Continue oxygen 4 ltiers per nasal cannula.   Continue duonebs three times daily.  Unable to care for self  Mixed hyperlipidemia  Comments:  Continue Crestor 5 mg po daily.     Kacie Shah, APRN-CNP

## 2024-07-08 ENCOUNTER — NURSING HOME VISIT (OUTPATIENT)
Dept: POST ACUTE CARE | Facility: EXTERNAL LOCATION | Age: 64
End: 2024-07-08
Payer: MEDICARE

## 2024-07-08 DIAGNOSIS — N18.4 CKD (CHRONIC KIDNEY DISEASE) STAGE 4, GFR 15-29 ML/MIN (MULTI): Primary | ICD-10-CM

## 2024-07-08 DIAGNOSIS — Z79.4 TYPE 2 DIABETES MELLITUS WITH STAGE 4 CHRONIC KIDNEY DISEASE, WITH LONG-TERM CURRENT USE OF INSULIN (MULTI): ICD-10-CM

## 2024-07-08 DIAGNOSIS — E11.22 TYPE 2 DIABETES MELLITUS WITH STAGE 4 CHRONIC KIDNEY DISEASE, WITH LONG-TERM CURRENT USE OF INSULIN (MULTI): ICD-10-CM

## 2024-07-08 DIAGNOSIS — N18.4 TYPE 2 DIABETES MELLITUS WITH STAGE 4 CHRONIC KIDNEY DISEASE, WITH LONG-TERM CURRENT USE OF INSULIN (MULTI): ICD-10-CM

## 2024-07-08 DIAGNOSIS — I10 PRIMARY HYPERTENSION: ICD-10-CM

## 2024-07-08 DIAGNOSIS — I73.9 PERIPHERAL VASCULAR DISEASE (CMS-HCC): ICD-10-CM

## 2024-07-08 DIAGNOSIS — D50.8 IRON DEFICIENCY ANEMIA SECONDARY TO INADEQUATE DIETARY IRON INTAKE: ICD-10-CM

## 2024-07-08 PROCEDURE — 99309 SBSQ NF CARE MODERATE MDM 30: CPT | Performed by: FAMILY MEDICINE

## 2024-07-08 NOTE — LETTER
Patient: Brittanie Smart  : 1960    Encounter Date: 2024    Subjective  Patient ID: Brittanie Smart is a 63 y.o. female who is acute skilled care being seen and evaluated for multiple medical problems.    HPI 63 year old female had been in a care facility in [place] until day of admission to hospital. Reportedly insurance ran out and she was discharged from facility in [place]? She got apt [place] but is nonambulatory. She initially refused er visit but then family convinced her to go. She was admitted and seen by nephro for ckd 4, started on epogen for anemia and noted she had us kidneys which showed possible renal mass seen on ct as well, needs urology follow up.  She also has dm2 with A1c 7.5 and is on insulin.  Continues to have edema noted to her lower extremities.  She is happy to be up out of bed and able to ambulate a little bit within her room now.  Recheck BMP is pending today further follow-up on her GFR which has steadily improved during her stay here.  BC also being monitored.           Review of Systems   Constitutional:  Positive for fatigue. Negative for chills and fever.   HENT:  Negative for congestion and sore throat.    Eyes:  Negative for visual disturbance.   Respiratory:  Positive for shortness of breath. Negative for cough and wheezing.    Cardiovascular:  Positive for leg swelling. Negative for chest pain and palpitations.   Gastrointestinal:  Negative for abdominal pain, constipation, diarrhea, nausea and vomiting.   Genitourinary:  Negative for dysuria, frequency, hematuria and urgency.   Musculoskeletal:  Positive for arthralgias and gait problem.   Skin:  Negative for rash.   Neurological:  Positive for weakness. Negative for dizziness, syncope and headaches.   Psychiatric/Behavioral:  Negative for confusion and dysphoric mood. The patient is not nervous/anxious.        Objective  There were no vitals taken for this visit.    Physical Exam  Vitals reviewed:  temp 98.2 pulse 80  respirations 18 weight 294 which is down 2 pounds.   Constitutional:       General: She is not in acute distress.     Appearance: She is obese. She is not ill-appearing.   HENT:      Head: Normocephalic.      Nose: No congestion.      Mouth/Throat:      Mouth: Mucous membranes are dry.      Pharynx: Oropharynx is clear.   Eyes:      General: No scleral icterus.     Conjunctiva/sclera: Conjunctivae normal.   Cardiovascular:      Rate and Rhythm: Normal rate and regular rhythm.      Heart sounds:      No gallop.   Pulmonary:      Effort: Pulmonary effort is normal.      Breath sounds: No wheezing or rhonchi.   Abdominal:      General: Bowel sounds are normal.      Tenderness: There is no abdominal tenderness. There is no rebound.   Musculoskeletal:      Cervical back: Neck supple.      Right lower leg: No edema.      Left lower leg: No edema.   Lymphadenopathy:      Cervical: No cervical adenopathy.   Skin:     General: Skin is warm and dry.   Neurological:      Mental Status: Mental status is at baseline.      Motor: Weakness present.      Gait: Gait abnormal.         Assessment/Plan  Problem List Items Addressed This Visit             ICD-10-CM    Type 2 diabetes mellitus (Multi) E11.9    Peripheral vascular disease (CMS-[facility]) I73.9    Hypertension I10    Iron deficiency anemia secondary to inadequate dietary iron intake D50.8     Other Visit Diagnoses         Codes    CKD (chronic kidney disease) stage 4, GFR 15-29 ml/min (Multi)    -  Primary N18.4        Respiratory status improved, recheck CBC and BMP as noted.  Renal function has steadily improved as has hemoglobin, unsure of what her long-term discharge plans are at this point whether returning to an apartment she has rented here locally versus returning to [place].     Goals    None           Electronically Signed By: Yumi Howe MD   7/9/24  5:25 PM

## 2024-07-09 ENCOUNTER — NURSING HOME VISIT (OUTPATIENT)
Dept: POST ACUTE CARE | Facility: EXTERNAL LOCATION | Age: 64
End: 2024-07-09
Payer: MEDICARE

## 2024-07-09 DIAGNOSIS — N18.4 TYPE 2 DIABETES MELLITUS WITH STAGE 4 CHRONIC KIDNEY DISEASE, WITH LONG-TERM CURRENT USE OF INSULIN (MULTI): ICD-10-CM

## 2024-07-09 DIAGNOSIS — J96.02 ACUTE RESPIRATORY FAILURE WITH HYPOXIA AND HYPERCAPNIA (MULTI): ICD-10-CM

## 2024-07-09 DIAGNOSIS — I10 PRIMARY HYPERTENSION: Primary | ICD-10-CM

## 2024-07-09 DIAGNOSIS — I73.9 PERIPHERAL VASCULAR DISEASE (CMS-HCC): ICD-10-CM

## 2024-07-09 DIAGNOSIS — Z78.9 UNABLE TO CARE FOR SELF: ICD-10-CM

## 2024-07-09 DIAGNOSIS — E78.2 MIXED HYPERLIPIDEMIA: ICD-10-CM

## 2024-07-09 DIAGNOSIS — E11.22 TYPE 2 DIABETES MELLITUS WITH STAGE 4 CHRONIC KIDNEY DISEASE, WITH LONG-TERM CURRENT USE OF INSULIN (MULTI): ICD-10-CM

## 2024-07-09 DIAGNOSIS — J96.01 ACUTE RESPIRATORY FAILURE WITH HYPOXIA AND HYPERCAPNIA (MULTI): ICD-10-CM

## 2024-07-09 DIAGNOSIS — Z79.4 TYPE 2 DIABETES MELLITUS WITH STAGE 4 CHRONIC KIDNEY DISEASE, WITH LONG-TERM CURRENT USE OF INSULIN (MULTI): ICD-10-CM

## 2024-07-09 PROCEDURE — 99309 SBSQ NF CARE MODERATE MDM 30: CPT | Performed by: NURSE PRACTITIONER

## 2024-07-09 ASSESSMENT — ENCOUNTER SYMPTOMS
ABDOMINAL PAIN: 0
DIARRHEA: 0
ARTHRALGIAS: 1
DYSPHORIC MOOD: 0
CONFUSION: 0
CONSTIPATION: 0
NERVOUS/ANXIOUS: 0
VOMITING: 0
CHILLS: 0
FREQUENCY: 0
DIZZINESS: 0
HEADACHES: 0
COUGH: 0
NAUSEA: 0
PALPITATIONS: 0
FEVER: 0
FATIGUE: 1
SORE THROAT: 0
DYSURIA: 0
WEAKNESS: 1
SHORTNESS OF BREATH: 1
HEMATURIA: 0
WHEEZING: 0

## 2024-07-09 NOTE — LETTER
Patient: Brittanie Smart  : 1960    Encounter Date: 2024    Subjective  Patient ID: Brittanie Smart ia a  63 y.o. female who is being seen in the acute skilled unit of [facility] for multiple medical problems.     HPI Subjective   Patient ID: Brittanie Smart is a 63 y.o. female who is being seen in the skilled acute unit of [facility] for multiple medical problems.    HPI Pt seen sitting up on side of bed in good spirits. . Pt alert and oriented and cooperative with examination. Pt with lower extremities with Kerlix wraps on, Pt states they have been leaking a clear fluid. Dressings currently dry, following with wound care NP at facility. Pt denies SOB.  Pt with occasional moist non productive cough, Pt wearing oxygen at 4 liters per nasal cannula. Pt utilizing aerosal treatments. Pt happy she is no longer a wilda lift out of bed and can get to bedside commode with one person assist. Pt states they have her ambulating some in therapy. Pt now sitting up for all meals and is not napping during the day. Pt denies CP or palpitations. Denies urinary symptoms, pts urine culture came back negative.  Pt with no other complaints or concerns at present.     Review of Systems   Constitutional:  Positive for activity change, appetite change and fatigue.   HENT:  Positive for congestion and rhinorrhea.    Eyes: Negative.    Respiratory:  Positive for cough and shortness of breath.    Cardiovascular:  Positive for leg swelling.   Gastrointestinal:  Positive for abdominal pain and diarrhea.   Endocrine: Negative.    Genitourinary: Negative.    Musculoskeletal:  Positive for arthralgias, back pain and gait problem.   Allergic/Immunologic: Negative.    Neurological:  Positive for weakness.   Hematological: Negative.    Psychiatric/Behavioral: Negative.         Objective   There were no vitals taken for this visit.    Current Outpatient Medications:   •  acetaminophen (Tylenol) 325 mg tablet, Take 2 tablets (650 mg)  by mouth every 4 hours if needed for mild pain (1 - 3) or fever (temp greater than 38.0 C)., Disp: , Rfl:   •  ammonium lactate (Lac-Hydrin) 12 % lotion, Apply topically every 1 hour if needed for dry skin., Disp: , Rfl:   •  docusate sodium (Colace) 100 mg capsule, Take 1 capsule (100 mg) by mouth 2 times a day., Disp: , Rfl:   •  doxazosin (Cardura) 1 mg tablet, Take 1 tablet (1 mg) by mouth once daily., Disp: , Rfl:   •  doxycycline (Adoxa) 100 mg tablet, Take 1 tablet (100 mg) by mouth 2 times a day. Take with a full glass of water and do not lie down for at least 30 minutes after, Disp: , Rfl:   •  ferrous sulfate, 325 mg ferrous sulfate, tablet, Take 1 tablet by mouth once daily with breakfast., Disp: , Rfl:   •  furosemide (Lasix) 40 mg tablet, Take 1 tablet (40 mg) by mouth once daily., Disp: , Rfl:   •  gabapentin (Neurontin) 300 mg capsule, Take 1 capsule (300 mg) by mouth 2 times a day for 3 days., Disp: 6 capsule, Rfl: 0  •  glucagon (Glucagen) 1 mg injection, Inject 1 mg into the muscle every 15 minutes if needed for low blood sugar - see comments (For blood glucose less than or equal to 70 mg/dL and no IV access)., Disp: 1 each, Rfl: 12  •  insulin glargine (Lantus) 100 unit/mL injection, Inject 35 Units under the skin once daily at bedtime. Take as directed per insulin instructions., Disp: , Rfl:   •  insulin lispro (HumaLOG) 100 unit/mL injection, Inject 0.1 mL (10 Units) under the skin 3 times daily (morning, midday, late afternoon). Take as directed per insulin instructions., Disp: , Rfl:   •  insulin lispro (HumaLOG) 100 unit/mL injection, Inject 0-0.2 mL (0-20 Units) under the skin 3 times daily (morning, midday, late afternoon). Take as directed per insulin instructions., Disp: , Rfl:   •  ipratropium-albuteroL (Duo-Neb) 0.5-2.5 mg/3 mL nebulizer solution, Take 3 mL by nebulization 3 times a day., Disp: , Rfl:   •  ipratropium-albuteroL (Duo-Neb) 0.5-2.5 mg/3 mL nebulizer solution, Take 3 mL by  nebulization every 2 hours if needed for wheezing or shortness of breath., Disp: 180 mL, Rfl: 11  •  ketotifen (Zaditor) 0.025 % (0.035 %) ophthalmic solution, Administer 1 drop into both eyes 2 times a day., Disp: , Rfl:   •  levothyroxine (Synthroid, Levoxyl) 75 mcg tablet, Take 1 tablet (75 mcg) by mouth early in the morning.. Take on an empty stomach at the same time each day, either 30 to 60 minutes prior to breakfast, Disp: , Rfl:   •  metoprolol succinate XL (Toprol-XL) 25 mg 24 hr tablet, Take 1 tablet (25 mg) by mouth once daily. Do not crush or chew., Disp: , Rfl:   •  multivitamin with minerals iron-free (Centrum Silver), Take 1 tablet by mouth once daily., Disp: , Rfl:   •  nystatin (Mycostatin) 100,000 unit/gram powder, Apply 1 Application topically every 8 hours., Disp: , Rfl:   •  oxygen (O2) gas therapy, Inhale 4 L/min once every 24 hours., Disp: , Rfl:   •  pantoprazole (ProtoNix) 40 mg EC tablet, Take 1 tablet (40 mg) by mouth once daily in the morning. Take before meals. Do not crush, chew, or split., Disp: , Rfl:   •  polyethylene glycol (Glycolax, Miralax) 17 gram packet, Take 17 g by mouth once daily., Disp: , Rfl:   •  rosuvastatin (Crestor) 5 mg tablet, Take 1 tablet (5 mg) by mouth once daily., Disp: , Rfl:   •  oxygen (O2) gas therapy, Inhale 3 L/min every 8 hours., Disp: , Rfl:     Physical Exam  Constitutional:       Appearance: Normal appearance. She is morbidly obese.   HENT:      Head: Normocephalic and atraumatic.      Nose: Nose normal.      Mouth/Throat:      Mouth: Mucous membranes are dry.   Eyes:      Conjunctiva/sclera: Conjunctivae normal.      Pupils: Pupils are equal, round, and reactive to light.   Cardiovascular:      Rate and Rhythm: Normal rate and regular rhythm.   Pulmonary:      Effort: Pulmonary effort is normal.      Breath sounds: Examination of the right-upper field reveals decreased breath sounds. Examination of the left-upper field reveals decreased breath  sounds. Examination of the right-middle field reveals decreased breath sounds. Examination of the left-middle field reveals decreased breath sounds. Examination of the right-lower field reveals decreased breath sounds. Examination of the left-lower field reveals decreased breath sounds. Decreased breath sounds present.   Abdominal:      General: Bowel sounds are normal. There is distension.      Palpations: Abdomen is soft.   Musculoskeletal:      Cervical back: Normal range of motion and neck supple.      Right lower le+ Edema present.      Left lower le+ Edema present.   Skin:     General: Skin is warm and dry.      Capillary Refill: Capillary refill takes less than 2 seconds.   Neurological:      Mental Status: She is alert and oriented to person, place, and time.      Motor: Weakness present.      Gait: Gait abnormal.   Psychiatric:         Mood and Affect: Mood normal.         Behavior: Behavior normal.         Thought Content: Thought content normal.         Judgment: Judgment normal.       Patient Active Problem List   Diagnosis   • Unable to care for self   • Type 2 diabetes mellitus (Multi)   • Morbid obesity (Multi)   • Peripheral vascular disease (CMS-[facility])   • Hypertension   • Pressure sore on sacrum   • Behavior problem, adult   • Renal failure   • Acute respiratory failure with hypoxia and hypercapnia (Multi)   • Pressure injury of skin of heel   • Iron deficiency anemia secondary to inadequate dietary iron intake   • Pneumonia of left lung due to infectious organism   • Hyperlipidemia         Assessment/Plan   Diagnoses and all orders for this visit:  Primary hypertension  Comments:  Continue Metorpolol 25 mg po daily.  Continue Doxazosin 1 mg po daily.  Peripheral vascular disease (CMS-[facility])  Comments:  Continue ace wraps to legs  Type 2 diabetes mellitus with stage 4 chronic kidney disease, with long-term current use of insulin (Multi)  Comments:  Continue Glargine insulin 25 units sq  daily.  COntinue Lispro sliding scale with meals.   Conitnue Lispro 10 units with meals.  Acute respiratory failure with hypoxia and hypercapnia (Multi)  Comments:  Continue oxygen 4 ltiers per nasal cannula.   Continue duonebs three times daily.  Unable to care for self  Mixed hyperlipidemia  Comments:  Continue Crestor 5 mg po daily.     RHONDA Appiah               Electronically Signed By: RHONDA Appiah   7/9/24 10:36 AM

## 2024-07-09 NOTE — PROGRESS NOTES
Subjective   Patient ID: Brittanie Smart is a 63 y.o. female who is acute skilled care being seen and evaluated for multiple medical problems.    HPI 63 year old female had been in a care facility in [place] until day of admission to hospital. Reportedly insurance ran out and she was discharged from facility in [place]? She got apt [place] but is nonambulatory. She initially refused er visit but then family convinced her to go. She was admitted and seen by nephro for ckd 4, started on epogen for anemia and noted she had us kidneys which showed possible renal mass seen on ct as well, needs urology follow up.  She also has dm2 with A1c 7.5 and is on insulin.  Continues to have edema noted to her lower extremities.  She is happy to be up out of bed and able to ambulate a little bit within her room now.  Recheck BMP is pending today further follow-up on her GFR which has steadily improved during her stay here.  BC also being monitored.           Review of Systems   Constitutional:  Positive for fatigue. Negative for chills and fever.   HENT:  Negative for congestion and sore throat.    Eyes:  Negative for visual disturbance.   Respiratory:  Positive for shortness of breath. Negative for cough and wheezing.    Cardiovascular:  Positive for leg swelling. Negative for chest pain and palpitations.   Gastrointestinal:  Negative for abdominal pain, constipation, diarrhea, nausea and vomiting.   Genitourinary:  Negative for dysuria, frequency, hematuria and urgency.   Musculoskeletal:  Positive for arthralgias and gait problem.   Skin:  Negative for rash.   Neurological:  Positive for weakness. Negative for dizziness, syncope and headaches.   Psychiatric/Behavioral:  Negative for confusion and dysphoric mood. The patient is not nervous/anxious.        Objective   There were no vitals taken for this visit.    Physical Exam  Vitals reviewed: 29/59 temp 98.2 pulse 80 respirations 18 weight 294 which is down 2 pounds.   Constitutional:        General: She is not in acute distress.     Appearance: She is obese. She is not ill-appearing.   HENT:      Head: Normocephalic.      Nose: No congestion.      Mouth/Throat:      Mouth: Mucous membranes are dry.      Pharynx: Oropharynx is clear.   Eyes:      General: No scleral icterus.     Conjunctiva/sclera: Conjunctivae normal.   Cardiovascular:      Rate and Rhythm: Normal rate and regular rhythm.      Heart sounds:      No gallop.   Pulmonary:      Effort: Pulmonary effort is normal.      Breath sounds: No wheezing or rhonchi.   Abdominal:      General: Bowel sounds are normal.      Tenderness: There is no abdominal tenderness. There is no rebound.   Musculoskeletal:      Cervical back: Neck supple.      Right lower leg: No edema.      Left lower leg: No edema.   Lymphadenopathy:      Cervical: No cervical adenopathy.   Skin:     General: Skin is warm and dry.   Neurological:      Mental Status: Mental status is at baseline.      Motor: Weakness present.      Gait: Gait abnormal.         Assessment/Plan   Problem List Items Addressed This Visit             ICD-10-CM    Type 2 diabetes mellitus (Multi) E11.9    Peripheral vascular disease (CMS-[facility]) I73.9    Hypertension I10    Iron deficiency anemia secondary to inadequate dietary iron intake D50.8     Other Visit Diagnoses         Codes    CKD (chronic kidney disease) stage 4, GFR 15-29 ml/min (Multi)    -  Primary N18.4        Respiratory status improved, recheck CBC and BMP as noted.  Renal function has steadily improved as has hemoglobin, unsure of what her long-term discharge plans are at this point whether returning to an apartment she has rented here locally versus returning to [place].     Goals    None

## 2024-07-09 NOTE — PROGRESS NOTES
Subjective   Patient ID: Brittanie Smart ia a  63 y.o. female who is being seen in the acute skilled unit of [facility] for multiple medical problems.     HPI Subjective   Patient ID: Brittanie Smart is a 63 y.o. female who is being seen in the skilled acute unit of [facility] for multiple medical problems.    HPI Pt seen sitting up on side of bed in good spirits. . Pt alert and oriented and cooperative with examination. Pt with lower extremities with Kerlix wraps on, Pt states they have been leaking a clear fluid. Dressings currently dry, following with wound care NP at facility. Pt denies SOB.  Pt with occasional moist non productive cough, Pt wearing oxygen at 4 liters per nasal cannula. Pt utilizing aerosal treatments. Pt happy she is no longer a wilda lift out of bed and can get to bedside commode with one person assist. Pt states they have her ambulating some in therapy. Pt now sitting up for all meals and is not napping during the day. Pt denies CP or palpitations. Denies urinary symptoms, pts urine culture came back negative.  Pt with no other complaints or concerns at present.     Review of Systems   Constitutional:  Positive for activity change, appetite change and fatigue.   HENT:  Positive for congestion and rhinorrhea.    Eyes: Negative.    Respiratory:  Positive for cough and shortness of breath.    Cardiovascular:  Positive for leg swelling.   Gastrointestinal:  Positive for abdominal pain and diarrhea.   Endocrine: Negative.    Genitourinary: Negative.    Musculoskeletal:  Positive for arthralgias, back pain and gait problem.   Allergic/Immunologic: Negative.    Neurological:  Positive for weakness.   Hematological: Negative.    Psychiatric/Behavioral: Negative.         Objective   There were no vitals taken for this visit.    Current Outpatient Medications:     acetaminophen (Tylenol) 325 mg tablet, Take 2 tablets (650 mg) by mouth every 4 hours if needed for mild pain (1 - 3) or fever (temp  greater than 38.0 C)., Disp: , Rfl:     ammonium lactate (Lac-Hydrin) 12 % lotion, Apply topically every 1 hour if needed for dry skin., Disp: , Rfl:     docusate sodium (Colace) 100 mg capsule, Take 1 capsule (100 mg) by mouth 2 times a day., Disp: , Rfl:     doxazosin (Cardura) 1 mg tablet, Take 1 tablet (1 mg) by mouth once daily., Disp: , Rfl:     doxycycline (Adoxa) 100 mg tablet, Take 1 tablet (100 mg) by mouth 2 times a day. Take with a full glass of water and do not lie down for at least 30 minutes after, Disp: , Rfl:     ferrous sulfate, 325 mg ferrous sulfate, tablet, Take 1 tablet by mouth once daily with breakfast., Disp: , Rfl:     furosemide (Lasix) 40 mg tablet, Take 1 tablet (40 mg) by mouth once daily., Disp: , Rfl:     gabapentin (Neurontin) 300 mg capsule, Take 1 capsule (300 mg) by mouth 2 times a day for 3 days., Disp: 6 capsule, Rfl: 0    glucagon (Glucagen) 1 mg injection, Inject 1 mg into the muscle every 15 minutes if needed for low blood sugar - see comments (For blood glucose less than or equal to 70 mg/dL and no IV access)., Disp: 1 each, Rfl: 12    insulin glargine (Lantus) 100 unit/mL injection, Inject 35 Units under the skin once daily at bedtime. Take as directed per insulin instructions., Disp: , Rfl:     insulin lispro (HumaLOG) 100 unit/mL injection, Inject 0.1 mL (10 Units) under the skin 3 times daily (morning, midday, late afternoon). Take as directed per insulin instructions., Disp: , Rfl:     insulin lispro (HumaLOG) 100 unit/mL injection, Inject 0-0.2 mL (0-20 Units) under the skin 3 times daily (morning, midday, late afternoon). Take as directed per insulin instructions., Disp: , Rfl:     ipratropium-albuteroL (Duo-Neb) 0.5-2.5 mg/3 mL nebulizer solution, Take 3 mL by nebulization 3 times a day., Disp: , Rfl:     ipratropium-albuteroL (Duo-Neb) 0.5-2.5 mg/3 mL nebulizer solution, Take 3 mL by nebulization every 2 hours if needed for wheezing or shortness of breath., Disp:  180 mL, Rfl: 11    ketotifen (Zaditor) 0.025 % (0.035 %) ophthalmic solution, Administer 1 drop into both eyes 2 times a day., Disp: , Rfl:     levothyroxine (Synthroid, Levoxyl) 75 mcg tablet, Take 1 tablet (75 mcg) by mouth early in the morning.. Take on an empty stomach at the same time each day, either 30 to 60 minutes prior to breakfast, Disp: , Rfl:     metoprolol succinate XL (Toprol-XL) 25 mg 24 hr tablet, Take 1 tablet (25 mg) by mouth once daily. Do not crush or chew., Disp: , Rfl:     multivitamin with minerals iron-free (Centrum Silver), Take 1 tablet by mouth once daily., Disp: , Rfl:     nystatin (Mycostatin) 100,000 unit/gram powder, Apply 1 Application topically every 8 hours., Disp: , Rfl:     oxygen (O2) gas therapy, Inhale 4 L/min once every 24 hours., Disp: , Rfl:     pantoprazole (ProtoNix) 40 mg EC tablet, Take 1 tablet (40 mg) by mouth once daily in the morning. Take before meals. Do not crush, chew, or split., Disp: , Rfl:     polyethylene glycol (Glycolax, Miralax) 17 gram packet, Take 17 g by mouth once daily., Disp: , Rfl:     rosuvastatin (Crestor) 5 mg tablet, Take 1 tablet (5 mg) by mouth once daily., Disp: , Rfl:     oxygen (O2) gas therapy, Inhale 3 L/min every 8 hours., Disp: , Rfl:     Physical Exam  Constitutional:       Appearance: Normal appearance. She is morbidly obese.   HENT:      Head: Normocephalic and atraumatic.      Nose: Nose normal.      Mouth/Throat:      Mouth: Mucous membranes are dry.   Eyes:      Conjunctiva/sclera: Conjunctivae normal.      Pupils: Pupils are equal, round, and reactive to light.   Cardiovascular:      Rate and Rhythm: Normal rate and regular rhythm.   Pulmonary:      Effort: Pulmonary effort is normal.      Breath sounds: Examination of the right-upper field reveals decreased breath sounds. Examination of the left-upper field reveals decreased breath sounds. Examination of the right-middle field reveals decreased breath sounds. Examination of the  left-middle field reveals decreased breath sounds. Examination of the right-lower field reveals decreased breath sounds. Examination of the left-lower field reveals decreased breath sounds. Decreased breath sounds present.   Abdominal:      General: Bowel sounds are normal. There is distension.      Palpations: Abdomen is soft.   Musculoskeletal:      Cervical back: Normal range of motion and neck supple.      Right lower le+ Edema present.      Left lower le+ Edema present.   Skin:     General: Skin is warm and dry.      Capillary Refill: Capillary refill takes less than 2 seconds.   Neurological:      Mental Status: She is alert and oriented to person, place, and time.      Motor: Weakness present.      Gait: Gait abnormal.   Psychiatric:         Mood and Affect: Mood normal.         Behavior: Behavior normal.         Thought Content: Thought content normal.         Judgment: Judgment normal.       Patient Active Problem List   Diagnosis    Unable to care for self    Type 2 diabetes mellitus (Multi)    Morbid obesity (Multi)    Peripheral vascular disease (CMS-[facility])    Hypertension    Pressure sore on sacrum    Behavior problem, adult    Renal failure    Acute respiratory failure with hypoxia and hypercapnia (Multi)    Pressure injury of skin of heel    Iron deficiency anemia secondary to inadequate dietary iron intake    Pneumonia of left lung due to infectious organism    Hyperlipidemia         Assessment/Plan   Diagnoses and all orders for this visit:  Primary hypertension  Comments:  Continue Metorpolol 25 mg po daily.  Continue Doxazosin 1 mg po daily.  Peripheral vascular disease (CMS-[facility])  Comments:  Continue ace wraps to legs  Type 2 diabetes mellitus with stage 4 chronic kidney disease, with long-term current use of insulin (Multi)  Comments:  Continue Glargine insulin 25 units sq daily.  COntinue Lispro sliding scale with meals.   Conitnue Lispro 10 units with meals.  Acute respiratory failure  with hypoxia and hypercapnia (Multi)  Comments:  Continue oxygen 4 ltiers per nasal cannula.   Continue duonebs three times daily.  Unable to care for self  Mixed hyperlipidemia  Comments:  Continue Crestor 5 mg po daily.     Kacie Shah, APRN-CNP
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
## 2024-07-12 ENCOUNTER — NURSING HOME VISIT (OUTPATIENT)
Dept: POST ACUTE CARE | Facility: EXTERNAL LOCATION | Age: 64
End: 2024-07-12
Payer: MEDICARE

## 2024-07-12 DIAGNOSIS — D50.8 IRON DEFICIENCY ANEMIA SECONDARY TO INADEQUATE DIETARY IRON INTAKE: ICD-10-CM

## 2024-07-12 DIAGNOSIS — I10 PRIMARY HYPERTENSION: ICD-10-CM

## 2024-07-12 DIAGNOSIS — N18.4 TYPE 2 DIABETES MELLITUS WITH STAGE 4 CHRONIC KIDNEY DISEASE, WITH LONG-TERM CURRENT USE OF INSULIN (MULTI): Primary | ICD-10-CM

## 2024-07-12 DIAGNOSIS — E78.2 MIXED HYPERLIPIDEMIA: ICD-10-CM

## 2024-07-12 DIAGNOSIS — E11.22 TYPE 2 DIABETES MELLITUS WITH STAGE 4 CHRONIC KIDNEY DISEASE, WITH LONG-TERM CURRENT USE OF INSULIN (MULTI): Primary | ICD-10-CM

## 2024-07-12 DIAGNOSIS — Z79.4 TYPE 2 DIABETES MELLITUS WITH STAGE 4 CHRONIC KIDNEY DISEASE, WITH LONG-TERM CURRENT USE OF INSULIN (MULTI): Primary | ICD-10-CM

## 2024-07-12 PROCEDURE — 99308 SBSQ NF CARE LOW MDM 20: CPT | Performed by: FAMILY MEDICINE

## 2024-07-12 ASSESSMENT — ENCOUNTER SYMPTOMS
FREQUENCY: 0
COUGH: 0
NAUSEA: 0
PALPITATIONS: 0
NERVOUS/ANXIOUS: 0
WHEEZING: 0
HEMATURIA: 0
SORE THROAT: 0
CHILLS: 0
DYSURIA: 0
FATIGUE: 1
CONFUSION: 0
DYSPHORIC MOOD: 0
WEAKNESS: 1
CONSTIPATION: 0
DIZZINESS: 0
SHORTNESS OF BREATH: 1
ABDOMINAL PAIN: 0
ARTHRALGIAS: 1
VOMITING: 0
FEVER: 0
HEADACHES: 0
DIARRHEA: 0

## 2024-07-12 NOTE — LETTER
Patient: Brittanie Smart  : 1960    Encounter Date: 2024    Subjective  Patient ID: Brittanie Smart is a 63 y.o. female who is acute skilled care being seen and evaluated for multiple medical problems.    HPI 63 year old female had been in a care facility in [place] until day of admission to hospital. Reportedly insurance ran out and she was discharged from facility in [place]? She got [place] but is nonambulatory. She initially refused er visit but then family convinced her to go. She was admitted and seen by nephro for ckd 4, started on epogen for anemia and noted she had us kidneys which showed possible renal mass seen on ct as well, needs urology follow up.  She also has dm2 with A1c 7.5 and is on insulin.  Continues to have edema noted to her lower extremities.  She is happy to be up out of bed and able to ambulate a little bit within her room now.  Recheck BMP this week was stable. Sugars are coming down now with meds being taken routinely.            Review of Systems   Constitutional:  Positive for fatigue. Negative for chills and fever.   HENT:  Negative for congestion and sore throat.    Eyes:  Negative for visual disturbance.   Respiratory:  Positive for shortness of breath. Negative for cough and wheezing.    Cardiovascular:  Positive for leg swelling. Negative for chest pain and palpitations.   Gastrointestinal:  Negative for abdominal pain, constipation, diarrhea, nausea and vomiting.   Genitourinary:  Negative for dysuria, frequency, hematuria and urgency.   Musculoskeletal:  Positive for arthralgias and gait problem.   Skin:  Negative for rash.   Neurological:  Positive for weakness. Negative for dizziness, syncope and headaches.   Psychiatric/Behavioral:  Negative for confusion and dysphoric mood. The patient is not nervous/anxious.        Objective  There were no vitals taken for this visit.    Physical Exam  Vitals reviewed: 137/66 97 81 18 weight 294 which is down 2 pounds.   Constitutional:        General: She is not in acute distress.     Appearance: She is obese. She is not ill-appearing.   HENT:      Head: Normocephalic.      Nose: No congestion.      Mouth/Throat:      Mouth: Mucous membranes are dry.      Pharynx: Oropharynx is clear.   Eyes:      General: No scleral icterus.     Conjunctiva/sclera: Conjunctivae normal.   Cardiovascular:      Rate and Rhythm: Normal rate and regular rhythm.      Heart sounds:      No gallop.   Pulmonary:      Effort: Pulmonary effort is normal.      Breath sounds: No wheezing or rhonchi.   Abdominal:      General: Bowel sounds are normal.      Tenderness: There is no abdominal tenderness. There is no rebound.   Musculoskeletal:      Cervical back: Neck supple.      Right lower leg: No edema.      Left lower leg: No edema.   Lymphadenopathy:      Cervical: No cervical adenopathy.   Skin:     General: Skin is warm and dry.   Neurological:      Mental Status: Mental status is at baseline.      Motor: Weakness present.      Gait: Gait abnormal.         Assessment/Plan  Problem List Items Addressed This Visit             ICD-10-CM    Type 2 diabetes mellitus (Multi) - Primary E11.9    Hypertension I10    Iron deficiency anemia secondary to inadequate dietary iron intake D50.8    Hyperlipidemia E78.5     Respiratory status improved, recheck CBC and BMP as noted.  Renal function has steadily improved as has hemoglobin, unsure of what her long-term discharge plans are at this point whether returning to an apartment she has rented here locally versus returning to [place].     Goals    None           Electronically Signed By: Yumi Howe MD   7/12/24  8:40 PM

## 2024-07-13 NOTE — PROGRESS NOTES
Subjective   Patient ID: Brittanie Smart is a 63 y.o. female who is acute skilled care being seen and evaluated for multiple medical problems.    HPI 63 year old female had been in a care facility in [place] until day of admission to hospital. Reportedly insurance ran out and she was discharged from facility in [place]? She got [place] but is nonambulatory. She initially refused er visit but then family convinced her to go. She was admitted and seen by nephro for ckd 4, started on epogen for anemia and noted she had us kidneys which showed possible renal mass seen on ct as well, needs urology follow up.  She also has dm2 with A1c 7.5 and is on insulin.  Continues to have edema noted to her lower extremities.  She is happy to be up out of bed and able to ambulate a little bit within her room now.  Recheck BMP this week was stable. Sugars are coming down now with meds being taken routinely.            Review of Systems   Constitutional:  Positive for fatigue. Negative for chills and fever.   HENT:  Negative for congestion and sore throat.    Eyes:  Negative for visual disturbance.   Respiratory:  Positive for shortness of breath. Negative for cough and wheezing.    Cardiovascular:  Positive for leg swelling. Negative for chest pain and palpitations.   Gastrointestinal:  Negative for abdominal pain, constipation, diarrhea, nausea and vomiting.   Genitourinary:  Negative for dysuria, frequency, hematuria and urgency.   Musculoskeletal:  Positive for arthralgias and gait problem.   Skin:  Negative for rash.   Neurological:  Positive for weakness. Negative for dizziness, syncope and headaches.   Psychiatric/Behavioral:  Negative for confusion and dysphoric mood. The patient is not nervous/anxious.        Objective   There were no vitals taken for this visit.    Physical Exam  Vitals reviewed: 137/66 97 81 18 weight 294 which is down 2 pounds.   Constitutional:       General: She is not in acute distress.     Appearance: She is  obese. She is not ill-appearing.   HENT:      Head: Normocephalic.      Nose: No congestion.      Mouth/Throat:      Mouth: Mucous membranes are dry.      Pharynx: Oropharynx is clear.   Eyes:      General: No scleral icterus.     Conjunctiva/sclera: Conjunctivae normal.   Cardiovascular:      Rate and Rhythm: Normal rate and regular rhythm.      Heart sounds:      No gallop.   Pulmonary:      Effort: Pulmonary effort is normal.      Breath sounds: No wheezing or rhonchi.   Abdominal:      General: Bowel sounds are normal.      Tenderness: There is no abdominal tenderness. There is no rebound.   Musculoskeletal:      Cervical back: Neck supple.      Right lower leg: No edema.      Left lower leg: No edema.   Lymphadenopathy:      Cervical: No cervical adenopathy.   Skin:     General: Skin is warm and dry.   Neurological:      Mental Status: Mental status is at baseline.      Motor: Weakness present.      Gait: Gait abnormal.         Assessment/Plan   Problem List Items Addressed This Visit             ICD-10-CM    Type 2 diabetes mellitus (Multi) - Primary E11.9    Hypertension I10    Iron deficiency anemia secondary to inadequate dietary iron intake D50.8    Hyperlipidemia E78.5     Respiratory status improved, recheck CBC and BMP as noted.  Renal function has steadily improved as has hemoglobin, unsure of what her long-term discharge plans are at this point whether returning to an apartment she has rented here locally versus returning to [place].     Goals    None

## 2024-07-16 ENCOUNTER — NURSING HOME VISIT (OUTPATIENT)
Dept: POST ACUTE CARE | Facility: EXTERNAL LOCATION | Age: 64
End: 2024-07-16
Payer: MEDICARE

## 2024-07-16 DIAGNOSIS — H11.32 CONJUNCTIVAL HEMORRHAGE OF LEFT EYE: Primary | ICD-10-CM

## 2024-07-16 PROCEDURE — 99308 SBSQ NF CARE LOW MDM 20: CPT | Performed by: NURSE PRACTITIONER

## 2024-07-16 NOTE — LETTER
Patient: Brittanie Smart  : 1960    Encounter Date: 2024    Subjective  Patient ID: Brittanie Smart is a 63 y.o. female who presents for Conjunctivitis.    Asked to see patient who has redness in her left eye. Staff noticed this today. Patient states that she would not have known it was there if they had not mentioned it. She state that it does not hurt and that her vision has not been affected. She uses allergy eye drops for itching. No headache, vomiting or coughing         Review of Systems   HENT:          See HPI       Objective  /66   Pulse 81   Temp 36.6 °C (97.9 °F)   Resp 16   Wt 134 kg (294 lb 9.6 oz)   SpO2 96%   BMI 44.80 kg/m²     Physical Exam  Constitutional:       Appearance: She is obese.   Eyes:      General: Lids are normal. Vision grossly intact.      Extraocular Movements:      Left eye: Normal extraocular motion and no nystagmus.      Conjunctiva/sclera:      Left eye: Hemorrhage present. No exudate.  Pulmonary:      Effort: Pulmonary effort is normal.   Neurological:      Mental Status: She is alert.         Assessment/Plan  Diagnoses and all orders for this visit:  Conjunctival hemorrhage of left eye  Comments:  monitor area, cont with allergy eye drops         Electronically Signed By: RHONDA James   24  1:45 PM

## 2024-07-18 ENCOUNTER — NURSING HOME VISIT (OUTPATIENT)
Dept: POST ACUTE CARE | Facility: EXTERNAL LOCATION | Age: 64
End: 2024-07-18
Payer: MEDICARE

## 2024-07-18 VITALS
TEMPERATURE: 97.9 F | HEART RATE: 81 BPM | WEIGHT: 293 LBS | BODY MASS INDEX: 44.8 KG/M2 | RESPIRATION RATE: 16 BRPM | OXYGEN SATURATION: 96 % | DIASTOLIC BLOOD PRESSURE: 66 MMHG | SYSTOLIC BLOOD PRESSURE: 137 MMHG

## 2024-07-18 DIAGNOSIS — R09.81 NASAL SINUS CONGESTION: ICD-10-CM

## 2024-07-18 DIAGNOSIS — R06.02 SHORTNESS OF BREATH: Primary | ICD-10-CM

## 2024-07-18 PROCEDURE — 99309 SBSQ NF CARE MODERATE MDM 30: CPT | Performed by: NURSE PRACTITIONER

## 2024-07-18 ASSESSMENT — VISUAL ACUITY: OU: 1

## 2024-07-18 NOTE — LETTER
Patient: Brittanie Smart  : 1960    Encounter Date: 2024    Subjective  Patient ID: Brittanie Smart is a 64 y.o. female who presents for Shortness of Breath.    Asked to see patients who is complaining of SOB. She wears O2 continually and uses nebulizers TID. She states that there are times she feels like she isn't getting enough O2 through her nasal cannula and that he nose is stuffy. She denies worsened cough. She takes lasix for diuretic. She states that she gets SOB with exertion. She was recently treated for pneumonia. She is to see pulmonary but appointment has not been made.          Review of Systems   Constitutional:  Negative for fatigue and fever.   HENT:  Positive for sinus pressure.    Respiratory:  Positive for shortness of breath. Negative for cough, choking and chest tightness.    Cardiovascular:  Negative for chest pain.       Objective  There were no vitals taken for this visit.    Physical Exam  Constitutional:       Appearance: She is obese.   HENT:      Nose: Mucosal edema and congestion present.      Right Sinus: Frontal sinus tenderness present.   Cardiovascular:      Rate and Rhythm: Normal rate.   Pulmonary:      Effort: Pulmonary effort is normal.      Breath sounds: Decreased air movement present.      Comments: Some dyspnea with speaking  Skin:     General: Skin is warm and dry.   Neurological:      Mental Status: She is alert.   Psychiatric:         Mood and Affect: Mood normal.         Assessment/Plan  Diagnoses and all orders for this visit:  Shortness of breath  Comments:  CXR 2 view showed bibasilac infiltrate versus atelectasis, will start omnicef 300mg BID for 10 days, cont with nebulizers, O2  Nasal sinus congestion  Comments:  flonase 2 sprays B nostrils daily           Electronically Signed By: RHONDA James   24 10:44 AM

## 2024-07-18 NOTE — PROGRESS NOTES
Subjective   Patient ID: Brittanie Smart is a 63 y.o. female who presents for Conjunctivitis.    Asked to see patient who has redness in her left eye. Staff noticed this today. Patient states that she would not have known it was there if they had not mentioned it. She state that it does not hurt and that her vision has not been affected. She uses allergy eye drops for itching. No headache, vomiting or coughing         Review of Systems   HENT:          See HPI       Objective   /66   Pulse 81   Temp 36.6 °C (97.9 °F)   Resp 16   Wt 134 kg (294 lb 9.6 oz)   SpO2 96%   BMI 44.80 kg/m²     Physical Exam  Constitutional:       Appearance: She is obese.   Eyes:      General: Lids are normal. Vision grossly intact.      Extraocular Movements:      Left eye: Normal extraocular motion and no nystagmus.      Conjunctiva/sclera:      Left eye: Hemorrhage present. No exudate.  Pulmonary:      Effort: Pulmonary effort is normal.   Neurological:      Mental Status: She is alert.         Assessment/Plan   Diagnoses and all orders for this visit:  Conjunctival hemorrhage of left eye  Comments:  monitor area, cont with allergy eye drops

## 2024-07-19 VITALS
SYSTOLIC BLOOD PRESSURE: 137 MMHG | BODY MASS INDEX: 44.8 KG/M2 | OXYGEN SATURATION: 96 % | HEART RATE: 81 BPM | RESPIRATION RATE: 18 BRPM | TEMPERATURE: 97.9 F | DIASTOLIC BLOOD PRESSURE: 66 MMHG | WEIGHT: 293 LBS

## 2024-07-19 ASSESSMENT — ENCOUNTER SYMPTOMS
SINUS PRESSURE: 1
FATIGUE: 0
COUGH: 0
SHORTNESS OF BREATH: 1
CHEST TIGHTNESS: 0
FEVER: 0
CHOKING: 0

## 2024-07-19 NOTE — PROGRESS NOTES
Subjective   Patient ID: Brittanie Smart is a 64 y.o. female who presents for Shortness of Breath.    Asked to see patients who is complaining of SOB. She wears O2 continually and uses nebulizers TID. She states that there are times she feels like she isn't getting enough O2 through her nasal cannula and that he nose is stuffy. She denies worsened cough. She takes lasix for diuretic. She states that she gets SOB with exertion. She was recently treated for pneumonia. She is to see pulmonary but appointment has not been made.          Review of Systems   Constitutional:  Negative for fatigue and fever.   HENT:  Positive for sinus pressure.    Respiratory:  Positive for shortness of breath. Negative for cough, choking and chest tightness.    Cardiovascular:  Negative for chest pain.       Objective   There were no vitals taken for this visit.    Physical Exam  Constitutional:       Appearance: She is obese.   HENT:      Nose: Mucosal edema and congestion present.      Right Sinus: Frontal sinus tenderness present.   Cardiovascular:      Rate and Rhythm: Normal rate.   Pulmonary:      Effort: Pulmonary effort is normal.      Breath sounds: Decreased air movement present.      Comments: Some dyspnea with speaking  Skin:     General: Skin is warm and dry.   Neurological:      Mental Status: She is alert.   Psychiatric:         Mood and Affect: Mood normal.         Assessment/Plan   Diagnoses and all orders for this visit:  Shortness of breath  Comments:  CXR 2 view showed bibasilac infiltrate versus atelectasis, will start omnicef 300mg BID for 10 days, cont with nebulizers, O2  Nasal sinus congestion  Comments:  flonase 2 sprays B nostrils daily

## 2024-07-30 ENCOUNTER — NURSING HOME VISIT (OUTPATIENT)
Dept: POST ACUTE CARE | Facility: EXTERNAL LOCATION | Age: 64
End: 2024-07-30
Payer: MEDICARE

## 2024-07-30 VITALS
BODY MASS INDEX: 43.19 KG/M2 | OXYGEN SATURATION: 96 % | WEIGHT: 284 LBS | TEMPERATURE: 98.5 F | HEART RATE: 67 BPM | RESPIRATION RATE: 18 BRPM | SYSTOLIC BLOOD PRESSURE: 128 MMHG | DIASTOLIC BLOOD PRESSURE: 70 MMHG

## 2024-07-30 DIAGNOSIS — J18.9 PNEUMONIA OF BOTH LOWER LOBES DUE TO INFECTIOUS ORGANISM: ICD-10-CM

## 2024-07-30 DIAGNOSIS — U07.1 COVID-19: Primary | ICD-10-CM

## 2024-07-30 PROCEDURE — 99309 SBSQ NF CARE MODERATE MDM 30: CPT | Performed by: NURSE PRACTITIONER

## 2024-07-30 ASSESSMENT — ENCOUNTER SYMPTOMS
WHEEZING: 0
COUGH: 0
SHORTNESS OF BREATH: 0
FEVER: 0
FATIGUE: 0

## 2024-07-30 NOTE — PROGRESS NOTES
Subjective   Patient ID: Brittanie Smart is a 64 y.o. female who presents for COVID-19.    Asked to see patient who tested positive for COVID-19 yesterday. She states that she feels OK. Denies worsened SOB. Denies cough, fever and chills. She says her appetite is good. Patient recently stopped antibiotics for treatment of bilateral infiltrates. She was to see pulmonary but appointment was when she was on antibiotics and needed to be rescheduled. She remains on O2. She takes duoneb TID.          Review of Systems   Constitutional:  Negative for fatigue and fever.   Respiratory:  Negative for cough, shortness of breath and wheezing.    Cardiovascular:  Negative for chest pain.       Objective   /70   Pulse 67   Temp 36.9 °C (98.5 °F)   Resp 18   Wt 129 kg (284 lb)   SpO2 96%   BMI 43.19 kg/m²     Physical Exam  Constitutional:       Appearance: She is obese.   HENT:      Mouth/Throat:      Mouth: Mucous membranes are moist.   Eyes:      Conjunctiva/sclera: Conjunctivae normal.   Cardiovascular:      Rate and Rhythm: Normal rate and regular rhythm.   Pulmonary:      Effort: Pulmonary effort is normal.      Breath sounds: Examination of the right-middle field reveals decreased breath sounds. Examination of the left-middle field reveals decreased breath sounds. Decreased breath sounds present.   Abdominal:      General: Bowel sounds are normal.   Musculoskeletal:      Right lower leg: Edema present.      Left lower leg: Edema present.   Skin:     General: Skin is warm and dry.   Neurological:      Mental Status: She is alert.   Psychiatric:         Mood and Affect: Mood normal.         Assessment/Plan   Diagnoses and all orders for this visit:  COVID-19  Comments:  cont with respiratory assessment, unable to take paxlovid due to medication interactions  Pneumonia of both lower lobes due to infectious organism  Comments:  follow up with pulmonary, cont with duoneb, O2, encouraged flu and pneumonia vaccines when  due

## 2024-07-30 NOTE — LETTER
Patient: Brittanie Smart  : 1960    Encounter Date: 2024    Subjective  Patient ID: Brittaine Smart is a 64 y.o. female who presents for COVID-19.    Asked to see patient who tested positive for COVID-19 yesterday. She states that she feels OK. Denies worsened SOB. Denies cough, fever and chills. She says her appetite is good. Patient recently stopped antibiotics for treatment of bilateral infiltrates. She was to see pulmonary but appointment was when she was on antibiotics and needed to be rescheduled. She remains on O2. She takes duoneb TID.          Review of Systems   Constitutional:  Negative for fatigue and fever.   Respiratory:  Negative for cough, shortness of breath and wheezing.    Cardiovascular:  Negative for chest pain.       Objective  /70   Pulse 67   Temp 36.9 °C (98.5 °F)   Resp 18   Wt 129 kg (284 lb)   SpO2 96%   BMI 43.19 kg/m²     Physical Exam  Constitutional:       Appearance: She is obese.   HENT:      Mouth/Throat:      Mouth: Mucous membranes are moist.   Eyes:      Conjunctiva/sclera: Conjunctivae normal.   Cardiovascular:      Rate and Rhythm: Normal rate and regular rhythm.   Pulmonary:      Effort: Pulmonary effort is normal.      Breath sounds: Examination of the right-middle field reveals decreased breath sounds. Examination of the left-middle field reveals decreased breath sounds. Decreased breath sounds present.   Abdominal:      General: Bowel sounds are normal.   Musculoskeletal:      Right lower leg: Edema present.      Left lower leg: Edema present.   Skin:     General: Skin is warm and dry.   Neurological:      Mental Status: She is alert.   Psychiatric:         Mood and Affect: Mood normal.         Assessment/Plan  Diagnoses and all orders for this visit:  COVID-19  Comments:  cont with respiratory assessment, unable to take paxlovid due to medication interactions  Pneumonia of both lower lobes due to infectious organism  Comments:  follow up with pulmonary,  cont with duoneb, O2, encouraged flu and pneumonia vaccines when due           Electronically Signed By: ACE James-CNP   7/30/24 11:43 AM

## 2024-08-08 ENCOUNTER — NURSING HOME VISIT (OUTPATIENT)
Dept: POST ACUTE CARE | Facility: EXTERNAL LOCATION | Age: 64
End: 2024-08-08
Payer: MEDICARE

## 2024-08-08 VITALS
RESPIRATION RATE: 18 BRPM | DIASTOLIC BLOOD PRESSURE: 71 MMHG | OXYGEN SATURATION: 98 % | HEART RATE: 80 BPM | SYSTOLIC BLOOD PRESSURE: 140 MMHG | TEMPERATURE: 98 F | BODY MASS INDEX: 43.82 KG/M2 | WEIGHT: 288.1 LBS

## 2024-08-08 DIAGNOSIS — R60.0 LOCALIZED EDEMA: Primary | ICD-10-CM

## 2024-08-08 DIAGNOSIS — U07.1 COVID-19: ICD-10-CM

## 2024-08-08 PROCEDURE — 99309 SBSQ NF CARE MODERATE MDM 30: CPT | Performed by: NURSE PRACTITIONER

## 2024-08-08 ASSESSMENT — ENCOUNTER SYMPTOMS
FEVER: 0
SHORTNESS OF BREATH: 1
COUGH: 0
FATIGUE: 0
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
## 2024-08-09 ENCOUNTER — NURSING HOME VISIT (OUTPATIENT)
Dept: POST ACUTE CARE | Facility: EXTERNAL LOCATION | Age: 64
End: 2024-08-09
Payer: MEDICARE

## 2024-08-09 DIAGNOSIS — E11.22 TYPE 2 DIABETES MELLITUS WITH STAGE 4 CHRONIC KIDNEY DISEASE, WITH LONG-TERM CURRENT USE OF INSULIN (MULTI): ICD-10-CM

## 2024-08-09 DIAGNOSIS — K74.60 CIRRHOSIS OF LIVER WITHOUT ASCITES, UNSPECIFIED HEPATIC CIRRHOSIS TYPE (MULTI): ICD-10-CM

## 2024-08-09 DIAGNOSIS — U07.1 COVID-19: Primary | ICD-10-CM

## 2024-08-09 DIAGNOSIS — N18.4 TYPE 2 DIABETES MELLITUS WITH STAGE 4 CHRONIC KIDNEY DISEASE, WITH LONG-TERM CURRENT USE OF INSULIN (MULTI): ICD-10-CM

## 2024-08-09 DIAGNOSIS — N18.4 CKD (CHRONIC KIDNEY DISEASE) STAGE 4, GFR 15-29 ML/MIN (MULTI): ICD-10-CM

## 2024-08-09 DIAGNOSIS — Z79.4 TYPE 2 DIABETES MELLITUS WITH STAGE 4 CHRONIC KIDNEY DISEASE, WITH LONG-TERM CURRENT USE OF INSULIN (MULTI): ICD-10-CM

## 2024-08-09 DIAGNOSIS — R60.0 LOCALIZED EDEMA: ICD-10-CM

## 2024-08-09 DIAGNOSIS — D50.8 IRON DEFICIENCY ANEMIA SECONDARY TO INADEQUATE DIETARY IRON INTAKE: ICD-10-CM

## 2024-08-09 PROCEDURE — 99309 SBSQ NF CARE MODERATE MDM 30: CPT | Performed by: FAMILY MEDICINE

## 2024-08-09 NOTE — LETTER
Patient: Brittanie Smart  : 1960    Encounter Date: 2024    Subjective  Patient ID: Brittanie Smart is a 64 y.o. female who is acute skilled care being seen and evaluated for multiple medical problems.    HPI 64 year old female had been in a care facility in [place] until day of admission to hospital. Reportedly insurance ran out and she was discharged from facility in [place]? She got [facility] [place] but is nonambulatory. She has recently had an increase in edema for which Lasix was increased with recheck labs ordered for Monday.  In addition she was diagnosed with coronavirus infection and was on isolation for 10 days.  She did become despondent with this and was more insistent on leaving the facility however at this time unsure of what she is discharging to.  Safe discharge plans need to be put into place first.             Review of Systems   Constitutional:  Positive for fatigue. Negative for chills and fever.   HENT:  Negative for congestion and sore throat.    Eyes:  Negative for visual disturbance.   Respiratory:  Positive for shortness of breath. Negative for cough and wheezing.    Cardiovascular:  Negative for chest pain, palpitations and leg swelling.   Gastrointestinal:  Negative for abdominal pain, constipation, diarrhea, nausea and vomiting.   Genitourinary:  Negative for dysuria, frequency, hematuria and urgency.   Musculoskeletal:  Positive for arthralgias and gait problem.   Skin:  Negative for rash.   Neurological:  Positive for weakness. Negative for dizziness, syncope and headaches.   Psychiatric/Behavioral:  Negative for confusion and dysphoric mood. The patient is not nervous/anxious.        Objective  There were no vitals taken for this visit.    Physical Exam  Vitals reviewed: And 139/74 temp 97.7 pulse 84 weight 288.   Constitutional:       General: She is not in acute distress.     Appearance: She is obese. She is not ill-appearing.   HENT:      Head: Normocephalic.      Nose: No congestion.       Mouth/Throat:      Mouth: Mucous membranes are dry.      Pharynx: Oropharynx is clear.   Eyes:      General: No scleral icterus.     Conjunctiva/sclera: Conjunctivae normal.   Cardiovascular:      Rate and Rhythm: Normal rate and regular rhythm.      Heart sounds:      No gallop.   Pulmonary:      Effort: Pulmonary effort is normal.      Breath sounds: No wheezing or rhonchi.   Abdominal:      General: Bowel sounds are normal.      Tenderness: There is no abdominal tenderness. There is no rebound.   Musculoskeletal:      Cervical back: Neck supple.      Right lower leg: Edema present.      Left lower leg: Edema present.   Lymphadenopathy:      Cervical: No cervical adenopathy.   Skin:     General: Skin is warm and dry.   Neurological:      Mental Status: Mental status is at baseline.      Motor: Weakness present.      Gait: Gait abnormal.         Assessment/Plan  Problem List Items Addressed This Visit             ICD-10-CM    Type 2 diabetes mellitus (Multi) E11.9    Iron deficiency anemia secondary to inadequate dietary iron intake D50.8     Other Visit Diagnoses         Codes    COVID-19    -  Primary U07.1    Cirrhosis of liver without ascites, unspecified hepatic cirrhosis type (Multi)     K74.60    CKD (chronic kidney disease) stage 4, GFR 15-29 ml/min (Multi)     N18.4    Localized edema     R60.0            Respiratory status improved  CBC CMP and thyroid function ordered for Monday she continues on Retacrit due to her chronic kidney disease with most recent GFR of 26 noted compared to 32.  Hemoglobin up to 9.3 from 8.7 but again continues on Retacrit  Edema slowly improving may need increase in diuretics more routinely   Unsure what discharge plans are at this time    Goals    None           Electronically Signed By: Yumi Howe MD   8/12/24  1:15 PM

## 2024-08-12 ASSESSMENT — ENCOUNTER SYMPTOMS
FEVER: 0
NAUSEA: 0
ARTHRALGIAS: 1
PALPITATIONS: 0
HEMATURIA: 0
FREQUENCY: 0
WHEEZING: 0
DYSPHORIC MOOD: 0
NERVOUS/ANXIOUS: 0
HEADACHES: 0
FATIGUE: 1
DYSURIA: 0
SHORTNESS OF BREATH: 1
ABDOMINAL PAIN: 0
COUGH: 0
DIZZINESS: 0
DIARRHEA: 0
VOMITING: 0
CONFUSION: 0
CONSTIPATION: 0
WEAKNESS: 1
SORE THROAT: 0
CHILLS: 0

## 2024-08-12 NOTE — PROGRESS NOTES
Subjective   Patient ID: Brittanie Smart is a 64 y.o. female who is acute skilled care being seen and evaluated for multiple medical problems.    HPI 64 year old female had been in a care facility in [place] until day of admission to hospital. Reportedly insurance ran out and she was discharged from facility in [place]? She got [facility] [place] but is nonambulatory. She has recently had an increase in edema for which Lasix was increased with recheck labs ordered for Monday.  In addition she was diagnosed with coronavirus infection and was on isolation for 10 days.  She did become despondent with this and was more insistent on leaving the facility however at this time unsure of what she is discharging to.  Safe discharge plans need to be put into place first.             Review of Systems   Constitutional:  Positive for fatigue. Negative for chills and fever.   HENT:  Negative for congestion and sore throat.    Eyes:  Negative for visual disturbance.   Respiratory:  Positive for shortness of breath. Negative for cough and wheezing.    Cardiovascular:  Negative for chest pain, palpitations and leg swelling.   Gastrointestinal:  Negative for abdominal pain, constipation, diarrhea, nausea and vomiting.   Genitourinary:  Negative for dysuria, frequency, hematuria and urgency.   Musculoskeletal:  Positive for arthralgias and gait problem.   Skin:  Negative for rash.   Neurological:  Positive for weakness. Negative for dizziness, syncope and headaches.   Psychiatric/Behavioral:  Negative for confusion and dysphoric mood. The patient is not nervous/anxious.        Objective   There were no vitals taken for this visit.    Physical Exam  Vitals reviewed: And 139/74 temp 97.7 pulse 84 weight 288.   Constitutional:       General: She is not in acute distress.     Appearance: She is obese. She is not ill-appearing.   HENT:      Head: Normocephalic.      Nose: No congestion.      Mouth/Throat:      Mouth: Mucous membranes are dry.       Pharynx: Oropharynx is clear.   Eyes:      General: No scleral icterus.     Conjunctiva/sclera: Conjunctivae normal.   Cardiovascular:      Rate and Rhythm: Normal rate and regular rhythm.      Heart sounds:      No gallop.   Pulmonary:      Effort: Pulmonary effort is normal.      Breath sounds: No wheezing or rhonchi.   Abdominal:      General: Bowel sounds are normal.      Tenderness: There is no abdominal tenderness. There is no rebound.   Musculoskeletal:      Cervical back: Neck supple.      Right lower leg: Edema present.      Left lower leg: Edema present.   Lymphadenopathy:      Cervical: No cervical adenopathy.   Skin:     General: Skin is warm and dry.   Neurological:      Mental Status: Mental status is at baseline.      Motor: Weakness present.      Gait: Gait abnormal.         Assessment/Plan   Problem List Items Addressed This Visit             ICD-10-CM    Type 2 diabetes mellitus (Multi) E11.9    Iron deficiency anemia secondary to inadequate dietary iron intake D50.8     Other Visit Diagnoses         Codes    COVID-19    -  Primary U07.1    Cirrhosis of liver without ascites, unspecified hepatic cirrhosis type (Multi)     K74.60    CKD (chronic kidney disease) stage 4, GFR 15-29 ml/min (Multi)     N18.4    Localized edema     R60.0            Respiratory status improved  CBC CMP and thyroid function ordered for Monday she continues on Retacrit due to her chronic kidney disease with most recent GFR of 26 noted compared to 32.  Hemoglobin up to 9.3 from 8.7 but again continues on Retacrit  Edema slowly improving may need increase in diuretics more routinely   Unsure what discharge plans are at this time    Goals    None

## 2024-09-09 ENCOUNTER — NURSING HOME VISIT (OUTPATIENT)
Dept: POST ACUTE CARE | Facility: EXTERNAL LOCATION | Age: 64
End: 2024-09-09
Payer: COMMERCIAL

## 2024-09-09 DIAGNOSIS — I10 PRIMARY HYPERTENSION: ICD-10-CM

## 2024-09-09 DIAGNOSIS — Z79.4 TYPE 2 DIABETES MELLITUS WITH STAGE 4 CHRONIC KIDNEY DISEASE, WITH LONG-TERM CURRENT USE OF INSULIN (MULTI): Primary | ICD-10-CM

## 2024-09-09 DIAGNOSIS — N18.4 CKD (CHRONIC KIDNEY DISEASE) STAGE 4, GFR 15-29 ML/MIN (MULTI): ICD-10-CM

## 2024-09-09 DIAGNOSIS — D50.8 IRON DEFICIENCY ANEMIA SECONDARY TO INADEQUATE DIETARY IRON INTAKE: ICD-10-CM

## 2024-09-09 DIAGNOSIS — N18.4 TYPE 2 DIABETES MELLITUS WITH STAGE 4 CHRONIC KIDNEY DISEASE, WITH LONG-TERM CURRENT USE OF INSULIN (MULTI): Primary | ICD-10-CM

## 2024-09-09 DIAGNOSIS — I27.20 PULMONARY HTN (MULTI): ICD-10-CM

## 2024-09-09 DIAGNOSIS — E78.2 MIXED HYPERLIPIDEMIA: ICD-10-CM

## 2024-09-09 DIAGNOSIS — E11.22 TYPE 2 DIABETES MELLITUS WITH STAGE 4 CHRONIC KIDNEY DISEASE, WITH LONG-TERM CURRENT USE OF INSULIN (MULTI): Primary | ICD-10-CM

## 2024-09-09 PROCEDURE — 99309 SBSQ NF CARE MODERATE MDM 30: CPT | Performed by: FAMILY MEDICINE

## 2024-09-09 NOTE — LETTER
Patient: Brittanie Smart  : 1960    Encounter Date: 2024    Subjective  Patient ID: Brittanie Smart is a 64 y.o. female who is acute skilled care being seen and evaluated for multiple medical problems.    HPI 64 year old female had been in a care facility in [place] until day of admission to hospital. Reportedly insurance ran out and she was discharged from facility in [place]? She got [facility] [place] but is nonambulatory. She continues on diuretics for edema.  Hemoglobin has remained stable from 9.5-9.9.  She has recovered from coronavirus infection.  Her insurance situation is now straightened out and she is hoping to be able to leave the facility here shortly.  Today she is frustrated as her remote for her TV is not working.       Review of Systems   Constitutional:  Positive for fatigue. Negative for chills and fever.   HENT:  Negative for congestion and sore throat.    Eyes:  Negative for visual disturbance.   Respiratory:  Positive for shortness of breath. Negative for cough and wheezing.    Cardiovascular:  Negative for chest pain, palpitations and leg swelling.   Gastrointestinal:  Negative for abdominal pain, constipation, diarrhea, nausea and vomiting.   Genitourinary:  Negative for dysuria, frequency, hematuria and urgency.   Musculoskeletal:  Positive for arthralgias and gait problem.   Skin:  Negative for rash.   Neurological:  Positive for weakness. Negative for dizziness, syncope and headaches.   Psychiatric/Behavioral:  Positive for dysphoric mood. Negative for confusion. The patient is not nervous/anxious.        Objective  There were no vitals taken for this visit.    Physical Exam  Vitals reviewed: 132/70 temp 98 pulse 80 respirations 18 weight 295.   Constitutional:       General: She is not in acute distress.     Appearance: She is obese. She is not ill-appearing.   HENT:      Head: Normocephalic.      Nose: No congestion.      Mouth/Throat:      Mouth: Mucous membranes are dry.      Pharynx:  Oropharynx is clear.   Eyes:      General: No scleral icterus.     Conjunctiva/sclera: Conjunctivae normal.   Cardiovascular:      Rate and Rhythm: Normal rate and regular rhythm.      Heart sounds:      No gallop.   Pulmonary:      Effort: Pulmonary effort is normal.      Breath sounds: No wheezing or rhonchi.   Abdominal:      General: Bowel sounds are normal.      Tenderness: There is no abdominal tenderness. There is no rebound.   Musculoskeletal:      Cervical back: Neck supple.      Right lower leg: Edema present.      Left lower leg: Edema present.   Lymphadenopathy:      Cervical: No cervical adenopathy.   Skin:     General: Skin is warm and dry.   Neurological:      Mental Status: Mental status is at baseline.      Motor: Weakness present.      Gait: Gait abnormal.         Assessment/Plan  Problem List Items Addressed This Visit             ICD-10-CM    Type 2 diabetes mellitus (Multi) - Primary E11.9    Hypertension I10    Iron deficiency anemia secondary to inadequate dietary iron intake D50.8    Hyperlipidemia E78.5     Other Visit Diagnoses         Codes    CKD (chronic kidney disease) stage 4, GFR 15-29 ml/min (Multi)     N18.4    Pulmonary HTN (Multi)     I27.20          She had labs done at the end of last month, hemoglobin has remained fairly stable in the nines lately with GFR in the 20s.  A1c is also being monitored.  Edema continues to fluctuate however she is not compliant with diet restrictions and ate a bag of caramel candies today.  Again working with  for discharge planning safely.   Goals    None           Electronically Signed By: Yumi Howe MD   9/10/24  1:06 PM

## 2024-09-10 ASSESSMENT — ENCOUNTER SYMPTOMS
NAUSEA: 0
SHORTNESS OF BREATH: 1
COUGH: 0
WEAKNESS: 1
NERVOUS/ANXIOUS: 0
HEADACHES: 0
HEMATURIA: 0
DYSURIA: 0
FREQUENCY: 0
FATIGUE: 1
ABDOMINAL PAIN: 0
ARTHRALGIAS: 1
PALPITATIONS: 0
WHEEZING: 0
DYSPHORIC MOOD: 1
DIZZINESS: 0
SORE THROAT: 0
CONFUSION: 0
CHILLS: 0
DIARRHEA: 0
VOMITING: 0
FEVER: 0
CONSTIPATION: 0

## 2024-09-10 NOTE — PROGRESS NOTES
Subjective   Patient ID: Brittanie Smart is a 64 y.o. female who is acute skilled care being seen and evaluated for multiple medical problems.    HPI 64 year old female had been in a care facility in [place] until day of admission to hospital. Reportedly insurance ran out and she was discharged from facility in [place]? She got [facility] [place] but is nonambulatory. She continues on diuretics for edema.  Hemoglobin has remained stable from 9.5-9.9.  She has recovered from coronavirus infection.  Her insurance situation is now straightened out and she is hoping to be able to leave the facility here shortly.  Today she is frustrated as her remote for her TV is not working.       Review of Systems   Constitutional:  Positive for fatigue. Negative for chills and fever.   HENT:  Negative for congestion and sore throat.    Eyes:  Negative for visual disturbance.   Respiratory:  Positive for shortness of breath. Negative for cough and wheezing.    Cardiovascular:  Negative for chest pain, palpitations and leg swelling.   Gastrointestinal:  Negative for abdominal pain, constipation, diarrhea, nausea and vomiting.   Genitourinary:  Negative for dysuria, frequency, hematuria and urgency.   Musculoskeletal:  Positive for arthralgias and gait problem.   Skin:  Negative for rash.   Neurological:  Positive for weakness. Negative for dizziness, syncope and headaches.   Psychiatric/Behavioral:  Positive for dysphoric mood. Negative for confusion. The patient is not nervous/anxious.        Objective   There were no vitals taken for this visit.    Physical Exam  Vitals reviewed: 132/70 temp 98 pulse 80 respirations 18 weight 295.   Constitutional:       General: She is not in acute distress.     Appearance: She is obese. She is not ill-appearing.   HENT:      Head: Normocephalic.      Nose: No congestion.      Mouth/Throat:      Mouth: Mucous membranes are dry.      Pharynx: Oropharynx is clear.   Eyes:      General: No scleral icterus.      Conjunctiva/sclera: Conjunctivae normal.   Cardiovascular:      Rate and Rhythm: Normal rate and regular rhythm.      Heart sounds:      No gallop.   Pulmonary:      Effort: Pulmonary effort is normal.      Breath sounds: No wheezing or rhonchi.   Abdominal:      General: Bowel sounds are normal.      Tenderness: There is no abdominal tenderness. There is no rebound.   Musculoskeletal:      Cervical back: Neck supple.      Right lower leg: Edema present.      Left lower leg: Edema present.   Lymphadenopathy:      Cervical: No cervical adenopathy.   Skin:     General: Skin is warm and dry.   Neurological:      Mental Status: Mental status is at baseline.      Motor: Weakness present.      Gait: Gait abnormal.         Assessment/Plan   Problem List Items Addressed This Visit             ICD-10-CM    Type 2 diabetes mellitus (Multi) - Primary E11.9    Hypertension I10    Iron deficiency anemia secondary to inadequate dietary iron intake D50.8    Hyperlipidemia E78.5     Other Visit Diagnoses         Codes    CKD (chronic kidney disease) stage 4, GFR 15-29 ml/min (Multi)     N18.4    Pulmonary HTN (Multi)     I27.20          She had labs done at the end of last month, hemoglobin has remained fairly stable in the nines lately with GFR in the 20s.  A1c is also being monitored.  Edema continues to fluctuate however she is not compliant with diet restrictions and ate a bag of caramel candies today.  Again working with  for discharge planning safely.   Goals    None

## 2024-09-11 ENCOUNTER — NURSING HOME VISIT (OUTPATIENT)
Dept: POST ACUTE CARE | Facility: EXTERNAL LOCATION | Age: 64
End: 2024-09-11
Payer: COMMERCIAL

## 2024-09-11 DIAGNOSIS — N18.4 TYPE 2 DIABETES MELLITUS WITH STAGE 4 CHRONIC KIDNEY DISEASE, WITH LONG-TERM CURRENT USE OF INSULIN (MULTI): Primary | ICD-10-CM

## 2024-09-11 DIAGNOSIS — E11.22 TYPE 2 DIABETES MELLITUS WITH STAGE 4 CHRONIC KIDNEY DISEASE, WITH LONG-TERM CURRENT USE OF INSULIN (MULTI): Primary | ICD-10-CM

## 2024-09-11 DIAGNOSIS — Z78.9 UNABLE TO CARE FOR SELF: ICD-10-CM

## 2024-09-11 DIAGNOSIS — I73.9 PERIPHERAL VASCULAR DISEASE (CMS-HCC): ICD-10-CM

## 2024-09-11 DIAGNOSIS — Z79.4 TYPE 2 DIABETES MELLITUS WITH STAGE 4 CHRONIC KIDNEY DISEASE, WITH LONG-TERM CURRENT USE OF INSULIN (MULTI): Primary | ICD-10-CM

## 2024-09-11 NOTE — LETTER
Patient: Brittanie Smart  : 1960    Encounter Date: 2024    Subjective  Patient ID: Brittanie Smart is a 64 y.o. female who presents for Diabetes.    Following up with patient who is intending discharge home soon. She will be starting therapies to become more independent in her care. Met with SW and patient to discuss current barriers to discharging safely.     Patient takes lantus and lispro insulin for management of her DM. She states that she is unable to poke her finger to do accuchecks. Explained a CGM and ordering and usage. She is open to having this and obtaining it from her PO box with the assistance of a friend. She also states that will need pens in order to dial up her insulin but that she is able to give herself her insulin.  Reviewed accuchecks     Patient has bilateral wounds on her shins that require daily dressing changes. She states that she has been unable to do the treatments. Explained that she will need to be able to do the dressing changes or have daily assistance to have these done at home. She state that she is willing to start trying to do her dressings.              Review of Systems   Constitutional:  Negative for chills, fatigue and fever.   Respiratory:  Negative for cough, choking and shortness of breath.    Cardiovascular:  Positive for leg swelling.   Musculoskeletal:  Positive for gait problem.   Skin:  Positive for wound.       Objective  /77   Pulse 76   Temp 36.7 °C (98 °F)   Resp 18   Wt 134 kg (295 lb 1.6 oz)   SpO2 96%   BMI 44.88 kg/m²     Physical Exam  Constitutional:       General: She is not in acute distress.     Appearance: She is obese. She is not ill-appearing.   HENT:      Mouth/Throat:      Mouth: Mucous membranes are moist.   Eyes:      Conjunctiva/sclera: Conjunctivae normal.   Cardiovascular:      Rate and Rhythm: Normal rate and regular rhythm.   Pulmonary:      Effort: Pulmonary effort is normal.      Breath sounds: Normal breath sounds.       Comments: Some dyspnea with rapid speech  Musculoskeletal:      Comments: Seen in bed   Skin:     General: Skin is warm and dry.      Comments: Dressings intact on BLE   Neurological:      Mental Status: She is alert.   Psychiatric:         Mood and Affect: Affect is flat.         Assessment/Plan  Diagnoses and all orders for this visit:  Type 2 diabetes mellitus with stage 4 chronic kidney disease, with long-term current use of insulin (Multi)  Comments:  cont with insulins but patient to start administering, will order pens from pharmacy, add ozempic 0.25mg sq weekly  Orders:  -     Dexcom G6 Sensor device; Change every 10 days  -     Dexcom G6 Transmitter device; Use as instructed  Peripheral vascular disease (CMS-HCC)  Comments:  cont with topical wound care, patient to start participating in doing wound care  Unable to care for self  Comments:  patient to start therapies with intent to discharge home safely           Electronically Signed By: RHONDA James   9/12/24 11:49 AM

## 2024-09-12 VITALS
WEIGHT: 293 LBS | BODY MASS INDEX: 44.88 KG/M2 | OXYGEN SATURATION: 96 % | DIASTOLIC BLOOD PRESSURE: 77 MMHG | RESPIRATION RATE: 18 BRPM | SYSTOLIC BLOOD PRESSURE: 132 MMHG | TEMPERATURE: 98 F | HEART RATE: 76 BPM

## 2024-09-12 PROCEDURE — RXMED WILLOW AMBULATORY MEDICATION CHARGE

## 2024-09-12 RX ORDER — ATORVASTATIN CALCIUM 10 MG/1
10 TABLET, FILM COATED ORAL DAILY
COMMUNITY

## 2024-09-12 RX ORDER — BLOOD-GLUCOSE SENSOR
EACH MISCELLANEOUS
Qty: 3 EACH | Refills: 2 | Status: SHIPPED | OUTPATIENT
Start: 2024-09-12

## 2024-09-12 RX ORDER — EPOETIN ALFA-EPBX 10000 [IU]/ML
10000 INJECTION, SOLUTION INTRAVENOUS; SUBCUTANEOUS 3 TIMES WEEKLY
COMMUNITY

## 2024-09-12 RX ORDER — TRAZODONE HYDROCHLORIDE 100 MG/1
100 TABLET ORAL NIGHTLY
COMMUNITY

## 2024-09-12 RX ORDER — BLOOD-GLUCOSE TRANSMITTER
EACH MISCELLANEOUS
Qty: 1 EACH | Refills: 0 | Status: SHIPPED | OUTPATIENT
Start: 2024-09-12

## 2024-09-12 ASSESSMENT — ENCOUNTER SYMPTOMS
SHORTNESS OF BREATH: 0
COUGH: 0
WOUND: 1
CHILLS: 0
FATIGUE: 0
CHOKING: 0
FEVER: 0

## 2024-09-12 NOTE — PROGRESS NOTES
Subjective   Patient ID: Brittanie Smart is a 64 y.o. female who presents for Diabetes.    Following up with patient who is intending discharge home soon. She will be starting therapies to become more independent in her care. Met with SW and patient to discuss current barriers to discharging safely.     Patient takes lantus and lispro insulin for management of her DM. She states that she is unable to poke her finger to do accuchecks. Explained a CGM and ordering and usage. She is open to having this and obtaining it from her PO box with the assistance of a friend. She also states that will need pens in order to dial up her insulin but that she is able to give herself her insulin.  Reviewed accuchecks     Patient has bilateral wounds on her shins that require daily dressing changes. She states that she has been unable to do the treatments. Explained that she will need to be able to do the dressing changes or have daily assistance to have these done at home. She state that she is willing to start trying to do her dressings.              Review of Systems   Constitutional:  Negative for chills, fatigue and fever.   Respiratory:  Negative for cough, choking and shortness of breath.    Cardiovascular:  Positive for leg swelling.   Musculoskeletal:  Positive for gait problem.   Skin:  Positive for wound.       Objective   /77   Pulse 76   Temp 36.7 °C (98 °F)   Resp 18   Wt 134 kg (295 lb 1.6 oz)   SpO2 96%   BMI 44.88 kg/m²     Physical Exam  Constitutional:       General: She is not in acute distress.     Appearance: She is obese. She is not ill-appearing.   HENT:      Mouth/Throat:      Mouth: Mucous membranes are moist.   Eyes:      Conjunctiva/sclera: Conjunctivae normal.   Cardiovascular:      Rate and Rhythm: Normal rate and regular rhythm.   Pulmonary:      Effort: Pulmonary effort is normal.      Breath sounds: Normal breath sounds.      Comments: Some dyspnea with rapid speech  Musculoskeletal:       Comments: Seen in bed   Skin:     General: Skin is warm and dry.      Comments: Dressings intact on BLE   Neurological:      Mental Status: She is alert.   Psychiatric:         Mood and Affect: Affect is flat.         Assessment/Plan   Diagnoses and all orders for this visit:  Type 2 diabetes mellitus with stage 4 chronic kidney disease, with long-term current use of insulin (Multi)  Comments:  cont with insulins but patient to start administering, will order pens from pharmacy, add ozempic 0.25mg sq weekly  Orders:  -     Dexcom G6 Sensor device; Change every 10 days  -     Dexcom G6 Transmitter device; Use as instructed  Peripheral vascular disease (CMS-HCC)  Comments:  cont with topical wound care, patient to start participating in doing wound care  Unable to care for self  Comments:  patient to start therapies with intent to discharge home safely

## 2024-09-13 ENCOUNTER — PHARMACY VISIT (OUTPATIENT)
Dept: PHARMACY | Facility: CLINIC | Age: 64
End: 2024-09-13
Payer: COMMERCIAL

## 2024-09-17 ENCOUNTER — NURSING HOME VISIT (OUTPATIENT)
Dept: POST ACUTE CARE | Facility: EXTERNAL LOCATION | Age: 64
End: 2024-09-17
Payer: COMMERCIAL

## 2024-09-17 DIAGNOSIS — Z79.4 TYPE 2 DIABETES MELLITUS WITH STAGE 4 CHRONIC KIDNEY DISEASE, WITH LONG-TERM CURRENT USE OF INSULIN (MULTI): ICD-10-CM

## 2024-09-17 DIAGNOSIS — E11.22 TYPE 2 DIABETES MELLITUS WITH STAGE 4 CHRONIC KIDNEY DISEASE, WITH LONG-TERM CURRENT USE OF INSULIN (MULTI): ICD-10-CM

## 2024-09-17 DIAGNOSIS — N18.4 TYPE 2 DIABETES MELLITUS WITH STAGE 4 CHRONIC KIDNEY DISEASE, WITH LONG-TERM CURRENT USE OF INSULIN (MULTI): ICD-10-CM

## 2024-09-17 DIAGNOSIS — I73.9 PERIPHERAL VASCULAR DISEASE (CMS-HCC): ICD-10-CM

## 2024-09-17 DIAGNOSIS — J96.11 CHRONIC RESPIRATORY FAILURE WITH HYPOXIA (MULTI): Primary | ICD-10-CM

## 2024-09-17 PROCEDURE — 99309 SBSQ NF CARE MODERATE MDM 30: CPT | Performed by: NURSE PRACTITIONER

## 2024-09-17 NOTE — LETTER
Patient: Brittanie Smart  : 1960    Encounter Date: 2024    Subjective  Patient ID: Brittanie Smart is a 64 y.o. female who presents for Wound Check.    Following up with patient who has been having topical wound care to her bilateral LE. These monitored by wound CNP. She states that she has been having increased pain to her LE. She state that she had been taking gabapentin for management of her LE pain in the past. She has only been taking tylenol as needed which she states has not helped her pain. Her legs are weepy with serous fluid. She states that she has not tolerated nutritional supplements in the past due to them causing diarrhea. She snacks throughout the day. She remains on diuretics for management of edema in her LE. She has started on therapies for strengthening with the hope of a discharge home soon. She has started ozempic and a CGM was ordered and delivered to her apartment. She will need to find a friend or family member to go and get this from her apartment         Review of Systems   Constitutional:  Negative for chills, fatigue and fever.   Respiratory:  Positive for shortness of breath. Negative for cough and wheezing.    Cardiovascular:  Negative for chest pain and leg swelling.   Musculoskeletal:  Positive for myalgias.   Skin:  Positive for wound.   Neurological:  Positive for weakness.       Objective  /68   Pulse 74   Temp 36.8 °C (98.2 °F)   Resp 16   Wt 134 kg (295 lb 1.6 oz)   SpO2 98%   BMI 44.88 kg/m²     Physical Exam  Constitutional:       General: She is not in acute distress.     Appearance: She is obese. She is not ill-appearing.   HENT:      Mouth/Throat:      Mouth: Mucous membranes are moist.   Eyes:      Conjunctiva/sclera: Conjunctivae normal.   Pulmonary:      Effort: Pulmonary effort is normal.      Comments: Mild dyspnea with prolonged speaking  Musculoskeletal:      Comments: Seen in bed   Skin:     General: Skin is warm and dry.      Comments: Bilateral  LE with red open areas, blisters present and large amountof serous fluid weeping, dressings saturated, washed and redressed    Neurological:      General: No focal deficit present.      Mental Status: She is alert.      Sensory: Sensory deficit present.      Comments: Increased sensation BLE   Psychiatric:         Mood and Affect: Mood normal.         Assessment/Plan  Diagnoses and all orders for this visit:  Chronic respiratory failure with hypoxia (Multi)  Comments:  O2 4L NC continuously, cont with inhalers, nebulziers as needed  Peripheral vascular disease (CMS-HCC)  Comments:  cont with topical wound care, add doxycycline 100mg po BID for 7 days, gabapentin 100mg po TID for pain management  Type 2 diabetes mellitus with stage 4 chronic kidney disease, with long-term current use of insulin (Multi)  Comments:  awaiting dexcom to be picked up for friend and brought to facility, cont with ozempic and insulins           Electronically Signed By: ACE James-CNP   9/18/24  7:37 AM

## 2024-09-18 VITALS
SYSTOLIC BLOOD PRESSURE: 132 MMHG | WEIGHT: 293 LBS | DIASTOLIC BLOOD PRESSURE: 68 MMHG | BODY MASS INDEX: 44.88 KG/M2 | OXYGEN SATURATION: 98 % | TEMPERATURE: 98.2 F | HEART RATE: 74 BPM | RESPIRATION RATE: 16 BRPM

## 2024-09-18 ASSESSMENT — ENCOUNTER SYMPTOMS
WHEEZING: 0
FATIGUE: 0
MYALGIAS: 1
CHILLS: 0
WEAKNESS: 1
SHORTNESS OF BREATH: 1
COUGH: 0
WOUND: 1
FEVER: 0

## 2024-09-18 NOTE — PROGRESS NOTES
Subjective   Patient ID: Brittanie Smart is a 64 y.o. female who presents for Wound Check.    Following up with patient who has been having topical wound care to her bilateral LE. These monitored by wound CNP. She states that she has been having increased pain to her LE. She state that she had been taking gabapentin for management of her LE pain in the past. She has only been taking tylenol as needed which she states has not helped her pain. Her legs are weepy with serous fluid. She states that she has not tolerated nutritional supplements in the past due to them causing diarrhea. She snacks throughout the day. She remains on diuretics for management of edema in her LE. She has started on therapies for strengthening with the hope of a discharge home soon. She has started ozempic and a CGM was ordered and delivered to her apartment. She will need to find a friend or family member to go and get this from her apartment         Review of Systems   Constitutional:  Negative for chills, fatigue and fever.   Respiratory:  Positive for shortness of breath. Negative for cough and wheezing.    Cardiovascular:  Negative for chest pain and leg swelling.   Musculoskeletal:  Positive for myalgias.   Skin:  Positive for wound.   Neurological:  Positive for weakness.       Objective   /68   Pulse 74   Temp 36.8 °C (98.2 °F)   Resp 16   Wt 134 kg (295 lb 1.6 oz)   SpO2 98%   BMI 44.88 kg/m²     Physical Exam  Constitutional:       General: She is not in acute distress.     Appearance: She is obese. She is not ill-appearing.   HENT:      Mouth/Throat:      Mouth: Mucous membranes are moist.   Eyes:      Conjunctiva/sclera: Conjunctivae normal.   Pulmonary:      Effort: Pulmonary effort is normal.      Comments: Mild dyspnea with prolonged speaking  Musculoskeletal:      Comments: Seen in bed   Skin:     General: Skin is warm and dry.      Comments: Bilateral LE with red open areas, blisters present and large amountof serous  fluid weeping, dressings saturated, washed and redressed    Neurological:      General: No focal deficit present.      Mental Status: She is alert.      Sensory: Sensory deficit present.      Comments: Increased sensation BLE   Psychiatric:         Mood and Affect: Mood normal.         Assessment/Plan   Diagnoses and all orders for this visit:  Chronic respiratory failure with hypoxia (Multi)  Comments:  O2 4L NC continuously, cont with inhalers, nebulziers as needed  Peripheral vascular disease (CMS-HCC)  Comments:  cont with topical wound care, add doxycycline 100mg po BID for 7 days, gabapentin 100mg po TID for pain management  Type 2 diabetes mellitus with stage 4 chronic kidney disease, with long-term current use of insulin (Multi)  Comments:  awaiting dexcom to be picked up for friend and brought to facility, cont with ozempic and insulins

## 2024-09-19 ENCOUNTER — NURSING HOME VISIT (OUTPATIENT)
Dept: POST ACUTE CARE | Facility: EXTERNAL LOCATION | Age: 64
End: 2024-09-19
Payer: COMMERCIAL

## 2024-09-19 DIAGNOSIS — S81.809D MULTIPLE OPENS WOUND OF LOWER EXTREMITY, UNSPECIFIED LATERALITY, SUBSEQUENT ENCOUNTER: Primary | ICD-10-CM

## 2024-09-19 DIAGNOSIS — E11.22 TYPE 2 DIABETES MELLITUS WITH STAGE 4 CHRONIC KIDNEY DISEASE, WITH LONG-TERM CURRENT USE OF INSULIN (MULTI): ICD-10-CM

## 2024-09-19 DIAGNOSIS — N18.4 TYPE 2 DIABETES MELLITUS WITH STAGE 4 CHRONIC KIDNEY DISEASE, WITH LONG-TERM CURRENT USE OF INSULIN (MULTI): ICD-10-CM

## 2024-09-19 DIAGNOSIS — Z79.4 TYPE 2 DIABETES MELLITUS WITH STAGE 4 CHRONIC KIDNEY DISEASE, WITH LONG-TERM CURRENT USE OF INSULIN (MULTI): ICD-10-CM

## 2024-09-19 PROCEDURE — 99309 SBSQ NF CARE MODERATE MDM 30: CPT | Performed by: NURSE PRACTITIONER

## 2024-09-19 NOTE — LETTER
Patient: Brittanie Smart  : 1960    Encounter Date: 2024    Subjective  Patient ID: Brittanie Smart is a 64 y.o. female who presents for Wound Infection.    Following up with patient who will be planning to discharge home but is unable to do her own leg wound care. She is seen along with therapy today to assess barriers and possible interventions to help with wound care upon discharge. Patient admits to feeling fearful about falling and has difficulty leaning forward toward her LE due to obesity. She will continue to work with therapies.    She was started on ozempic and remains on lantus and lispro for management of DM. Her blood sugars have been elevated and she admits to snacking at night. Reviewed diet, snacking and blood sugars    >40 minutes spent with patient, care team and wound care         Review of Systems   Constitutional:  Negative for fatigue.   Respiratory:  Positive for shortness of breath.    Musculoskeletal:  Positive for gait problem.   Skin:  Positive for wound.   Psychiatric/Behavioral:  The patient is nervous/anxious.        Objective  /81   Pulse 75   Temp 36.8 °C (98.3 °F)   Resp 18   Wt 134 kg (295 lb 1.6 oz)   SpO2 99%   BMI 44.88 kg/m²     Physical Exam  Constitutional:       Appearance: She is obese.   HENT:      Mouth/Throat:      Mouth: Mucous membranes are moist.   Eyes:      Conjunctiva/sclera: Conjunctivae normal.   Cardiovascular:      Rate and Rhythm: Normal rate and regular rhythm.   Pulmonary:      Effort: Tachypnea present.   Abdominal:      General: Abdomen is protuberant. Bowel sounds are normal.   Musculoskeletal:      Comments: Unable to reach BLE to perform dressing change   Skin:     General: Skin is warm and dry.      Comments: BLE dressings intact   Neurological:      Mental Status: She is alert. Mental status is at baseline.   Psychiatric:         Mood and Affect: Mood normal.         Assessment/Plan  Diagnoses and all orders for this visit:  Multiple  opens wound of lower extremity, unspecified laterality, subsequent encounter  Comments:  cont with current topical care, antibiotic, f/u with wound CNP next week  Type 2 diabetes mellitus with stage 4 chronic kidney disease, with long-term current use of insulin (Multi)  Comments:  increase lantus to 38 units sq qhs, cont with lispro before meals, ozempic           Electronically Signed By: RHONDA James   9/25/24 11:46 AM

## 2024-09-20 ENCOUNTER — NURSING HOME VISIT (OUTPATIENT)
Dept: POST ACUTE CARE | Facility: EXTERNAL LOCATION | Age: 64
End: 2024-09-20
Payer: COMMERCIAL

## 2024-09-20 DIAGNOSIS — L03.115 BILATERAL LOWER LEG CELLULITIS: Primary | ICD-10-CM

## 2024-09-20 DIAGNOSIS — E11.22 TYPE 2 DIABETES MELLITUS WITH STAGE 4 CHRONIC KIDNEY DISEASE, WITH LONG-TERM CURRENT USE OF INSULIN (MULTI): ICD-10-CM

## 2024-09-20 DIAGNOSIS — N18.4 CKD (CHRONIC KIDNEY DISEASE) STAGE 4, GFR 15-29 ML/MIN (MULTI): ICD-10-CM

## 2024-09-20 DIAGNOSIS — L03.116 BILATERAL LOWER LEG CELLULITIS: Primary | ICD-10-CM

## 2024-09-20 DIAGNOSIS — N18.4 TYPE 2 DIABETES MELLITUS WITH STAGE 4 CHRONIC KIDNEY DISEASE, WITH LONG-TERM CURRENT USE OF INSULIN (MULTI): ICD-10-CM

## 2024-09-20 DIAGNOSIS — D50.8 IRON DEFICIENCY ANEMIA SECONDARY TO INADEQUATE DIETARY IRON INTAKE: ICD-10-CM

## 2024-09-20 DIAGNOSIS — Z79.4 TYPE 2 DIABETES MELLITUS WITH STAGE 4 CHRONIC KIDNEY DISEASE, WITH LONG-TERM CURRENT USE OF INSULIN (MULTI): ICD-10-CM

## 2024-09-20 PROCEDURE — 99309 SBSQ NF CARE MODERATE MDM 30: CPT | Performed by: FAMILY MEDICINE

## 2024-09-20 NOTE — LETTER
Patient: Brittanie Smart  : 1960    Encounter Date: 2024    Subjective  Patient ID: Brittanie Smart is a 64 y.o. female who is long-term care being seen and evaluated for multiple medical problems.    HPI 64 year old female had been in a care facility in [place] until day of admission to hospital. Reportedly insurance ran out and she was discharged from facility in [place]? She got apt [place] but is nonambulatory. She continues on diuretics for edema.  Hemoglobin has remained stable from 9.5-9.9.  She had worsening bilateral lower extremity edema with redness noted.  She has now started on doxycycline twice a day and was given after dose of Lasix.  Renal function seems to have handled that with GFR stable today at 32 from 35.  Hemoglobin actually improved to 10.3 from 9.9 as well.         Review of Systems   Constitutional:  Positive for fatigue. Negative for chills and fever.   HENT:  Negative for congestion and sore throat.    Eyes:  Negative for visual disturbance.   Respiratory:  Positive for shortness of breath. Negative for cough and wheezing.    Cardiovascular:  Positive for leg swelling. Negative for chest pain and palpitations.   Gastrointestinal:  Negative for abdominal pain, constipation, diarrhea, nausea and vomiting.   Genitourinary:  Negative for dysuria, frequency, hematuria and urgency.   Musculoskeletal:  Positive for arthralgias and gait problem.   Skin:  Positive for color change. Negative for rash.   Neurological:  Positive for weakness. Negative for dizziness, syncope and headaches.   Psychiatric/Behavioral:  Positive for dysphoric mood. Negative for confusion. The patient is not nervous/anxious.        Objective  There were no vitals taken for this visit.    Physical Exam  Vitals reviewed: 131/81 temp 98 pulse 75 weight 295.   Constitutional:       General: She is not in acute distress.     Appearance: She is obese. She is not ill-appearing.   HENT:      Head: Normocephalic.      Nose: No  congestion.      Mouth/Throat:      Pharynx: Oropharynx is clear.   Eyes:      General: No scleral icterus.     Conjunctiva/sclera: Conjunctivae normal.   Cardiovascular:      Rate and Rhythm: Normal rate and regular rhythm.      Heart sounds:      No gallop.   Pulmonary:      Effort: Pulmonary effort is normal.      Breath sounds: No wheezing or rhonchi.   Abdominal:      General: Bowel sounds are normal.      Tenderness: There is no abdominal tenderness. There is no rebound.   Musculoskeletal:      Cervical back: Neck supple.      Right lower leg: Edema present.      Left lower leg: Edema present.   Lymphadenopathy:      Cervical: No cervical adenopathy.   Skin:     General: Skin is warm and dry.   Neurological:      Mental Status: Mental status is at baseline.      Motor: Weakness present.      Gait: Gait abnormal.         Assessment/Plan  Problem List Items Addressed This Visit             ICD-10-CM    Type 2 diabetes mellitus (Multi) E11.9    Iron deficiency anemia secondary to inadequate dietary iron intake D50.8     Other Visit Diagnoses         Codes    Bilateral lower leg cellulitis    -  Primary L03.116, L03.115    CKD (chronic kidney disease) stage 4, GFR 15-29 ml/min (Multi)     N18.4          Continue with doxycycline, renal function stable with extra dose of Lasix given for increase in edema, anemia overall improved.  Continue to monitor GFR as well as CBC.Again working with  for discharge planning safely.   Goals    None           Electronically Signed By: Yumi Howe MD   9/23/24 10:09 AM

## 2024-09-23 ASSESSMENT — ENCOUNTER SYMPTOMS
NERVOUS/ANXIOUS: 0
ARTHRALGIAS: 1
DIARRHEA: 0
COUGH: 0
DIZZINESS: 0
FEVER: 0
WHEEZING: 0
FREQUENCY: 0
HEMATURIA: 0
CHILLS: 0
PALPITATIONS: 0
CONFUSION: 0
CONSTIPATION: 0
FATIGUE: 1
ABDOMINAL PAIN: 0
HEADACHES: 0
DYSPHORIC MOOD: 1
VOMITING: 0
SORE THROAT: 0
NAUSEA: 0
COLOR CHANGE: 1
SHORTNESS OF BREATH: 1
DYSURIA: 0
WEAKNESS: 1

## 2024-09-23 NOTE — PROGRESS NOTES
Subjective   Patient ID: Brittanie Smart is a 64 y.o. female who is long-term care being seen and evaluated for multiple medical problems.    HPI 64 year old female had been in a care facility in [place] until day of admission to hospital. Reportedly insurance ran out and she was discharged from facility in [place]? She got [place] but is nonambulatory. She continues on diuretics for edema.  Hemoglobin has remained stable from 9.5-9.9.  She had worsening bilateral lower extremity edema with redness noted.  She has now started on doxycycline twice a day and was given after dose of Lasix.  Renal function seems to have handled that with GFR stable today at 32 from 35.  Hemoglobin actually improved to 10.3 from 9.9 as well.         Review of Systems   Constitutional:  Positive for fatigue. Negative for chills and fever.   HENT:  Negative for congestion and sore throat.    Eyes:  Negative for visual disturbance.   Respiratory:  Positive for shortness of breath. Negative for cough and wheezing.    Cardiovascular:  Positive for leg swelling. Negative for chest pain and palpitations.   Gastrointestinal:  Negative for abdominal pain, constipation, diarrhea, nausea and vomiting.   Genitourinary:  Negative for dysuria, frequency, hematuria and urgency.   Musculoskeletal:  Positive for arthralgias and gait problem.   Skin:  Positive for color change. Negative for rash.   Neurological:  Positive for weakness. Negative for dizziness, syncope and headaches.   Psychiatric/Behavioral:  Positive for dysphoric mood. Negative for confusion. The patient is not nervous/anxious.        Objective   There were no vitals taken for this visit.    Physical Exam  Vitals reviewed: 131/81 temp 98 pulse 75 weight 295.   Constitutional:       General: She is not in acute distress.     Appearance: She is obese. She is not ill-appearing.   HENT:      Head: Normocephalic.      Nose: No congestion.      Mouth/Throat:      Pharynx: Oropharynx is clear.    Eyes:      General: No scleral icterus.     Conjunctiva/sclera: Conjunctivae normal.   Cardiovascular:      Rate and Rhythm: Normal rate and regular rhythm.      Heart sounds:      No gallop.   Pulmonary:      Effort: Pulmonary effort is normal.      Breath sounds: No wheezing or rhonchi.   Abdominal:      General: Bowel sounds are normal.      Tenderness: There is no abdominal tenderness. There is no rebound.   Musculoskeletal:      Cervical back: Neck supple.      Right lower leg: Edema present.      Left lower leg: Edema present.   Lymphadenopathy:      Cervical: No cervical adenopathy.   Skin:     General: Skin is warm and dry.   Neurological:      Mental Status: Mental status is at baseline.      Motor: Weakness present.      Gait: Gait abnormal.         Assessment/Plan   Problem List Items Addressed This Visit             ICD-10-CM    Type 2 diabetes mellitus (Multi) E11.9    Iron deficiency anemia secondary to inadequate dietary iron intake D50.8     Other Visit Diagnoses         Codes    Bilateral lower leg cellulitis    -  Primary L03.116, L03.115    CKD (chronic kidney disease) stage 4, GFR 15-29 ml/min (Multi)     N18.4          Continue with doxycycline, renal function stable with extra dose of Lasix given for increase in edema, anemia overall improved.  Continue to monitor GFR as well as CBC.Again working with  for discharge planning safely.   Goals    None

## 2024-09-25 ENCOUNTER — TELEPHONE (OUTPATIENT)
Dept: PRIMARY CARE | Facility: CLINIC | Age: 64
End: 2024-09-25
Payer: MEDICARE

## 2024-09-25 VITALS
BODY MASS INDEX: 44.88 KG/M2 | OXYGEN SATURATION: 99 % | HEART RATE: 75 BPM | SYSTOLIC BLOOD PRESSURE: 151 MMHG | RESPIRATION RATE: 18 BRPM | TEMPERATURE: 98.3 F | DIASTOLIC BLOOD PRESSURE: 81 MMHG | WEIGHT: 293 LBS

## 2024-09-25 DIAGNOSIS — I73.9 PERIPHERAL VASCULAR DISEASE (CMS-HCC): ICD-10-CM

## 2024-09-25 DIAGNOSIS — J96.01 ACUTE RESPIRATORY FAILURE WITH HYPOXIA AND HYPERCAPNIA (MULTI): ICD-10-CM

## 2024-09-25 DIAGNOSIS — J96.02 ACUTE RESPIRATORY FAILURE WITH HYPOXIA AND HYPERCAPNIA (MULTI): ICD-10-CM

## 2024-09-25 RX ORDER — SEMAGLUTIDE 1.34 MG/ML
INJECTION, SOLUTION SUBCUTANEOUS WEEKLY
COMMUNITY

## 2024-09-25 ASSESSMENT — ENCOUNTER SYMPTOMS
WOUND: 1
NERVOUS/ANXIOUS: 1
FATIGUE: 0
SHORTNESS OF BREATH: 1

## 2024-09-25 NOTE — TELEPHONE ENCOUNTER
On 9/18/24 received email from EDDA Pompa NP at [facility] requesting referral for [facility] program services for patient.  Email forwarded to EDDA Perdue Lpn, acute care coordinator for processing.  Patient due to discharge from facility on 10/12/24 with plans to move to  apartment for permanent residency in [facility].  Patient with dx. That include but not limited to: DMII with diabetic nephropathy; CKD, unspecified; morbid (severe) obesity due to excess calories; PVD, unspecified; depression, unspecified; Hypothyroidism, unspecified, Essential HTN, unspecified disorder of adult personality behavior.  Patient with no current PCP ; tentative plan to discharge to home with [facility] services.  House calls will continue to follow patient stay at [facility] and offer services upon discharge. NEDA Eric at [facility] to contact this nurse with updated discharge information for scheduling with [facility] Calls provider. Referral entered.     Preliminary discharge PT notes and med orders scanned to patient chart.

## 2024-09-25 NOTE — PROGRESS NOTES
Subjective   Patient ID: Brittanie Smart is a 64 y.o. female who presents for Wound Infection.    Following up with patient who will be planning to discharge home but is unable to do her own leg wound care. She is seen along with therapy today to assess barriers and possible interventions to help with wound care upon discharge. Patient admits to feeling fearful about falling and has difficulty leaning forward toward her LE due to obesity. She will continue to work with therapies.    She was started on ozempic and remains on lantus and lispro for management of DM. Her blood sugars have been elevated and she admits to snacking at night. Reviewed diet, snacking and blood sugars    >40 minutes spent with patient, care team and wound care         Review of Systems   Constitutional:  Negative for fatigue.   Respiratory:  Positive for shortness of breath.    Musculoskeletal:  Positive for gait problem.   Skin:  Positive for wound.   Psychiatric/Behavioral:  The patient is nervous/anxious.        Objective   /81   Pulse 75   Temp 36.8 °C (98.3 °F)   Resp 18   Wt 134 kg (295 lb 1.6 oz)   SpO2 99%   BMI 44.88 kg/m²     Physical Exam  Constitutional:       Appearance: She is obese.   HENT:      Mouth/Throat:      Mouth: Mucous membranes are moist.   Eyes:      Conjunctiva/sclera: Conjunctivae normal.   Cardiovascular:      Rate and Rhythm: Normal rate and regular rhythm.   Pulmonary:      Effort: Tachypnea present.   Abdominal:      General: Abdomen is protuberant. Bowel sounds are normal.   Musculoskeletal:      Comments: Unable to reach BLE to perform dressing change   Skin:     General: Skin is warm and dry.      Comments: BLE dressings intact   Neurological:      Mental Status: She is alert. Mental status is at baseline.   Psychiatric:         Mood and Affect: Mood normal.         Assessment/Plan   Diagnoses and all orders for this visit:  Multiple opens wound of lower extremity, unspecified laterality, subsequent  encounter  Comments:  cont with current topical care, antibiotic, f/u with wound CNP next week  Type 2 diabetes mellitus with stage 4 chronic kidney disease, with long-term current use of insulin (Multi)  Comments:  increase lantus to 38 units sq qhs, cont with lispro before meals, ozempic

## 2024-09-26 ENCOUNTER — NURSING HOME VISIT (OUTPATIENT)
Dept: POST ACUTE CARE | Facility: EXTERNAL LOCATION | Age: 64
End: 2024-09-26
Payer: MEDICARE

## 2024-09-26 ENCOUNTER — TELEPHONE (OUTPATIENT)
Dept: PRIMARY CARE | Facility: CLINIC | Age: 64
End: 2024-09-26
Payer: MEDICARE

## 2024-09-26 DIAGNOSIS — R53.83 LETHARGY: Primary | ICD-10-CM

## 2024-09-26 DIAGNOSIS — Z78.9 UNABLE TO CARE FOR SELF: ICD-10-CM

## 2024-09-26 NOTE — LETTER
Patient: Brittanie Smart  : 1960    Encounter Date: 2024    Subjective  Patient ID: Brittanie Smart is a 64 y.o. female who presents for Fatigue.    Following up with patient who has a discharge planned for . Met with her yesterday along with NEDA to discuss option regarding staying, medicaid process which she was interested in but wanted to have this discussed with her friend Bryant who lives in [place] and has assisted her with her medical decisions. Spoke with Bryant on the phone along with NEDA, ED and  for the facility. Bryant states that he will be staying with the patient upon discharge and will assume all care needs. Described Johny lift, wound care, appointments, medications and diabetic monitoring which he states he is will to provide. He will come the week prior to her discharge to learn all of this. Patient was unaware that he was going to do this.     Patient states that she is feeling much better off of the lexapro. Her pain remains well managed on gabapentin         Review of Systems   Respiratory:  Positive for cough and shortness of breath.    Cardiovascular:  Positive for leg swelling.   Skin:  Positive for wound.   Neurological:  Positive for weakness.   Psychiatric/Behavioral:  The patient is nervous/anxious.        Objective  There were no vitals taken for this visit.    Physical Exam  Constitutional:       Appearance: She is obese.   HENT:      Mouth/Throat:      Mouth: Mucous membranes are moist.   Eyes:      Conjunctiva/sclera: Conjunctivae normal.   Pulmonary:      Effort: Pulmonary effort is normal.   Skin:     General: Skin is warm and dry.      Comments: Dressings intact   Neurological:      Mental Status: She is alert.   Psychiatric:         Mood and Affect: Affect is flat.         Assessment/Plan  Diagnoses and all orders for this visit:  Lethargy  Comments:  improved off lexapro and on lowered dose of gabapentin  Unable to care for  self  Comments:  extensive communication over complicated discharge plan, will follow up next week regarding plans           Electronically Signed By: RHONDA James   10/4/24 12:02 PM

## 2024-09-30 ENCOUNTER — TELEPHONE (OUTPATIENT)
Dept: PRIMARY CARE | Facility: CLINIC | Age: 64
End: 2024-09-30
Payer: MEDICARE

## 2024-09-30 NOTE — TELEPHONE ENCOUNTER
Renetta RED returned call, plan remains that patient will discharge home on 10/12/24. [facility] Calls visit scheduled with Mami Watts NP 10/16/24 at 9:00 am. Per NEDA patient has a friend that will be staying with her 24/7 post SNF discharge. Per Rosa Maria Pompa NP:  Education will be provided to friend prior to discharge on wound care, medications, and transfers.

## 2024-09-30 NOTE — TELEPHONE ENCOUNTER
Phone Jeaneth Batista/ NEDA at [facility] to discuss patient's discharge status, no answer, LMOM with House Calls office phone number.

## 2024-10-02 ENCOUNTER — NURSING HOME VISIT (OUTPATIENT)
Dept: POST ACUTE CARE | Facility: EXTERNAL LOCATION | Age: 64
End: 2024-10-02
Payer: MEDICARE

## 2024-10-02 VITALS
RESPIRATION RATE: 16 BRPM | BODY MASS INDEX: 44.88 KG/M2 | TEMPERATURE: 98.5 F | SYSTOLIC BLOOD PRESSURE: 122 MMHG | HEART RATE: 78 BPM | DIASTOLIC BLOOD PRESSURE: 74 MMHG | WEIGHT: 293 LBS | OXYGEN SATURATION: 96 %

## 2024-10-02 DIAGNOSIS — N18.4 TYPE 2 DIABETES MELLITUS WITH STAGE 4 CHRONIC KIDNEY DISEASE, WITH LONG-TERM CURRENT USE OF INSULIN (MULTI): ICD-10-CM

## 2024-10-02 DIAGNOSIS — R30.0 DYSURIA: Primary | ICD-10-CM

## 2024-10-02 DIAGNOSIS — Z79.4 TYPE 2 DIABETES MELLITUS WITH STAGE 4 CHRONIC KIDNEY DISEASE, WITH LONG-TERM CURRENT USE OF INSULIN (MULTI): ICD-10-CM

## 2024-10-02 DIAGNOSIS — Z78.9 UNABLE TO CARE FOR SELF: ICD-10-CM

## 2024-10-02 DIAGNOSIS — E11.22 TYPE 2 DIABETES MELLITUS WITH STAGE 4 CHRONIC KIDNEY DISEASE, WITH LONG-TERM CURRENT USE OF INSULIN (MULTI): ICD-10-CM

## 2024-10-02 PROCEDURE — 99309 SBSQ NF CARE MODERATE MDM 30: CPT | Performed by: NURSE PRACTITIONER

## 2024-10-02 RX ORDER — GABAPENTIN 100 MG/1
100 CAPSULE ORAL 2 TIMES DAILY
COMMUNITY

## 2024-10-02 ASSESSMENT — ENCOUNTER SYMPTOMS
WOUND: 1
SHORTNESS OF BREATH: 1
WEAKNESS: 1

## 2024-10-02 NOTE — LETTER
Patient: Brittanie Smart  : 1960    Encounter Date: 10/02/2024    Subjective  Patient ID: Brittanie Smart is a 64 y.o. female who presents for Fatigue.    Following up with patient who states that she still feels fatigued after stopping the lexapro. She states that she thinks she may have a UTI because it burns with urination. She had labs yesterday which did have have an elevated WBC. She is sleeping well at night but states that she is also sleeping during the day. Her pain is well controlled with gabapentin. She states that her legs continue to bleed in areas but that the drainage has decreased a lot. She continues to participate in therapy but states that she has mixed results depending on the day. She has not been getting OOB on non therapy days. Discussed how this will make therapy harder the next time she has it.     Today we set up her Dexcom6. She was unable to apply the monitor to herself. Cece set up on her phone.     She is having stress over not being able to access her bank cece to pay her rent. She is to discharge next week and has her friend coming to learn dressings, transfers and diabetic supplies prior to discharge         Review of Systems   Respiratory:  Positive for shortness of breath.    Musculoskeletal:  Positive for gait problem.   Skin:  Positive for wound.   Neurological:  Positive for weakness.       Objective  /74   Pulse 78   Temp 36.9 °C (98.5 °F)   Resp 16   Wt 134 kg (295 lb 1.6 oz)   SpO2 96%   BMI 44.88 kg/m²     Physical Exam  Constitutional:       General: She is not in acute distress.     Appearance: She is obese. She is not ill-appearing.   HENT:      Mouth/Throat:      Mouth: Mucous membranes are moist.   Eyes:      Conjunctiva/sclera: Conjunctivae normal.   Pulmonary:      Effort: Pulmonary effort is normal.   Abdominal:      General: There is no distension.      Tenderness: There is no abdominal tenderness.   Musculoskeletal:      Comments: Johny lift for  transfers   Skin:     General: Skin is warm and dry.      Capillary Refill: Capillary refill takes 2 to 3 seconds.      Comments: BLE with dressings intact   Neurological:      Mental Status: She is alert.   Psychiatric:         Mood and Affect: Affect is flat.         Assessment/Plan  Diagnoses and all orders for this visit:  Dysuria  Comments:  dip urine, if positive then send UA C&S  Type 2 diabetes mellitus with stage 4 chronic kidney disease, with long-term current use of insulin (Multi)  Comments:  dexcom started, cont with insulins, ozempic  Unable to care for self  Comments:  complex discharge planning discussed with patient and IDT           Electronically Signed By: RHONDA James   10/2/24  4:09 PM

## 2024-10-02 NOTE — PROGRESS NOTES
Subjective   Patient ID: Brittanie Smart is a 64 y.o. female who presents for Fatigue.    Following up with patient who states that she still feels fatigued after stopping the lexapro. She states that she thinks she may have a UTI because it burns with urination. She had labs yesterday which did have have an elevated WBC. She is sleeping well at night but states that she is also sleeping during the day. Her pain is well controlled with gabapentin. She states that her legs continue to bleed in areas but that the drainage has decreased a lot. She continues to participate in therapy but states that she has mixed results depending on the day. She has not been getting OOB on non therapy days. Discussed how this will make therapy harder the next time she has it.     Today we set up her Dexcom6. She was unable to apply the monitor to herself. Cece set up on her phone.     She is having stress over not being able to access her bank cece to pay her rent. She is to discharge next week and has her friend coming to learn dressings, transfers and diabetic supplies prior to discharge         Review of Systems   Respiratory:  Positive for shortness of breath.    Musculoskeletal:  Positive for gait problem.   Skin:  Positive for wound.   Neurological:  Positive for weakness.       Objective   /74   Pulse 78   Temp 36.9 °C (98.5 °F)   Resp 16   Wt 134 kg (295 lb 1.6 oz)   SpO2 96%   BMI 44.88 kg/m²     Physical Exam  Constitutional:       General: She is not in acute distress.     Appearance: She is obese. She is not ill-appearing.   HENT:      Mouth/Throat:      Mouth: Mucous membranes are moist.   Eyes:      Conjunctiva/sclera: Conjunctivae normal.   Pulmonary:      Effort: Pulmonary effort is normal.   Abdominal:      General: There is no distension.      Tenderness: There is no abdominal tenderness.   Musculoskeletal:      Comments: Johny lift for transfers   Skin:     General: Skin is warm and dry.      Capillary Refill:  Capillary refill takes 2 to 3 seconds.      Comments: BLE with dressings intact   Neurological:      Mental Status: She is alert.   Psychiatric:         Mood and Affect: Affect is flat.         Assessment/Plan   Diagnoses and all orders for this visit:  Dysuria  Comments:  dip urine, if positive then send UA C&S  Type 2 diabetes mellitus with stage 4 chronic kidney disease, with long-term current use of insulin (Multi)  Comments:  dexcom started, cont with insulins, ozempic  Unable to care for self  Comments:  complex discharge planning discussed with patient and IDT

## 2024-10-04 ASSESSMENT — ENCOUNTER SYMPTOMS
NERVOUS/ANXIOUS: 1
WOUND: 1
COUGH: 1
SHORTNESS OF BREATH: 1
WEAKNESS: 1

## 2024-10-04 NOTE — PROGRESS NOTES
Subjective   Patient ID: Brittanie Smart is a 64 y.o. female who presents for Fatigue.    Following up with patient who has a discharge planned for October 12. Met with her yesterday along with NEDA to discuss option regarding staying, medicaid process which she was interested in but wanted to have this discussed with her friend Bryant who lives in [place] and has assisted her with her medical decisions. Spoke with Bryant on the phone along with SW, ED and  for the facility. Bryant states that he will be staying with the patient upon discharge and will assume all care needs. Described Johny lift, wound care, appointments, medications and diabetic monitoring which he states he is will to provide. He will come the week prior to her discharge to learn all of this. Patient was unaware that he was going to do this.     Patient states that she is feeling much better off of the lexapro. Her pain remains well managed on gabapentin         Review of Systems   Respiratory:  Positive for cough and shortness of breath.    Cardiovascular:  Positive for leg swelling.   Skin:  Positive for wound.   Neurological:  Positive for weakness.   Psychiatric/Behavioral:  The patient is nervous/anxious.        Objective   There were no vitals taken for this visit.    Physical Exam  Constitutional:       Appearance: She is obese.   HENT:      Mouth/Throat:      Mouth: Mucous membranes are moist.   Eyes:      Conjunctiva/sclera: Conjunctivae normal.   Pulmonary:      Effort: Pulmonary effort is normal.   Skin:     General: Skin is warm and dry.      Comments: Dressings intact   Neurological:      Mental Status: She is alert.   Psychiatric:         Mood and Affect: Affect is flat.         Assessment/Plan   Diagnoses and all orders for this visit:  Lethargy  Comments:  improved off lexapro and on lowered dose of gabapentin  Unable to care for self  Comments:  extensive communication over complicated discharge plan, will  follow up next week regarding plans

## 2024-10-07 ENCOUNTER — TELEPHONE (OUTPATIENT)
Dept: PRIMARY CARE | Facility: CLINIC | Age: 64
End: 2024-10-07
Payer: COMMERCIAL

## 2024-10-07 RX ORDER — FLUTICASONE PROPIONATE 50 MCG
1 SPRAY, SUSPENSION (ML) NASAL DAILY
COMMUNITY

## 2024-10-08 ENCOUNTER — NURSING HOME VISIT (OUTPATIENT)
Dept: POST ACUTE CARE | Facility: EXTERNAL LOCATION | Age: 64
End: 2024-10-08
Payer: COMMERCIAL

## 2024-10-08 DIAGNOSIS — S81.809D MULTIPLE OPENS WOUND OF LOWER EXTREMITY, UNSPECIFIED LATERALITY, SUBSEQUENT ENCOUNTER: ICD-10-CM

## 2024-10-08 DIAGNOSIS — Z78.9 UNABLE TO CARE FOR SELF: ICD-10-CM

## 2024-10-08 DIAGNOSIS — N18.4 CKD (CHRONIC KIDNEY DISEASE) STAGE 4, GFR 15-29 ML/MIN (MULTI): ICD-10-CM

## 2024-10-08 DIAGNOSIS — N30.00 ACUTE CYSTITIS WITHOUT HEMATURIA: Primary | ICD-10-CM

## 2024-10-08 PROCEDURE — 99310 SBSQ NF CARE HIGH MDM 45: CPT | Performed by: NURSE PRACTITIONER

## 2024-10-08 NOTE — LETTER
Patient: Brittanie Smart  : 1960    Encounter Date: 10/08/2024    Subjective  Patient ID: Brittanie Smart is a 64 y.o. female who presents for UTI.    Following up with patient who had a UA C&S done over the weekend which was positive for a UTI. She has not started antibiotics yet. She state that it still burns with urination. She is incontinent of urine and wears briefs.    She had her lasix held for 3 days due to worsened renal function. Her repeat labs yesterday showed that her GFR remained the same following having the diuretic held. She had elevated potassium and restarted her lasix and received an additional dose. She has been urinating without difficulty and states that she feels that she is emptying her bladder. She was seen by renal when she was hospitalized in  but has not followed up due to insurance. She now has insurance which is accepted in [place] and is open to having specialists scheduled.    She states that she now has a blister on her left leg where she had had wound previously. She states that she was told that her wounds looked good but that she had the blisters. She denies increased pain from the dressing changes. Topical wound care is in place and patient is followed by wound CNP at facility.     Patient states that she had a few episodes of loose stool over the weekend. She states that she has no abdominal pain but that she feels bloated and has been passing gas. She took imodium for this and states that she is hoping that it works. She denies fever and chills and sick contacts    Extensive discharge planning discussion held with patient, SW and her friend Bryant who states that he will be caring for her upon discharge. Explained complexity of medical diagnoses along with current changes with UTI and worsened renal function, changes to wounds. Explained need for specialists to manage diagnoses upon discharge. Bryant states that he is here to learn wound care, how to transfer patient,  "medications and that he is planning to stay \"as long as it takes\". During the course of the day several changes were mentioned with the discharge plan per Bryant which were communicated to me from the  including his desire to drive patient to where he lives in [place] once patient discharges on Saturday 10/12/24. Did discuss the option of applying for Medicaid and staying at facility to receive further care for her complex medical issues.     >60 minutes spent in care coordination, wound care, discharge meeting         Review of Systems   Gastrointestinal:  Positive for diarrhea.   Genitourinary:  Positive for dysuria.   Musculoskeletal:  Positive for gait problem.   Skin:  Positive for wound.   Neurological:  Positive for weakness.   All other systems reviewed and are negative.      Objective  /67   Pulse 77   Temp 36.9 °C (98.5 °F)   Resp 16   Wt 133 kg (293 lb 6.4 oz)   SpO2 96%   BMI 44.62 kg/m²     Physical Exam  Constitutional:       Appearance: She is obese.   HENT:      Mouth/Throat:      Mouth: Mucous membranes are moist.   Cardiovascular:      Rate and Rhythm: Normal rate and regular rhythm.   Pulmonary:      Effort: Pulmonary effort is normal.      Breath sounds: Examination of the right-upper field reveals decreased breath sounds. Examination of the left-upper field reveals decreased breath sounds. Examination of the right-middle field reveals decreased breath sounds. Examination of the left-middle field reveals decreased breath sounds. Examination of the right-lower field reveals decreased breath sounds. Examination of the left-lower field reveals decreased breath sounds. Decreased breath sounds present.   Abdominal:      General: Bowel sounds are normal. There is distension.      Tenderness: There is no abdominal tenderness.   Musculoskeletal:      Comments: Johny lift to WC, can roll side to side in bed with assistance   Skin:     General: Skin is warm and dry.      Comments: Multiple " blisters present on bilateral LE, small open areas with calcium alginate in place, ABD pads and Kerlix wraps wet with serous fluid and changed during assessment   Neurological:      Mental Status: She is alert.   Psychiatric:         Mood and Affect: Affect is flat.         Assessment/Plan  Diagnoses and all orders for this visit:  Acute cystitis without hematuria  Comments:  Cipro 500mg po BID for 7 days, acidophilus 1 tab po BID for 7 days  CKD (chronic kidney disease) stage 4, GFR 15-29 ml/min (Multi)  Comments:  repeat labs on 10/9/24, monitor potassium, schedule appt with renal  Multiple opens wound of lower extremity, unspecified laterality, subsequent encounter  Comments:  cont with topical wound care, Proheal for wound healing, cont with wound CNP visits  Unable to care for self  Comments:  D/C planned for 10/12/24, will f/u with patient on 10/9/24 to [facility] traveling out of state, DME ordered through            Electronically Signed By: ACE James-CNP   10/9/24  6:06 PM

## 2024-10-09 ENCOUNTER — NURSING HOME VISIT (OUTPATIENT)
Dept: POST ACUTE CARE | Facility: EXTERNAL LOCATION | Age: 64
End: 2024-10-09
Payer: COMMERCIAL

## 2024-10-09 VITALS
WEIGHT: 293 LBS | TEMPERATURE: 98.5 F | RESPIRATION RATE: 16 BRPM | SYSTOLIC BLOOD PRESSURE: 127 MMHG | HEART RATE: 77 BPM | BODY MASS INDEX: 44.62 KG/M2 | DIASTOLIC BLOOD PRESSURE: 67 MMHG | OXYGEN SATURATION: 96 %

## 2024-10-09 DIAGNOSIS — R06.02 SOB (SHORTNESS OF BREATH): Primary | ICD-10-CM

## 2024-10-09 DIAGNOSIS — N18.4 ACUTE RENAL FAILURE SUPERIMPOSED ON STAGE 4 CHRONIC KIDNEY DISEASE, UNSPECIFIED ACUTE RENAL FAILURE TYPE (MULTI): ICD-10-CM

## 2024-10-09 DIAGNOSIS — L30.8 DERMATITIS ASSOCIATED WITH MOISTURE: ICD-10-CM

## 2024-10-09 DIAGNOSIS — Z78.9 UNABLE TO CARE FOR SELF: ICD-10-CM

## 2024-10-09 DIAGNOSIS — N17.9 ACUTE RENAL FAILURE SUPERIMPOSED ON STAGE 4 CHRONIC KIDNEY DISEASE, UNSPECIFIED ACUTE RENAL FAILURE TYPE (MULTI): ICD-10-CM

## 2024-10-09 PROCEDURE — 99310 SBSQ NF CARE HIGH MDM 45: CPT | Performed by: NURSE PRACTITIONER

## 2024-10-09 ASSESSMENT — ENCOUNTER SYMPTOMS
DYSURIA: 1
DIARRHEA: 1
WOUND: 1
WEAKNESS: 1

## 2024-10-09 NOTE — LETTER
Patient: Brittanie Smart  : 1960    Encounter Date: 10/09/2024    Subjective  Patient ID: Brittanie Smart is a 64 y.o. female who presents for Shortness of Breath.    Asked to see patient who is complaining of a sore on her tailbone. She states that it burns when she lays on it or sit up in her WC. She state that staff has been putting cream on it daily. She is incontinent and wears briefs for this.     She is complaining of wheezing and SOB. She says that its worse when she rolls in bed. She wears O2 continuously and takes lasix daily. This was held due to worsened renal function but has since been restarted. She denies cough. She has not taken anything for this but is asking for PRN Robitussin.    She continues on cipro for treatment of a UTI. She states that her renal function worsened when she had a UTI before. She has been drinking fluids and states that the burning with urination has improved.     Discharge planning discussion involving patient and friend Bryant along with NEDA. Bryant stated that he wants to discharge with patient on Saturday then drive her to [place] the same day. Discussed all barriers to this such as having no insurance accepted in [place], no [place] PCP or DME that can travel with the patient in addition to the long car ride increasing risk for DVT and further skin breakdown. Throughout the day met with NEDA who had updates and changes to the discharge plan from Bryant.     Patient is confined to bed without lift,  needs this to transfer from bed to chair    >40 minutes spent in care coordination, wound care and discharge planning with IDT         Review of Systems   Respiratory:  Positive for shortness of breath and wheezing.    Musculoskeletal:  Positive for gait problem.   Skin:  Positive for wound.       Objective  /67   Pulse 77   Temp 36.9 °C (98.5 °F)   Resp 18   Wt 133 kg (293 lb 6.4 oz)   SpO2 98%   BMI 44.62 kg/m²     Physical Exam  Constitutional:       General: She is  not in acute distress.     Appearance: She is obese. She is not ill-appearing.   HENT:      Mouth/Throat:      Mouth: Mucous membranes are moist.   Eyes:      Conjunctiva/sclera: Conjunctivae normal.   Pulmonary:      Effort: Pulmonary effort is normal.      Breath sounds: Examination of the right-upper field reveals decreased breath sounds. Examination of the left-upper field reveals decreased breath sounds. Examination of the right-middle field reveals decreased breath sounds. Examination of the left-middle field reveals decreased breath sounds. Decreased breath sounds present.   Abdominal:      General: Bowel sounds are normal. There is no distension.      Tenderness: There is no abdominal tenderness.   Musculoskeletal:      Comments: Johny lift to    Skin:     General: Skin is warm and dry.      Comments: All open area on left buttock, covered with barrier cream applied to area, BLE with ACE wraps intact   Neurological:      General: No focal deficit present.      Mental Status: She is alert.   Psychiatric:         Mood and Affect: Affect is flat.         Assessment/Plan  Diagnoses and all orders for this visit:  SOB (shortness of breath)  Comments:  CXR  Acute renal failure superimposed on stage 4 chronic kidney disease, unspecified acute renal failure type (Multi)  Comments:  labs tomorrow, encourage fluids, cont with antibiotics for UTI  Dermatitis associated with moisture  Comments:  Triad to affected area  Unable to care for self  Comments:  Johny lift for transfers, cont supporting with discharge plans           Electronically Signed By: RHONDA James   10/10/24 12:08 PM

## 2024-10-09 NOTE — PROGRESS NOTES
"Subjective   Patient ID: Brittanie Smart is a 64 y.o. female who presents for UTI.    Following up with patient who had a UA C&S done over the weekend which was positive for a UTI. She has not started antibiotics yet. She state that it still burns with urination. She is incontinent of urine and wears briefs.    She had her lasix held for 3 days due to worsened renal function. Her repeat labs yesterday showed that her GFR remained the same following having the diuretic held. She had elevated potassium and restarted her lasix and received an additional dose. She has been urinating without difficulty and states that she feels that she is emptying her bladder. She was seen by renal when she was hospitalized in June but has not followed up due to insurance. She now has insurance which is accepted in [place] and is open to having specialists scheduled.    She states that she now has a blister on her left leg where she had had wound previously. She states that she was told that her wounds looked good but that she had the blisters. She denies increased pain from the dressing changes. Topical wound care is in place and patient is followed by wound CNP at facility.     Patient states that she had a few episodes of loose stool over the weekend. She states that she has no abdominal pain but that she feels bloated and has been passing gas. She took imodium for this and states that she is hoping that it works. She denies fever and chills and sick contacts    Extensive discharge planning discussion held with patient, SW and her friend Bryant who states that he will be caring for her upon discharge. Explained complexity of medical diagnoses along with current changes with UTI and worsened renal function, changes to wounds. Explained need for specialists to manage diagnoses upon discharge. Bryant states that he is here to learn wound care, how to transfer patient, medications and that he is planning to stay \"as long as it takes\". During " the course of the day several changes were mentioned with the discharge plan per Bryant which were communicated to me from the SW including his desire to drive patient to where he lives in [place] once patient discharges on Saturday 10/12/24. Did discuss the option of applying for Medicaid and staying at facility to receive further care for her complex medical issues.     >60 minutes spent in care coordination, wound care, discharge meeting         Review of Systems   Gastrointestinal:  Positive for diarrhea.   Genitourinary:  Positive for dysuria.   Musculoskeletal:  Positive for gait problem.   Skin:  Positive for wound.   Neurological:  Positive for weakness.   All other systems reviewed and are negative.      Objective   /67   Pulse 77   Temp 36.9 °C (98.5 °F)   Resp 16   Wt 133 kg (293 lb 6.4 oz)   SpO2 96%   BMI 44.62 kg/m²     Physical Exam  Constitutional:       Appearance: She is obese.   HENT:      Mouth/Throat:      Mouth: Mucous membranes are moist.   Cardiovascular:      Rate and Rhythm: Normal rate and regular rhythm.   Pulmonary:      Effort: Pulmonary effort is normal.      Breath sounds: Examination of the right-upper field reveals decreased breath sounds. Examination of the left-upper field reveals decreased breath sounds. Examination of the right-middle field reveals decreased breath sounds. Examination of the left-middle field reveals decreased breath sounds. Examination of the right-lower field reveals decreased breath sounds. Examination of the left-lower field reveals decreased breath sounds. Decreased breath sounds present.   Abdominal:      General: Bowel sounds are normal. There is distension.      Tenderness: There is no abdominal tenderness.   Musculoskeletal:      Comments: Johny lift to WC, can roll side to side in bed with assistance   Skin:     General: Skin is warm and dry.      Comments: Multiple blisters present on bilateral LE, small open areas with calcium alginate in  place, ABD pads and Kerlix wraps wet with serous fluid and changed during assessment   Neurological:      Mental Status: She is alert.   Psychiatric:         Mood and Affect: Affect is flat.         Assessment/Plan   Diagnoses and all orders for this visit:  Acute cystitis without hematuria  Comments:  Cipro 500mg po BID for 7 days, acidophilus 1 tab po BID for 7 days  CKD (chronic kidney disease) stage 4, GFR 15-29 ml/min (Multi)  Comments:  repeat labs on 10/9/24, monitor potassium, schedule appt with renal  Multiple opens wound of lower extremity, unspecified laterality, subsequent encounter  Comments:  cont with topical wound care, Proheal for wound healing, cont with wound CNP visits  Unable to care for self  Comments:  D/C planned for 10/12/24, will f/u with patient on 10/9/24 to [place], DME ordered through SW

## 2024-10-10 VITALS
RESPIRATION RATE: 18 BRPM | OXYGEN SATURATION: 98 % | SYSTOLIC BLOOD PRESSURE: 127 MMHG | HEART RATE: 77 BPM | DIASTOLIC BLOOD PRESSURE: 67 MMHG | WEIGHT: 293 LBS | TEMPERATURE: 98.5 F | BODY MASS INDEX: 44.62 KG/M2

## 2024-10-10 ASSESSMENT — ENCOUNTER SYMPTOMS
WHEEZING: 1
SHORTNESS OF BREATH: 1
WOUND: 1

## 2024-10-10 NOTE — PROGRESS NOTES
Subjective   Patient ID: Brittanie Smart is a 64 y.o. female who presents for Shortness of Breath.    Asked to see patient who is complaining of a sore on her tailbone. She states that it burns when she lays on it or sit up in her WC. She state that staff has been putting cream on it daily. She is incontinent and wears briefs for this.     She is complaining of wheezing and SOB. She says that its worse when she rolls in bed. She wears O2 continuously and takes lasix daily. This was held due to worsened renal function but has since been restarted. She denies cough. She has not taken anything for this but is asking for PRN Robitussin.    She continues on cipro for treatment of a UTI. She states that her renal function worsened when she had a UTI before. She has been drinking fluids and states that the burning with urination has improved.     Discharge planning discussion involving patient and friend Bryant along with NEDA. Bryant stated that he wants to discharge with patient on Saturday then drive her to [place] the same day. Discussed all barriers to this such as having no insurance accepted in [place], no [place] PCP or DME that can travel with the patient in addition to the long car ride increasing risk for DVT and further skin breakdown. Throughout the day met with NEDA who had updates and changes to the discharge plan from Bryant.     Patient is confined to bed without lift,  needs this to transfer from bed to chair    >40 minutes spent in care coordination, wound care and discharge planning with IDT         Review of Systems   Respiratory:  Positive for shortness of breath and wheezing.    Musculoskeletal:  Positive for gait problem.   Skin:  Positive for wound.       Objective   /67   Pulse 77   Temp 36.9 °C (98.5 °F)   Resp 18   Wt 133 kg (293 lb 6.4 oz)   SpO2 98%   BMI 44.62 kg/m²     Physical Exam  Constitutional:       General: She is not in acute distress.     Appearance: She is obese. She is not  ill-appearing.   HENT:      Mouth/Throat:      Mouth: Mucous membranes are moist.   Eyes:      Conjunctiva/sclera: Conjunctivae normal.   Pulmonary:      Effort: Pulmonary effort is normal.      Breath sounds: Examination of the right-upper field reveals decreased breath sounds. Examination of the left-upper field reveals decreased breath sounds. Examination of the right-middle field reveals decreased breath sounds. Examination of the left-middle field reveals decreased breath sounds. Decreased breath sounds present.   Abdominal:      General: Bowel sounds are normal. There is no distension.      Tenderness: There is no abdominal tenderness.   Musculoskeletal:      Comments: Johny lift to    Skin:     General: Skin is warm and dry.      Comments: All open area on left buttock, covered with barrier cream applied to area, BLE with ACE wraps intact   Neurological:      General: No focal deficit present.      Mental Status: She is alert.   Psychiatric:         Mood and Affect: Affect is flat.         Assessment/Plan   Diagnoses and all orders for this visit:  SOB (shortness of breath)  Comments:  CXR  Acute renal failure superimposed on stage 4 chronic kidney disease, unspecified acute renal failure type (Multi)  Comments:  labs tomorrow, encourage fluids, cont with antibiotics for UTI  Dermatitis associated with moisture  Comments:  Triad to affected area  Unable to care for self  Comments:  Johny lift for transfers, cont supporting with discharge plans

## 2025-02-06 NOTE — TELEPHONE ENCOUNTER
Awaiting call from NEDA stubbs at [facility] for information on discharge for this patient who will be seen by [facility] program.   
What Is The Reason For Today's Visit?: Full Body Skin Examination